# Patient Record
Sex: MALE | Race: WHITE | NOT HISPANIC OR LATINO | Employment: OTHER | URBAN - METROPOLITAN AREA
[De-identification: names, ages, dates, MRNs, and addresses within clinical notes are randomized per-mention and may not be internally consistent; named-entity substitution may affect disease eponyms.]

---

## 2017-01-06 ENCOUNTER — OFFICE VISIT (OUTPATIENT)
Dept: CARDIOLOGY | Facility: CLINIC | Age: 77
End: 2017-01-06
Payer: MEDICARE

## 2017-01-06 ENCOUNTER — TELEPHONE (OUTPATIENT)
Dept: ELECTROPHYSIOLOGY | Facility: CLINIC | Age: 77
End: 2017-01-06

## 2017-01-06 VITALS
HEIGHT: 71 IN | DIASTOLIC BLOOD PRESSURE: 76 MMHG | WEIGHT: 219.56 LBS | SYSTOLIC BLOOD PRESSURE: 139 MMHG | HEART RATE: 51 BPM | BODY MASS INDEX: 30.74 KG/M2

## 2017-01-06 DIAGNOSIS — I25.810 CORONARY ARTERY DISEASE INVOLVING AUTOLOGOUS VEIN CORONARY BYPASS GRAFT WITHOUT ANGINA PECTORIS: ICD-10-CM

## 2017-01-06 DIAGNOSIS — I48.3 TYPICAL ATRIAL FLUTTER: Primary | ICD-10-CM

## 2017-01-06 DIAGNOSIS — I25.5 ISCHEMIC CARDIOMYOPATHY: Chronic | ICD-10-CM

## 2017-01-06 DIAGNOSIS — I25.2 HISTORY OF MYOCARDIAL INFARCTION: ICD-10-CM

## 2017-01-06 DIAGNOSIS — Z95.810 ICD (IMPLANTABLE CARDIOVERTER-DEFIBRILLATOR) IN PLACE: Chronic | ICD-10-CM

## 2017-01-06 DIAGNOSIS — I48.0 PAF (PAROXYSMAL ATRIAL FIBRILLATION): ICD-10-CM

## 2017-01-06 DIAGNOSIS — I50.42 CHRONIC COMBINED SYSTOLIC AND DIASTOLIC HEART FAILURE: ICD-10-CM

## 2017-01-06 DIAGNOSIS — Z95.1 S/P CABG X 4: ICD-10-CM

## 2017-01-06 DIAGNOSIS — I10 ESSENTIAL HYPERTENSION: ICD-10-CM

## 2017-01-06 PROCEDURE — 99214 OFFICE O/P EST MOD 30 MIN: CPT | Mod: S$GLB,,, | Performed by: INTERNAL MEDICINE

## 2017-01-06 PROCEDURE — 1157F ADVNC CARE PLAN IN RCRD: CPT | Mod: S$GLB,,, | Performed by: INTERNAL MEDICINE

## 2017-01-06 PROCEDURE — 1159F MED LIST DOCD IN RCRD: CPT | Mod: S$GLB,,, | Performed by: INTERNAL MEDICINE

## 2017-01-06 PROCEDURE — 99499 UNLISTED E&M SERVICE: CPT | Mod: S$GLB,,, | Performed by: INTERNAL MEDICINE

## 2017-01-06 PROCEDURE — 99999 PR PBB SHADOW E&M-EST. PATIENT-LVL III: CPT | Mod: PBBFAC,,, | Performed by: INTERNAL MEDICINE

## 2017-01-06 PROCEDURE — 3075F SYST BP GE 130 - 139MM HG: CPT | Mod: S$GLB,,, | Performed by: INTERNAL MEDICINE

## 2017-01-06 PROCEDURE — 1126F AMNT PAIN NOTED NONE PRSNT: CPT | Mod: S$GLB,,, | Performed by: INTERNAL MEDICINE

## 2017-01-06 PROCEDURE — 93000 ELECTROCARDIOGRAM COMPLETE: CPT | Mod: S$GLB,,, | Performed by: INTERNAL MEDICINE

## 2017-01-06 PROCEDURE — 1160F RVW MEDS BY RX/DR IN RCRD: CPT | Mod: S$GLB,,, | Performed by: INTERNAL MEDICINE

## 2017-01-06 PROCEDURE — 3078F DIAST BP <80 MM HG: CPT | Mod: S$GLB,,, | Performed by: INTERNAL MEDICINE

## 2017-01-06 NOTE — TELEPHONE ENCOUNTER
----- Message from Morris Alejandre MD sent at 1/5/2017  2:29 PM CST -----  Monitor revealed no significant arrhythmias please relay to patient

## 2017-01-06 NOTE — PROGRESS NOTES
Subjective:    Patient ID:  Ankit Ruff is a 76 y.o. male who presents for follow-up of Atrial Fibrillation (post op f/u - RFA - 10/24/2016) and NSVT      HPI 75 yo male with atrial flutter, CAD, ischemic cardiomyopathy, PCI, CHF, ICD, NSVT.  He is a .  Developed atrial arrhythmias in 2013. Notes indicate atrial fibrillation, ECG available for review c/w atrial flutter. Underwent DCCV 3/13, and did well for extended period. More recently developed recurrence. ECG c/w isthmus-dependent atrial flutter.  Echo 7/8/16 EF 25% PASP 50 mm Hg  Single chamber ICD implanted 10/27/10, rt sided. Reports abnormal lt sided venous anatomy (? Persistent lt SVC).  RFA 10/24/16 Isthmus dependent atrial flutter confirmed, RFA performed.  Event monitor with auto trigger 6 weeks later negative.  Continues to feel well.  Thinks he may feel slightly better overall, but this is a slight difference.  ECG reveals nsr.    Review of Systems   Constitution: Negative. Negative for weakness and malaise/fatigue.   Cardiovascular: Negative for chest pain, dyspnea on exertion, irregular heartbeat, leg swelling, near-syncope, orthopnea, palpitations, paroxysmal nocturnal dyspnea and syncope.   Respiratory: Negative for cough and shortness of breath.    Neurological: Negative for dizziness and light-headedness.   All other systems reviewed and are negative.       Objective:    Physical Exam   Constitutional: He is oriented to person, place, and time. He appears well-developed and well-nourished.   Eyes: Conjunctivae are normal. No scleral icterus.   Neck: No JVD present. No tracheal deviation present.   Cardiovascular: Normal rate, regular rhythm and normal heart sounds.  PMI is not displaced.    Pulmonary/Chest: Effort normal and breath sounds normal. No respiratory distress.   Abdominal: Soft. There is no hepatosplenomegaly. There is no tenderness.   Musculoskeletal: He exhibits no edema (lower extremity) or tenderness.   Neurological:  He is alert and oriented to person, place, and time.   Skin: Skin is warm and dry. No rash noted.   Psychiatric: He has a normal mood and affect. His behavior is normal.         Assessment:       1. Typical atrial flutter    2. Chronic combined systolic and diastolic heart failure    3. Ischemic cardiomyopathy    4. Essential hypertension    5. Coronary artery disease involving autologous vein coronary bypass graft without angina pectoris    6. S/P CABG x 4    7. History of myocardial infarction    8. ICD (implantable cardioverter-defibrillator) in place         Plan:           Doing great.  Discontinue Xarelto.  Event monitor with auto-trigger in one year, f/u upon completion.

## 2017-01-06 NOTE — TELEPHONE ENCOUNTER
Informed Pt of 30 day monitor results stating that there are no significant arrhythmias. He verbalized understanding and denied further questions, needs, and concerns.

## 2017-01-11 ENCOUNTER — PATIENT MESSAGE (OUTPATIENT)
Dept: CARDIOLOGY | Facility: CLINIC | Age: 77
End: 2017-01-11

## 2017-01-11 DIAGNOSIS — I48.0 PAF (PAROXYSMAL ATRIAL FIBRILLATION): Primary | ICD-10-CM

## 2017-01-11 RX ORDER — PANTOPRAZOLE SODIUM 40 MG/1
40 TABLET, DELAYED RELEASE ORAL DAILY
Qty: 90 TABLET | Refills: 1 | Status: SHIPPED | OUTPATIENT
Start: 2017-01-11 | End: 2017-06-05 | Stop reason: SDUPTHER

## 2017-02-06 ENCOUNTER — CLINICAL SUPPORT (OUTPATIENT)
Dept: CARDIOLOGY | Facility: CLINIC | Age: 77
End: 2017-02-06
Payer: MEDICARE

## 2017-02-06 DIAGNOSIS — Z95.810 CARDIAC DEFIBRILLATOR IN SITU: ICD-10-CM

## 2017-02-06 DIAGNOSIS — I50.9 ACUTE ON CHRONIC CONGESTIVE HEART FAILURE, UNSPECIFIED CONGESTIVE HEART FAILURE TYPE: ICD-10-CM

## 2017-02-06 DIAGNOSIS — I25.5 ISCHEMIC CARDIOMYOPATHY: ICD-10-CM

## 2017-02-06 PROCEDURE — 93295 DEV INTERROG REMOTE 1/2/MLT: CPT | Mod: S$GLB,,, | Performed by: INTERNAL MEDICINE

## 2017-02-06 PROCEDURE — 93296 REM INTERROG EVL PM/IDS: CPT | Mod: S$GLB,,, | Performed by: INTERNAL MEDICINE

## 2017-02-12 RX ORDER — GABAPENTIN 300 MG/1
CAPSULE ORAL
Qty: 180 CAPSULE | Refills: 0 | Status: SHIPPED | OUTPATIENT
Start: 2017-02-12 | End: 2017-04-03 | Stop reason: SDUPTHER

## 2017-02-23 RX ORDER — TRAMADOL HYDROCHLORIDE 50 MG/1
50 TABLET ORAL 2 TIMES DAILY
Qty: 60 TABLET | Refills: 0 | Status: SHIPPED | OUTPATIENT
Start: 2017-02-23 | End: 2017-05-29 | Stop reason: SDUPTHER

## 2017-03-16 ENCOUNTER — OFFICE VISIT (OUTPATIENT)
Dept: DERMATOLOGY | Facility: CLINIC | Age: 77
End: 2017-03-16
Payer: MEDICARE

## 2017-03-16 VITALS — BODY MASS INDEX: 27.58 KG/M2 | HEIGHT: 71 IN | WEIGHT: 197 LBS | RESPIRATION RATE: 16 BRPM

## 2017-03-16 DIAGNOSIS — L57.0 MULTIPLE ACTINIC KERATOSES: Primary | ICD-10-CM

## 2017-03-16 DIAGNOSIS — Z85.828 PERSONAL HISTORY OF OTHER MALIGNANT NEOPLASM OF SKIN: ICD-10-CM

## 2017-03-16 DIAGNOSIS — D48.5 NEOPLASM OF UNCERTAIN BEHAVIOR OF SKIN: ICD-10-CM

## 2017-03-16 DIAGNOSIS — Z12.83 SKIN EXAM, SCREENING FOR CANCER: ICD-10-CM

## 2017-03-16 PROCEDURE — 88305 TISSUE EXAM BY PATHOLOGIST: CPT | Performed by: PATHOLOGY

## 2017-03-16 PROCEDURE — 1126F AMNT PAIN NOTED NONE PRSNT: CPT | Mod: S$GLB,,, | Performed by: DERMATOLOGY

## 2017-03-16 PROCEDURE — 1160F RVW MEDS BY RX/DR IN RCRD: CPT | Mod: S$GLB,,, | Performed by: DERMATOLOGY

## 2017-03-16 PROCEDURE — 1157F ADVNC CARE PLAN IN RCRD: CPT | Mod: S$GLB,,, | Performed by: DERMATOLOGY

## 2017-03-16 PROCEDURE — 17004 DESTROY PREMAL LESIONS 15/>: CPT | Mod: S$GLB,,, | Performed by: DERMATOLOGY

## 2017-03-16 PROCEDURE — 1159F MED LIST DOCD IN RCRD: CPT | Mod: S$GLB,,, | Performed by: DERMATOLOGY

## 2017-03-16 PROCEDURE — 99999 PR PBB SHADOW E&M-EST. PATIENT-LVL III: CPT | Mod: PBBFAC,,, | Performed by: DERMATOLOGY

## 2017-03-16 PROCEDURE — 99213 OFFICE O/P EST LOW 20 MIN: CPT | Mod: 25,S$GLB,, | Performed by: DERMATOLOGY

## 2017-03-16 PROCEDURE — 11100 PR BIOPSY OF SKIN LESION: CPT | Mod: 59,S$GLB,, | Performed by: DERMATOLOGY

## 2017-03-16 RX ORDER — CEFDINIR 300 MG/1
CAPSULE ORAL
COMMUNITY
Start: 2017-02-27 | End: 2017-05-29 | Stop reason: ALTCHOICE

## 2017-03-16 NOTE — PROGRESS NOTES
Subjective:       Patient ID:  Ankit Ruff is a 76 y.o. male who presents for   Chief Complaint   Patient presents with    Follow-up     HPI Comments: High risk patient here for f/u Last seen 8-15-16   Here to evaluate for the development of new lesions.     Surgery 6/21/2016 with Dr. Richardson for Mohs surgery left cheek SCC    Phx SK  Phx AK's - cryo tx   Implantable cardioverter-defibrillator) in place        FINAL PATHOLOGIC DATED 6-6-16   1. Skin, left medial cheek, shave biopsy:  - ACTINIC KERATOSIS.  - THE LESION IS ULCERATED.  - THE ATYPICAL SQUAMOUS EPITHELIUM EXTENDS FOCALLY TO THE BASE OF THE BIOPSY, AND AN  UNDERLYING INVASIVE SQUAMOUS CELL CARCINOMA CANNOT BE EXCLUDED.  MICROSCOPIC DESCRIPTION: Sections show atypia within the lowermost epidermal layers associated with  hyper- and parakeratosis and solar elastosis. There is epidermal ulceration with underlying dermal necrosis and  inflammation. Multiple levels were examined. Pancytokeratin immunohistochemical stain (AE1/AE3) fails to reveal  definitive evidence of invasive carcinoma. Appropriately reactive controls were reviewed.  Diagnosed by: Adriana Baum M.D.  (Electronically Signed: 2016-06-10 11:24:04)    History numerous NMSC , AKs s/p cryo at last visit.  SCC in situ on the right lower nose. In light of the absence of any evidence of residual squamous cell carcinoma in situ at the biopsy site and given the small apparent size of the lesion, the pt defered treatment at this time.    Hx of skin cancer had Mohs surgeries by dr Richardson 9/2015, SCC.  History PDT in past, unhappy with experience.    History mohs left neck/postauricular. States firm scar since initial surgery date - stable      growth on left cheek, rough, weeks, asx, no tx.     Scaly area on nose, asx, no tx. Weeks.     Review of Systems   Skin: Positive for dry skin. Negative for daily sunscreen use, activity-related sunscreen use, sensitivity to antibiotic ointment,  sensitivity to bandage adhesive and tendency to form keloidal scars.   Hematologic/Lymphatic: Bruises/bleeds easily.        Objective:    Physical Exam   Constitutional: He appears well-developed and well-nourished. No distress.   Neurological: He is alert and oriented to person, place, and time. He is not disoriented.   Psychiatric: He has a normal mood and affect.   Skin:   Areas Examined (abnormalities noted in diagram):   Scalp / Hair Palpated and Inspected  Head / Face Inspection Performed  Neck Inspection Performed  Chest / Axilla Inspection Performed  Abdomen Inspection Performed  Back Inspection Performed  RUE Inspected  LUE Inspection Performed  RLE Inspected  LLE Inspection Performed  Nails and Digits Inspection Performed                   Diagram Legend     Erythematous scaling macule/papule c/w actinic keratosis       Vascular papule c/w angioma      Pigmented verrucoid papule/plaque c/w seborrheic keratosis      Yellow umbilicated papule c/w sebaceous hyperplasia      Irregularly shaped tan macule c/w lentigo     1-2 mm smooth white papules consistent with Milia      Movable subcutaneous cyst with punctum c/w epidermal inclusion cyst      Subcutaneous movable cyst c/w pilar cyst      Firm pink to brown papule c/w dermatofibroma      Pedunculated fleshy papule(s) c/w skin tag(s)      Evenly pigmented macule c/w junctional nevus     Mildly variegated pigmented, slightly irregular-bordered macule c/w mildly atypical nevus      Flesh colored to evenly pigmented papule c/w intradermal nevus       Pink pearly papule/plaque c/w basal cell carcinoma      Erythematous hyperkeratotic cursted plaque c/w SCC      Surgical scar with no sign of skin cancer recurrence      Open and closed comedones      Inflammatory papules and pustules      Verrucoid papule consistent consistent with wart     Erythematous eczematous patches and plaques     Dystrophic onycholytic nail with subungual debris c/w onychomycosis      Umbilicated papule    Erythematous-base heme-crusted tan verrucoid plaque consistent with inflamed seborrheic keratosis     Erythematous Silvery Scaling Plaque c/w Psoriasis     See annotation          Assessment / Plan:      Pathology Orders:      Normal Orders This Visit    Tissue Specimen To Pathology, Dermatology     Questions:    Directional Terms:  Other(comment)    Clinical information:  scc vs other    Specific Site:  left cheek        Multiple actinic keratoses  Cryosurgery Procedure Note    Verbal consent from the patient is obtained and the patient is aware of the precancerous quality and need for treatment of these lesions. Liquid nitrogen cryosurgery is applied to the 18 actinic keratoses, as detailed in the physical exam, to produce a freeze injury. The patient is aware that blisters may form and is instructed on wound care with gentle cleansing and use of vaseline ointment to keep moist until healed. The patient is supplied a handout on cryosurgery and is instructed to call if lesions do not completely resolve. Discussed risk postinflammatory pigmentary changes.       Neoplasm of uncertain behavior of skin  -     Tissue Specimen To Pathology, Dermatology  If biopsy reveals malignancy, will refer to Dr. Richardson for Mohs surgery consultation.    Shave biopsy procedure note:    Shave biopsy performed after verbal consent including risk of infection, scar, recurrence, need for additional treatment of site. Area prepped with alcohol, anesthetized with approximately 1.0cc of 1% lidocaine with epinephrine. Lesional tissue shaved with razor blade. Hemostasis achieved with application of aluminum chloride followed by hyfrecation. No complications. Dressing applied. Wound care explained.        Skin exam, screening for cancer  Area(s) of previous NMSC evaluated with no signs of recurrence.    Upper body skin examination performed today including at least 6 points as noted in physical examination. Suspicious  lesions noted.      Personal history of other malignant neoplasm of skin  Area(s) of previous NMSC evaluated with no signs of recurrence.    Upper body skin examination performed today including at least 6 points as noted in physical examination. Suspicious lesions noted.           Return in about 4 months (around 7/16/2017).

## 2017-03-29 ENCOUNTER — INITIAL CONSULT (OUTPATIENT)
Dept: DERMATOLOGY | Facility: CLINIC | Age: 77
End: 2017-03-29
Payer: MEDICARE

## 2017-03-29 VITALS
HEIGHT: 71 IN | BODY MASS INDEX: 27.44 KG/M2 | SYSTOLIC BLOOD PRESSURE: 114 MMHG | HEART RATE: 61 BPM | WEIGHT: 196 LBS | DIASTOLIC BLOOD PRESSURE: 70 MMHG

## 2017-03-29 DIAGNOSIS — C44.329 SQUAMOUS CELL CARCINOMA OF SKIN OF CHEEK: Primary | ICD-10-CM

## 2017-03-29 PROCEDURE — 1159F MED LIST DOCD IN RCRD: CPT | Mod: S$GLB,,, | Performed by: DERMATOLOGY

## 2017-03-29 PROCEDURE — 3074F SYST BP LT 130 MM HG: CPT | Mod: S$GLB,,, | Performed by: DERMATOLOGY

## 2017-03-29 PROCEDURE — 99213 OFFICE O/P EST LOW 20 MIN: CPT | Mod: S$GLB,,, | Performed by: DERMATOLOGY

## 2017-03-29 PROCEDURE — 1160F RVW MEDS BY RX/DR IN RCRD: CPT | Mod: S$GLB,,, | Performed by: DERMATOLOGY

## 2017-03-29 PROCEDURE — 1126F AMNT PAIN NOTED NONE PRSNT: CPT | Mod: S$GLB,,, | Performed by: DERMATOLOGY

## 2017-03-29 PROCEDURE — 99999 PR PBB SHADOW E&M-EST. PATIENT-LVL III: CPT | Mod: PBBFAC,,, | Performed by: DERMATOLOGY

## 2017-03-29 PROCEDURE — 1157F ADVNC CARE PLAN IN RCRD: CPT | Mod: S$GLB,,, | Performed by: DERMATOLOGY

## 2017-03-29 PROCEDURE — 3078F DIAST BP <80 MM HG: CPT | Mod: S$GLB,,, | Performed by: DERMATOLOGY

## 2017-03-29 NOTE — PROGRESS NOTES
ALLERGIES:  Penicillins    CHIEF COMPLAINT:  This 76 y.o. male comes for evaluation for Mohs' Micrographic Surgery, Fresh Tissue Technique, for treatment of a biopsy-proven squamous cell carcinoma on the left cheek. Consultation requested by Bridgett Singh MD.    HISTORY OF PRESENT ILLNESS:   Location: Left cheek  Duration: 3-4 months  Quality:  Context: status post biopsy by Bridgett Singh MD; path = squamous cell carcinoma ; pathology accession #[id number], [facility]     Prior Treatment: none  See also the handwritten notes/diagrams scanned to chart for additional details.    Defibrillator: Yes  Pacemaker: No  Artificial heart valves: No  Artificial joints: No    REVIEW OF SYSTEMS:   General: general health good  Skin: has previous history of skin cancer(s); prior Mohs surgery  CV: has no hypertension, no artificial valves; has a defibrillator; has atrial fibrillation; status post ablation; history of coronary artery bypass grafting and stents  Resp: has no respiratory problems  Endo: has no diabetes  Hem/Lymph: taking prescribed anticoagulants-ASA; has stopped Xarelto  Allergy/Immuno: has allergies as noted above  GI: has no history of hepatitis  MS: as noted above     PAST MEDICAL HISTORY:  Past Medical History:   Diagnosis Date    Anxiety     Arthritis     Atrial fibrillation     CAD (coronary artery disease)     Cataract     OU    CHF (congestive heart failure)     Defibrillator discharge     Heart failure     Hyperlipidemia     Hypertension     ICD (implantable cardiac defibrillator) in place     Ischemic cardiomyopathy     Myocardial infarction     Sciatica     had seen Dr. Amy Canela in the past    Squamous cell carcinoma     Left cheek 4-2016        PAST SURGICAL HISTORY:  Past Surgical History:   Procedure Laterality Date    APPENDECTOMY      CARDIAC SURGERY      FRACTURE SURGERY      HERNIA REPAIR      SKIN BIOPSY          SOCIAL HISTORY:  Dependencies: smoking status as  noted below  Social History   Substance Use Topics    Smoking status: Former Smoker     Types: Cigarettes     Quit date: 8/8/1977    Smokeless tobacco: Never Used    Alcohol use Yes      Comment: socially       PERTINENT MEDICATIONS:  See medications list.  Current Outpatient Prescriptions on File Prior to Visit   Medication Sig Dispense Refill    aspirin (ECOTRIN) 81 MG EC tablet Take 81 mg by mouth once daily.        carvedilol (COREG) 12.5 MG tablet TAKE 1 TABLET TWICE DAILY 180 tablet 3    cefdinir (OMNICEF) 300 MG capsule       citalopram (CELEXA) 10 MG tablet TAKE 1 TABLET EVERY DAY 90 tablet 3    gabapentin (NEURONTIN) 300 MG capsule TAKE 1 CAPSULE TWICE DAILY 180 capsule 0    meloxicam (MOBIC) 15 MG tablet TAKE 1 TABLET ONE TIME DAILY 90 tablet 1    pantoprazole (PROTONIX) 40 MG tablet Take 1 tablet (40 mg total) by mouth once daily. 90 tablet 1    rosuvastatin (CRESTOR) 10 MG tablet TAKE 1 TABLET ONE TIME DAILY 90 tablet 3    spironolactone (ALDACTONE) 25 MG tablet Take 1 tablet (25 mg total) by mouth once daily. Take half a tablet Monday, Wednesday and Friday 45 tablet 3    tizanidine (ZANAFLEX) 4 MG tablet TAKE 1/2 TABLET EVERY 8 HOURS 90 tablet 0    tramadol (ULTRAM) 50 mg tablet Take 1 tablet (50 mg total) by mouth 2 (two) times daily. 60 tablet 0     No current facility-administered medications on file prior to visit.        ALLERGIES:  Penicillins    EXAM:  See also the handwritten notes/diagrams scanned to chart for additional details.  Constitutional  General appearance: well-developed, well-nourished, well-kempt older white male    Eyes  Inspection of conjunctivae and lids reveals no abnormalities; sclerae anicteric  Neurologic/Psychiatric  Alert,  normal orientation to time, place, person  Normal mood and affect with no evidence of depression, anxiety, agitation  Skin: see photo(s)  Head: background marked solar damage to exposed areas of skin; in addition, inspection and/or  palpation reveals an approximately 5 mm eschar with an ill-defined area of surrounding erythema on the left malar cheek/lower eyelid which feels freely movable over the underlying tissues on palpation;  he confirmed this as the site of the prior biopsy; inferior to this on the cheek is a well-healed scar from his prior surgery  Neck: examination reveals marked chronic solar damage  Right upper extremity: examination reveals marked chronic solar damage  Left upper extremity: examination reveals marked chronic solar damage    ASSESSMENT: 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 In particular, the discussion included, but was not limited to, the following:    One alternative at this point would be to defer further treatment and observe the lesion. With small skin cancers of this kind, it is possible that a biopsy can be sufficient to definitively treat a small skin cancer of this kind. Alternatively, some skin cancers are slow growing and do not require immediate treatment. The potential advantage of this choice would be to avoid the need for possibly unnecessary additional surgery. Among the potential disadvantages of this would be the possibility of enlargement of the lesion, more extensive spread of the lesion or recurrence at a later date, which might necessitate a larger and more complex surgery.    Radiation treatment can be an effective treatment for this type of skin cancer. The usual course of treatment is every weekday for  several weeks. Local irritation will result from treatment, although no systemic side effects are expected. The potential advantage of radiation treatment is that it avoids the need for surgery. Among the disadvantages of radiation treatment are the length of treatment, the local inflammatory response, the absence of pathologic confirmation of the removal of the skin cancer, a possible increased risk of additional skin cancer in the treated area in later years, and a somewhat increased risk of recurrence at a later date.     Excisional surgery can be an effective treatment for this type of skin cancer. This would involve excision of the lesion with margin evaluation by submitting the specimen to a pathologist for either immediate marginal assessment via frozen section processing, or delayed marginal assessment by fixed-tissue processing. The potential advantage of this technique is that it offers a way of treating the lesion with some degree of histologic confirmation of tumor removal. Among the disadvantages of this treatment are the possible need for re-excision if marginal involvement is identified, a somewhat greater likelihood of recurrence as compared to Mohs' surgery because of the less comprehensive margin evaluation inherent in the technique, and the general potential risks of surgery, including allergic reactions to the anesthetic and other materials used, infection, injury to nerves in the area with consequent loss of sensation or muscle function, and scarring or distortion of surrounding structures.    Mohs' surgery is a very effective treatment for this type of skin cancer. The potential advantage of Mohs' surgery is that this technique offers the greatest possible certainty of knowing that the skin cancer has been completely removed, with the removal of the least amount of normal tissue. The potential disadvantages of Mohs' surgery include the duration of the surgery, the possible need for a separate  surgery for reconstruction following tumor removal, and scarring as a result. In addition, general potential risks of surgery as noted above also apply to treatment via Mohs' surgery.    In light of the ill-defined nature of this tumor and the location on the cheek/eyelid in an area of increased risk of recurrence,  Mohs' micrographic surgery was thought to be the most appropriate management choice, and this diagnosis is appropriate for treatment by Mohs' micrographic surgery.     Sufficient time was available for questions, and all questions were answered to his satisfaction. He fully understands the aims, risks, alternatives, and possible complications, and has elected to proceed with the surgery, and verbally consented to do so. The procedure will be scheduled in the near future.  Routine pre-op instructions were given to him.    The patient was instructed to continue ASA prior to surgery.    --------------------------------------  Note: some or all of this note may have been generated using voice recognition software and thus may contain grammatical and/or spelling errors.

## 2017-03-29 NOTE — LETTER
March 30, 2017      Bridgett Singh MD  [address] [facility] [address] [place] [address]           [facility]  [address] [facility] [address] [place] [address]  Phone: [phone]          Patient: Ankit Ruff   MR Number: [id number]   YOB: 1940   Date of Visit: 3/29/2017       Dear Dr. Bridgett Singh:    Thank you for referring Ankit Ruff to me for evaluation. Attached you will find relevant portions of my assessment and plan of care.    If you have questions, please do not hesitate to call me. I look forward to following Ankit Ruff along with you.    Sincerely,    Javier Richardson MD    Enclosure  CC:  No Recipients    If you would like to receive this communication electronically, please contact [email or web address] or [phone] to request more information on [employer] access.    For providers and/or their staff who would like to refer a patient to [facility], please contact us through our one-stop-shop provider referral line, [facility], at [phone].    If you feel you have received this communication in error or would no longer like to receive these types of communications, please e-mail [email or web address]

## 2017-03-29 NOTE — MR AVS SNAPSHOT
[place] [facility]  [address]  [facility] [address]  Phone: [phone]                  Ankit Ruff   3/29/2017 2:00 PM   Initial consult    Description:  Male : 1940   Provider:  Javier Richardson MD   Department:  [place] [facility]           Reason for Visit     Squamous Cell Carcinoma                To Do List           Goals (5 Years of Data)     None      [facility]     [facility] Care Line -  Assistance  Registered nurses in the [facility] provide clinical advisement, health education, appointment booking, and other advisory services.  Call for this free service at [phone].             Medications           Message regarding Medications     Verify the changes and/or additions to your medication regime listed below are the same as discussed with your clinician today.  If any of these changes or additions are incorrect, please notify your healthcare provider.             Verify that the below list of medications is an accurate representation of the medications you are currently taking.  If none reported, the list may be blank. If incorrect, please contact your healthcare provider. Carry this list with you in case of emergency.           Current Medications     aspirin (ECOTRIN) 81 MG EC tablet Take 81 mg by mouth once daily.      carvedilol (COREG) 12.5 MG tablet TAKE 1 TABLET TWICE DAILY    cefdinir (OMNICEF) 300 MG capsule     citalopram (CELEXA) 10 MG tablet TAKE 1 TABLET EVERY DAY    gabapentin (NEURONTIN) 300 MG capsule TAKE 1 CAPSULE TWICE DAILY    meloxicam (MOBIC) 15 MG tablet TAKE 1 TABLET ONE TIME DAILY    pantoprazole (PROTONIX) 40 MG tablet Take 1 tablet (40 mg total) by mouth once daily.    rosuvastatin (CRESTOR) 10 MG tablet TAKE 1 TABLET ONE TIME DAILY    spironolactone (ALDACTONE) 25 MG tablet Take 1 tablet (25 mg total) by mouth once daily. Take half a tablet Monday, Wednesday and Friday    tizanidine (ZANAFLEX) 4 MG  "tablet TAKE 1/2 TABLET EVERY 8 HOURS    tramadol (ULTRAM) 50 mg tablet Take 1 tablet (50 mg total) by mouth 2 (two) times daily.           Clinical Reference Information           Your Vitals Were     BP Pulse Height Weight BMI    114/70 (BP Location: Right arm, Patient Position: Sitting, BP Method: Automatic) 61 5' 11" (1.803 m) 88.9 kg (196 lb) 27.34 kg/m2      Blood Pressure          Most Recent Value    BP  114/70      Allergies as of 3/29/2017     Penicillins      Immunizations Administered on Date of Encounter - 3/29/2017     None      Language Assistance Services     ATTENTION: Language assistance services are available, free of charge. Please call [phone].      ATENCIÓN: Si tatyana bello, tiene a mena disposición servicios gratuitos de asistencia lingüística. Llgume al [phone].     PRIMITIVO Ý: N?u b?n nói Ti?ng Vi?t, có các d?ch v? h? tr? ngôn ng? mi?n phí dành cho b?n. G?i s? [phone].         [facility] complies with applicable Federal civil rights laws and does not discriminate on the basis of race, color, national origin, age, disability, or sex.        "

## 2017-04-04 RX ORDER — GABAPENTIN 300 MG/1
CAPSULE ORAL
Qty: 180 CAPSULE | Refills: 0 | Status: SHIPPED | OUTPATIENT
Start: 2017-04-04 | End: 2017-06-14 | Stop reason: SDUPTHER

## 2017-04-05 ENCOUNTER — PROCEDURE VISIT (OUTPATIENT)
Dept: DERMATOLOGY | Facility: CLINIC | Age: 77
End: 2017-04-05
Payer: MEDICARE

## 2017-04-05 VITALS
SYSTOLIC BLOOD PRESSURE: 131 MMHG | DIASTOLIC BLOOD PRESSURE: 74 MMHG | HEART RATE: 55 BPM | HEIGHT: 71 IN | WEIGHT: 196 LBS | BODY MASS INDEX: 27.44 KG/M2

## 2017-04-05 DIAGNOSIS — C44.329 SQUAMOUS CELL CARCINOMA OF SKIN OF CHEEK: Primary | ICD-10-CM

## 2017-04-05 PROCEDURE — 99499 UNLISTED E&M SERVICE: CPT | Mod: S$GLB,,, | Performed by: DERMATOLOGY

## 2017-04-05 PROCEDURE — 12052 INTMD RPR FACE/MM 2.6-5.0 CM: CPT | Mod: 51,S$GLB,, | Performed by: DERMATOLOGY

## 2017-04-05 PROCEDURE — 17311 MOHS 1 STAGE H/N/HF/G: CPT | Mod: S$GLB,,, | Performed by: DERMATOLOGY

## 2017-04-05 NOTE — PROGRESS NOTES
ALLERGIES:   Penicillins      Current Outpatient Prescriptions:     aspirin (ECOTRIN) 81 MG EC tablet, Take 81 mg by mouth once daily.  , Disp: , Rfl:     carvedilol (COREG) 12.5 MG tablet, TAKE 1 TABLET TWICE DAILY, Disp: 180 tablet, Rfl: 3    cefdinir (OMNICEF) 300 MG capsule, , Disp: , Rfl:     citalopram (CELEXA) 10 MG tablet, TAKE 1 TABLET EVERY DAY, Disp: 90 tablet, Rfl: 3    gabapentin (NEURONTIN) 300 MG capsule, TAKE 1 CAPSULE TWICE DAILY, Disp: 180 capsule, Rfl: 0    meloxicam (MOBIC) 15 MG tablet, TAKE 1 TABLET ONE TIME DAILY, Disp: 90 tablet, Rfl: 1    pantoprazole (PROTONIX) 40 MG tablet, Take 1 tablet (40 mg total) by mouth once daily., Disp: 90 tablet, Rfl: 1    rosuvastatin (CRESTOR) 10 MG tablet, TAKE 1 TABLET ONE TIME DAILY, Disp: 90 tablet, Rfl: 3    spironolactone (ALDACTONE) 25 MG tablet, Take 1 tablet (25 mg total) by mouth once daily. Take half a tablet Monday, Wednesday and Friday, Disp: 45 tablet, Rfl: 3    tizanidine (ZANAFLEX) 4 MG tablet, TAKE 1/2 TABLET EVERY 8 HOURS, Disp: 90 tablet, Rfl: 0    tramadol (ULTRAM) 50 mg tablet, Take 1 tablet (50 mg total) by mouth 2 (two) times daily., Disp: 60 tablet, Rfl: 0  -------------------------------------------------------------  PROCEDURE: Mohs' Micrographic Surgery    SITE: left cheek    INDICATION: squamous cell carcinoma in an area at increased risk of recurrence    CASE NUMBER: [id number]      ANESTHETIC: 1 mL 1% Lidocaine with Epinephrine 1:100,000, buffered    SURGICAL PREP: Ethanol and Betadine    SURGEON: Javier Richardson MD    ASSISTANTS: Isidro Dobson CST     PREOPERATIVE DIAGNOSIS: squamous cell carcinoma     POSTOPERATIVE DIAGNOSIS: squamous cell carcinoma     PATHOLOGIC DIAGNOSIS: squamous cell carcinoma     STAGES OF MOHS' SURGERY PERFORMED: one    TUMOR-FREE PLANE ACHIEVED: yes    HEMOSTASIS: Hot-tipped cautery     SPECIMENS: one (one in stage A)    INITIAL LESION SIZE: 0.8 x 0.9 cm    FINAL DEFECT SIZE: 0.9 x 1.1  cm    WOUND REPAIR/DISPOSITION: see below    NARRATIVE:    The patient is a 76 y.o.male referred by Bridgett Singh MD with a history of cancer on the left malar cheek/lower eyelid which was biopsied - pathology accession #[id number], [facility]. Findings revealed squamous cell carcinoma. Examination revealed a pink, crusted scar on the left malar cheek/lower eyelid at the site of prior biopsy, which was confirmed by reference to the photograph taken at the previous patient visit. In light of the nature of this tumor and the location, Mohs' micrographic surgery was thought to be the most appropriate management choice, and this diagnosis is appropriate for treatment by Mohs' micrographic surgery.  I discussed it with the patient and he fully understands the aims, risks, alternatives, and possible complications, and elects to proceed.  There are no medical or surgical contraindications to the procedure.     A signed informed consent was obtained.    PROCEDURE:  The patient was placed in the semi-recumbent position on the operating table in the Mohs' Surgery Suite. The area in question was thoroughly prepped with ethanol and Betadine. A sterile surgical marker was used to outline the clinically apparent margins of the involved area, and a narrow margin of normal-appearing skin. Reference marks were made at the periphery of the outlined area with the surgical marker. The proposed area of excision was measured and photographed. Local anesthesia of 1% Lidocaine with 1:100,000 epinephrine, buffered with sodium bicarbonate, was administered.  The total volume of anesthetic used throughout this portion of the procedure was as documented above. The area was prepared and draped in the standard manner. All of the grossly identifiable area of clinically abnormal tissue and an underlying/peripheral layer was taken and processed by the Mohs' technique.  Hemostasis was obtained with the hot-tipped cautery pen. Tissue was  "taken from any areas of residual marginal involvement (if present) and processed by the Mohs' technique in as many stages as needed until a tumor-free plane was achieved.    Colors of inks used in the reference nicks at epidermal margins (if present) and/or inking of non-epithelial edges, if applicable, is represented on the Mohs map as follows: solid lines represent red ink, dots represent blue ink, jagged lines represent black ink, curlicues represent green ink, "xxx" represents yellow ink.    The first Mohs' layer consisted of one section(s) with 4 slide(s) evaluated. No residual tumor was noted at the margins of the first Mohs' layer. Histology of the specimen(s) showed changes consistent with chronic solar damage.    A total of one section(s) and 4 slide(s) were examined under the microscope via the Mohs technique.  A cancer free plane was reached after layer number one. Defect final size was as noted above.      The wound was covered with a nonadherent dressing between stages, and the patient allowed to wait in the waiting area during these periods. The final defect was photographed at the completion of the Mohs' procedure.    See the separate procedure note which follows regarding repair of the defect following Mohs' surgery.       -----------------------------------------------    REPAIR FOLLOWING MOHS' MICROGRAPHIC SURGERY    PREOPERATIVE DIAGNOSIS: defect following Mohs' surgery for a squamous cell carcinoma    POSTOPERATIVE DIAGNOSIS: same    PROCEDURE PERFORMED: intermediate (layered) closure     ANESTHETIC: 3 mL 1% Lidocaine with Epinephrine 1:100,000, buffered     SURGICAL PREP: Betadine    SURGEON: Javier Richardson MD     ASSISTANTS: as above    LOCATION: left malar cheek/lower eyelid      INDICATIONS:  Earlier in the day, the patient underwent Mohs' micrographic surgical excision of a squamous cell carcinoma on the left malar cheek/lower eyelid. Tumor free margins were achieved after layer number " one.  Later in the day, the management of the resulting wound was addressed with the patient. I discussed the various wound management options with the patient and he fully understands the aims, risks, alternatives, and possible complications of the alternatives, and he elects to proceed with closure of the defect in the manner noted below.  There are no medical or surgical contraindications to the procedure.    A signed informed consent was previously obtained.    PROCEDURE:  Repair via intermediate closure:  The patient was returned to the procedure room following completion of the Mohs' procedure and final slide review. Because of the size, shape and location of the defect, simple closure could not be achieved without excessive tension on the wound margins and an unacceptable risk of wound dehiscence and standing cone deformities. After surgical prepping, additional anesthetic was infiltrated into the tissues surrounding the defect and the anticipated area of repair, to maintain anesthesia during the procedure. Preparation of the site was carried out by extending the defect through excision of small triangles of superfluous tissue on either side of the wound to square the shoulders of the defect and to allow closure without distortion by standing cone deformities, creating a fusiform defect measuring 0.9 x 3.0 cm in size. Wound margins were minimally undermined to allow closure with minimal tension. After hemostasis was achieved with the hyfrecator, closure was accomplished in layered fashion with:      three #5-0 buried interrupted Vicryl suture(s) and    two #5-0 running locked Prolene suture(s) for final approximation of the wound margins.     The site was photographed following completion of the repair. Final dressing consisted of petrolatum, Telfa and tape.    Estimated blood loss for the total procedure was less than 5 mL.    Total operative time including tissue processing in the Mohs' laboratory and  microscopic Mohs' frozen section slide review was 2 hour(s). Verbal and written wound care instructions were given to the patient, and he expressed understanding of these instructions. The patient tolerated the procedure well and left the operating room in good condition; he is to return in 7 days for suture removal.     Dr. Richardson's cell phone number was given to the patient with instructions to call prn with any problems.

## 2017-04-12 ENCOUNTER — OFFICE VISIT (OUTPATIENT)
Dept: DERMATOLOGY | Facility: CLINIC | Age: 77
End: 2017-04-12
Payer: MEDICARE

## 2017-04-12 DIAGNOSIS — Z48.02 VISIT FOR SUTURE REMOVAL: Primary | ICD-10-CM

## 2017-04-12 PROCEDURE — 99024 POSTOP FOLLOW-UP VISIT: CPT | Mod: S$GLB,,, | Performed by: DERMATOLOGY

## 2017-04-12 PROCEDURE — 99999 PR PBB SHADOW E&M-EST. PATIENT-LVL II: CPT | Mod: PBBFAC,,, | Performed by: DERMATOLOGY

## 2017-05-01 ENCOUNTER — TELEPHONE (OUTPATIENT)
Dept: FAMILY MEDICINE | Facility: CLINIC | Age: 77
End: 2017-05-01

## 2017-05-03 ENCOUNTER — TELEPHONE (OUTPATIENT)
Dept: INTERNAL MEDICINE | Facility: CLINIC | Age: 77
End: 2017-05-03

## 2017-05-03 DIAGNOSIS — E78.5 HYPERLIPIDEMIA, UNSPECIFIED HYPERLIPIDEMIA TYPE: ICD-10-CM

## 2017-05-03 DIAGNOSIS — Z51.81 MEDICATION MONITORING ENCOUNTER: Primary | ICD-10-CM

## 2017-05-03 RX ORDER — TRAMADOL HYDROCHLORIDE 50 MG/1
50 TABLET ORAL 2 TIMES DAILY
Qty: 60 TABLET | Refills: 0 | OUTPATIENT
Start: 2017-05-03

## 2017-05-03 RX ORDER — MELOXICAM 15 MG/1
TABLET ORAL
Qty: 90 TABLET | Refills: 1 | OUTPATIENT
Start: 2017-05-03

## 2017-05-03 NOTE — TELEPHONE ENCOUNTER
scheduled appointment for 5/29/17 at 1:40 appointment letter printed for reminder for appointment printed to be mailed to patient

## 2017-05-03 NOTE — TELEPHONE ENCOUNTER
I have refused refills.  He needs appt. And labs.  May be with available provider.  He cancelled his last 2 appts. with me.

## 2017-05-04 RX ORDER — MELOXICAM 15 MG/1
TABLET ORAL
Qty: 90 TABLET | Refills: 1 | OUTPATIENT
Start: 2017-05-04

## 2017-05-05 ENCOUNTER — LAB VISIT (OUTPATIENT)
Dept: LAB | Facility: HOSPITAL | Age: 77
End: 2017-05-05
Attending: INTERNAL MEDICINE
Payer: MEDICARE

## 2017-05-05 DIAGNOSIS — Z51.81 MEDICATION MONITORING ENCOUNTER: ICD-10-CM

## 2017-05-05 DIAGNOSIS — E78.5 HYPERLIPIDEMIA, UNSPECIFIED HYPERLIPIDEMIA TYPE: ICD-10-CM

## 2017-05-05 LAB
ALBUMIN SERPL BCP-MCNC: 3.9 G/DL
ALP SERPL-CCNC: 63 U/L
ALT SERPL W/O P-5'-P-CCNC: 41 U/L
ANION GAP SERPL CALC-SCNC: 9 MMOL/L
AST SERPL-CCNC: 46 U/L
BILIRUB SERPL-MCNC: 0.9 MG/DL
BUN SERPL-MCNC: 20 MG/DL
CALCIUM SERPL-MCNC: 9.3 MG/DL
CHLORIDE SERPL-SCNC: 102 MMOL/L
CHOLEST/HDLC SERPL: 2.8 {RATIO}
CO2 SERPL-SCNC: 27 MMOL/L
CREAT SERPL-MCNC: 1 MG/DL
EST. GFR  (AFRICAN AMERICAN): >60 ML/MIN/1.73 M^2
EST. GFR  (NON AFRICAN AMERICAN): >60 ML/MIN/1.73 M^2
GLUCOSE SERPL-MCNC: 104 MG/DL
HDL/CHOLESTEROL RATIO: 35.5 %
HDLC SERPL-MCNC: 138 MG/DL
HDLC SERPL-MCNC: 49 MG/DL
LDLC SERPL CALC-MCNC: 69 MG/DL
NONHDLC SERPL-MCNC: 89 MG/DL
POTASSIUM SERPL-SCNC: 4.8 MMOL/L
PROT SERPL-MCNC: 7.4 G/DL
SODIUM SERPL-SCNC: 138 MMOL/L
TRIGL SERPL-MCNC: 100 MG/DL

## 2017-05-05 PROCEDURE — 36415 COLL VENOUS BLD VENIPUNCTURE: CPT | Mod: PO

## 2017-05-05 PROCEDURE — 80053 COMPREHEN METABOLIC PANEL: CPT

## 2017-05-05 PROCEDURE — 80061 LIPID PANEL: CPT

## 2017-05-24 ENCOUNTER — OFFICE VISIT (OUTPATIENT)
Dept: DERMATOLOGY | Facility: CLINIC | Age: 77
End: 2017-05-24
Payer: MEDICARE

## 2017-05-24 DIAGNOSIS — Z98.890 HISTORY OF MOH'S MICROGRAPHIC SURGERY FOR SKIN CANCER: Primary | ICD-10-CM

## 2017-05-24 DIAGNOSIS — Z85.828 HISTORY OF MOH'S MICROGRAPHIC SURGERY FOR SKIN CANCER: Primary | ICD-10-CM

## 2017-05-24 PROCEDURE — 99999 PR PBB SHADOW E&M-EST. PATIENT-LVL I: CPT | Mod: PBBFAC,,, | Performed by: DERMATOLOGY

## 2017-05-24 PROCEDURE — 99024 POSTOP FOLLOW-UP VISIT: CPT | Mod: S$GLB,,, | Performed by: DERMATOLOGY

## 2017-05-24 NOTE — PROGRESS NOTES
CC: 76 y.o.male patient is here for followup     HPI: Patient is 7 week(s) s/p Mohs' micrographic surgery, fresh tissue technique, of a squamous cell carcinoma on the left cheek; with subsequent repair   Patient reports no problems    EXAM: Site appears well healed. Some residual erythema as anticipated.    IMPRESSION: Well healed post Mohs' micrographic surgery    PLAN:  Discussed anticipated course  Followup to Dr. Singh in 2-3 months; prn to me

## 2017-05-29 ENCOUNTER — OFFICE VISIT (OUTPATIENT)
Dept: INTERNAL MEDICINE | Facility: CLINIC | Age: 77
End: 2017-05-29
Payer: MEDICARE

## 2017-05-29 ENCOUNTER — CLINICAL SUPPORT (OUTPATIENT)
Dept: CARDIOLOGY | Facility: CLINIC | Age: 77
End: 2017-05-29
Payer: MEDICARE

## 2017-05-29 ENCOUNTER — TELEPHONE (OUTPATIENT)
Dept: FAMILY MEDICINE | Facility: CLINIC | Age: 77
End: 2017-05-29

## 2017-05-29 ENCOUNTER — HOSPITAL ENCOUNTER (OUTPATIENT)
Dept: RADIOLOGY | Facility: HOSPITAL | Age: 77
Discharge: HOME | End: 2017-05-29
Attending: INTERNAL MEDICINE
Payer: MEDICARE

## 2017-05-29 VITALS
RESPIRATION RATE: 18 BRPM | BODY MASS INDEX: 29.56 KG/M2 | HEIGHT: 71 IN | SYSTOLIC BLOOD PRESSURE: 124 MMHG | HEART RATE: 59 BPM | DIASTOLIC BLOOD PRESSURE: 70 MMHG | WEIGHT: 211.19 LBS | OXYGEN SATURATION: 96 %

## 2017-05-29 DIAGNOSIS — I47.29 NSVT (NONSUSTAINED VENTRICULAR TACHYCARDIA): ICD-10-CM

## 2017-05-29 DIAGNOSIS — M47.26 OSTEOARTHRITIS OF SPINE WITH RADICULOPATHY, LUMBAR REGION: ICD-10-CM

## 2017-05-29 DIAGNOSIS — J42 CHRONIC BRONCHITIS, UNSPECIFIED CHRONIC BRONCHITIS TYPE: Primary | ICD-10-CM

## 2017-05-29 DIAGNOSIS — Z00.00 HEALTH CARE MAINTENANCE: ICD-10-CM

## 2017-05-29 DIAGNOSIS — J42 CHRONIC BRONCHITIS, UNSPECIFIED CHRONIC BRONCHITIS TYPE: ICD-10-CM

## 2017-05-29 DIAGNOSIS — Z95.810 CARDIAC DEFIBRILLATOR IN SITU: ICD-10-CM

## 2017-05-29 DIAGNOSIS — I48.0 PAF (PAROXYSMAL ATRIAL FIBRILLATION): ICD-10-CM

## 2017-05-29 DIAGNOSIS — E78.5 HYPERLIPIDEMIA, UNSPECIFIED HYPERLIPIDEMIA TYPE: ICD-10-CM

## 2017-05-29 PROCEDURE — 71020 XR CHEST PA AND LATERAL: CPT | Mod: TC,PO

## 2017-05-29 PROCEDURE — 93296 REM INTERROG EVL PM/IDS: CPT | Mod: S$GLB,,, | Performed by: INTERNAL MEDICINE

## 2017-05-29 PROCEDURE — 99499 UNLISTED E&M SERVICE: CPT | Mod: S$GLB,,, | Performed by: INTERNAL MEDICINE

## 2017-05-29 PROCEDURE — 93295 DEV INTERROG REMOTE 1/2/MLT: CPT | Mod: S$GLB,,, | Performed by: INTERNAL MEDICINE

## 2017-05-29 PROCEDURE — 1159F MED LIST DOCD IN RCRD: CPT | Mod: S$GLB,,, | Performed by: INTERNAL MEDICINE

## 2017-05-29 PROCEDURE — 1126F AMNT PAIN NOTED NONE PRSNT: CPT | Mod: S$GLB,,, | Performed by: INTERNAL MEDICINE

## 2017-05-29 PROCEDURE — 99214 OFFICE O/P EST MOD 30 MIN: CPT | Mod: S$GLB,,, | Performed by: INTERNAL MEDICINE

## 2017-05-29 PROCEDURE — 99999 PR PBB SHADOW E&M-EST. PATIENT-LVL III: CPT | Mod: PBBFAC,,, | Performed by: INTERNAL MEDICINE

## 2017-05-29 PROCEDURE — 71020 XR CHEST PA AND LATERAL: CPT | Mod: 26,,, | Performed by: RADIOLOGY

## 2017-05-29 RX ORDER — MELOXICAM 15 MG/1
TABLET ORAL
Qty: 90 TABLET | Refills: 1 | Status: SHIPPED | OUTPATIENT
Start: 2017-05-29 | End: 2017-08-31 | Stop reason: SDUPTHER

## 2017-05-29 RX ORDER — AZITHROMYCIN 500 MG/1
500 TABLET, FILM COATED ORAL DAILY
Qty: 3 TABLET | Refills: 0 | Status: SHIPPED | OUTPATIENT
Start: 2017-05-29 | End: 2017-06-01

## 2017-05-29 RX ORDER — TRAMADOL HYDROCHLORIDE 50 MG/1
50 TABLET ORAL 2 TIMES DAILY
Qty: 60 TABLET | Refills: 0 | Status: SHIPPED | OUTPATIENT
Start: 2017-05-29 | End: 2017-09-06 | Stop reason: SDUPTHER

## 2017-05-29 NOTE — PROGRESS NOTES
HISTORY OF PRESENT ILLNESS:  Pt. is a 76 y.o. male presents for monitoring of his hyperlipidemia, HTN, PAF, CAD, SCC, osteoarthritis of the spine.  He is also followed by Dr. Guerrero and Dr. Alejandre. He has a significant history of coronary artery bypass graft and coronary stents. He has an ICD implanted.  He has been able to stop his xarelto.  He has recently seen Dr. Debra cantrell for a Mohs' Micrographic Surgery, Fresh Tissue Technique, for treatment of a biopsy-proven squamous cell carcinoma on the left cheek.  HTN is in control on current meds.  He states he has been having a chronic bronchitis, phlegm is occ yellow/green.  He states has been happening intermittently, did have an initial bronchitis, was treated with antibiotics, got slightly better, but has not resolved.  Had cefdinir.  States celexa is working well.    Lab Results   Component Value Date    WBC 6.95 10/17/2016    HGB 14.2 10/17/2016    HCT 41.7 10/17/2016     (L) 10/17/2016    CHOL 138 05/05/2017    TRIG 100 05/05/2017    HDL 49 05/05/2017    ALT 41 05/05/2017    AST 46 (H) 05/05/2017     05/05/2017    K 4.8 05/05/2017     05/05/2017    CREATININE 1.0 05/05/2017    BUN 20 05/05/2017    CO2 27 05/05/2017    TSH 1.843 07/08/2016    INR 1.2 10/17/2016     Lab Results   Component Value Date    LDLCALC 69.0 05/05/2017             ROS:  GENERAL: No fever, chills, fatigability or weight loss.  SKIN: No rashes, itching or changes in color or texture of skin.  HEAD: No headaches or recent head trauma.  EARS: Denies ear pain, discharge or vertigo.  NOSE: No loss of smell, no epistaxis or postnasal drip.  MOUTH & THROAT: No hoarseness or change in voice. No excessive gum bleeding.  NODES: Denies swollen glands.  CHEST: Denies LINDER, cyanosis, wheezing, cough and sputum production.  CARDIOVASCULAR: Denies chest pain, PND, orthopnea or reduced exercise tolerance.  ABDOMEN: Appetite fine. No weight loss. Denies constipation, diarrhea,  abdominal pain, hematemesis or blood in stool.  URINARY: No flank pain, dysuria or hematuria.  PERIPHERAL VASCULAR: No claudication or cyanosis. No edema.  MUSCULOSKELETAL: No joint stiffness or swelling. Denies back pain.  NEUROLOGIC: Denies numbness    PE:   Vitals:   Vitals:    05/29/17 1307   BP: 124/70   Pulse: (!) 59   Resp: 18     GENERAL: no acute distress, A&Ox3, comfortable.  Male with BMI of 29   HEENT: tympanic membranes clear, nasal mucosa pink, no pharyngeal erythema or exudate  NECK: supple, no cervical lymphadenopathy, no thyromegaly; no supraclavicular nodes;   CHEST:  clear to auscultation bilaterally, no crackles or wheeze; no increased work of breathing;  CARDIOVASCULAR: regular rate and rhythm, no rubs, murmurs or gallops.  ABDOMEN: normal bowel sounds, soft non-tender, non-distended; no palpable organomegaly;   EXT: no clubbing, cyanosis or edema.     ASSESSMENT/PLAN:    1.  Chronic bronchitis: will have pt. Use mucinex OTC; will give zithromax 500 mg po q day x 3 days;  2.  Osteoarthritis of lumbar spine: will give ultram and meloxicam; doesn't need refill of tizanidine at this time; used to see Dr. Amy Canela for pain mgt;  3.  History PAF: being followed by cardiology;  4.  Hyperlipidemia:  is at cardiac goal on crestor;  5.  Health Maintenance: has done ColoGuard through Dr. Grande, was negative;      Chronic bronchitis, unspecified chronic bronchitis type  -     X-Ray Chest PA And Lateral; Future; Expected date: 05/29/2017    Other orders  -     azithromycin (ZITHROMAX) 500 MG tablet; Take 1 tablet (500 mg total) by mouth once daily.  Dispense: 3 tablet; Refill: 0  -     tramadol (ULTRAM) 50 mg tablet; Take 1 tablet (50 mg total) by mouth 2 (two) times daily.  Dispense: 60 tablet; Refill: 0  -     meloxicam (MOBIC) 15 MG tablet; TAKE 1 TABLET ONE TIME DAILY  Dispense: 90 tablet; Refill: 1          Call if condition changes or worsens.

## 2017-05-30 PROBLEM — J42 CHRONIC BRONCHITIS: Status: ACTIVE | Noted: 2017-05-30

## 2017-06-01 DIAGNOSIS — Z95.810 CARDIAC DEFIBRILLATOR IN SITU: Primary | ICD-10-CM

## 2017-06-01 DIAGNOSIS — I47.29 NSVT (NONSUSTAINED VENTRICULAR TACHYCARDIA): ICD-10-CM

## 2017-06-06 RX ORDER — PANTOPRAZOLE SODIUM 40 MG/1
TABLET, DELAYED RELEASE ORAL
Qty: 90 TABLET | Refills: 1 | Status: SHIPPED | OUTPATIENT
Start: 2017-06-06 | End: 2018-01-22 | Stop reason: SDUPTHER

## 2017-06-15 RX ORDER — GABAPENTIN 300 MG/1
CAPSULE ORAL
Qty: 180 CAPSULE | Refills: 0 | Status: SHIPPED | OUTPATIENT
Start: 2017-06-15 | End: 2017-08-31 | Stop reason: SDUPTHER

## 2017-08-31 ENCOUNTER — TELEPHONE (OUTPATIENT)
Dept: FAMILY MEDICINE | Facility: CLINIC | Age: 77
End: 2017-08-31

## 2017-08-31 RX ORDER — GABAPENTIN 300 MG/1
CAPSULE ORAL
Qty: 180 CAPSULE | Refills: 0 | Status: SHIPPED | OUTPATIENT
Start: 2017-08-31 | End: 2018-04-02 | Stop reason: SDUPTHER

## 2017-08-31 RX ORDER — MELOXICAM 15 MG/1
TABLET ORAL
Qty: 90 TABLET | Refills: 0 | Status: SHIPPED | OUTPATIENT
Start: 2017-08-31 | End: 2018-03-26

## 2017-08-31 RX ORDER — CARVEDILOL 12.5 MG/1
TABLET ORAL
Qty: 180 TABLET | Refills: 3 | Status: SHIPPED | OUTPATIENT
Start: 2017-08-31 | End: 2018-10-17 | Stop reason: SDUPTHER

## 2017-08-31 RX ORDER — CITALOPRAM 10 MG/1
TABLET ORAL
Qty: 90 TABLET | Refills: 3 | Status: SHIPPED | OUTPATIENT
Start: 2017-08-31 | End: 2018-11-06 | Stop reason: SDUPTHER

## 2017-08-31 NOTE — TELEPHONE ENCOUNTER
Pt notified that medication meloxicam has been sent to pharmacy and to schedule appointment in 3 months he verbalized that he understood

## 2017-09-01 ENCOUNTER — OFFICE VISIT (OUTPATIENT)
Dept: FAMILY MEDICINE | Facility: CLINIC | Age: 77
End: 2017-09-01
Payer: MEDICARE

## 2017-09-01 VITALS
BODY MASS INDEX: 29.94 KG/M2 | SYSTOLIC BLOOD PRESSURE: 116 MMHG | HEIGHT: 71 IN | HEART RATE: 55 BPM | WEIGHT: 213.88 LBS | DIASTOLIC BLOOD PRESSURE: 66 MMHG

## 2017-09-01 DIAGNOSIS — I27.9 PULMONARY HEART DISEASE: ICD-10-CM

## 2017-09-01 DIAGNOSIS — I48.0 PAF (PAROXYSMAL ATRIAL FIBRILLATION): ICD-10-CM

## 2017-09-01 DIAGNOSIS — Z95.1 S/P CABG X 4: ICD-10-CM

## 2017-09-01 DIAGNOSIS — I50.42 CHRONIC COMBINED SYSTOLIC AND DIASTOLIC HEART FAILURE: ICD-10-CM

## 2017-09-01 DIAGNOSIS — Z00.00 ENCOUNTER FOR PREVENTIVE HEALTH EXAMINATION: Primary | ICD-10-CM

## 2017-09-01 DIAGNOSIS — I25.810 CORONARY ARTERY DISEASE INVOLVING AUTOLOGOUS VEIN CORONARY BYPASS GRAFT WITHOUT ANGINA PECTORIS: ICD-10-CM

## 2017-09-01 DIAGNOSIS — I10 ESSENTIAL HYPERTENSION: ICD-10-CM

## 2017-09-01 DIAGNOSIS — M47.26 OSTEOARTHRITIS OF SPINE WITH RADICULOPATHY, LUMBAR REGION: ICD-10-CM

## 2017-09-01 DIAGNOSIS — E78.5 HYPERLIPIDEMIA, UNSPECIFIED HYPERLIPIDEMIA TYPE: ICD-10-CM

## 2017-09-01 DIAGNOSIS — H91.93 BILATERAL HEARING LOSS, UNSPECIFIED HEARING LOSS TYPE: ICD-10-CM

## 2017-09-01 DIAGNOSIS — I25.2 HISTORY OF MYOCARDIAL INFARCTION: ICD-10-CM

## 2017-09-01 DIAGNOSIS — Z85.89 HISTORY OF SQUAMOUS CELL CARCINOMA: ICD-10-CM

## 2017-09-01 DIAGNOSIS — D69.6 THROMBOCYTOPENIA: ICD-10-CM

## 2017-09-01 DIAGNOSIS — M54.30 SCIATICA, UNSPECIFIED LATERALITY: ICD-10-CM

## 2017-09-01 DIAGNOSIS — I25.5 ISCHEMIC CARDIOMYOPATHY: Chronic | ICD-10-CM

## 2017-09-01 DIAGNOSIS — I47.29 NSVT (NONSUSTAINED VENTRICULAR TACHYCARDIA): ICD-10-CM

## 2017-09-01 DIAGNOSIS — I48.3 TYPICAL ATRIAL FLUTTER: ICD-10-CM

## 2017-09-01 DIAGNOSIS — Z95.810 ICD (IMPLANTABLE CARDIOVERTER-DEFIBRILLATOR) IN PLACE: Chronic | ICD-10-CM

## 2017-09-01 PROBLEM — J42 CHRONIC BRONCHITIS: Status: RESOLVED | Noted: 2017-05-30 | Resolved: 2017-09-01

## 2017-09-01 PROCEDURE — G0439 PPPS, SUBSEQ VISIT: HCPCS | Mod: S$GLB,,, | Performed by: NURSE PRACTITIONER

## 2017-09-01 PROCEDURE — 99999 PR PBB SHADOW E&M-EST. PATIENT-LVL IV: CPT | Mod: PBBFAC,,, | Performed by: NURSE PRACTITIONER

## 2017-09-01 PROCEDURE — 99499 UNLISTED E&M SERVICE: CPT | Mod: S$GLB,,, | Performed by: NURSE PRACTITIONER

## 2017-09-01 NOTE — PATIENT INSTRUCTIONS
Counseling and Referral of Other Preventative  (Italic type indicates deductible and co-insurance are waived)    Patient Name: Ankit Ruff  Today's Date: 9/1/2017      SERVICE LIMITATIONS RECOMMENDATION    Vaccines    · Pneumococcal (once after 65)    · Influenza (annually)    · Hepatitis B (if medium/high risk)    · Prevnar 13      Hepatitis B medium/high risk factors:       - End-stage renal disease       - Hemophiliacs who received Factor VII or         IX concentrates       - Clients of institutions for the mentally             retarded       - Persons who live in the same house as          a HepB carrier       - Homosexual men       - Illicit injectable drug abusers     Pneumococcal: Scheduled - see appointments     Influenza: N/A     Hepatitis B: N/A     Prevnar 13: Done, no repeat necessary    Prostate cancer screening (annually to age 75)     Prostate specific antigen (PSA) Shared decision making with Provider. Sometimes a co-pay may be required if the patient decides to have this test. The USPSTF no longer recommends prostate cancer screening routinely in medicine: not to follow    Colorectal cancer screening (to age 75)    · Fecal occult blood test (annual)  · Flexible sigmoidoscopy (5y)  · Screening colonoscopy (10y)  · Barium enema   N/A    Diabetes self-management training (no USPSTF recommendations)  Requires referral by treating physician for patient with diabetes or renal disease. 10 hours of initial DSMT sessions of no less than 30 minutes each in a continuous 12-month period. 2 hours of follow-up DSMT in subsequent years.  N/A    Glaucoma screening (no USPSTF recommendation)  Diabetes mellitus, family history   , age 50 or over    American, age 65 or over  Recommend follow up with eye care professional regularly    Medical nutrition therapy for diabetes or renal disease (no recommended schedule)  Requires referral by treating physician for patient with diabetes or renal  disease or kidney transplant within the past 3 years.  Can be provided in same year as diabetes self-management training (DSMT), and CMS recommends medical nutrition therapy take place after DSMT. Up to 3 hours for initial year and 2 hours in subsequent years.  N/A    Cardiovascular screening blood tests (every 5 years)  · Fasting lipid panel  Order as a panel if possible  Done this year, repeat every year    Diabetes screening tests (at least every 3 years, Medicare covers annually or at 6-month intervals for prediabetic patients)  · Fasting blood sugar (FBS) or glucose tolerance test (GTT)  Patient must be diagnosed with one of the following:       - Hypertension       - Dyslipidemia       - Obesity (BMI 30kg/m2)       - Previous elevated impaired FBS or GTT       ... or any two of the following:       - Overweight (BMI 25 but <30)       - Family history of diabetes       - Age 65 or older       - History of gestational diabetes or birth of baby weighing more than 9 pounds  Done this year, repeat every year    Abdominal aortic aneurysm screening (once)  · Sonogram   Limited to patients who meet one of the following criteria:       - Men who are 65-75 years old and have smoked more than 100 cigarette in their lifetime       - Anyone with a family history of abdominal aortic aneurysm       - Anyone recommended for screening by the USPSTF  N/A    HIV screening (annually for increased risk patients)  · HIV-1 and HIV-2 by EIA, or PRIETO, rapid antibody test or oral mucosa transudate  Patients must be at increased risk for HIV infection per USPSTF guidelines or pregnant. Tests covered annually for patient at increased risk or as requested by the patient. Pregnant patients may receive up to 3 tests during pregnancy.  Risks discussed, screening is not recommended    Smoking cessation counseling (up to 8 sessions per year)  Patients must be asymptomatic of tobacco-related conditions to receive as a preventative service.   Non-smoker    Subsequent annual wellness visit  At least 12 months since last AWV  Return in one year     The following information is provided to all patients.  This information is to help you find resources for any of the problems found today that may be affecting your health:                Living healthy guide: www.[facility].[place].[facility]      Understanding Diabetes: www.diabetes.org      Eating healthy: www.cdc.gov/healthyweight      CDC home safety checklist: www.cdc.gov/steadi/patient.html      Agency on Aging: www.goea.[place].[facility]      Alcoholics anonymous (AA): www.aa.org      Physical Activity: www.karrie.nih.gov/pj4fcig      Tobacco use: www.quitwithusla.org

## 2017-09-06 ENCOUNTER — OFFICE VISIT (OUTPATIENT)
Dept: CARDIOLOGY | Facility: CLINIC | Age: 77
End: 2017-09-06
Payer: MEDICARE

## 2017-09-06 ENCOUNTER — CLINICAL SUPPORT (OUTPATIENT)
Dept: CARDIOLOGY | Facility: CLINIC | Age: 77
End: 2017-09-06
Payer: MEDICARE

## 2017-09-06 VITALS
HEIGHT: 71 IN | DIASTOLIC BLOOD PRESSURE: 72 MMHG | BODY MASS INDEX: 30 KG/M2 | HEART RATE: 55 BPM | WEIGHT: 214.31 LBS | SYSTOLIC BLOOD PRESSURE: 140 MMHG

## 2017-09-06 DIAGNOSIS — Z95.1 S/P CABG X 4: Primary | ICD-10-CM

## 2017-09-06 DIAGNOSIS — I47.29 NSVT (NONSUSTAINED VENTRICULAR TACHYCARDIA): ICD-10-CM

## 2017-09-06 DIAGNOSIS — I25.810 CORONARY ARTERY DISEASE INVOLVING AUTOLOGOUS VEIN CORONARY BYPASS GRAFT WITHOUT ANGINA PECTORIS: ICD-10-CM

## 2017-09-06 DIAGNOSIS — Z95.810 CARDIAC DEFIBRILLATOR IN SITU: ICD-10-CM

## 2017-09-06 DIAGNOSIS — I10 ESSENTIAL HYPERTENSION: ICD-10-CM

## 2017-09-06 DIAGNOSIS — Z95.810 ICD (IMPLANTABLE CARDIOVERTER-DEFIBRILLATOR) IN PLACE: Chronic | ICD-10-CM

## 2017-09-06 DIAGNOSIS — I48.0 PAF (PAROXYSMAL ATRIAL FIBRILLATION): ICD-10-CM

## 2017-09-06 PROCEDURE — 1159F MED LIST DOCD IN RCRD: CPT | Mod: S$GLB,,, | Performed by: INTERNAL MEDICINE

## 2017-09-06 PROCEDURE — 99214 OFFICE O/P EST MOD 30 MIN: CPT | Mod: S$GLB,,, | Performed by: INTERNAL MEDICINE

## 2017-09-06 PROCEDURE — 99499 UNLISTED E&M SERVICE: CPT | Mod: S$GLB,,, | Performed by: INTERNAL MEDICINE

## 2017-09-06 PROCEDURE — 99999 PR PBB SHADOW E&M-EST. PATIENT-LVL II: CPT | Mod: PBBFAC,,, | Performed by: INTERNAL MEDICINE

## 2017-09-06 PROCEDURE — 1126F AMNT PAIN NOTED NONE PRSNT: CPT | Mod: S$GLB,,, | Performed by: INTERNAL MEDICINE

## 2017-09-06 PROCEDURE — 3077F SYST BP >= 140 MM HG: CPT | Mod: S$GLB,,, | Performed by: INTERNAL MEDICINE

## 2017-09-06 PROCEDURE — 3078F DIAST BP <80 MM HG: CPT | Mod: S$GLB,,, | Performed by: INTERNAL MEDICINE

## 2017-09-06 PROCEDURE — 93282 PRGRMG EVAL IMPLANTABLE DFB: CPT | Mod: S$GLB,,, | Performed by: INTERNAL MEDICINE

## 2017-09-06 PROCEDURE — 3008F BODY MASS INDEX DOCD: CPT | Mod: S$GLB,,, | Performed by: INTERNAL MEDICINE

## 2017-09-06 RX ORDER — TRAMADOL HYDROCHLORIDE 50 MG/1
50 TABLET ORAL 2 TIMES DAILY
Qty: 60 TABLET | Refills: 0 | Status: SHIPPED | OUTPATIENT
Start: 2017-09-06 | End: 2017-10-16 | Stop reason: SDUPTHER

## 2017-09-06 NOTE — PROGRESS NOTES
Subjective:    Patient ID:  Ankit Ruff is a 77 y.o. male who presents for follow-up of CAD    HPI  He comes for follow up with no major problems, no chest pain, no shortness of breath.  FC II    Review of Systems   Constitution: Negative for decreased appetite, weakness, malaise/fatigue, weight gain and weight loss.   Cardiovascular: Negative for chest pain, dyspnea on exertion, leg swelling, palpitations and syncope.   Respiratory: Negative for cough and shortness of breath.    Gastrointestinal: Negative.    All other systems reviewed and are negative.       Objective:    Physical Exam   Constitutional: He is oriented to person, place, and time. He appears well-developed and well-nourished.   HENT:   Head: Normocephalic.   Eyes: Pupils are equal, round, and reactive to light.   Neck: Normal range of motion. Neck supple. No JVD present. Carotid bruit is not present. No thyromegaly present.   Cardiovascular: Normal rate, regular rhythm, normal heart sounds, intact distal pulses and normal pulses.  PMI is not displaced.  Exam reveals no gallop.    No murmur heard.  Pulmonary/Chest: Effort normal and breath sounds normal.   Abdominal: Soft. Normal appearance. He exhibits no mass. There is no hepatosplenomegaly. There is no tenderness.   Musculoskeletal: Normal range of motion. He exhibits no edema.   Neurological: He is alert and oriented to person, place, and time. He has normal strength and normal reflexes. No sensory deficit.   Skin: Skin is warm and intact.   Psychiatric: He has a normal mood and affect.   Nursing note and vitals reviewed.        Assessment:       1. S/P CABG x 4    2. PAF (paroxysmal atrial fibrillation)    3. ICD (implantable cardioverter-defibrillator) in place    4. Essential hypertension    5. Coronary artery disease involving autologous vein coronary bypass graft without angina pectoris         Plan:     Continue all cardiac medications  Regular exercise program  Weight loss  1 yr f/u  with [facility]

## 2017-09-08 PROBLEM — D69.6 THROMBOCYTOPENIA: Status: ACTIVE | Noted: 2017-09-08

## 2017-09-08 NOTE — PROGRESS NOTES
"Ankit Ruff presented for a  Medicare AWV and comprehensive Health Risk Assessment today. The following components were reviewed and updated:    · Medical history  · Family History  · Social history  · Allergies and Current Medications  · Health Risk Assessment  · Health Maintenance  · Care Team     ** See Completed Assessments for Annual Wellness Visit within the encounter summary.**       The following assessments were completed:  · Living Situation  · CAGE  · Depression Screening  · Timed Get Up and Go  · Whisper Test  · Cognitive Function Screening  · Nutrition Screening  · ADL Screening  · PAQ Screening    Vitals:    09/01/17 0858   BP: 116/66   BP Location: Left arm   Patient Position: Sitting   BP Method: Medium (Automatic)   Pulse: (!) 55   Weight: 97 kg (213 lb 13.5 oz)   Height: 5' 11" (1.803 m)     Body mass index is 29.83 kg/m².  Physical Exam   Constitutional: He appears well-developed and well-nourished. No distress.   HENT:   Head: Normocephalic and atraumatic.   Right Ear: External ear normal.   Left Ear: External ear normal.   Nose: Nose normal.   Mouth/Throat: Oropharynx is clear and moist. No oropharyngeal exudate.   Eyes: Conjunctivae and EOM are normal. Pupils are equal, round, and reactive to light. No scleral icterus.   Cardiovascular: Normal heart sounds.  An irregular rhythm present. Bradycardia present.    Pulmonary/Chest: Effort normal and breath sounds normal. No respiratory distress. He has no wheezes. He exhibits no tenderness.   Neurological: He is alert.   Skin: Skin is warm and dry. He is not diaphoretic. No erythema.   Psychiatric: He has a normal mood and affect. His behavior is normal. Judgment and thought content normal.   Nursing note and vitals reviewed.        Diagnoses and health risks identified today and associated recommendations/orders:    1. Encounter for preventive health examination  Reviewed health [id number], provided recommendations  Provided written rx for " [id number]     2. Typical atrial flutter  Stable.   Controlled on current medications.  Followed by Celina.       3. PAF (paroxysmal atrial fibrillation)  Stable.   Taking coreg  Followed by Celina.       4. NSVT (nonsustained ventricular tachycardia)  Stable.     Followed by Celina.       5. Chronic combined systolic and diastolic heart failure  Stable.   Taking coreg and spironoloactone  Followed by cesar.       6. Pulmonary heart disease  Continue to monitor with echo  Followed by Cesar.   PA pressure 50 echo 7/8/16    7. Coronary artery disease involving autologous vein coronary bypass graft without angina pectoris  Stable.   No cp  Followed by Cesar.       8. Osteoarthritis of spine with radiculopathy, lumbar region  Stable.     Followed by Lorna Webb MD .       9. S/P CABG x 4  Stable.     Followed by cardiology.       10. Ischemic cardiomyopathy  Continue to monitor with echo     Followed by cardiology.       11. Thrombocytopenia  Continue to monitor   Followed by Lorna Webb MD .       12. ICD (implantable cardioverter-defibrillator) in place  Stable.     Followed by celina.       13. Sciatica, unspecified laterality  Stable.   Taking tramadol  Followed by Lorna Webb MD .       14. Hyperlipidemia, unspecified hyperlipidemia type  Continue to monitor lipids  Taking statin  Followed by Lorna Webb MD .       15. History of myocardial infarction  Stable.   No cp  Followed by cardiology.       16. Essential hypertension  Stable.   Controlled on current medications.  Followed by cardiology.       17. History of squamous cell carcinoma  Stable.   Continue to have skin checks as recommended by derm  Followed by Francisco.       18. Bilateral hearing loss, unspecified hearing loss type  Stable.     Followed by Lorna Webb MD .         Provided Ankit with a 5-10 year written screening schedule and personal prevention plan. Recommendations were developed using the USPSTF  age appropriate recommendations. Education, counseling, and referrals were provided as needed. After Visit Summary printed and given to patient which includes a list of additional screenings\tests needed.    Return in about 1 year (around 9/1/2018).    Kayleigh Erickson NP

## 2017-10-12 DIAGNOSIS — I10 ESSENTIAL HYPERTENSION: ICD-10-CM

## 2017-10-12 RX ORDER — SPIRONOLACTONE 25 MG/1
25 TABLET ORAL DAILY
Qty: 90 TABLET | Refills: 3 | Status: SHIPPED | OUTPATIENT
Start: 2017-10-12 | End: 2017-10-17 | Stop reason: SDUPTHER

## 2017-10-17 ENCOUNTER — PATIENT MESSAGE (OUTPATIENT)
Dept: CARDIOLOGY | Facility: CLINIC | Age: 77
End: 2017-10-17

## 2017-10-17 DIAGNOSIS — I10 ESSENTIAL HYPERTENSION: ICD-10-CM

## 2017-10-17 RX ORDER — TRAMADOL HYDROCHLORIDE 50 MG/1
50 TABLET ORAL 2 TIMES DAILY
Qty: 60 TABLET | Refills: 0 | Status: SHIPPED | OUTPATIENT
Start: 2017-10-17 | End: 2017-10-26 | Stop reason: SDUPTHER

## 2017-10-18 RX ORDER — SPIRONOLACTONE 25 MG/1
25 TABLET ORAL DAILY
Qty: 90 TABLET | Refills: 1 | Status: SHIPPED | OUTPATIENT
Start: 2017-10-18 | End: 2018-10-05 | Stop reason: SDUPTHER

## 2017-10-23 ENCOUNTER — TELEPHONE (OUTPATIENT)
Dept: CARDIOLOGY | Facility: CLINIC | Age: 77
End: 2017-10-23

## 2017-10-23 NOTE — TELEPHONE ENCOUNTER
----- Message from Marilyn Moreno sent at 10/23/2017  1:55 PM CDT -----  Contact: Stephanie with [facility] [phone]  Stephanie with [facility] [phone] calling to get clarification on a medication for the patient spironolactone (ALDACTONE) 25 MG tablet, had conflicting directions. Please advise. Thanks.

## 2017-10-25 ENCOUNTER — TELEPHONE (OUTPATIENT)
Dept: CARDIOLOGY | Facility: CLINIC | Age: 77
End: 2017-10-25

## 2017-10-25 NOTE — TELEPHONE ENCOUNTER
----- Message from Ankur House sent at 10/25/2017 10:10 AM CDT -----  Contact: [facility], Rogelio Colón want to speak with a nurse regarding spironolactone dosage please call back at [phone]

## 2017-10-31 RX ORDER — TRAMADOL HYDROCHLORIDE 50 MG/1
TABLET ORAL
Qty: 60 TABLET | Refills: 0 | Status: SHIPPED | OUTPATIENT
Start: 2017-10-31 | End: 2018-01-24 | Stop reason: SDUPTHER

## 2017-11-22 RX ORDER — TIZANIDINE 4 MG/1
4 TABLET ORAL EVERY 8 HOURS
Qty: 90 TABLET | Refills: 3 | Status: SHIPPED | OUTPATIENT
Start: 2017-11-22 | End: 2018-10-23 | Stop reason: SDUPTHER

## 2017-12-14 ENCOUNTER — TELEPHONE (OUTPATIENT)
Dept: PAIN MEDICINE | Facility: CLINIC | Age: 77
End: 2017-12-14

## 2017-12-14 NOTE — TELEPHONE ENCOUNTER
----- Message from Macie Heath sent at 12/14/2017 12:05 PM CST -----  Contact: Self  Patient has fallen and now his right knee is swollen with possible fluild building up, but both knees hurt.  Requesting appointment access as soon as possible.  Please call [phone] or his cell [phone].  Thank you!

## 2017-12-26 RX ORDER — ROSUVASTATIN CALCIUM 10 MG/1
10 TABLET, COATED ORAL DAILY
Qty: 90 TABLET | Refills: 3 | Status: SHIPPED | OUTPATIENT
Start: 2017-12-26 | End: 2018-10-17 | Stop reason: SDUPTHER

## 2018-01-18 DIAGNOSIS — M25.561 CHRONIC PAIN OF BOTH KNEES: Primary | ICD-10-CM

## 2018-01-18 DIAGNOSIS — M25.562 CHRONIC PAIN OF BOTH KNEES: Primary | ICD-10-CM

## 2018-01-18 DIAGNOSIS — G89.29 CHRONIC PAIN OF BOTH KNEES: Primary | ICD-10-CM

## 2018-01-19 ENCOUNTER — OFFICE VISIT (OUTPATIENT)
Dept: PHYSICAL MEDICINE AND REHAB | Facility: CLINIC | Age: 78
End: 2018-01-19
Payer: MEDICARE

## 2018-01-19 ENCOUNTER — HOSPITAL ENCOUNTER (OUTPATIENT)
Dept: RADIOLOGY | Facility: HOSPITAL | Age: 78
Discharge: HOME | End: 2018-01-19
Attending: PHYSICAL MEDICINE & REHABILITATION
Payer: MEDICARE

## 2018-01-19 VITALS
WEIGHT: 214 LBS | HEART RATE: 60 BPM | BODY MASS INDEX: 29.96 KG/M2 | SYSTOLIC BLOOD PRESSURE: 138 MMHG | DIASTOLIC BLOOD PRESSURE: 65 MMHG | HEIGHT: 71 IN

## 2018-01-19 DIAGNOSIS — G89.29 CHRONIC PAIN OF BOTH KNEES: ICD-10-CM

## 2018-01-19 DIAGNOSIS — M25.561 CHRONIC PAIN OF BOTH KNEES: ICD-10-CM

## 2018-01-19 DIAGNOSIS — M25.562 CHRONIC PAIN OF BOTH KNEES: ICD-10-CM

## 2018-01-19 DIAGNOSIS — M25.561 CHRONIC PAIN OF BOTH KNEES: Primary | ICD-10-CM

## 2018-01-19 DIAGNOSIS — M17.0 PRIMARY OSTEOARTHRITIS OF BOTH KNEES: ICD-10-CM

## 2018-01-19 DIAGNOSIS — G89.29 CHRONIC PAIN OF BOTH KNEES: Primary | ICD-10-CM

## 2018-01-19 DIAGNOSIS — M25.562 CHRONIC PAIN OF BOTH KNEES: Primary | ICD-10-CM

## 2018-01-19 PROCEDURE — 99999 PR PBB SHADOW E&M-EST. PATIENT-LVL III: CPT | Mod: PBBFAC,,, | Performed by: PHYSICAL MEDICINE & REHABILITATION

## 2018-01-19 PROCEDURE — 73564 X-RAY EXAM KNEE 4 OR MORE: CPT | Mod: TC,50,FY,PO

## 2018-01-19 PROCEDURE — 99204 OFFICE O/P NEW MOD 45 MIN: CPT | Mod: S$GLB,,, | Performed by: PHYSICAL MEDICINE & REHABILITATION

## 2018-01-19 PROCEDURE — 99499 UNLISTED E&M SERVICE: CPT | Mod: S$GLB,,, | Performed by: PHYSICAL MEDICINE & REHABILITATION

## 2018-01-19 PROCEDURE — 73564 X-RAY EXAM KNEE 4 OR MORE: CPT | Mod: 26,50,, | Performed by: RADIOLOGY

## 2018-01-19 NOTE — PROGRESS NOTES
[facility]    CHIEF COMPLAINT:   Chief Complaint   Patient presents with    Knee Pain     bilateral knee pain     HISTORY OF PRESENT ILLNESS: Ankit Ruff is a 77 y.o. male who presents to me for the first time regarding bilateral knee pain.  He feels the right side is worse than the left.  He rates his pain as a 5 on a scale of 1-10.  He is very active and reports his knees hurt when he gets up from a seated position.  He does note a previous right knee arthroscopic meniscal repair surgery several years ago.  He feels his current knee pain may have worsened after falling on the front of his knees a couple of years ago.  He feels his knees are somewhat weakening over recent months.  He denies any numbness or tingling of the area.  The pain is achy in nature.  The pain is reduced with rest.    Review of Systems   Constitutional: Negative for fever.   HENT: Negative for drooling.    Eyes: Negative for discharge.   Respiratory: Negative for choking.    Cardiovascular: Negative for chest pain.   Genitourinary: Negative for flank pain.   Skin: Negative for wound.   Allergic/Immunologic: Negative for immunocompromised state.   Neurological: Negative for tremors and syncope.   Psychiatric/Behavioral: Negative for behavioral problems.     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 No pertinent family history.    Medications:   Current Outpatient Prescriptions on File Prior to Visit   Medication Sig Dispense Refill    aspirin (ECOTRIN) 81 MG EC tablet Take 81 mg by mouth once daily.        carvedilol (COREG) 12.5 MG tablet TAKE 1 TABLET TWICE DAILY 180 tablet 3    citalopram (CELEXA) 10 MG tablet TAKE 1 TABLET EVERY DAY 90 tablet 3    gabapentin (NEURONTIN) 300 MG capsule TAKE 1 CAPSULE TWICE DAILY 180 capsule 0    meloxicam (MOBIC) 15 MG tablet TAKE 1 TABLET EVERY DAY 90 tablet 0    pantoprazole (PROTONIX) 40 MG tablet TAKE 1 TABLET EVERY DAY 90 tablet 1    rosuvastatin (CRESTOR) 10 MG tablet Take 1 tablet (10 mg total) by mouth once daily. 90 tablet 3    spironolactone (ALDACTONE) 25 MG tablet Take 1 tablet (25 mg total) by mouth once daily. Take half a tablet Monday, Wednesday and Friday 90 tablet 1    tiZANidine (ZANAFLEX) 4 MG tablet Take 1 tablet (4 mg total) by mouth every 8 (eight) hours. 90 tablet 3    traMADol (ULTRAM) 50 mg tablet TAKE 1 TABLET TWICE DAILY 60 tablet 0     No current facility-administered medications on file prior to visit.        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    PHYSICAL EXAMINATION:   General    Vitals:    01/19/18 1044   Weight: 97.1 kg (214 lb)   Height: 5' 11" (1.803 m)     Constitutional: Oriented to person, " place, and time. No apparent distress. Appears well-developed and well-nourished. Pleasant.  HENT:   Head: Normocephalic and atraumatic.   Eyes: Right eye exhibits no discharge. Left eye exhibits no discharge. No scleral icterus.   Pulmonary/Chest: Effort normal. No respiratory distress.   Abdominal: There is no guarding.   Neurological: Alert and oriented to person, place, and time.   Psychiatric: Behavior is normal.   Right Knee Exam     Tenderness   The patient is experiencing tenderness in the medial joint line.    Range of Motion   Extension: 0   Flexion: 130     Tests   Vikki:  Medial - negative Lateral - negative  Lachman:  Anterior - negative    Posterior - negative  Varus: negative  Valgus: negative  Patellar Apprehension: negative    Other   Erythema: absent  Scars: present  Sensation: normal  Pulse: present  Swelling: none  Other tests: no effusion present      Left Knee Exam     Tenderness   The patient is experiencing no tenderness.         Range of Motion   Extension: 0   Flexion: 130     Tests   Vikki:  Medial - negative Lateral - negative  Lachman:  Anterior - negative    Posterior - negative  Varus: negative  Valgus: negative  Patellar Apprehension: negative    Other   Erythema: absent  Scars: absent  Sensation: normal  Pulse: present  Swelling: none  Effusion: no effusion present        INSPECTION: There is no swelling, ecchymoses, erythema or gross deformity about the bilateral knees.  GAIT/DYNAMIC: Preserved.    Imaging  X-ray of the bilateral knees from 1/19/2018: There is degenerative arthrosis of the medial compartments of both knees with joint space narrowing. There is minimal degenerative arthrosis of the patellofemoral articulations with small posterior patellar osteophytes. No fracture or subluxation are identified. There are no signs of joint effusion on either side.    Data Reviewed: X-ray    Supportive Actions: Independent visualization of images or test specimens    ASSESSMENT:    Encounter Diagnoses   Name Primary?    Chronic pain of both knees Yes    Primary osteoarthritis of both knees      PLAN:     1. Time was spent reviewing the above diagnosis in depth with Ankit today, including acute management and rehabilitation.     2.  He has signs and symptoms consistent with bilateral knee degenerative arthritis.  He had no prior formal directed treatment of this issue.  We will start conservatively in the form of formal physical therapy working on quadriceps strengthening and transitioning to a home exercise program for long-term maintenance.  We'll help facilitate him getting started with physical therapy here at [facility].    3.  We discussed various forms of injections and I recommended injections of hyaluronic acid.  This should produce lubricating properties, reduce inflammation, and chondral protection.  He does wish to proceed with this option.     4. RTC in 2 weeks for the first in a series of 3 bilateral knee Euflexxa injections.    The above note was completed, in part, with the aid of Dragon dictation software/hardware. Translation errors may be present.

## 2018-01-22 ENCOUNTER — TELEPHONE (OUTPATIENT)
Dept: FAMILY MEDICINE | Facility: CLINIC | Age: 78
End: 2018-01-22

## 2018-01-22 RX ORDER — PANTOPRAZOLE SODIUM 40 MG/1
40 TABLET, DELAYED RELEASE ORAL DAILY
Qty: 90 TABLET | Refills: 3 | Status: SHIPPED | OUTPATIENT
Start: 2018-01-22 | End: 2018-10-17 | Stop reason: SDUPTHER

## 2018-01-22 NOTE — TELEPHONE ENCOUNTER
Pt is requesting refill for noted medication LOV with Dr Webb was 5/29/17 with no upcoming appt noted. Please review and advise. Thank you. CLC

## 2018-01-23 ENCOUNTER — LAB VISIT (OUTPATIENT)
Dept: LAB | Facility: HOSPITAL | Age: 78
End: 2018-01-23
Attending: INTERNAL MEDICINE
Payer: MEDICARE

## 2018-01-23 ENCOUNTER — TELEPHONE (OUTPATIENT)
Dept: FAMILY MEDICINE | Facility: CLINIC | Age: 78
End: 2018-01-23

## 2018-01-23 DIAGNOSIS — Z51.81 MEDICATION MONITORING ENCOUNTER: Primary | ICD-10-CM

## 2018-01-23 DIAGNOSIS — Z51.81 MEDICATION MONITORING ENCOUNTER: ICD-10-CM

## 2018-01-23 PROCEDURE — 80048 BASIC METABOLIC PNL TOTAL CA: CPT

## 2018-01-23 PROCEDURE — 36415 COLL VENOUS BLD VENIPUNCTURE: CPT | Mod: PO

## 2018-01-23 RX ORDER — MELOXICAM 15 MG/1
15 TABLET ORAL DAILY
Qty: 90 TABLET | Refills: 0 | OUTPATIENT
Start: 2018-01-23

## 2018-01-23 RX ORDER — TRAMADOL HYDROCHLORIDE 50 MG/1
50 TABLET ORAL 2 TIMES DAILY
Qty: 60 TABLET | Refills: 0 | OUTPATIENT
Start: 2018-01-23

## 2018-01-24 ENCOUNTER — TELEPHONE (OUTPATIENT)
Dept: INTERNAL MEDICINE | Facility: CLINIC | Age: 78
End: 2018-01-24

## 2018-01-24 LAB
ANION GAP SERPL CALC-SCNC: 9 MMOL/L
BUN SERPL-MCNC: 23 MG/DL
CALCIUM SERPL-MCNC: 9.1 MG/DL
CHLORIDE SERPL-SCNC: 103 MMOL/L
CO2 SERPL-SCNC: 26 MMOL/L
CREAT SERPL-MCNC: 1.3 MG/DL
EST. GFR  (AFRICAN AMERICAN): 60 ML/MIN/1.73 M^2
EST. GFR  (NON AFRICAN AMERICAN): 51.9 ML/MIN/1.73 M^2
GLUCOSE SERPL-MCNC: 111 MG/DL
POTASSIUM SERPL-SCNC: 4.6 MMOL/L
SODIUM SERPL-SCNC: 138 MMOL/L

## 2018-01-24 RX ORDER — TRAMADOL HYDROCHLORIDE 50 MG/1
50 TABLET ORAL DAILY
Qty: 30 TABLET | Refills: 0 | Status: SHIPPED | OUTPATIENT
Start: 2018-01-24 | End: 2018-03-26

## 2018-01-24 NOTE — TELEPHONE ENCOUNTER
Phoned pt in regards to test results per Dr Webb's instructions. Pt states he is drinking plenty of free water and goes to the restroom constantly. Pt states he will go to the 1/2 tab of mobic as instructed but is requesting a refill for his ultram as his knees are giving him trouble and is seeing Dr Cash for that. Pt is suppose to be getting synvisc injections for that soon, but if he cannot have the rx ultram he will take OTC meds such as aleve or whatever he can to get relief for knee pain. Pt states the RX for ultram states he can take 2 times daily but he only has to take 1 daily to get knee pain relief. PT would like to have that sent to [employer] if possible. Please review and advise on this request. Thank you . CLC

## 2018-01-24 NOTE — TELEPHONE ENCOUNTER
----- Message from Lorna Webb MD sent at 1/24/2018  9:37 AM CST -----  Please have pt. Cut meloxicam in half to 7.5 mg, as kidney function has decreased slightly.  Adjust medlist./  Is he drinking sufficient free water?

## 2018-01-25 NOTE — TELEPHONE ENCOUNTER
Patient notified.   Romy at [facility] patient has script on file already from 11/2017. Rx refaxed [phone]

## 2018-01-31 ENCOUNTER — OFFICE VISIT (OUTPATIENT)
Dept: PHYSICAL MEDICINE AND REHAB | Facility: CLINIC | Age: 78
End: 2018-01-31
Payer: MEDICARE

## 2018-01-31 ENCOUNTER — HOSPITAL ENCOUNTER (OUTPATIENT)
Dept: RADIOLOGY | Facility: HOSPITAL | Age: 78
Discharge: HOME | End: 2018-01-31
Attending: PHYSICAL MEDICINE & REHABILITATION
Payer: MEDICARE

## 2018-01-31 DIAGNOSIS — M79.642 LEFT HAND PAIN: ICD-10-CM

## 2018-01-31 DIAGNOSIS — M17.0 BILATERAL PRIMARY OSTEOARTHRITIS OF KNEE: Primary | ICD-10-CM

## 2018-01-31 PROCEDURE — 73130 X-RAY EXAM OF HAND: CPT | Mod: TC,PO,LT

## 2018-01-31 PROCEDURE — 99499 UNLISTED E&M SERVICE: CPT | Mod: S$GLB,,, | Performed by: PHYSICAL MEDICINE & REHABILITATION

## 2018-01-31 PROCEDURE — 20611 DRAIN/INJ JOINT/BURSA W/US: CPT | Mod: 50,S$GLB,, | Performed by: PHYSICAL MEDICINE & REHABILITATION

## 2018-01-31 PROCEDURE — 73130 X-RAY EXAM OF HAND: CPT | Mod: 26,LT,, | Performed by: RADIOLOGY

## 2018-01-31 PROCEDURE — 99999 PR PBB SHADOW E&M-EST. PATIENT-LVL I: CPT | Mod: PBBFAC,,, | Performed by: PHYSICAL MEDICINE & REHABILITATION

## 2018-01-31 RX ORDER — HYALURONATE SODIUM 20 MG/2 ML
20 SYRINGE (ML) INTRAARTICULAR
Status: DISCONTINUED | OUTPATIENT
Start: 2018-01-31 | End: 2018-01-31 | Stop reason: HOSPADM

## 2018-01-31 RX ADMIN — Medication 20 MG: at 10:01

## 2018-01-31 NOTE — PROCEDURES
Large Joint Aspiration/Injection  Date/Time: 1/31/2018 10:38 AM  Performed by: PAIGE SORIANO  Authorized by: PAIGE SORIANO     Consent Done?:  Yes (Verbal)  Indications:  Pain  Procedure site marked: Yes    Timeout: Prior to procedure the correct patient, procedure, and site was verified      Location:  Knee  Site:  R knee and L knee  Prep: Patient was prepped and draped in usual sterile fashion    Ultrasonic Guidance for needle placement: Yes  Images are saved and documented.  Needle size:  22 G  Approach: superolateral.  Medications:  20 mg EUFLEXXA 10 mg/mL(mw 2.4 -3.6 million)  Patient tolerance:  Patient tolerated the procedure well with no immediate complications    Additional Comments: Ultrasound guidance was used for correct needle placement, the images were saved will be uploaded to EMR.

## 2018-01-31 NOTE — PROGRESS NOTES
[facility]    CHIEF COMPLAINT:   Chief Complaint   Patient presents with    Injections     bilateral euflexxa 1/3     HISTORY OF PRESENT ILLNESS: Ankit Ruff is a 77 y.o. male who presents to me in follow-up for evaluation of bilateral knee pain and #1/3 bilateral Euflexxa injections. He reports achy pain in bilateral knees with symptoms greater in right knee. He denies numbness/tingling or any changes in his symptoms since his last clinic visit. He is about to start physical therapy at [facility] for bilateral knees. He would like to proceed with injections today.    He also reports chronic bilateral hand arthralgias (left>right) that he would like to be addressed at next visit.    Review of Systems   Constitutional: Negative for fever.   HENT: Negative for drooling.    Eyes: Negative for discharge.   Respiratory: Negative for choking.    Cardiovascular: Negative for chest pain.   Genitourinary: Negative for flank pain.   Skin: Negative for wound.   Allergic/Immunologic: Negative for immunocompromised state.   Neurological: Negative for tremors and syncope.   Psychiatric/Behavioral: Negative for behavioral problems.     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    Medications:   Current Outpatient Prescriptions  on File Prior to Visit   Medication Sig Dispense Refill    aspirin (ECOTRIN) 81 MG EC tablet Take 81 mg by mouth once daily.        carvedilol (COREG) 12.5 MG tablet TAKE 1 TABLET TWICE DAILY 180 tablet 3    citalopram (CELEXA) 10 MG tablet TAKE 1 TABLET EVERY DAY 90 tablet 3    gabapentin (NEURONTIN) 300 MG capsule TAKE 1 CAPSULE TWICE DAILY 180 capsule 0    meloxicam (MOBIC) 15 MG tablet TAKE 1 TABLET EVERY DAY (Patient taking differently: TAKE 1/2 TABLET EVERY DAY) 90 tablet 0    pantoprazole (PROTONIX) 40 MG tablet Take 1 tablet (40 mg total) by mouth once daily. 90 tablet 3    rosuvastatin (CRESTOR) 10 MG tablet Take 1 tablet (10 mg total) by mouth once daily. 90 tablet 3    spironolactone (ALDACTONE) 25 MG tablet Take 1 tablet (25 mg total) by mouth once daily. Take half a tablet Monday, Wednesday and Friday 90 tablet 1    tiZANidine (ZANAFLEX) 4 MG tablet Take 1 tablet (4 mg total) by mouth every 8 (eight) hours. 90 tablet 3    traMADol (ULTRAM) 50 mg tablet Take 1 tablet (50 mg total) by mouth once daily. 30 tablet 0     No current facility-administered medications on file prior to visit.        Allergies:   Review of patient's allergies indicates:   Allergen Reactions    Penicillins Rash       Social History:   Social History     Social History    Marital status:      Spouse name: N/A    Number of children: N/A    Years of education: N/A     Social History Main Topics    Smoking status: Former Smoker     Types: Cigarettes     Quit date: 8/8/1977    Smokeless tobacco: Never Used    Alcohol use Yes      Comment: socially    Drug use: No    Sexual activity: Not on file     Other Topics Concern    Not on file     Social History Narrative    No narrative on file     PHYSICAL EXAMINATION:   General  VSS  Constitutional: Oriented to person, place, and time. No apparent distress. Appears well-developed and well-nourished. Pleasant.  HENT:   Head: Normocephalic and atraumatic.   Eyes:  Right eye exhibits no discharge. Left eye exhibits no discharge. No scleral icterus.   Pulmonary/Chest: Effort normal. No respiratory distress.   Abdominal: There is no guarding.   Neurological: Alert and oriented to person, place, and time.   Psychiatric: Behavior is normal.     Ortho Exam  Right Knee Exam      Tenderness   The patient is experiencing tenderness in the medial joint line.     Range of Motion   Extension: 0   Flexion: 130      Tests   Vikki:  Medial - negative Lateral - negative  Lachman:  Anterior - negative    Posterior - negative  Varus: negative  Valgus: negative  Patellar Apprehension: negative     Other   Erythema: absent  Scars: present  Sensation: normal  Pulse: present  Swelling: none  Other tests: no effusion present        Left Knee Exam      Tenderness   The patient is experiencing no tenderness.            Range of Motion   Extension: 0   Flexion: 130      Tests   Vikki:  Medial - negative Lateral - negative  Lachman:  Anterior - negative    Posterior - negative  Varus: negative  Valgus: negative  Patellar Apprehension: negative     Other   Erythema: absent  Scars: absent  Sensation: normal  Pulse: present  Swelling: none  Effusion: no effusion present     INSPECTION: There is no swelling, ecchymoses, erythema or gross deformity about the bilateral knees.  GAIT/DYNAMIC: Preserved.     Imaging  X-ray of the bilateral knees from 1/19/2018: There is degenerative arthrosis of the medial compartments of both knees with joint space narrowing. There is minimal degenerative arthrosis of the patellofemoral articulations with small posterior patellar osteophytes. No fracture or subluxation are identified. There are no signs of joint effusion on either side.    Data Reviewed: X-ray    Supportive Actions: Independent visualization of images or test specimens    ASSESSMENT:   1. Bilateral primary osteoarthritis of knee      PLAN:     1. Performed #1/3 bilateral knee Euflexxa injections under  ultrasound guidance. See separate procedure note.     2. Recommend physical therapy working on quadriceps strengthening and transitioning to a home exercise program for long-term maintenance. He will be starting at [facility] this week.    3. X-ray of left hand. Plan to address bilateral hand symptoms at next clinic visit.    4. Return to clinic next week for #2/3 bilateral Euflexxa injections    The above note was completed, in part, with the aid of Dragon dictation software/hardware. Translation errors may be present.

## 2018-02-06 ENCOUNTER — TELEPHONE (OUTPATIENT)
Dept: CARDIOLOGY | Facility: CLINIC | Age: 78
End: 2018-02-06

## 2018-02-06 ENCOUNTER — OFFICE VISIT (OUTPATIENT)
Dept: PHYSICAL MEDICINE AND REHAB | Facility: CLINIC | Age: 78
End: 2018-02-06
Payer: MEDICARE

## 2018-02-06 DIAGNOSIS — G89.29 CHRONIC PAIN OF BOTH KNEES: Primary | ICD-10-CM

## 2018-02-06 DIAGNOSIS — M17.0 PRIMARY OSTEOARTHRITIS OF BOTH KNEES: ICD-10-CM

## 2018-02-06 DIAGNOSIS — M25.561 CHRONIC PAIN OF BOTH KNEES: Primary | ICD-10-CM

## 2018-02-06 DIAGNOSIS — M25.562 CHRONIC PAIN OF BOTH KNEES: Primary | ICD-10-CM

## 2018-02-06 PROCEDURE — 99499 UNLISTED E&M SERVICE: CPT | Mod: S$GLB,,, | Performed by: PHYSICAL MEDICINE & REHABILITATION

## 2018-02-06 PROCEDURE — 20611 DRAIN/INJ JOINT/BURSA W/US: CPT | Mod: 50,S$GLB,, | Performed by: PHYSICAL MEDICINE & REHABILITATION

## 2018-02-06 PROCEDURE — 99999 PR PBB SHADOW E&M-EST. PATIENT-LVL II: CPT | Mod: PBBFAC,,, | Performed by: PHYSICAL MEDICINE & REHABILITATION

## 2018-02-06 RX ORDER — HYALURONATE SODIUM 20 MG/2 ML
20 SYRINGE (ML) INTRAARTICULAR
Status: DISCONTINUED | OUTPATIENT
Start: 2018-02-06 | End: 2018-02-06 | Stop reason: HOSPADM

## 2018-02-06 RX ADMIN — Medication 20 MG: at 10:02

## 2018-02-06 NOTE — TELEPHONE ENCOUNTER
----- Message from Alisa Kumar sent at 2/6/2018 10:09 AM CST -----  Patient would like to be contacted to make sure his appointment for 3/7 is at 10:30. He stated he spoke with nurse about appointment being to 9:30.

## 2018-02-06 NOTE — PROGRESS NOTES
[facility]    CHIEF COMPLAINT:   Chief Complaint   Patient presents with    Injections     euflexxa 2/3 samantha     HISTORY OF PRESENT ILLNESS: Ankit Ruff is a 77 y.o. male who presents to me in follow-up for #2/3 bilateral Euflexxa injections. He reports improvement in his bilateral knee pain. He is starting physical therapy at [facility] for bilateral knees. He would like to proceed with injections today.    Review of Systems   Constitutional: Negative for fever.   HENT: Negative for drooling.    Eyes: Negative for discharge.   Respiratory: Negative for choking.    Cardiovascular: Negative for chest pain.   Genitourinary: Negative for flank pain.   Skin: Negative for wound.   Allergic/Immunologic: Negative for immunocompromised state.   Neurological: Negative for tremors and syncope.   Psychiatric/Behavioral: Negative for behavioral problems.     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    Medications:   Current Outpatient Prescriptions on File Prior to Visit   Medication Sig Dispense Refill    aspirin (ECOTRIN) 81 MG EC tablet Take 81 mg by mouth once daily.        carvedilol (COREG) 12.5 MG tablet TAKE 1 TABLET TWICE DAILY 180 tablet 3    citalopram (CELEXA) 10 MG tablet TAKE 1 TABLET EVERY DAY 90 tablet 3     gabapentin (NEURONTIN) 300 MG capsule TAKE 1 CAPSULE TWICE DAILY 180 capsule 0    meloxicam (MOBIC) 15 MG tablet TAKE 1 TABLET EVERY DAY (Patient taking differently: TAKE 1/2 TABLET EVERY DAY) 90 tablet 0    pantoprazole (PROTONIX) 40 MG tablet Take 1 tablet (40 mg total) by mouth once daily. 90 tablet 3    rosuvastatin (CRESTOR) 10 MG tablet Take 1 tablet (10 mg total) by mouth once daily. 90 tablet 3    spironolactone (ALDACTONE) 25 MG tablet Take 1 tablet (25 mg total) by mouth once daily. Take half a tablet Monday, Wednesday and Friday 90 tablet 1    tiZANidine (ZANAFLEX) 4 MG tablet Take 1 tablet (4 mg total) by mouth every 8 (eight) hours. 90 tablet 3    traMADol (ULTRAM) 50 mg tablet Take 1 tablet (50 mg total) by mouth once daily. 30 tablet 0     No current facility-administered medications on file prior to visit.        Allergies:   Review of patient's allergies indicates:   Allergen Reactions    Penicillins Rash       Social History:   Social History     Social History    Marital status:      Spouse name: N/A    Number of children: N/A    Years of education: N/A     Social History Main Topics    Smoking status: Former Smoker     Types: Cigarettes     Quit date: 8/8/1977    Smokeless tobacco: Never Used    Alcohol use Yes      Comment: socially    Drug use: No    Sexual activity: Not Asked     Other Topics Concern    None     Social History Narrative    None     PHYSICAL EXAMINATION:   General  VSS  Constitutional: Oriented to person, place, and time. No apparent distress. Appears well-developed and well-nourished. Pleasant.  HENT:   Head: Normocephalic and atraumatic.   Eyes: Right eye exhibits no discharge. Left eye exhibits no discharge. No scleral icterus.   Pulmonary/Chest: Effort normal. No respiratory distress.   Abdominal: There is no guarding.   Neurological: Alert and oriented to person, place, and time.   Psychiatric: Behavior is normal.     Ortho Exam  Right Knee Exam       Tenderness   The patient is experiencing tenderness in the medial joint line.     Range of Motion   Extension: 0   Flexion: 130      Tests   Vikki:  Medial - negative Lateral - negative  Lachman:  Anterior - negative    Posterior - negative  Varus: negative  Valgus: negative  Patellar Apprehension: negative     Other   Erythema: absent  Scars: present  Sensation: normal  Pulse: present  Swelling: none  Other tests: no effusion present        Left Knee Exam      Tenderness   The patient is experiencing no tenderness.            Range of Motion   Extension: 0   Flexion: 130      Tests   Vikki:  Medial - negative Lateral - negative  Lachman:  Anterior - negative    Posterior - negative  Varus: negative  Valgus: negative  Patellar Apprehension: negative     Other   Erythema: absent  Scars: absent  Sensation: normal  Pulse: present  Swelling: none  Effusion: no effusion present     INSPECTION: There is no swelling, ecchymoses, erythema or gross deformity about the bilateral knees.  GAIT/DYNAMIC: Preserved.     Imaging  X-ray of the bilateral knees from 1/19/2018: There is degenerative arthrosis of the medial compartments of both knees with joint space narrowing. There is minimal degenerative arthrosis of the patellofemoral articulations with small posterior patellar osteophytes. No fracture or subluxation are identified. There are no signs of joint effusion on either side.    Data Reviewed: X-ray    Supportive Actions: Independent visualization of images or test specimens    ASSESSMENT:   1. Chronic pain of both knees    2. Primary osteoarthritis of both knees      PLAN:     1. Performed #2/3 bilateral knee Euflexxa injections under ultrasound guidance. See separate procedure note.     2. Recommend starting physical therapy working on quadriceps strengthening and transitioning to a home exercise program for long-term maintenance.    3. X-ray of left hand reveals severe first CMC osteoarthritis. Plan to address  bilateral hand symptoms at next clinic visit.    4. Return to clinic next week for #3/3 bilateral Euflexxa injections    The above note was completed, in part, with the aid of Dragon dictation software/hardware. Translation errors may be present.

## 2018-02-06 NOTE — PROCEDURES
Large Joint Aspiration/Injection  Date/Time: 2/6/2018 10:05 AM  Performed by: PAIGE SORIANO  Authorized by: PAIGE SORIANO     Consent Done?:  Yes (Verbal)  Indications:  Pain  Procedure site marked: Yes    Timeout: Prior to procedure the correct patient, procedure, and site was verified      Location:  Knee  Site:  R knee and L knee  Prep: Patient was prepped and draped in usual sterile fashion    Ultrasonic Guidance for needle placement: Yes  Images are saved and documented.  Needle size:  22 G  Approach: Needle in plane, lateral to medial.  Medications:  20 mg EUFLEXXA 10 mg/mL(mw 2.4 -3.6 million)  Patient tolerance:  Patient tolerated the procedure well with no immediate complications    Additional Comments: Ultrasound guidance was used for correct needle placement, the images were saved will be uploaded to EMR.

## 2018-02-15 ENCOUNTER — CLINICAL SUPPORT (OUTPATIENT)
Dept: PHYSICAL MEDICINE AND REHAB | Facility: CLINIC | Age: 78
End: 2018-02-15
Payer: MEDICARE

## 2018-02-15 DIAGNOSIS — M25.562 CHRONIC PAIN OF BOTH KNEES: Primary | ICD-10-CM

## 2018-02-15 DIAGNOSIS — M17.0 PRIMARY OSTEOARTHRITIS OF BOTH KNEES: ICD-10-CM

## 2018-02-15 DIAGNOSIS — G89.29 CHRONIC PAIN OF BOTH KNEES: Primary | ICD-10-CM

## 2018-02-15 DIAGNOSIS — M25.561 CHRONIC PAIN OF BOTH KNEES: Primary | ICD-10-CM

## 2018-02-15 PROCEDURE — 99499 UNLISTED E&M SERVICE: CPT | Mod: S$GLB,,, | Performed by: PHYSICAL MEDICINE & REHABILITATION

## 2018-02-15 PROCEDURE — 20611 DRAIN/INJ JOINT/BURSA W/US: CPT | Mod: 50,S$GLB,, | Performed by: PHYSICAL MEDICINE & REHABILITATION

## 2018-02-15 RX ORDER — HYALURONATE SODIUM 20 MG/2 ML
20 SYRINGE (ML) INTRAARTICULAR
Status: DISCONTINUED | OUTPATIENT
Start: 2018-02-15 | End: 2018-02-15 | Stop reason: HOSPADM

## 2018-02-15 RX ADMIN — Medication 20 MG: at 01:02

## 2018-02-15 NOTE — PROCEDURES
Large Joint Aspiration/Injection  Date/Time: 2/15/2018 1:51 PM  Performed by: PAIGE SORIANO  Authorized by: PAIGE SORIANO     Consent Done?:  Yes (Verbal)  Indications:  Pain  Procedure site marked: Yes    Timeout: Prior to procedure the correct patient, procedure, and site was verified      Location:  Knee  Site:  R knee and L knee  Prep: Patient was prepped and draped in usual sterile fashion    Ultrasonic Guidance for needle placement: Yes  Images are saved and documented.  Needle size:  22 G  Approach: Needle in plane, lateral to medial.  Medications:  20 mg EUFLEXXA 10 mg/mL(mw 2.4 -3.6 million)  Patient tolerance:  Patient tolerated the procedure well with no immediate complications    Additional Comments: Ultrasound guidance was used for correct needle placement, the images were saved will be uploaded to EMR.

## 2018-02-15 NOTE — PROGRESS NOTES
[facility]    CHIEF COMPLAINT:   Bilateral knee pain    HISTORY OF PRESENT ILLNESS: Ankit Ruff is a 77 y.o. male who presents to me in follow-up for #3/3 bilateral Euflexxa injections. He reports improvement in his bilateral knee pain since last visit. He is starting physical therapy at [facility] for bilateral knees. He would like to proceed with injections today.    Review of Systems   Constitutional: Negative for fever.   HENT: Negative for drooling.    Eyes: Negative for discharge.   Respiratory: Negative for choking.    Cardiovascular: Negative for chest pain.   Genitourinary: Negative for flank pain.   Skin: Negative for wound.   Allergic/Immunologic: Negative for immunocompromised state.   Neurological: Negative for tremors and syncope.   Psychiatric/Behavioral: Negative for behavioral problems.     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    Medications:   Current Outpatient Prescriptions on File Prior to Visit   Medication Sig Dispense Refill    aspirin (ECOTRIN) 81 MG EC tablet Take 81 mg by mouth once daily.        carvedilol (COREG) 12.5 MG tablet TAKE 1 TABLET TWICE DAILY 180 tablet 3    citalopram (CELEXA) 10 MG tablet TAKE 1 TABLET EVERY DAY 90 tablet 3    gabapentin (NEURONTIN) 300 MG  capsule TAKE 1 CAPSULE TWICE DAILY 180 capsule 0    meloxicam (MOBIC) 15 MG tablet TAKE 1 TABLET EVERY DAY (Patient taking differently: TAKE 1/2 TABLET EVERY DAY) 90 tablet 0    pantoprazole (PROTONIX) 40 MG tablet Take 1 tablet (40 mg total) by mouth once daily. 90 tablet 3    rosuvastatin (CRESTOR) 10 MG tablet Take 1 tablet (10 mg total) by mouth once daily. 90 tablet 3    spironolactone (ALDACTONE) 25 MG tablet Take 1 tablet (25 mg total) by mouth once daily. Take half a tablet Monday, Wednesday and Friday 90 tablet 1    tiZANidine (ZANAFLEX) 4 MG tablet Take 1 tablet (4 mg total) by mouth every 8 (eight) hours. 90 tablet 3    traMADol (ULTRAM) 50 mg tablet Take 1 tablet (50 mg total) by mouth once daily. 30 tablet 0     No current facility-administered medications on file prior to visit.        Allergies:   Review of patient's allergies indicates:   Allergen Reactions    Penicillins Rash       Social History:   Social History     Social History    Marital status:      Spouse name: N/A    Number of children: N/A    Years of education: N/A     Social History Main Topics    Smoking status: Former Smoker     Types: Cigarettes     Quit date: 8/8/1977    Smokeless tobacco: Never Used    Alcohol use Yes      Comment: socially    Drug use: No    Sexual activity: Not on file     Other Topics Concern    Not on file     Social History Narrative    No narrative on file     PHYSICAL EXAMINATION:   General  VSS  Constitutional: Oriented to person, place, and time. No apparent distress. Appears well-developed and well-nourished. Pleasant.  HENT:   Head: Normocephalic and atraumatic.   Eyes: Right eye exhibits no discharge. Left eye exhibits no discharge. No scleral icterus.   Pulmonary/Chest: Effort normal. No respiratory distress.   Abdominal: There is no guarding.   Neurological: Alert and oriented to person, place, and time.   Psychiatric: Behavior is normal.     Ortho Exam  Right Knee Exam       Tenderness   The patient is experiencing tenderness in the medial joint line.     Range of Motion   Extension: 0   Flexion: 130      Tests   Vikki:  Medial - negative Lateral - negative  Lachman:  Anterior - negative    Posterior - negative  Varus: negative  Valgus: negative  Patellar Apprehension: negative     Other   Erythema: absent  Scars: present  Sensation: normal  Pulse: present  Swelling: none  Other tests: no effusion present        Left Knee Exam      Tenderness   The patient is experiencing no tenderness.            Range of Motion   Extension: 0   Flexion: 130      Tests   Vikki:  Medial - negative Lateral - negative  Lachman:  Anterior - negative    Posterior - negative  Varus: negative  Valgus: negative  Patellar Apprehension: negative     Other   Erythema: absent  Scars: absent  Sensation: normal  Pulse: present  Swelling: none  Effusion: no effusion present     INSPECTION: There is no swelling, ecchymoses, erythema or gross deformity about the bilateral knees.  GAIT/DYNAMIC: Preserved.     Imaging  X-ray of the bilateral knees from 1/19/2018: There is degenerative arthrosis of the medial compartments of both knees with joint space narrowing. There is minimal degenerative arthrosis of the patellofemoral articulations with small posterior patellar osteophytes. No fracture or subluxation are identified. There are no signs of joint effusion on either side.    Data Reviewed: X-ray    Supportive Actions: Independent visualization of images or test specimens    ASSESSMENT:   1. Chronic pain of both knees    2. Primary osteoarthritis of both knees      PLAN:     1. Performed #3/3 bilateral knee Euflexxa injections under ultrasound guidance. See separate procedure note.     2. Recommend starting physical therapy working on quadriceps strengthening and transitioning to a home exercise program for long-term maintenance.    3. X-ray of left hand reveals severe first CMC osteoarthritis.  He reports the  thumb is not terribly bothersome at the present time.  He will return to clinic if his symptoms increase.    4. Return to clinic prn.    The above note was completed, in part, with the aid of Dragon dictation software/hardware. Translation errors may be present.

## 2018-02-26 ENCOUNTER — CLINICAL SUPPORT (OUTPATIENT)
Dept: CARDIOLOGY | Facility: CLINIC | Age: 78
End: 2018-02-26
Attending: INTERNAL MEDICINE
Payer: MEDICARE

## 2018-02-26 DIAGNOSIS — I10 ESSENTIAL HYPERTENSION: ICD-10-CM

## 2018-02-26 DIAGNOSIS — I25.810 CORONARY ARTERY DISEASE INVOLVING AUTOLOGOUS VEIN CORONARY BYPASS GRAFT WITHOUT ANGINA PECTORIS: ICD-10-CM

## 2018-02-26 DIAGNOSIS — Z95.1 S/P CABG X 4: ICD-10-CM

## 2018-02-26 DIAGNOSIS — Z95.810 ICD (IMPLANTABLE CARDIOVERTER-DEFIBRILLATOR) IN PLACE: Chronic | ICD-10-CM

## 2018-02-26 DIAGNOSIS — I48.0 PAF (PAROXYSMAL ATRIAL FIBRILLATION): ICD-10-CM

## 2018-02-26 LAB
DIASTOLIC DYSFUNCTION: YES
ESTIMATED PA SYSTOLIC PRESSURE: 31.3
MITRAL VALVE MOBILITY: NORMAL
MITRAL VALVE REGURGITATION: ABNORMAL
RETIRED EF AND QEF - SEE NOTES: 30 (ref 55–65)
TRICUSPID VALVE REGURGITATION: ABNORMAL

## 2018-02-26 PROCEDURE — 93306 TTE W/DOPPLER COMPLETE: CPT | Mod: S$GLB,,, | Performed by: INTERNAL MEDICINE

## 2018-03-06 ENCOUNTER — TELEPHONE (OUTPATIENT)
Dept: CARDIOLOGY | Facility: CLINIC | Age: 78
End: 2018-03-06

## 2018-03-06 NOTE — TELEPHONE ENCOUNTER
Contacted patient about his March 7, 2018 at 8am instead of 10:30am to confirm and patient agrees and understands.

## 2018-03-07 ENCOUNTER — OFFICE VISIT (OUTPATIENT)
Dept: CARDIOLOGY | Facility: CLINIC | Age: 78
End: 2018-03-07
Payer: MEDICARE

## 2018-03-07 VITALS
BODY MASS INDEX: 29.78 KG/M2 | DIASTOLIC BLOOD PRESSURE: 70 MMHG | HEIGHT: 71 IN | HEART RATE: 55 BPM | WEIGHT: 212.75 LBS | SYSTOLIC BLOOD PRESSURE: 137 MMHG

## 2018-03-07 DIAGNOSIS — Z95.1 S/P CABG X 4: ICD-10-CM

## 2018-03-07 DIAGNOSIS — I10 ESSENTIAL HYPERTENSION: ICD-10-CM

## 2018-03-07 DIAGNOSIS — I50.42 CHRONIC COMBINED SYSTOLIC AND DIASTOLIC HEART FAILURE: ICD-10-CM

## 2018-03-07 DIAGNOSIS — I47.29 NSVT (NONSUSTAINED VENTRICULAR TACHYCARDIA): ICD-10-CM

## 2018-03-07 DIAGNOSIS — I25.5 ISCHEMIC CARDIOMYOPATHY: Chronic | ICD-10-CM

## 2018-03-07 DIAGNOSIS — I48.0 PAF (PAROXYSMAL ATRIAL FIBRILLATION): Primary | ICD-10-CM

## 2018-03-07 DIAGNOSIS — Z95.810 ICD (IMPLANTABLE CARDIOVERTER-DEFIBRILLATOR) IN PLACE: Chronic | ICD-10-CM

## 2018-03-07 PROCEDURE — 3075F SYST BP GE 130 - 139MM HG: CPT | Mod: S$GLB,,, | Performed by: INTERNAL MEDICINE

## 2018-03-07 PROCEDURE — 3078F DIAST BP <80 MM HG: CPT | Mod: S$GLB,,, | Performed by: INTERNAL MEDICINE

## 2018-03-07 PROCEDURE — 99214 OFFICE O/P EST MOD 30 MIN: CPT | Mod: S$GLB,,, | Performed by: INTERNAL MEDICINE

## 2018-03-07 PROCEDURE — 99999 PR PBB SHADOW E&M-EST. PATIENT-LVL II: CPT | Mod: PBBFAC,,, | Performed by: INTERNAL MEDICINE

## 2018-03-07 PROCEDURE — 99499 UNLISTED E&M SERVICE: CPT | Mod: S$GLB,,, | Performed by: INTERNAL MEDICINE

## 2018-03-07 NOTE — PROGRESS NOTES
Subjective:    Patient ID:  Ankit Ruff is a 77 y.o. male who presents for follow-up of ICD    HPI  He comes for follow up with no major problems, no chest pain, no shortness of breath.  FC II    Review of Systems   Constitution: Negative for decreased appetite, weakness, malaise/fatigue, weight gain and weight loss.   Cardiovascular: Negative for chest pain, dyspnea on exertion, leg swelling, palpitations and syncope.   Respiratory: Negative for cough and shortness of breath.    Gastrointestinal: Negative.    All other systems reviewed and are negative.       Objective:    Physical Exam   Constitutional: He is oriented to person, place, and time. He appears well-developed and well-nourished.   HENT:   Head: Normocephalic.   Eyes: Pupils are equal, round, and reactive to light.   Neck: Normal range of motion. Neck supple. No JVD present. Carotid bruit is not present. No thyromegaly present.   Cardiovascular: Normal rate, regular rhythm, normal heart sounds, intact distal pulses and normal pulses.  PMI is not displaced.  Exam reveals no gallop.    No murmur heard.  Pulmonary/Chest: Effort normal and breath sounds normal.   Abdominal: Soft. Normal appearance. He exhibits no mass. There is no hepatosplenomegaly. There is no tenderness.   Musculoskeletal: Normal range of motion. He exhibits no edema.   Neurological: He is alert and oriented to person, place, and time. He has normal strength and normal reflexes. No sensory deficit.   Skin: Skin is warm and intact.   Psychiatric: He has a normal mood and affect.   Nursing note and vitals reviewed.    Echo reviewed      Assessment:       1. PAF (paroxysmal atrial fibrillation)    2. NSVT (nonsustained ventricular tachycardia)    3. Chronic combined systolic and diastolic heart failure    4. S/P CABG x 4    5. Essential hypertension    6. Ischemic cardiomyopathy    7. ICD (implantable cardioverter-defibrillator) in place         Plan:     Continue all cardiac  medications  Regular exercise program  9 m f/u

## 2018-03-14 DIAGNOSIS — Z51.81 MEDICATION MONITORING ENCOUNTER: Primary | ICD-10-CM

## 2018-03-15 RX ORDER — TRAMADOL HYDROCHLORIDE 50 MG/1
50 TABLET ORAL DAILY
Qty: 30 TABLET | Refills: 0 | OUTPATIENT
Start: 2018-03-15

## 2018-03-15 RX ORDER — MELOXICAM 15 MG/1
15 TABLET ORAL DAILY
Qty: 90 TABLET | Refills: 0 | OUTPATIENT
Start: 2018-03-15

## 2018-03-15 NOTE — TELEPHONE ENCOUNTER
His kidney function is slightly decreased, is he taking 1/2 pill of meloxicam now?    If so, have him come in to recheck kidney function.  Can't send 3 months to [facility].

## 2018-03-15 NOTE — TELEPHONE ENCOUNTER
The urologist is not associated with medical renal function.  His last BMP showed a decrease in his kidney function, and at that time I wanted him to cut pill in half to 7.5 mg.  He needs a redo to monitor.  His previous GFRs had been normal, but his last was not.

## 2018-03-15 NOTE — TELEPHONE ENCOUNTER
MARY   Pt states he is taking 1 tab meloxicam at 15 mg daily and is going to see the urologist 3/28/18 and feels like he does not need to come in to have the kidney function lab done before coming in to see Dr Arambula. Pt states that he just had labs done not too long ago and saw Dr Ellis and was told he was doing great. Med card corrected according to pt taking the RX mobic. CLC

## 2018-03-16 NOTE — TELEPHONE ENCOUNTER
Spoke to pt and gave him your advice and he states that his daughter is a pharmacist and a nurse and his son is an ER dr. He went on and talked about he is going to see a urologist and that is his main focus. States he is also going to look to est care with another provider and will have the lab drawn when he finishes with everything else.

## 2018-03-26 ENCOUNTER — OFFICE VISIT (OUTPATIENT)
Dept: UROLOGY | Facility: CLINIC | Age: 78
End: 2018-03-26
Payer: MEDICARE

## 2018-03-26 VITALS
HEIGHT: 71 IN | DIASTOLIC BLOOD PRESSURE: 67 MMHG | SYSTOLIC BLOOD PRESSURE: 125 MMHG | HEART RATE: 49 BPM | WEIGHT: 208.5 LBS | BODY MASS INDEX: 29.19 KG/M2

## 2018-03-26 DIAGNOSIS — N13.8 ENLARGED PROSTATE WITH URINARY OBSTRUCTION: ICD-10-CM

## 2018-03-26 DIAGNOSIS — R35.1 NOCTURIA: Primary | ICD-10-CM

## 2018-03-26 DIAGNOSIS — R33.9 URINARY RETENTION: ICD-10-CM

## 2018-03-26 DIAGNOSIS — N40.1 ENLARGED PROSTATE WITH URINARY OBSTRUCTION: ICD-10-CM

## 2018-03-26 LAB
BILIRUB SERPL-MCNC: NORMAL MG/DL
BLOOD URINE, POC: NORMAL
COLOR, POC UA: YELLOW
GLUCOSE UR QL STRIP: NORMAL
KETONES UR QL STRIP: NORMAL
LEUKOCYTE ESTERASE URINE, POC: NORMAL
NITRITE, POC UA: NORMAL
PH, POC UA: 6
POC RESIDUAL URINE VOLUME: 168 ML (ref 0–100)
PROTEIN, POC: NORMAL
SPECIFIC GRAVITY, POC UA: 1.01
UROBILINOGEN, POC UA: NORMAL

## 2018-03-26 PROCEDURE — 51798 US URINE CAPACITY MEASURE: CPT | Mod: S$GLB,,, | Performed by: UROLOGY

## 2018-03-26 PROCEDURE — 99499 UNLISTED E&M SERVICE: CPT | Mod: S$GLB,,, | Performed by: UROLOGY

## 2018-03-26 PROCEDURE — 99204 OFFICE O/P NEW MOD 45 MIN: CPT | Mod: 25,S$GLB,, | Performed by: UROLOGY

## 2018-03-26 PROCEDURE — 3074F SYST BP LT 130 MM HG: CPT | Mod: CPTII,S$GLB,, | Performed by: UROLOGY

## 2018-03-26 PROCEDURE — 3078F DIAST BP <80 MM HG: CPT | Mod: CPTII,S$GLB,, | Performed by: UROLOGY

## 2018-03-26 PROCEDURE — 81002 URINALYSIS NONAUTO W/O SCOPE: CPT | Mod: S$GLB,,, | Performed by: UROLOGY

## 2018-03-26 PROCEDURE — 99999 PR PBB SHADOW E&M-EST. PATIENT-LVL III: CPT | Mod: PBBFAC,,, | Performed by: UROLOGY

## 2018-03-26 RX ORDER — OXYBUTYNIN CHLORIDE 5 MG/1
5 TABLET ORAL NIGHTLY
Qty: 30 TABLET | Refills: 11 | Status: SHIPPED | OUTPATIENT
Start: 2018-03-26 | End: 2018-07-11 | Stop reason: SDUPTHER

## 2018-03-26 NOTE — PROGRESS NOTES
Subjective:       Patient ID: Ankit Ruff is a 77 y.o. male.    Chief Complaint: Nocturia    HPI     77 year complains of nocturia.  He says he void every 2 hours at night.  Daytime symptoms are not as bad.  He is still very active.  He has been shodding horses for 60 years.  He does take pain medicine due to chronic arthalgia.  He denies hematuria and dysuria.  He says his flow is strong.  Previously seen by Dr. Kelly.  Last PSA 1.5 09/2015.  He has no family history of prostate cancer.  Takes Aldactone in the morning.  No previous BPH meds.  He has tried fluid restrictions.    Urine dipstick shows negative for all components.   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  , Disp: , Rfl:     carvedilol (COREG) 12.5 MG tablet, TAKE 1 TABLET TWICE DAILY, Disp: 180 tablet, Rfl: 3    citalopram (CELEXA) 10 MG tablet, TAKE 1 TABLET EVERY DAY, Disp: 90 tablet, Rfl: 3    gabapentin (NEURONTIN) 300 MG capsule, TAKE 1 CAPSULE TWICE DAILY, Disp: 180 capsule, Rfl: 0    pantoprazole (PROTONIX) 40 MG tablet, Take 1 tablet (40 mg total) by mouth once daily., Disp: 90 tablet, Rfl: 3    rosuvastatin (CRESTOR) 10 MG tablet, Take 1 tablet (10 mg total) by mouth once daily., Disp: 90 tablet, Rfl:  3    spironolactone (ALDACTONE) 25 MG tablet, Take 1 tablet (25 mg total) by mouth once daily. Take half a tablet Monday, Wednesday and Friday, Disp: 90 tablet, Rfl: 1    tiZANidine (ZANAFLEX) 4 MG tablet, Take 1 tablet (4 mg total) by mouth every 8 (eight) hours., Disp: 90 tablet, Rfl: 3    oxybutynin (DITROPAN) 5 MG Tab, Take 1 tablet (5 mg total) by mouth every evening., Disp: 30 tablet, Rfl: 11    Review of Systems   Constitutional: Negative for fever.   Eyes: Negative for visual disturbance.   Respiratory: Negative for shortness of breath.    Cardiovascular: Negative for chest pain.   Gastrointestinal: Negative for nausea and vomiting.   Genitourinary: Negative for dysuria and hematuria.   Musculoskeletal: Negative for gait problem.   Skin: Negative for rash.   Neurological: Negative for seizures.   Psychiatric/Behavioral: Negative for confusion.       Objective:      Physical Exam   Constitutional: He is oriented to person, place, and time. He appears well-developed and well-nourished.   HENT:   Head: Normocephalic and atraumatic.   Eyes: Conjunctivae are normal.   Cardiovascular: Normal rate.    Pulmonary/Chest: Effort normal.   Abdominal: Hernia confirmed negative in the right inguinal area and confirmed negative in the left inguinal area.   Genitourinary: Testes normal and penis normal. Rectal exam shows no mass and anal tone normal. Prostate is enlarged (30g, s/s/a). Prostate is not tender.   Musculoskeletal: Normal range of motion.   Lymphadenopathy: No inguinal adenopathy noted on the right or left side.   Neurological: He is alert and oriented to person, place, and time.   Skin: Skin is warm and dry. No rash noted.   Psychiatric: He has a normal mood and affect.   Vitals reviewed.      Assessment:       1. Nocturia    2. Enlarged prostate with urinary obstruction    3. Urinary retention        Plan:       Nocturia  -     POCT urine dipstick without microscope  -     POCT Bladder Scan    Enlarged  prostate with urinary obstruction    Urinary retention    Other orders  -     oxybutynin (DITROPAN) 5 MG Tab; Take 1 tablet (5 mg total) by mouth every evening.  Dispense: 30 tablet; Refill: 11      Trial oxybutynin at bedtime

## 2018-04-03 RX ORDER — GABAPENTIN 300 MG/1
300 CAPSULE ORAL 2 TIMES DAILY
Qty: 180 CAPSULE | Refills: 0 | Status: SHIPPED | OUTPATIENT
Start: 2018-04-03 | End: 2018-06-07 | Stop reason: SDUPTHER

## 2018-04-03 RX ORDER — GABAPENTIN 300 MG/1
CAPSULE ORAL
Qty: 180 CAPSULE | Refills: 0 | Status: SHIPPED | OUTPATIENT
Start: 2018-04-03 | End: 2018-05-09

## 2018-04-04 ENCOUNTER — OFFICE VISIT (OUTPATIENT)
Dept: PHYSICAL MEDICINE AND REHAB | Facility: CLINIC | Age: 78
End: 2018-04-04
Payer: MEDICARE

## 2018-04-04 VITALS
DIASTOLIC BLOOD PRESSURE: 80 MMHG | SYSTOLIC BLOOD PRESSURE: 167 MMHG | WEIGHT: 208 LBS | HEIGHT: 71 IN | BODY MASS INDEX: 29.12 KG/M2 | HEART RATE: 47 BPM

## 2018-04-04 DIAGNOSIS — M18.12 ARTHRITIS OF CARPOMETACARPAL (CMC) JOINT OF LEFT THUMB: Primary | ICD-10-CM

## 2018-04-04 PROCEDURE — 3079F DIAST BP 80-89 MM HG: CPT | Mod: CPTII,S$GLB,, | Performed by: PHYSICAL MEDICINE & REHABILITATION

## 2018-04-04 PROCEDURE — 3077F SYST BP >= 140 MM HG: CPT | Mod: CPTII,S$GLB,, | Performed by: PHYSICAL MEDICINE & REHABILITATION

## 2018-04-04 PROCEDURE — 99499 UNLISTED E&M SERVICE: CPT | Mod: S$GLB,,, | Performed by: PHYSICAL MEDICINE & REHABILITATION

## 2018-04-04 PROCEDURE — 99999 PR PBB SHADOW E&M-EST. PATIENT-LVL III: CPT | Mod: PBBFAC,,, | Performed by: PHYSICAL MEDICINE & REHABILITATION

## 2018-04-04 PROCEDURE — 99213 OFFICE O/P EST LOW 20 MIN: CPT | Mod: 25,S$GLB,, | Performed by: PHYSICAL MEDICINE & REHABILITATION

## 2018-04-04 PROCEDURE — 20604 DRAIN/INJ JOINT/BURSA W/US: CPT | Mod: LT,S$GLB,, | Performed by: PHYSICAL MEDICINE & REHABILITATION

## 2018-04-04 RX ORDER — TRAMADOL HYDROCHLORIDE 50 MG/1
50 TABLET ORAL DAILY PRN
Qty: 30 TABLET | Refills: 1 | Status: CANCELLED | OUTPATIENT
Start: 2018-04-04 | End: 2018-04-14

## 2018-04-04 RX ORDER — TRAMADOL HYDROCHLORIDE 50 MG/1
50 TABLET ORAL DAILY PRN
Qty: 30 TABLET | Refills: 1 | Status: SHIPPED | OUTPATIENT
Start: 2018-04-04 | End: 2018-04-14

## 2018-04-04 RX ORDER — TRIAMCINOLONE ACETONIDE 40 MG/ML
40 INJECTION, SUSPENSION INTRA-ARTICULAR; INTRAMUSCULAR
Status: DISCONTINUED | OUTPATIENT
Start: 2018-04-04 | End: 2018-04-04 | Stop reason: HOSPADM

## 2018-04-04 RX ORDER — MELOXICAM 15 MG/1
15 TABLET ORAL DAILY
Qty: 30 TABLET | Refills: 1 | Status: SHIPPED | OUTPATIENT
Start: 2018-04-04 | End: 2018-05-28 | Stop reason: SDUPTHER

## 2018-04-04 RX ADMIN — TRIAMCINOLONE ACETONIDE 40 MG: 40 INJECTION, SUSPENSION INTRA-ARTICULAR; INTRAMUSCULAR at 02:04

## 2018-04-05 ENCOUNTER — PATIENT MESSAGE (OUTPATIENT)
Dept: PHYSICAL MEDICINE AND REHAB | Facility: CLINIC | Age: 78
End: 2018-04-05

## 2018-04-05 NOTE — PROGRESS NOTES
[facility]    CHIEF COMPLAINT:   Chief Complaint   Patient presents with    Hand Pain     left thumb pain     HISTORY OF PRESENT ILLNESS: Ankit Ruff is a 77 y.o. male who presents to me in follow-up for evaluation and treatment of his chronic left thumb pain.  I last saw him recently for a series of Euflexxa injections.  He notes that his knee pain is improved following these injections.  He reports that his primary care physician is changing and he has been out of many medicines for the past few weeks.  He notes that he has been taking tramadol and meloxicam for over 10 years.  Since he has discontinued these medicines he has been expressing increased diffuse pain, but especially in the left thumb region.  The left hand pain is increased with increased use of the left hand.  He locates the pain to the thumb/thenar region.  There is no excess redness or warmth of the area.    Review of Systems   Constitutional: Negative for fever.   HENT: Negative for drooling.    Eyes: Negative for discharge.   Respiratory: Negative for choking.    Cardiovascular: Negative for chest pain.   Genitourinary: Negative for flank pain.   Skin: Negative for wound.   Allergic/Immunologic: Negative for immunocompromised state.   Neurological: Negative for tremors and syncope.   Psychiatric/Behavioral: Negative for behavioral problems.     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 No pertinent family history.    Medications:   Current Outpatient Prescriptions on File Prior to Visit   Medication Sig Dispense Refill    aspirin (ECOTRIN) 81 MG EC tablet Take 81 mg by mouth once daily.        carvedilol (COREG) 12.5 MG tablet TAKE 1 TABLET TWICE DAILY 180 tablet 3    citalopram (CELEXA) 10 MG tablet TAKE 1 TABLET EVERY DAY 90 tablet 3    gabapentin (NEURONTIN) 300 MG capsule Take 1 capsule (300 mg total) by mouth 2 (two) times daily. 180 capsule 0    gabapentin (NEURONTIN) 300 MG capsule TAKE 1 CAPSULE TWICE DAILY 180 capsule 0    oxybutynin (DITROPAN) 5 MG Tab Take 1 tablet (5 mg total) by mouth every evening. 30 tablet 11    pantoprazole (PROTONIX) 40 MG tablet Take 1 tablet (40 mg total) by mouth once daily. 90 tablet 3    rosuvastatin (CRESTOR) 10 MG tablet Take 1 tablet (10 mg total) by mouth once daily. 90 tablet 3    spironolactone (ALDACTONE) 25 MG tablet Take 1 tablet (25 mg total) by mouth once daily. Take half a tablet Monday, Wednesday and Friday 90 tablet 1    tiZANidine (ZANAFLEX) 4 MG tablet Take 1 tablet (4 mg total) by mouth every 8 (eight) hours. 90 tablet 3     No current facility-administered medications on file prior to visit.        Allergies:   Review of patient's allergies indicates:   Allergen Reactions    Penicillins Rash       Social History:   Social History     Social History    Marital status:      Spouse name: N/A    Number of children: N/A    Years of education: N/A     Social History Main Topics    Smoking status: Former Smoker     Types: Cigarettes     Quit date: 8/8/1977    Smokeless tobacco: Never Used    Alcohol use Yes      Comment: socially    Drug use: No    Sexual activity: Not Asked     Other Topics Concern    None     Social History Narrative    None     PHYSICAL  "EXAMINATION:   General    Vitals:    04/04/18 1437   BP: (!) 167/80   Pulse: (!) 47   Weight: 94.3 kg (208 lb)   Height: 5' 11" (1.803 m)     Constitutional: Oriented to person, place, and time. No apparent distress. Appears well-developed and well-nourished. Pleasant.  HENT:   Head: Normocephalic and atraumatic.   Eyes: Right eye exhibits no discharge. Left eye exhibits no discharge. No scleral icterus.   Pulmonary/Chest: Effort normal. No respiratory distress.   Abdominal: There is no guarding.   Neurological: Alert and oriented to person, place, and time.   Psychiatric: Behavior is normal.   Right Hand Exam     Tenderness   The patient is experiencing no tenderness.         Range of Motion     Wrist   Extension: normal   Flexion: normal   Pronation: normal   Supination: normal     Other   Erythema: absent  Scars: absent  Sensation: normal  Pulse: present      Left Hand Exam     Tenderness   Left hand tenderness location: Tenderness at the base of the first digit.     Range of Motion     Wrist   Extension: normal   Flexion: normal   Pronation: normal   Supination: normal     Hand   MP Thumb: 80   DIP Thumb: 80     Muscle Strength   Wrist Extension: 5/5   Wrist Flexion: 5/5   :  4/5     Tests   Tinels Sign (Medial Nerve): negative    Other   Erythema: absent  Scars: absent  Sensation: normal  Pulse: present    Comments:  Positive basilar thumb grind test        INSPECTION: There is no swelling, ecchymoses, erythema of the left hand.    Imaging  X-ray of the left hand from 1/31/2018: No fracture or subluxation are identified. There is osteoarthritis of the first carpometacarpal joint with joint space narrowing and periarticular osteophyte formation. There is mild osteoarthritis of the DIP joints of the left second through fifth digits. No erosive changes are evident.    Data Reviewed: X-ray    Supportive Actions: Independent visualization of images or test specimens    ASSESSMENT:   1. Arthritis of " carpometacarpal (CMC) joint of left thumb      PLAN:     1. Time was spent reviewing the above diagnosis in depth with Ankit today, including acute management and rehabilitation.     2.  He has signs, symptoms, and x-ray findings all consistent with a diagnosis of left a similar thumb degenerative arthrosis.  He's had no prior injections in the area and would like to proceed with this option, see separate procedure note for ultrasound-guided corticosteroid injection of the first CMC joint.    3.  I will refill a 30 day supply of his Ultram and meloxicam which she has been on for several years.  He may take 1 pill daily when necessary pain.     4. RTC in 4-6 weeks if not improved.    The above note was completed, in part, with the aid of Dragon dictation software/hardware. Translation errors may be present.

## 2018-04-06 ENCOUNTER — TELEPHONE (OUTPATIENT)
Dept: FAMILY MEDICINE | Facility: CLINIC | Age: 78
End: 2018-04-06

## 2018-04-06 NOTE — TELEPHONE ENCOUNTER
----- Message from Mercedes Buchanan sent at 4/5/2018  4:21 PM CDT -----  Contact: Laila Ruff(spouse)  Laila Ruff(spouse) calling to speak to the Nurse. She recently scheduled an appointment for her  and [facility] is schedule April 30 2018. Thanks!  Call back  or    Thanks!

## 2018-04-06 NOTE — TELEPHONE ENCOUNTER
Spoke to patient and advised to call [employer] and check for coverage of Tonya Silver as it may be listed under full name. Patient verbalized understanding. Wife, Laila was put on phone and information was repeated, wife verbalized understanding.

## 2018-04-17 ENCOUNTER — TELEPHONE (OUTPATIENT)
Dept: FAMILY MEDICINE | Facility: CLINIC | Age: 78
End: 2018-04-17

## 2018-04-17 ENCOUNTER — OFFICE VISIT (OUTPATIENT)
Dept: DERMATOLOGY | Facility: CLINIC | Age: 78
End: 2018-04-17
Payer: MEDICARE

## 2018-04-17 VITALS — BODY MASS INDEX: 29.12 KG/M2 | WEIGHT: 208 LBS | HEIGHT: 71 IN

## 2018-04-17 DIAGNOSIS — Z85.828 PERSONAL HISTORY OF OTHER MALIGNANT NEOPLASM OF SKIN: Primary | ICD-10-CM

## 2018-04-17 DIAGNOSIS — Z12.83 SKIN CANCER SCREENING: ICD-10-CM

## 2018-04-17 DIAGNOSIS — L57.0 MULTIPLE ACTINIC KERATOSES: ICD-10-CM

## 2018-04-17 PROCEDURE — 17000 DESTRUCT PREMALG LESION: CPT | Mod: S$GLB,,, | Performed by: DERMATOLOGY

## 2018-04-17 PROCEDURE — 99213 OFFICE O/P EST LOW 20 MIN: CPT | Mod: 25,S$GLB,, | Performed by: DERMATOLOGY

## 2018-04-17 PROCEDURE — 17003 DESTRUCT PREMALG LES 2-14: CPT | Mod: S$GLB,,, | Performed by: DERMATOLOGY

## 2018-04-17 PROCEDURE — 99999 PR PBB SHADOW E&M-EST. PATIENT-LVL II: CPT | Mod: PBBFAC,,, | Performed by: DERMATOLOGY

## 2018-04-17 NOTE — PROGRESS NOTES
Subjective:       Patient ID:  Ankit Ruff is a 77 y.o. male who presents for   Chief Complaint   Patient presents with    Spot     face     High risk patient returns today with redness and scaling to multiple sites on face.  Uses Cerave and hats daily.  Requests UBSE today.    Surgery  4/2017 with Dr. Richardson for Mohs surgery left cheek SCC  History numerous NMSC , AKs s/p cryo  SCC in situ on the right lower nose. In light of the absence of any evidence of residual squamous cell carcinoma in situ at the biopsy site and given the small apparent size of the lesion, the pt defered treatment at this time.    Hx of skin cancer had Mohs surgeries by dr Richardson 9/2015, SCC.  History PDT in past, unhappy with experience.    History mohs left neck/postauricular. States firm scar since initial surgery date - stable      Spot         Review of Systems   Constitutional: Negative for weight loss, weight gain and night sweats.   Skin: Positive for activity-related sunscreen use and wears hat. Negative for daily sunscreen use.   Hematologic/Lymphatic: Bruises/bleeds easily.        Objective:    Physical Exam   Constitutional: He appears well-developed and well-nourished. No distress.   HENT:   Head:       Mouth/Throat: Lips normal.    Eyes: Lids are normal.  No conjunctival no injection.   Neurological: He is alert and oriented to person, place, and time. He is not disoriented.   Psychiatric: He has a normal mood and affect.   Skin:   Areas Examined (abnormalities noted in diagram):   Head / Face Inspection Performed  Neck Inspection Performed  Chest / Axilla Inspection Performed  Back Inspection Performed  RUE Inspected  LUE Inspection Performed              Diagram Legend     Erythematous scaling macule/papule c/w actinic keratosis       Vascular papule c/w angioma      Pigmented verrucoid papule/plaque c/w seborrheic keratosis      Yellow umbilicated papule c/w sebaceous hyperplasia      Irregularly shaped tan  macule c/w lentigo     1-2 mm smooth white papules consistent with Milia      Movable subcutaneous cyst with punctum c/w epidermal inclusion cyst      Subcutaneous movable cyst c/w pilar cyst      Firm pink to brown papule c/w dermatofibroma      Pedunculated fleshy papule(s) c/w skin tag(s)      Evenly pigmented macule c/w junctional nevus     Mildly variegated pigmented, slightly irregular-bordered macule c/w mildly atypical nevus      Flesh colored to evenly pigmented papule c/w intradermal nevus       Pink pearly papule/plaque c/w basal cell carcinoma      Erythematous hyperkeratotic cursted plaque c/w SCC      Surgical scar with no sign of skin cancer recurrence      Open and closed comedones      Inflammatory papules and pustules      Verrucoid papule consistent consistent with wart     Erythematous eczematous patches and plaques     Dystrophic onycholytic nail with subungual debris c/w onychomycosis     Umbilicated papule    Erythematous-base heme-crusted tan verrucoid plaque consistent with inflamed seborrheic keratosis     Erythematous Silvery Scaling Plaque c/w Psoriasis     See annotation      Assessment / Plan:        Personal history of other malignant neoplasm of skin  Area(s) of previous NMSC evaluated with no signs of recurrence.    Upper body skin examination performed today including at least 6 points as noted in physical examination. No lesions suspicious for malignancy noted.      Skin cancer screening  Area(s) of previous NMSC evaluated with no signs of recurrence.    Upper body skin examination performed today including at least 6 points as noted in physical examination. No lesions suspicious for malignancy noted.      Multiple actinic keratoses  Cryosurgery Procedure Note    Verbal consent from the patient is obtained and the patient is aware of the precancerous quality and need for treatment of these lesions. Liquid nitrogen cryosurgery is applied to the 15 actinic keratoses, as detailed in  the physical exam, to produce a freeze injury. The patient is aware that blisters may form and is instructed on wound care with gentle cleansing and use of vaseline ointment to keep moist until healed. The patient is supplied a handout on cryosurgery and is instructed to call if lesions do not completely resolve. Discussed risk postinflammatory pigmentary changes.                Follow-up in about 3 months (around 7/17/2018).

## 2018-04-27 ENCOUNTER — OFFICE VISIT (OUTPATIENT)
Dept: FAMILY MEDICINE | Facility: CLINIC | Age: 78
End: 2018-04-27
Payer: MEDICARE

## 2018-04-27 VITALS
DIASTOLIC BLOOD PRESSURE: 70 MMHG | OXYGEN SATURATION: 96 % | WEIGHT: 209 LBS | HEIGHT: 71 IN | BODY MASS INDEX: 29.26 KG/M2 | HEART RATE: 54 BPM | SYSTOLIC BLOOD PRESSURE: 122 MMHG

## 2018-04-27 DIAGNOSIS — Z85.89 HISTORY OF SQUAMOUS CELL CARCINOMA: ICD-10-CM

## 2018-04-27 DIAGNOSIS — E78.5 HYPERLIPIDEMIA, UNSPECIFIED HYPERLIPIDEMIA TYPE: ICD-10-CM

## 2018-04-27 DIAGNOSIS — D69.6 THROMBOCYTOPENIA: ICD-10-CM

## 2018-04-27 DIAGNOSIS — I48.0 PAF (PAROXYSMAL ATRIAL FIBRILLATION): ICD-10-CM

## 2018-04-27 DIAGNOSIS — H91.93 BILATERAL HEARING LOSS, UNSPECIFIED HEARING LOSS TYPE: ICD-10-CM

## 2018-04-27 DIAGNOSIS — I50.42 CHRONIC COMBINED SYSTOLIC AND DIASTOLIC HEART FAILURE: ICD-10-CM

## 2018-04-27 DIAGNOSIS — Z95.1 S/P CABG X 4: ICD-10-CM

## 2018-04-27 DIAGNOSIS — I25.5 ISCHEMIC CARDIOMYOPATHY: Chronic | ICD-10-CM

## 2018-04-27 DIAGNOSIS — Z95.810 ICD (IMPLANTABLE CARDIOVERTER-DEFIBRILLATOR) IN PLACE: Chronic | ICD-10-CM

## 2018-04-27 DIAGNOSIS — I25.810 CORONARY ARTERY DISEASE INVOLVING AUTOLOGOUS VEIN CORONARY BYPASS GRAFT WITHOUT ANGINA PECTORIS: ICD-10-CM

## 2018-04-27 DIAGNOSIS — I27.9 PULMONARY HEART DISEASE: ICD-10-CM

## 2018-04-27 DIAGNOSIS — M54.30 SCIATICA, UNSPECIFIED LATERALITY: ICD-10-CM

## 2018-04-27 DIAGNOSIS — I25.2 HISTORY OF MYOCARDIAL INFARCTION: ICD-10-CM

## 2018-04-27 DIAGNOSIS — I47.29 NSVT (NONSUSTAINED VENTRICULAR TACHYCARDIA): ICD-10-CM

## 2018-04-27 DIAGNOSIS — M47.26 OSTEOARTHRITIS OF SPINE WITH RADICULOPATHY, LUMBAR REGION: ICD-10-CM

## 2018-04-27 DIAGNOSIS — Z00.00 ENCOUNTER FOR PREVENTIVE HEALTH EXAMINATION: Primary | ICD-10-CM

## 2018-04-27 DIAGNOSIS — I10 ESSENTIAL HYPERTENSION: ICD-10-CM

## 2018-04-27 DIAGNOSIS — I48.3 TYPICAL ATRIAL FLUTTER: ICD-10-CM

## 2018-04-27 PROCEDURE — 99999 PR PBB SHADOW E&M-EST. PATIENT-LVL V: CPT | Mod: PBBFAC,,, | Performed by: NURSE PRACTITIONER

## 2018-04-27 PROCEDURE — 99499 UNLISTED E&M SERVICE: CPT | Mod: S$GLB,,, | Performed by: NURSE PRACTITIONER

## 2018-04-27 PROCEDURE — 3074F SYST BP LT 130 MM HG: CPT | Mod: CPTII,S$GLB,, | Performed by: NURSE PRACTITIONER

## 2018-04-27 PROCEDURE — 3078F DIAST BP <80 MM HG: CPT | Mod: CPTII,S$GLB,, | Performed by: NURSE PRACTITIONER

## 2018-04-27 PROCEDURE — G0439 PPPS, SUBSEQ VISIT: HCPCS | Mod: S$GLB,,, | Performed by: NURSE PRACTITIONER

## 2018-04-27 RX ORDER — TRAMADOL HYDROCHLORIDE 50 MG/1
TABLET ORAL
COMMUNITY
End: 2018-06-05 | Stop reason: SDUPTHER

## 2018-04-27 RX ORDER — NUT. TX FOR PKU WITH IRON #36 10G-86
POWDER IN PACKET (EA) ORAL
COMMUNITY
End: 2019-03-06

## 2018-04-27 NOTE — PROGRESS NOTES
"Ankit Ruff presented for a  Medicare AWV and comprehensive Health Risk Assessment today. The following components were reviewed and updated:    · Medical history  · Family History  · Social history  · Allergies and Current Medications  · Health Risk Assessment  · Health Maintenance  · Care Team     ** See Completed Assessments for Annual Wellness Visit within the encounter summary.**       The following assessments were completed:  · Living Situation  · CAGE  · Depression Screening  · Timed Get Up and Go  · Whisper Test  · Cognitive Function Screening          · Nutrition Screening  · ADL Screening  · PAQ Screening    Vitals:    04/27/18 0753   BP: 122/70   BP Location: Left arm   Patient Position: Sitting   BP Method: Medium (Manual)   Pulse: (!) 54   SpO2: 96%   Weight: 94.8 kg (208 lb 15.9 oz)   Height: 5' 11" (1.803 m)     Body mass index is 29.15 kg/m².  Physical Exam   HENT:   Head: Normocephalic.   Eyes: Conjunctivae are normal.   Cardiovascular: Normal rate and regular rhythm.    No murmur heard.  Pulmonary/Chest: Effort normal and breath sounds normal. No respiratory distress.   Neurological: He is alert.   Skin: Skin is warm.   Psychiatric: He has a normal mood and affect.         Diagnoses and health risks identified today and associated recommendations/orders:    1. Encounter for preventive health examination  Reviewed health maintenance and provided recommendations    Written rx for shingrix x 1 refill provided    2. Osteoarthritis of spine with radiculopathy, lumbar region  Stable.     Followed by Shailesh.      3. Bilateral hearing loss, unspecified hearing loss type  Stable.   Has hearing aids  Followed by Lorna Webb MD .      4. Pulmonary heart disease  Continue to monitor with echo  Followed by Cesar.  .    5. Chronic combined systolic and diastolic heart failure  Stable.   Controlled on current medications.  Followed by Cesar.      6. Coronary artery disease involving autologous " vein coronary bypass graft without angina pectoris  Stable.   No CP  Followed by Cesar.      7. Essential hypertension  Stable.     Followed by Lorna Webb MD .      8. History of myocardial infarction  Stable.     Followed by Cesar.      9. Hyperlipidemia, unspecified hyperlipidemia type  Continue to monitor lipids     Followed by Lorna Webb MD .      10. ICD (implantable cardioverter-defibrillator) in place  Stable.   Controlled on current medications.  Followed by Cesar.      11. Ischemic cardiomyopathy  Continue to monitor with echo  Followed by Cesar.      12. NSVT (nonsustained ventricular tachycardia)  Continue to monitor   Followed by Cesar.      13. PAF (paroxysmal atrial fibrillation)  Had ablation  Followed by Cesar.      14. S/P CABG x 4  Stable.   No CP  Followed by Cesar.      15. Typical atrial flutter  Continue to monitor   Followed by Cesar.      16. History of squamous cell carcinoma  Continue to follow with dermatology  Followed by Francisco.      17. Sciatica, unspecified laterality  Stable.     Followed by Shailesh.      18. Thrombocytopenia  Continue to monitor   Followed by Lorna Webb MD .        Provided Ankit with a 5-10 year written screening schedule and personal prevention plan. Recommendations were developed using the USPSTF age appropriate recommendations. Education, counseling, and referrals were provided as needed. After Visit Summary printed and given to patient which includes a list of additional screenings\tests needed.    Follow-up in about 1 year (around 4/27/2019).    Kayleigh Erickson NP

## 2018-04-27 NOTE — PATIENT INSTRUCTIONS
Counseling and Referral of Other Preventative  (Italic type indicates deductible and co-insurance are waived)    Patient Name: Ankit Ruff  Today's Date: 4/27/2018    Health Maintenance       Date Due Completion Date    Lipid Panel 05/05/2022 5/5/2017    TETANUS VACCINE 09/16/2025 9/16/2015        No orders of the defined types were placed in this encounter.    The following information is provided to all patients.  This information is to help you find resources for any of the problems found today that may be affecting your health:                Living healthy guide: www.[facility]      Understanding Diabetes: www.diabetes.org      Eating healthy: www.cdc.gov/healthyweight      CDC home safety checklist: www.cdc.gov/steadi/patient.html      Agency on Aging: www.goea.[place].[facility]      Alcoholics anonymous (AA): www.aa.org      Physical Activity: www.karrie.nih.gov/ek8jzgh      Tobacco use: www.quitwithusla.org

## 2018-05-09 ENCOUNTER — OFFICE VISIT (OUTPATIENT)
Dept: FAMILY MEDICINE | Facility: CLINIC | Age: 78
End: 2018-05-09
Payer: MEDICARE

## 2018-05-09 VITALS
SYSTOLIC BLOOD PRESSURE: 138 MMHG | HEART RATE: 64 BPM | WEIGHT: 207.69 LBS | RESPIRATION RATE: 16 BRPM | HEIGHT: 71 IN | DIASTOLIC BLOOD PRESSURE: 74 MMHG | BODY MASS INDEX: 29.08 KG/M2

## 2018-05-09 DIAGNOSIS — I10 ESSENTIAL HYPERTENSION: Primary | ICD-10-CM

## 2018-05-09 DIAGNOSIS — M47.816 LUMBAR ARTHROPATHY: ICD-10-CM

## 2018-05-09 DIAGNOSIS — E78.5 HYPERLIPIDEMIA, UNSPECIFIED HYPERLIPIDEMIA TYPE: ICD-10-CM

## 2018-05-09 PROCEDURE — 99214 OFFICE O/P EST MOD 30 MIN: CPT | Mod: S$GLB,,, | Performed by: FAMILY MEDICINE

## 2018-05-09 PROCEDURE — 3078F DIAST BP <80 MM HG: CPT | Mod: CPTII,S$GLB,, | Performed by: FAMILY MEDICINE

## 2018-05-09 PROCEDURE — 99999 PR PBB SHADOW E&M-EST. PATIENT-LVL III: CPT | Mod: PBBFAC,,, | Performed by: FAMILY MEDICINE

## 2018-05-09 PROCEDURE — 3075F SYST BP GE 130 - 139MM HG: CPT | Mod: CPTII,S$GLB,, | Performed by: FAMILY MEDICINE

## 2018-05-09 PROCEDURE — 99499 UNLISTED E&M SERVICE: CPT | Mod: S$GLB,,, | Performed by: FAMILY MEDICINE

## 2018-05-09 NOTE — PROGRESS NOTES
Subjective:       Patient ID: Ankit Ruff is a 77 y.o. male.    Chief Complaint: Establish Care (Patient here to establish care)    Here for f/u chronic medical issues. States doing well overall.  New patient to me; previously saw Dr. Webb.      Hyperlipidemia   This is a chronic problem. The current episode started more than 1 year ago. The problem is controlled. Pertinent negatives include no chest pain or shortness of breath.   Hypertension   This is a chronic problem. The current episode started more than 1 year ago. The problem is controlled. Pertinent negatives include no chest pain, palpitations or shortness of breath.     Review of Systems   Constitutional: Negative for chills, fatigue and fever.   Respiratory: Negative for cough, chest tightness and shortness of breath.    Cardiovascular: Negative for chest pain, palpitations and leg swelling.   Gastrointestinal: Negative for abdominal distention and abdominal pain.   Endocrine: Negative for cold intolerance and heat intolerance.   Musculoskeletal: Positive for arthralgias.   Skin: Negative for rash.   Psychiatric/Behavioral: Negative for dysphoric mood. The patient is not nervous/anxious.        Objective:      Physical Exam   Constitutional: He appears well-developed and well-nourished.   HENT:   Head: Normocephalic and atraumatic.   Cardiovascular: Normal rate, regular rhythm and normal heart sounds.    Pulmonary/Chest: Effort normal and breath sounds normal.   Psychiatric: He has a normal mood and affect.   Nursing note and vitals reviewed.      Assessment:       1. Essential hypertension    2. Hyperlipidemia, unspecified hyperlipidemia type    3. Lumbar arthropathy        Plan:       Essential hypertension  -     CBC auto differential; Future; Expected date: 05/09/2018  -     Comprehensive metabolic panel; Future; Expected date: 05/09/2018  -     Lipid panel; Future; Expected date: 05/09/2018  -     TSH; Future; Expected date:  05/09/2018    Hyperlipidemia, unspecified hyperlipidemia type  -     CBC auto differential; Future; Expected date: 05/09/2018  -     Comprehensive metabolic panel; Future; Expected date: 05/09/2018  -     Lipid panel; Future; Expected date: 05/09/2018  -     TSH; Future; Expected date: 05/09/2018    Lumbar arthropathy      Will monitor chronic medical issues and continue current plan of care.  Update labs and health maintenance.    Follow-up in about 6 months (around 11/9/2018), or if symptoms worsen or fail to improve.

## 2018-05-10 ENCOUNTER — LAB VISIT (OUTPATIENT)
Dept: LAB | Facility: HOSPITAL | Age: 78
End: 2018-05-10
Attending: FAMILY MEDICINE
Payer: MEDICARE

## 2018-05-10 DIAGNOSIS — E78.5 HYPERLIPIDEMIA, UNSPECIFIED HYPERLIPIDEMIA TYPE: ICD-10-CM

## 2018-05-10 DIAGNOSIS — I10 ESSENTIAL HYPERTENSION: ICD-10-CM

## 2018-05-10 LAB
ALBUMIN SERPL BCP-MCNC: 4 G/DL
ALP SERPL-CCNC: 61 U/L
ALT SERPL W/O P-5'-P-CCNC: 35 U/L
ANION GAP SERPL CALC-SCNC: 8 MMOL/L
AST SERPL-CCNC: 42 U/L
BASOPHILS # BLD AUTO: 0.05 K/UL
BASOPHILS NFR BLD: 0.8 %
BILIRUB SERPL-MCNC: 1.1 MG/DL
BUN SERPL-MCNC: 23 MG/DL
CALCIUM SERPL-MCNC: 9.6 MG/DL
CHLORIDE SERPL-SCNC: 101 MMOL/L
CHOLEST SERPL-MCNC: 142 MG/DL
CHOLEST/HDLC SERPL: 2.7 {RATIO}
CO2 SERPL-SCNC: 28 MMOL/L
CREAT SERPL-MCNC: 0.9 MG/DL
DIFFERENTIAL METHOD: ABNORMAL
EOSINOPHIL # BLD AUTO: 0.2 K/UL
EOSINOPHIL NFR BLD: 3 %
ERYTHROCYTE [DISTWIDTH] IN BLOOD BY AUTOMATED COUNT: 12.4 %
EST. GFR  (AFRICAN AMERICAN): >60 ML/MIN/1.73 M^2
EST. GFR  (NON AFRICAN AMERICAN): >60 ML/MIN/1.73 M^2
GLUCOSE SERPL-MCNC: 108 MG/DL
HCT VFR BLD AUTO: 42.4 %
HDLC SERPL-MCNC: 52 MG/DL
HDLC SERPL: 36.6 %
HGB BLD-MCNC: 14.3 G/DL
IMM GRANULOCYTES # BLD AUTO: 0.02 K/UL
IMM GRANULOCYTES NFR BLD AUTO: 0.3 %
LDLC SERPL CALC-MCNC: 72.2 MG/DL
LYMPHOCYTES # BLD AUTO: 1.7 K/UL
LYMPHOCYTES NFR BLD: 27.3 %
MCH RBC QN AUTO: 32.3 PG
MCHC RBC AUTO-ENTMCNC: 33.7 G/DL
MCV RBC AUTO: 96 FL
MONOCYTES # BLD AUTO: 0.5 K/UL
MONOCYTES NFR BLD: 7.7 %
NEUTROPHILS # BLD AUTO: 3.9 K/UL
NEUTROPHILS NFR BLD: 60.9 %
NONHDLC SERPL-MCNC: 90 MG/DL
NRBC BLD-RTO: 0 /100 WBC
PLATELET # BLD AUTO: 109 K/UL
PMV BLD AUTO: 10.7 FL
POTASSIUM SERPL-SCNC: 4.9 MMOL/L
PROT SERPL-MCNC: 7.3 G/DL
RBC # BLD AUTO: 4.43 M/UL
SODIUM SERPL-SCNC: 137 MMOL/L
TRIGL SERPL-MCNC: 89 MG/DL
TSH SERPL DL<=0.005 MIU/L-ACNC: 1.55 UIU/ML
WBC # BLD AUTO: 6.37 K/UL

## 2018-05-10 PROCEDURE — 85025 COMPLETE CBC W/AUTO DIFF WBC: CPT

## 2018-05-10 PROCEDURE — 84443 ASSAY THYROID STIM HORMONE: CPT

## 2018-05-10 PROCEDURE — 36415 COLL VENOUS BLD VENIPUNCTURE: CPT | Mod: PO

## 2018-05-10 PROCEDURE — 80053 COMPREHEN METABOLIC PANEL: CPT

## 2018-05-10 PROCEDURE — 80061 LIPID PANEL: CPT

## 2018-05-28 ENCOUNTER — PATIENT MESSAGE (OUTPATIENT)
Dept: FAMILY MEDICINE | Facility: CLINIC | Age: 78
End: 2018-05-28

## 2018-05-30 RX ORDER — MELOXICAM 15 MG/1
15 TABLET ORAL DAILY PRN
Qty: 90 TABLET | Refills: 0 | Status: SHIPPED | OUTPATIENT
Start: 2018-05-30 | End: 2018-08-03 | Stop reason: SDUPTHER

## 2018-05-31 NOTE — TELEPHONE ENCOUNTER
Refill Authorization Note     is requesting a refill authorization.    Brief assessment and rationale for refill: DEFER: hx of CAD  Amount/Quantity of medication ordered: 90d         Refills Authorized: Yes  If authorized number of refills: 1           Medication Therapy Plan: Labs are updated however  Name and strength of medication: meloxicam (MOBIC) 15 MG tablet  How patient will take medication: t1t po daily   Medication reconciliation completed: No  Comments:   Lab Results   Component Value Date    WBC 6.37 05/10/2018    HGB 14.3 05/10/2018    HCT 42.4 05/10/2018    MCV 96 05/10/2018     (L) 05/10/2018      Lab Results   Component Value Date    ALT 35 05/10/2018    AST 42 (H) 05/10/2018    ALKPHOS 61 05/10/2018    BILITOT 1.1 (H) 05/10/2018      Lab Results   Component Value Date    CREATININE 0.9 05/10/2018    BUN 23 05/10/2018     05/10/2018    K 4.9 05/10/2018     05/10/2018    CO2 28 05/10/2018

## 2018-06-04 ENCOUNTER — PATIENT MESSAGE (OUTPATIENT)
Dept: FAMILY MEDICINE | Facility: CLINIC | Age: 78
End: 2018-06-04

## 2018-06-05 RX ORDER — TRAMADOL HYDROCHLORIDE 50 MG/1
50 TABLET ORAL DAILY PRN
Qty: 30 TABLET | Refills: 0 | Status: SHIPPED | OUTPATIENT
Start: 2018-06-05 | End: 2018-07-03 | Stop reason: SDUPTHER

## 2018-06-10 RX ORDER — GABAPENTIN 300 MG/1
CAPSULE ORAL
Qty: 180 CAPSULE | Refills: 0 | Status: SHIPPED | OUTPATIENT
Start: 2018-06-10 | End: 2018-10-03 | Stop reason: SDUPTHER

## 2018-06-19 ENCOUNTER — OFFICE VISIT (OUTPATIENT)
Dept: DERMATOLOGY | Facility: CLINIC | Age: 78
End: 2018-06-19
Payer: MEDICARE

## 2018-06-19 VITALS — BODY MASS INDEX: 28.98 KG/M2 | WEIGHT: 207 LBS | RESPIRATION RATE: 18 BRPM | HEIGHT: 71 IN

## 2018-06-19 DIAGNOSIS — D48.5 NEOPLASM OF UNCERTAIN BEHAVIOR OF SKIN: ICD-10-CM

## 2018-06-19 DIAGNOSIS — Z12.83 SKIN CANCER SCREENING: ICD-10-CM

## 2018-06-19 DIAGNOSIS — L57.0 MULTIPLE ACTINIC KERATOSES: Primary | ICD-10-CM

## 2018-06-19 DIAGNOSIS — Z85.828 PERSONAL HISTORY OF OTHER MALIGNANT NEOPLASM OF SKIN: ICD-10-CM

## 2018-06-19 PROCEDURE — 99213 OFFICE O/P EST LOW 20 MIN: CPT | Mod: 25,S$GLB,, | Performed by: DERMATOLOGY

## 2018-06-19 PROCEDURE — 88305 TISSUE EXAM BY PATHOLOGIST: CPT | Performed by: PATHOLOGY

## 2018-06-19 PROCEDURE — 17000 DESTRUCT PREMALG LESION: CPT | Mod: S$GLB,,, | Performed by: DERMATOLOGY

## 2018-06-19 PROCEDURE — 99999 PR PBB SHADOW E&M-EST. PATIENT-LVL III: CPT | Mod: PBBFAC,,, | Performed by: DERMATOLOGY

## 2018-06-19 PROCEDURE — 17003 DESTRUCT PREMALG LES 2-14: CPT | Mod: S$GLB,,, | Performed by: DERMATOLOGY

## 2018-06-19 PROCEDURE — 99499 UNLISTED E&M SERVICE: CPT | Mod: S$GLB,,, | Performed by: DERMATOLOGY

## 2018-06-19 PROCEDURE — 11100 PR BIOPSY OF SKIN LESION: CPT | Mod: 59,S$GLB,, | Performed by: DERMATOLOGY

## 2018-06-19 RX ORDER — ZOSTER VACCINE RECOMBINANT, ADJUVANTED 50 MCG/0.5
KIT INTRAMUSCULAR
COMMUNITY
Start: 2018-04-27 | End: 2023-06-26 | Stop reason: CLARIF

## 2018-06-19 NOTE — PROGRESS NOTES
Subjective:       Patient ID:  Ankit Ruff is a 77 y.o. male who presents for   Chief Complaint   Patient presents with    Follow-up    Lesion     face      Last seen 4-17-18  Follow up   S/P CRYO [place] - AK's to face  Lesion Right face - months - slightly raised, rough, & scabs at times . Cryo'd in past ?          Surgery  4/2017 with Dr. Richardson for Mohs surgery left cheek SCC  History numerous NMSC , AKs s/p cryo  SCC in situ on the right lower nose. In light of the absence of any evidence of residual squamous cell carcinoma in situ at the biopsy site and given the small apparent size of the lesion, the pt defered treatment at this time.    Hx of skin cancer had Mohs surgeries by dr Richardson 9/2015, SCC.  History PDT in past, unhappy with experience.    History mohs left neck/postauricular. States firm scar since initial surgery date - stable   Past Medical History:  No date: Anxiety  No date: Arthritis  No date: Atrial fibrillation  No date: CAD (coronary artery disease)  No date: Cataract      Comment: OU  No date: CHF (congestive heart failure)  No date: Defibrillator discharge  No date: Heart failure  No date: Hyperlipidemia  No date: Hypertension  No date: ICD (implantable cardiac defibrillator) in nuria*  No date: Ischemic cardiomyopathy  No date: Myocardial infarction  No date: Sciatica      Comment: had seen Dr. Amy Canela in the past  No date: Squamous cell carcinoma      Comment: Left cheek 4-2016               Review of Systems   Skin: Positive for dry skin and wears hat. Negative for sensitivity to antibiotic ointment and sensitivity to bandage adhesive.   Hematologic/Lymphatic: Bruises/bleeds easily.        Objective:    Physical Exam   Constitutional: He appears well-developed and well-nourished. No distress.   HENT:   Mouth/Throat: Lips normal.    Eyes: Lids are normal.  No conjunctival no injection.   Neurological: He is alert and oriented to person, place, and time. He is not disoriented.    Psychiatric: He has a normal mood and affect.   Skin:   Areas Examined (abnormalities noted in diagram):   Head / Face Inspection Performed  Neck Inspection Performed  Chest / Axilla Inspection Performed  Abdomen Inspection Performed  Back Inspection Performed  RUE Inspected  LUE Inspection Performed                   Diagram Legend     Erythematous scaling macule/papule c/w actinic keratosis       Vascular papule c/w angioma      Pigmented verrucoid papule/plaque c/w seborrheic keratosis      Yellow umbilicated papule c/w sebaceous hyperplasia      Irregularly shaped tan macule c/w lentigo     1-2 mm smooth white papules consistent with Milia      Movable subcutaneous cyst with punctum c/w epidermal inclusion cyst      Subcutaneous movable cyst c/w pilar cyst      Firm pink to brown papule c/w dermatofibroma      Pedunculated fleshy papule(s) c/w skin tag(s)      Evenly pigmented macule c/w junctional nevus     Mildly variegated pigmented, slightly irregular-bordered macule c/w mildly atypical nevus      Flesh colored to evenly pigmented papule c/w intradermal nevus       Pink pearly papule/plaque c/w basal cell carcinoma      Erythematous hyperkeratotic cursted plaque c/w SCC      Surgical scar with no sign of skin cancer recurrence      Open and closed comedones      Inflammatory papules and pustules      Verrucoid papule consistent consistent with wart     Erythematous eczematous patches and plaques     Dystrophic onycholytic nail with subungual debris c/w onychomycosis     Umbilicated papule    Erythematous-base heme-crusted tan verrucoid plaque consistent with inflamed seborrheic keratosis     Erythematous Silvery Scaling Plaque c/w Psoriasis     See annotation      Assessment / Plan:      Pathology Orders:     Normal Orders This Visit    Tissue Specimen To Pathology, Dermatology     Questions:    Directional Terms:  Other(comment)    Clinical information:  hak vs scc vs other    Specific Site:  right cheek         Multiple actinic keratoses  Cryosurgery Procedure Note    Verbal consent from the patient is obtained and the patient is aware of the precancerous quality and need for treatment of these lesions. Liquid nitrogen cryosurgery is applied to the 14 actinic keratoses, as detailed in the physical exam, to produce a freeze injury. The patient is aware that blisters may form and is instructed on wound care with gentle cleansing and use of vaseline ointment to keep moist until healed. The patient is supplied a handout on cryosurgery and is instructed to call if lesions do not completely resolve. Discussed risk postinflammatory pigmentary changes.       Skin cancer screening  Upper body skin examination performed today including at least 6 points as noted in physical examination. No lesions suspicious for malignancy noted.        Personal history of other malignant neoplasm of skin  Area(s) of previous NMSC evaluated with no signs of recurrence.    Upper body skin examination performed today including at least 6 points as noted in physical examination. Suspicious lesions noted.      Neoplasm of uncertain behavior of skin  -     Tissue Specimen To Pathology, Dermatology    If biopsy reveals malignancy, will refer to Dr. Richardson for Mohs surgery consultation.    Shave biopsy procedure note:    Shave biopsy performed after verbal consent including risk of infection, scar, recurrence, need for additional treatment of site. Area prepped with alcohol, anesthetized with approximately 1.0cc of 1% lidocaine with epinephrine. Lesional tissue shaved with razor blade. Hemostasis achieved with application of aluminum chloride followed by hyfrecation. No complications. Dressing applied. Wound care explained.                 Follow-up in about 6 months (around 12/19/2018).

## 2018-06-25 ENCOUNTER — OFFICE VISIT (OUTPATIENT)
Dept: UROLOGY | Facility: CLINIC | Age: 78
End: 2018-06-25
Payer: MEDICARE

## 2018-06-25 VITALS
WEIGHT: 213.19 LBS | BODY MASS INDEX: 29.85 KG/M2 | SYSTOLIC BLOOD PRESSURE: 145 MMHG | HEIGHT: 71 IN | HEART RATE: 51 BPM | DIASTOLIC BLOOD PRESSURE: 71 MMHG

## 2018-06-25 DIAGNOSIS — R35.1 NOCTURIA: Primary | ICD-10-CM

## 2018-06-25 LAB
BILIRUB SERPL-MCNC: NORMAL MG/DL
BLOOD URINE, POC: NORMAL
COLOR, POC UA: YELLOW
GLUCOSE UR QL STRIP: NORMAL
KETONES UR QL STRIP: NORMAL
LEUKOCYTE ESTERASE URINE, POC: NORMAL
NITRITE, POC UA: NORMAL
PH, POC UA: 5.5
POC RESIDUAL URINE VOLUME: 41 ML (ref 0–100)
PROTEIN, POC: NORMAL
SPECIFIC GRAVITY, POC UA: 1.03
UROBILINOGEN, POC UA: NORMAL

## 2018-06-25 PROCEDURE — 81002 URINALYSIS NONAUTO W/O SCOPE: CPT | Mod: S$GLB,,, | Performed by: UROLOGY

## 2018-06-25 PROCEDURE — 51798 US URINE CAPACITY MEASURE: CPT | Mod: S$GLB,,, | Performed by: UROLOGY

## 2018-06-25 PROCEDURE — 99999 PR PBB SHADOW E&M-EST. PATIENT-LVL III: CPT | Mod: PBBFAC,,, | Performed by: UROLOGY

## 2018-06-25 PROCEDURE — 3077F SYST BP >= 140 MM HG: CPT | Mod: CPTII,S$GLB,, | Performed by: UROLOGY

## 2018-06-25 PROCEDURE — 3078F DIAST BP <80 MM HG: CPT | Mod: CPTII,S$GLB,, | Performed by: UROLOGY

## 2018-06-25 PROCEDURE — 99213 OFFICE O/P EST LOW 20 MIN: CPT | Mod: 25,S$GLB,, | Performed by: UROLOGY

## 2018-06-25 NOTE — PROGRESS NOTES
Subjective:       Patient ID: Ankit Ruff is a 77 y.o. male.    Chief Complaint: Follow-up (3 mth f/u for nocturia stating that medication is helping.)    HPI     77 year complains of nocturia.  He says he void every 2 hours at night.  Daytime symptoms are not as bad.  He was given a trial of oxybutynin at bedtime and he feels there has been a significant improvement in his symptoms.  Nocturia decreased to 0-1.  He is overall satisfied for now.    Urine dipstick shows negative for all components.  PVR 41 ml    Review of Systems   Constitutional: Negative for fever.   Genitourinary: Negative for dysuria and hematuria.       Objective:      Physical Exam   Constitutional: He is oriented to person, place, and time. He appears well-developed and well-nourished.   Pulmonary/Chest: Effort normal.   Neurological: He is alert and oriented to person, place, and time.   Skin: No rash noted.   Psychiatric: He has a normal mood and affect.   Vitals reviewed.      Assessment:       1. Nocturia        Plan:       Nocturia  -     POCT URINE DIPSTICK WITHOUT MICROSCOPE  -     POCT Bladder Scan

## 2018-06-26 ENCOUNTER — TELEPHONE (OUTPATIENT)
Dept: DERMATOLOGY | Facility: CLINIC | Age: 78
End: 2018-06-26

## 2018-06-26 NOTE — TELEPHONE ENCOUNTER
"----- Message from Bridgett Singh MD sent at 6/26/2018  8:41 AM CDT -----  Please call pt with results and schedule consultation with Dr. Richardson for Mohs surgery on [place]. Thank you.         FINAL PATHOLOGIC DIAGNOSIS  1. 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  - THE TUMOR EXTENDS TO THE DEEP AND LATERAL BIOPSY MARGINS.  MICROSCOPIC DESCRIPTION: Sections show a proliferation of squamous cells with focal basaloid differentiation  exhibiting atypia and infiltrating within the dermis.  Diagnosed by: Adriana Baum M.D.  (Electronically Signed: 2018-06-25 13:02:50)  Gross Description  Specimen is received in a container of formalin, labeled with patient's name and medical record number  "[id number]" and designated as "right cheek", is a skin shave biopsy specimen that measures 0.7 x 0.3 x 0.2 cm.  "

## 2018-06-26 NOTE — TELEPHONE ENCOUNTER
6-26-18 called patient with path report and he is scheduled for a consult with Dr. Richardson on 6-27-18

## 2018-06-27 ENCOUNTER — INITIAL CONSULT (OUTPATIENT)
Dept: DERMATOLOGY | Facility: CLINIC | Age: 78
End: 2018-06-27
Payer: MEDICARE

## 2018-06-27 VITALS
DIASTOLIC BLOOD PRESSURE: 68 MMHG | HEART RATE: 51 BPM | HEIGHT: 71 IN | WEIGHT: 206 LBS | BODY MASS INDEX: 28.84 KG/M2 | SYSTOLIC BLOOD PRESSURE: 123 MMHG

## 2018-06-27 DIAGNOSIS — C44.329 SQUAMOUS CELL CARCINOMA OF SKIN OF RIGHT CHEEK: Primary | ICD-10-CM

## 2018-06-27 PROCEDURE — 3078F DIAST BP <80 MM HG: CPT | Mod: CPTII,S$GLB,, | Performed by: DERMATOLOGY

## 2018-06-27 PROCEDURE — 99999 PR PBB SHADOW E&M-EST. PATIENT-LVL III: CPT | Mod: PBBFAC,,, | Performed by: DERMATOLOGY

## 2018-06-27 PROCEDURE — 3074F SYST BP LT 130 MM HG: CPT | Mod: CPTII,S$GLB,, | Performed by: DERMATOLOGY

## 2018-06-27 PROCEDURE — 99499 UNLISTED E&M SERVICE: CPT | Mod: S$GLB,,, | Performed by: DERMATOLOGY

## 2018-06-27 PROCEDURE — 99214 OFFICE O/P EST MOD 30 MIN: CPT | Mod: 24,S$GLB,, | Performed by: DERMATOLOGY

## 2018-06-27 NOTE — PROGRESS NOTES
+++ HAS A DEFIBRILLATOR +++   ALLERGIES:  Penicillins    CHIEF COMPLAINT:  This 77 y.o. 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 Consultation requested by  Bridgett Singh M.D..    HISTORY OF PRESENT ILLNESS:   Location: Right cheek  Duration: 2 months months  Quality: persistent  Context: status post biopsy by Bridgett Singh M.D..; path = squamous cell carcinoma; pathology accession #[id number],  [facility]   Prior Treatment: none    See also the handwritten notes/diagrams scanned to chart for additional details.    Defibrillator: +++ HAS A DEFIBRILLATOR +++   Pacemaker: No  Artificial heart valves: No  Artificial joints: No    REVIEW OF SYSTEMS:   General: general health good  Skin: has previous history of skin cancer(s)  CV: has hypertension, has coronary artery disease and stents; no artificial valves, has no chest pain; +++ HAS A DEFIBRILLATOR +++   Resp: has no shortness of breath  Endo: has no diabetes  Hem/Lymph: taking prescribed anticoagulants-ASPIRIN 81 mg, has no easy bruising/bleeding  Allergy/Immuno: has allergies as noted above  GI: has no history of hepatitis  MS: as noted above     PAST MEDICAL HISTORY:  Past Medical History:   Diagnosis Date    Anxiety     Arthritis     Atrial fibrillation     CAD (coronary artery disease)     Cataract     OU    CHF (congestive heart failure)     Defibrillator discharge     Heart failure     Hyperlipidemia     Hypertension     ICD (implantable cardiac defibrillator) in place     Ischemic cardiomyopathy     Myocardial infarction     Sciatica     had seen Dr. Amy Canela in the past    Squamous cell carcinoma     Left cheek 4-2016        PAST SURGICAL HISTORY:  Past Surgical History:   Procedure Laterality Date    APPENDECTOMY      CARDIAC SURGERY      Defibulater      FRACTURE SURGERY      HERNIA REPAIR      SKIN BIOPSY           SOCIAL HISTORY:  Dependencies: smoking status as noted below  Social History   Substance Use Topics    Smoking status: Former Smoker     Types: Cigarettes     Quit date: 8/8/1977    Smokeless tobacco: Never Used    Alcohol use Yes      Comment: socially       PERTINENT MEDICATIONS:  See medications list.    Current Outpatient Prescriptions:     aspirin (ECOTRIN) 81 MG EC tablet, Take 81 mg by mouth once daily.  , Disp: , Rfl:     carvedilol (COREG) 12.5 MG tablet, TAKE 1 TABLET TWICE DAILY, Disp: 180 tablet, Rfl: 3    citalopram (CELEXA) 10 MG tablet, TAKE 1 TABLET EVERY DAY, Disp: 90 tablet, Rfl: 3    gabapentin (NEURONTIN) 300 MG capsule, TAKE 1 CAPSULE TWICE DAILY, Disp: 180 capsule, Rfl: 0    meloxicam (MOBIC) 15 MG tablet, Take 1 tablet (15 mg total) by mouth daily as needed for Pain., Disp: 90 tablet, Rfl: 0    nut.tx for PKU with iron no.25 (LANAFLEX) 33 gram-253 kcal/100 gram PwPk, Lanaflex  4mg, Disp: , Rfl:     oxybutynin (DITROPAN) 5 MG Tab, Take 1 tablet (5 mg total) by mouth every evening., Disp: 30 tablet, Rfl: 11    pantoprazole (PROTONIX) 40 MG tablet, Take 1 tablet (40 mg total) by mouth once daily., Disp: 90 tablet, Rfl: 3    rosuvastatin (CRESTOR) 10 MG tablet, Take 1 tablet (10 mg total) by mouth once daily., Disp: 90 tablet, Rfl: 3    SHINGRIX, PF, 50 mcg/0.5 mL injection, , Disp: , Rfl:     spironolactone (ALDACTONE) 25 MG tablet, Take 1 tablet (25 mg total) by mouth once daily. Take half a tablet Monday, Wednesday and Friday, Disp: 90 tablet, Rfl: 1    tiZANidine (ZANAFLEX) 4 MG tablet, Take 1 tablet (4 mg total) by mouth every 8 (eight) hours., Disp: 90 tablet, Rfl: 3    traMADol (ULTRAM) 50 mg tablet, Take 1 tablet (50 mg total) by mouth daily as needed for Pain., Disp: 30 tablet, Rfl: 0    ALLERGIES:  Penicillins    EXAM:  See also the handwritten notes/diagrams scanned to chart for additional details.  Constitutional  General appearance: well-developed,  well-nourished, well-kempt older white male    Eyes  Inspection of conjunctivae and lids reveals no abnormalities; sclerae anicteric  Neurologic/Psychiatric  Alert,  normal orientation to time, place, person  Normal mood and affect with no evidence of depression, anxiety, agitation  Skin: see photo(s)  Head: background marked solar damage to exposed areas of skin; in addition, inspection/palpation reveals an approximately 6-7 mm eschar on the right medial cheek which feels freely movable over the underlying tissues on palpation; site(s) confirmed by reference to the photograph(s) in the chart taken at the time of the biopsy/biopsies by the referring physician and he confirmed this as the site of the prior biopsy; medial to this and not contiguous with this is an area of erythema and papular changes which are suggestive of an area of basal cell carcinoma vs squamous cell carcinoma in situ vs other  Neck: examination reveals marked chronic solar damage  Right upper extremity: examination reveals marked chronic solar damage  Left upper extremity: examination reveals marked chronic solar damage    ASSESSMENT: 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 In particular, the discussion included, but was not limited to, the following:    One alternative at this point would be to defer further treatment and observe the lesion. With small skin cancers of this kind, it is possible that a biopsy can be sufficient  to definitively treat a small skin cancer of this kind. Alternatively, some skin cancers are slow growing and do not require immediate treatment. The potential advantage of this choice would be to avoid the need for possibly unnecessary additional surgery. Among the potential disadvantages of this would be the possibility of enlargement of the lesion, more extensive spread of the lesion or recurrence at a later date, which might necessitate a larger and more complex surgery.    Radiation treatment can be an effective treatment for this type of skin cancer. The usual course of treatment is every weekday for several weeks. Local irritation will result from treatment, although no systemic side effects are expected. The potential advantage of radiation treatment is that it avoids the need for surgery. Among the disadvantages of radiation treatment are the length of treatment, the local inflammatory response, the absence of pathologic confirmation of the removal of the skin cancer, a possible increased risk of additional skin cancer in the treated area in later years, and a somewhat increased risk of recurrence at a later date.     Excisional surgery can be an effective treatment for this type of skin cancer. This would involve excision of the lesion with margin evaluation by submitting the specimen to a pathologist for either immediate marginal assessment via frozen section processing, or delayed marginal assessment by fixed-tissue processing. The potential advantage of this technique is that it offers a way of treating the lesion with some degree of histologic confirmation of tumor removal. Among the disadvantages of this treatment are the possible need for re-excision if marginal involvement is identified, a somewhat greater likelihood of recurrence as compared to Mohs' surgery because of the less comprehensive margin evaluation inherent in the technique, and the general potential risks of surgery, including allergic  reactions to the anesthetic and other materials used, infection, injury to nerves in the area with consequent loss of sensation or muscle function, and scarring or distortion of surrounding structures.    Mohs' surgery is a very effective treatment for this type of skin cancer. The potential advantage of Mohs' surgery is that this technique offers the greatest possible certainty of knowing that the skin cancer has been completely removed, with the removal of the least amount of normal tissue. The potential disadvantages of Mohs' surgery include the duration of the surgery, the possible need for a separate surgery for reconstruction following tumor removal, and scarring as a result. In addition, general potential risks of surgery as noted above also apply to treatment via Mohs' surgery.    In light of the nature of this tumor and the location in an area of increased risk of recurrence,  Mohs' micrographic surgery was thought to be the most appropriate management choice, and this diagnosis is appropriate for treatment by Mohs' micrographic surgery.  However, given the uncertain nature of the changes medial to the site of previous biopsy, we will reschedule him in the near future with sufficient time to allow for biopsy of this area as well, to better define the extent of involvement and guide further treatment.    Treatment thereafter will be coordinated as indicated.  --------------------------------------  Note: Some or all of this note may have been generated using voice recognition software. There may be voice recognition errors including grammatical and/or spelling errors found in the text. Attempts were made to correct these errors prior to signature.

## 2018-06-27 NOTE — LETTER
June 28, 2018      Bridgett Singh MD  [address] [facility] [address] [place] [address]           [facility]  [address] [facility] [address] [place] [address]  Phone: [phone]          Patient: Ankit Ruff   MR Number: [id number]   YOB: 1940   Date of Visit: 6/27/2018       Dear Dr. Bridgett Singh:    Thank you for referring Ankit Ruff to me for evaluation. Attached you will find relevant portions of my assessment and plan of care.    If you have questions, please do not hesitate to call me. I look forward to following Ankit Ruff along with you.    Sincerely,    Javier Richardson MD    Enclosure  CC:  No Recipients    If you would like to receive this communication electronically, please contact [email or web address] or [phone] to request more information on [employer] Link access.    For providers and/or their staff who would like to refer a patient to [facility], please contact us through our one-stop-shop provider referral line, [facility], at [phone].    If you feel you have received this communication in error or would no longer like to receive these types of communications, please e-mail [email or web address]

## 2018-07-06 RX ORDER — TRAMADOL HYDROCHLORIDE 50 MG/1
TABLET ORAL
Qty: 30 TABLET | Refills: 0 | Status: SHIPPED | OUTPATIENT
Start: 2018-07-06 | End: 2018-08-14 | Stop reason: SDUPTHER

## 2018-07-11 ENCOUNTER — PATIENT MESSAGE (OUTPATIENT)
Dept: UROLOGY | Facility: CLINIC | Age: 78
End: 2018-07-11

## 2018-07-11 DIAGNOSIS — N32.81 OAB (OVERACTIVE BLADDER): Primary | ICD-10-CM

## 2018-07-11 RX ORDER — OXYBUTYNIN CHLORIDE 5 MG/1
5 TABLET ORAL NIGHTLY
Qty: 90 TABLET | Refills: 3 | Status: SHIPPED | OUTPATIENT
Start: 2018-07-11 | End: 2019-07-05 | Stop reason: SDUPTHER

## 2018-07-20 ENCOUNTER — OFFICE VISIT (OUTPATIENT)
Dept: DERMATOLOGY | Facility: CLINIC | Age: 78
End: 2018-07-20
Payer: MEDICARE

## 2018-07-20 DIAGNOSIS — D48.5 NEOPLASM OF UNCERTAIN BEHAVIOR OF SKIN: Primary | ICD-10-CM

## 2018-07-20 PROCEDURE — 11101 PR BIOPSY, EACH ADDED LESION: CPT | Mod: S$GLB,,, | Performed by: DERMATOLOGY

## 2018-07-20 PROCEDURE — 88305 TISSUE EXAM BY PATHOLOGIST: CPT | Mod: 59 | Performed by: PATHOLOGY

## 2018-07-20 PROCEDURE — 99499 UNLISTED E&M SERVICE: CPT | Mod: S$GLB,,, | Performed by: DERMATOLOGY

## 2018-07-20 PROCEDURE — 11100 PR BIOPSY OF SKIN LESION: CPT | Mod: S$GLB,,, | Performed by: DERMATOLOGY

## 2018-07-20 PROCEDURE — 99999 PR PBB SHADOW E&M-EST. PATIENT-LVL II: CPT | Mod: PBBFAC,,, | Performed by: DERMATOLOGY

## 2018-07-20 NOTE — PROGRESS NOTES
ALLERGIES:  Penicillins    CHIEF COMPLAINT: here for biopsies    HISTORY OF PRESENT ILLNESS:  Location: right lower eyelid/nose  Timing:  I last saw him about 3 weeks ago  Context: see previous note; here for additional biopsies to the area to establish the extent of potential involvement    REVIEW OF SYSTEMS:  General: general health good  CV: +++ HAS A DEFIBRILLATOR +++     PAST MEDICAL HISTORY:  Past Medical History:   Diagnosis Date    Anxiety     Arthritis     Atrial fibrillation     CAD (coronary artery disease)     Cataract     OU    CHF (congestive heart failure)     Defibrillator discharge     Heart failure     Hyperlipidemia     Hypertension     ICD (implantable cardiac defibrillator) in place     Ischemic cardiomyopathy     Myocardial infarction     Sciatica     had seen Dr. Amy Canela in the past    Squamous cell carcinoma     Left cheek 4-2016        PAST SURGICAL HISTORY:  Past Surgical History:   Procedure Laterality Date    APPENDECTOMY      CARDIAC SURGERY      Defibulater      FRACTURE SURGERY      HERNIA REPAIR      SKIN BIOPSY          SOCIAL HISTORY:  Social History   Substance Use Topics    Smoking status: Former Smoker     Types: Cigarettes     Quit date: 8/8/1977    Smokeless tobacco: Never Used    Alcohol use Yes      Comment: socially       PERTINENT MEDICATIONS:    Current Outpatient Prescriptions:     aspirin (ECOTRIN) 81 MG EC tablet, Take 81 mg by mouth once daily.  , Disp: , Rfl:     carvedilol (COREG) 12.5 MG tablet, TAKE 1 TABLET TWICE DAILY, Disp: 180 tablet, Rfl: 3    citalopram (CELEXA) 10 MG tablet, TAKE 1 TABLET EVERY DAY, Disp: 90 tablet, Rfl: 3    gabapentin (NEURONTIN) 300 MG capsule, TAKE 1 CAPSULE TWICE DAILY, Disp: 180 capsule, Rfl: 0    meloxicam (MOBIC) 15 MG tablet, Take 1 tablet (15 mg total) by mouth daily as needed for Pain., Disp: 90 tablet, Rfl: 0    nut.tx for PKU with iron no.25 (LANAFLEX) 33 gram-253 kcal/100 gram PwPk, Lanaflex  4mg,  Disp: , Rfl:     oxybutynin (DITROPAN) 5 MG Tab, Take 1 tablet (5 mg total) by mouth every evening., Disp: 90 tablet, Rfl: 3    pantoprazole (PROTONIX) 40 MG tablet, Take 1 tablet (40 mg total) by mouth once daily., Disp: 90 tablet, Rfl: 3    rosuvastatin (CRESTOR) 10 MG tablet, Take 1 tablet (10 mg total) by mouth once daily., Disp: 90 tablet, Rfl: 3    SHINGRIX, PF, 50 mcg/0.5 mL injection, , Disp: , Rfl:     spironolactone (ALDACTONE) 25 MG tablet, Take 1 tablet (25 mg total) by mouth once daily. Take half a tablet Monday, Wednesday and Friday, Disp: 90 tablet, Rfl: 1    tiZANidine (ZANAFLEX) 4 MG tablet, Take 1 tablet (4 mg total) by mouth every 8 (eight) hours., Disp: 90 tablet, Rfl: 3    traMADol (ULTRAM) 50 mg tablet, TAKE 1 TABLET DAILY AS NEEDED FOR PAIN., Disp: 30 tablet, Rfl: 0    ALLERGIES:  Penicillins    EXAM:  Constitutional  - General appearance: well-developed, well-nourished, well-kempt elderly white male    Skin: see photo(s) below  - Head/Face:  In the photo below, the central gentian violet [place] is the site of the previous biopsy; medial to this there is an approximately 4-5 mm superficially eroded papule; lateral to the site of previous biopsy, on the lower lid, is an approximately 4-5 mm centrally eroded papule          ASSESSMENT:   Status post biopsy, squamous cell carcinoma, right medial/infraorbital cheek in  Additional areas of possible involvement as noted above    PLAN:  The diagnosis/diagnoses and management options, and risks, benefits, and alternatives were discussed.  See procedure note(s) below.     PROCEDURE NOTE: MULTIPLE SHAVE BIOPSIES    The diagnoses and management options and risk, benefits and alternatives were discussed with the patient. All questions were answered. Verbal consent was obtained.    SITE ONE  Location: right lower eyelid  Indication: clinically suspicious lesion; rule out malignancy  Prep: ethanol  Anesthesia: 2% lidocaine plain, less than 2  mL  Shave biopsy performed  Hemostasis with hot-tipped cautery  Dressed with petrolatum and bandaid  Specimen placed in formalin to be submitted to pathology    SITE TWO  Location: right nose  Indication: clinically suspicious lesion; rule out malignancy  Prep: as above  Anesthesia: as above  Shave biopsy performed  Hemostasis as above  Dressed with petrolatum and bandaid  Specimen placed in formalin to be submitted to pathology    Routine care instructions given  Followup: 10-12 days to discuss biopsy results and coordinate any further treatment indicated    --------------------------------------  Note: Some or all of this note may have been generated using voice recognition software. There may be voice recognition errors including grammatical and/or spelling errors found in the text. Attempts were made to correct these errors prior to signature. .

## 2018-07-30 ENCOUNTER — TELEPHONE (OUTPATIENT)
Dept: DERMATOLOGY | Facility: CLINIC | Age: 78
End: 2018-07-30

## 2018-07-30 NOTE — TELEPHONE ENCOUNTER
----- Message from Javier Richardson MD sent at 7/30/2018  2:40 PM CDT -----  These were precancerous changes only.   Please followup as scheduled to discuss.  FINAL PATHOLOGIC DIAGNOSIS  [facility] DIAGNOSIS:  1. RIGHT LOWER EYELID:  ACTINIC KERATOSIS, EXCORIATED.  2. RIGHT NOSE:  ACTINIC KERATOSIS, EXCORIATED.  Brodie Martins MD, FCAP

## 2018-07-31 ENCOUNTER — TELEPHONE (OUTPATIENT)
Dept: DERMATOLOGY | Facility: CLINIC | Age: 78
End: 2018-07-31

## 2018-08-01 ENCOUNTER — OFFICE VISIT (OUTPATIENT)
Dept: DERMATOLOGY | Facility: CLINIC | Age: 78
End: 2018-08-01
Payer: MEDICARE

## 2018-08-01 DIAGNOSIS — C44.329 SQUAMOUS CELL CANCER OF SKIN OF RIGHT CHEEK: Primary | ICD-10-CM

## 2018-08-01 PROCEDURE — 99212 OFFICE O/P EST SF 10 MIN: CPT | Mod: S$GLB,,, | Performed by: DERMATOLOGY

## 2018-08-01 PROCEDURE — 99999 PR PBB SHADOW E&M-EST. PATIENT-LVL I: CPT | Mod: PBBFAC,,, | Performed by: DERMATOLOGY

## 2018-08-01 NOTE — PROGRESS NOTES
+++ HAS A DEFIBRILLATOR +++     ALLERGIES:  Penicillins    CHIEF COMPLAINT: followup post biopsies    HISTORY OF PRESENT ILLNESS:  Location: right lower eyelid/right nose  Timing: biopsy/biopsies performed 10-11 days ago   Context: pathology showed actinic keratosis as noted below; see pathology report in chart  See the previous note; prior biopsy in the area showed squamous cell carcinoma with basaloid features  Quality: unchanged    PATH:   FINAL PATHOLOGIC DIAGNOSIS  [facility] DIAGNOSIS:  1. RIGHT LOWER EYELID:  ACTINIC KERATOSIS, EXCORIATED.  2. RIGHT NOSE:  ACTINIC KERATOSIS, EXCORIATED.  Brodie Martins MD, FCAP      PAST MEDICAL HISTORY:  Past Medical History:   Diagnosis Date    Anxiety     Arthritis     Atrial fibrillation     CAD (coronary artery disease)     Cataract     OU    CHF (congestive heart failure)     Defibrillator discharge     Heart failure     Hyperlipidemia     Hypertension     ICD (implantable cardiac defibrillator) in place     Ischemic cardiomyopathy     Myocardial infarction     Sciatica     had seen Dr. Amy Canela in the past    Squamous cell carcinoma     Left cheek 4-2016        PAST SURGICAL HISTORY:  Past Surgical History:   Procedure Laterality Date    APPENDECTOMY      CARDIAC SURGERY      Defibulater      FRACTURE SURGERY      HERNIA REPAIR      SKIN BIOPSY         SOCIAL HISTORY:  Social History   Substance Use Topics    Smoking status: Former Smoker     Types: Cigarettes     Quit date: 8/8/1977    Smokeless tobacco: Never Used    Alcohol use Yes      Comment: socially        PERTINENT MEDICATIONS:  See medications list.  Current Outpatient Prescriptions on File Prior to Visit   Medication Sig Dispense Refill    aspirin (ECOTRIN) 81 MG EC tablet Take 81 mg by mouth once daily.        carvedilol (COREG) 12.5 MG tablet TAKE 1 TABLET TWICE DAILY 180 tablet 3    citalopram (CELEXA) 10 MG tablet TAKE 1 TABLET EVERY DAY 90 tablet 3    gabapentin  (NEURONTIN) 300 MG capsule TAKE 1 CAPSULE TWICE DAILY 180 capsule 0    meloxicam (MOBIC) 15 MG tablet Take 1 tablet (15 mg total) by mouth daily as needed for Pain. 90 tablet 0    nut.tx for PKU with iron no.25 (LANAFLEX) 33 gram-253 kcal/100 gram PwPk Lanaflex   4mg      oxybutynin (DITROPAN) 5 MG Tab Take 1 tablet (5 mg total) by mouth every evening. 90 tablet 3    pantoprazole (PROTONIX) 40 MG tablet Take 1 tablet (40 mg total) by mouth once daily. 90 tablet 3    rosuvastatin (CRESTOR) 10 MG tablet Take 1 tablet (10 mg total) by mouth once daily. 90 tablet 3    SHINGRIX, PF, 50 mcg/0.5 mL injection       spironolactone (ALDACTONE) 25 MG tablet Take 1 tablet (25 mg total) by mouth once daily. Take half a tablet Monday, Wednesday and Friday 90 tablet 1    tiZANidine (ZANAFLEX) 4 MG tablet Take 1 tablet (4 mg total) by mouth every 8 (eight) hours. 90 tablet 3    traMADol (ULTRAM) 50 mg tablet TAKE 1 TABLET DAILY AS NEEDED FOR PAIN. 30 tablet 0     No current facility-administered medications on file prior to visit.        EXAM:   Skin: eschars to the sites of the biopsies; these are the lateral and medial sites noted in the photo below  The site between these is the site of the prior biopsy which showed squamous cell carcinoma         ASSESSMENT:   status post biopsies, actinic keratosis x 2  biopsy-proven squamous cell carcinoma, pending treatment    PLAN:  The diagnosis of the squamous cell carcinoma and management options, and risks and benefits of the alternatives, including observation/non-treatment, radiation treatment, excision with vertical frozen section or paraffin-embedded section margin evaluation, and Mohs' Micrographic Surgery, Fresh Tissue Technique, were discussed at length with the patient. In particular, the discussion included, but was not limited to, the following:    One alternative at this point would be to defer further treatment and observe the lesion(s). With small skin cancers of  this kind, it is possible that a biopsy can be sufficient to definitively treat a small skin cancer of this kind. Alternatively, some skin cancers are slow growing and do not require immediate treatment. The potential advantage of this choice would be to avoid the need for possibly unnecessary additional surgery. Among the potential disadvantages of this would be the possibility of enlargement of the lesion, more extensive spread of the lesion or recurrence at a later date, which might necessitate a larger and more complex surgery.    Radiation treatment can be an effective treatment for this type of skin cancer. The usual course of treatment is every weekday for several weeks. Local irritation will result from treatment, although no systemic side effects are expected. The potential advantage of radiation treatment is that it avoids the need for surgery. Among the disadvantages of radiation treatment are the length of treatment, the local inflammatory response, the absence of pathologic confirmation of the removal of the skin cancer, a possible increased risk of additional skin cancer in the treated area in later years, and a somewhat increased risk of recurrence at a later date.     Excisional surgery can be an effective treatment for this type of skin cancer. This would involve excision of the lesion with margin evaluation by submitting the specimen to a pathologist for either immediate marginal assessment via frozen section processing, or delayed marginal assessment by fixed-tissue processing. The potential advantage of this technique is that it offers a way of treating the lesion with some degree of histologic confirmation of tumor removal. Among the disadvantages of this treatment are the possible need for re-excision if marginal involvement is identified, a somewhat greater likelihood of recurrence as compared to Mohs' surgery because of the less comprehensive margin evaluation inherent in the technique, and  the general potential risks of surgery, including allergic reactions to the anesthetic and other materials used, infection, injury to nerves in the area with consequent loss of sensation or muscle function, and scarring or distortion of surrounding structures.    Mohs' surgery is a very effective treatment for this type of skin cancer. The potential advantage of Mohs' surgery is that this technique offers the greatest possible certainty of knowing that the skin cancer has been completely removed, with the removal of the least amount of normal tissue. The potential disadvantages of Mohs' surgery include the duration of the surgery, the possible need for a separate surgery for reconstruction following tumor removal, and scarring as a result. In addition, general potential risks of surgery as noted above also apply to treatment via Mohs' surgery.    In light of the nature of this tumor and the location in an area of increased risk of recurrence,  Mohs' micrographic surgery was thought to be the most appropriate management choice, and this diagnosis is appropriate for treatment by Mohs' micrographic surgery.     Sufficient time was available for questions, and all questions were answered to his satisfaction. He fully understands the aims, risks, alternatives, and possible complications, and has elected to proceed with the surgery, and verbally consented to do so. The procedure will be scheduled in the near future.    Routine pre-op instructions were given to him.    --------------------------------------  Note: Some or all of this note may have been generated using voice recognition software. There may be voice recognition errors including grammatical and/or spelling errors found in the text. Attempts were made to correct these errors prior to signature.

## 2018-08-07 DIAGNOSIS — Z95.810 ICD (IMPLANTABLE CARDIOVERTER-DEFIBRILLATOR) IN PLACE: Primary | ICD-10-CM

## 2018-08-07 DIAGNOSIS — I47.29 NSVT (NONSUSTAINED VENTRICULAR TACHYCARDIA): ICD-10-CM

## 2018-08-07 RX ORDER — MELOXICAM 15 MG/1
15 TABLET ORAL DAILY PRN
Qty: 90 TABLET | Refills: 0 | Status: SHIPPED | OUTPATIENT
Start: 2018-08-07 | End: 2018-10-05 | Stop reason: SDUPTHER

## 2018-08-16 ENCOUNTER — CLINICAL SUPPORT (OUTPATIENT)
Dept: CARDIOLOGY | Facility: CLINIC | Age: 78
End: 2018-08-16
Attending: INTERNAL MEDICINE
Payer: MEDICARE

## 2018-08-16 DIAGNOSIS — I47.29 NSVT (NONSUSTAINED VENTRICULAR TACHYCARDIA): ICD-10-CM

## 2018-08-16 DIAGNOSIS — Z95.810 ICD (IMPLANTABLE CARDIOVERTER-DEFIBRILLATOR) IN PLACE: ICD-10-CM

## 2018-08-16 PROCEDURE — 93296 REM INTERROG EVL PM/IDS: CPT | Mod: ,,, | Performed by: INTERNAL MEDICINE

## 2018-08-16 PROCEDURE — 93295 DEV INTERROG REMOTE 1/2/MLT: CPT | Mod: ,,, | Performed by: INTERNAL MEDICINE

## 2018-08-17 RX ORDER — TRAMADOL HYDROCHLORIDE 50 MG/1
50 TABLET ORAL EVERY 4 HOURS PRN
Qty: 30 TABLET | Refills: 0 | Status: SHIPPED | OUTPATIENT
Start: 2018-08-17 | End: 2018-09-12 | Stop reason: SDUPTHER

## 2018-08-20 LAB
BATTERY VOLTAGE (V): 2.78 V
CHARGE TIME (SEC): 11.1 SEC
ERI (V): 2.63 V
HV IMPEDANCE (OHM): 67 OHM
IMPEDANCE RA LEAD: 437 OHMS
OHS CV DC PP MS1: 0.4 MS
OHS CV DC PP V1: 2 V
P/R-WAVE RA LEAD: 9.5 MV
SVC IMPEDANCE (OHM): 67 OHM

## 2018-09-06 ENCOUNTER — OFFICE VISIT (OUTPATIENT)
Dept: PHYSICAL MEDICINE AND REHAB | Facility: CLINIC | Age: 78
End: 2018-09-06
Payer: MEDICARE

## 2018-09-06 VITALS
BODY MASS INDEX: 28.84 KG/M2 | HEART RATE: 68 BPM | HEIGHT: 71 IN | DIASTOLIC BLOOD PRESSURE: 72 MMHG | SYSTOLIC BLOOD PRESSURE: 128 MMHG | WEIGHT: 206 LBS

## 2018-09-06 DIAGNOSIS — G89.29 CHRONIC PAIN OF RIGHT KNEE: Primary | ICD-10-CM

## 2018-09-06 DIAGNOSIS — M17.0 PRIMARY OSTEOARTHRITIS OF BOTH KNEES: ICD-10-CM

## 2018-09-06 DIAGNOSIS — M25.561 CHRONIC PAIN OF RIGHT KNEE: Primary | ICD-10-CM

## 2018-09-06 PROCEDURE — 3078F DIAST BP <80 MM HG: CPT | Mod: CPTII,,, | Performed by: PHYSICAL MEDICINE & REHABILITATION

## 2018-09-06 PROCEDURE — 20611 DRAIN/INJ JOINT/BURSA W/US: CPT | Mod: PBBFAC,PN | Performed by: PHYSICAL MEDICINE & REHABILITATION

## 2018-09-06 PROCEDURE — 1101F PT FALLS ASSESS-DOCD LE1/YR: CPT | Mod: CPTII,,, | Performed by: PHYSICAL MEDICINE & REHABILITATION

## 2018-09-06 PROCEDURE — 3074F SYST BP LT 130 MM HG: CPT | Mod: CPTII,,, | Performed by: PHYSICAL MEDICINE & REHABILITATION

## 2018-09-06 PROCEDURE — 99213 OFFICE O/P EST LOW 20 MIN: CPT | Mod: 25,S$PBB,, | Performed by: PHYSICAL MEDICINE & REHABILITATION

## 2018-09-06 PROCEDURE — 99999 PR PBB SHADOW E&M-EST. PATIENT-LVL III: CPT | Mod: PBBFAC,,, | Performed by: PHYSICAL MEDICINE & REHABILITATION

## 2018-09-06 PROCEDURE — 99213 OFFICE O/P EST LOW 20 MIN: CPT | Mod: PBBFAC,PN,25 | Performed by: PHYSICAL MEDICINE & REHABILITATION

## 2018-09-06 RX ADMIN — Medication 20 MG: at 02:09

## 2018-09-07 NOTE — PROCEDURES
Large Joint Aspiration/Injection: R knee  Date/Time: 9/6/2018 2:34 PM  Performed by: Sj Gonzales MD  Authorized by: Sj Gonzales MD     Consent Done?:  Yes (Verbal)  Indications:  Pain  Procedure site marked: Yes    Timeout: Prior to procedure the correct patient, procedure, and site was verified      Location:  Knee  Site:  R knee  Prep: Patient was prepped and draped in usual sterile fashion    Ultrasonic Guidance for needle placement: Yes  Images are saved and documented.  Needle size:  22 G  Approach: Needle in plane, lateral to medial.  Medications:  20 mg sodium hyaluronate (EUFLEXXA) 10 mg/mL(mw 2.4 -3.6 million)  Patient tolerance:  Patient tolerated the procedure well with no immediate complications    Additional Comments: Ultrasound guidance was used for correct needle placement, the images were saved will be uploaded to EMR.

## 2018-09-07 NOTE — PROGRESS NOTES
[facility]    CHIEF COMPLAINT:   Chief Complaint   Patient presents with    Knee Pain     right knee pain     HISTORY OF PRESENT ILLNESS: Ankit Ruff is a 78 y.o. male who presents to me  In follow-up for evaluation  And treatment of his right knee pain.  I last saw him for his knee and February of this year.  We completed a course of Euflexxa injections.  He feels these did produce some benefit but the effects have worn off over recent weeks.  He rates his pain currently as a 10 on a scale of 1-10.  He locates the pain most prominently over the medial aspect of the right knee.  He denies any swelling of the knee.  He has been wearing a brace which helps somewhat.  Overall he feels the symptoms are worsening over recent weeks.    Review of Systems   Constitutional: Negative for fever.   HENT: Negative for drooling.    Eyes: Negative for discharge.   Respiratory: Negative for choking.    Cardiovascular: Negative for chest pain.   Genitourinary: Negative for flank pain.   Skin: Negative for wound.   Allergic/Immunologic: Negative for immunocompromised state.   Neurological: Negative for tremors and syncope.   Psychiatric/Behavioral: Negative for behavioral problems.     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 No pertinent family history.    Medications:   Current Outpatient Medications on File Prior to Visit   Medication Sig Dispense Refill    aspirin (ECOTRIN) 81 MG EC tablet Take 81 mg by mouth once daily.        carvedilol (COREG) 12.5 MG tablet TAKE 1 TABLET TWICE DAILY 180 tablet 3    citalopram (CELEXA) 10 MG tablet TAKE 1 TABLET EVERY DAY 90 tablet 3    gabapentin (NEURONTIN) 300 MG capsule TAKE 1 CAPSULE TWICE DAILY 180 capsule 0    meloxicam (MOBIC) 15 MG tablet Take 1 tablet (15 mg total) by mouth daily as needed for Pain. 90 tablet 0    nut.tx for PKU with iron no.25 (LANAFLEX) 33 gram-253 kcal/100 gram PwPk Lanaflex   4mg      oxybutynin (DITROPAN) 5 MG Tab Take 1 tablet (5 mg total) by mouth every evening. 90 tablet 3    pantoprazole (PROTONIX) 40 MG tablet Take 1 tablet (40 mg total) by mouth once daily. 90 tablet 3    rosuvastatin (CRESTOR) 10 MG tablet Take 1 tablet (10 mg total) by mouth once daily. 90 tablet 3    SHINGRIX, PF, 50 mcg/0.5 mL injection       spironolactone (ALDACTONE) 25 MG tablet Take 1 tablet (25 mg total) by mouth once daily. Take half a tablet Monday, Wednesday and Friday 90 tablet 1    tiZANidine (ZANAFLEX) 4 MG tablet Take 1 tablet (4 mg total) by mouth every 8 (eight) hours. 90 tablet 3    traMADol (ULTRAM) 50 mg tablet Take 1 tablet (50 mg total) by mouth every 4 (four) hours as needed for Pain. 30 tablet 0     No current facility-administered medications on file prior to visit.        Allergies:   Review of patient's allergies indicates:   Allergen Reactions    Penicillins Rash       Social History:   Social History     Socioeconomic History    Marital status:      Spouse name: None    Number of children: None    Years of education: None    Highest education level: None   Social Needs    Financial resource strain: None    Food insecurity - worry: None    Food insecurity - inability: None    Transportation needs - medical: None     "Transportation needs - non-medical: None   Occupational History    None   Tobacco Use    Smoking status: Former Smoker     Types: Cigarettes     Last attempt to quit: 1977     Years since quittin.1    Smokeless tobacco: Never Used   Substance and Sexual Activity    Alcohol use: Yes     Comment: socially    Drug use: No    Sexual activity: None   Other Topics Concern    None   Social History Narrative    None     PHYSICAL EXAMINATION:   General    Vitals:    18 1509   BP: 128/72   Pulse: 68   Weight: 93.4 kg (206 lb)   Height: 5' 11" (1.803 m)     Constitutional: Oriented to person, place, and time. No apparent distress. Appears well-developed and well-nourished. Pleasant.  HENT:   Head: Normocephalic and atraumatic.   Eyes: Right eye exhibits no discharge. Left eye exhibits no discharge. No scleral icterus.   Pulmonary/Chest: Effort normal. No respiratory distress.   Abdominal: There is no guarding.   Neurological: Alert and oriented to person, place, and time.   Psychiatric: Behavior is normal.   Right Knee Exam     Tenderness   The patient is experiencing tenderness in the medial joint line.    Range of Motion   Extension: 0   Flexion: 120     Tests   Vikki:  Medial - negative Lateral - negative  Varus: negative Valgus: negative  Lachman:  Anterior - negative    Posterior - negative  Patellar apprehension: negative    Other   Erythema: absent  Scars: absent  Sensation: normal  Pulse: present  Swelling: none  Effusion: no effusion present      Left Knee Exam     Tenderness   The patient is experiencing no tenderness.     Range of Motion   Extension: normal   Flexion: normal     Other   Erythema: absent  Scars: absent  Sensation: normal  Pulse: present  Swelling: none  Effusion: no effusion present        INSPECTION: There is no swelling, ecchymoses, erythema or gross deformity  Of the right knee.  GAIT/DYNAMIC:  Mildly antalgic gait.    Imaging   x-ray of the right knee from 2018: " There is degenerative arthrosis of the medial compartments of both knees with joint space narrowing. There is minimal degenerative arthrosis of the patellofemoral articulations with small posterior patellar osteophytes. No fracture or subluxation are identified. There are no signs of joint effusion on either side.    Data Reviewed: X-ray    Supportive Actions: Independent visualization of images or test specimens    ASSESSMENT:   1. Chronic pain of right knee    2. Primary osteoarthritis of both knees      PLAN:     1. Time was spent reviewing the above diagnosis in depth with Ankit today, including acute management and rehabilitation.     2.   We discussed that his symptoms and exam are consistent with his x-ray showing significant osteoarthritis.  He did have some beneficial affects from the prior injection of hyaluronic acid.  We discussed referral for knee replacement, however he remains opposed to this.  He would prefer to stay on the conservative side of things.  We discussed corticosteroid injections, repeat injections of hyaluronic acid, or the genicular radiofrequency ablation.   After discussion, we elected to repeat the injection of hyaluronic acid in the form of Synvisc-One.  See separate procedure note for ultrasound-guided injection of the right knee today.    3. RTC prn.   It today's procedure does not produce adequate relief, we will likely proceed with genicular RFA.    The above note was completed, in part, with the aid of Dragon dictation software/hardware. Translation errors may be present.

## 2018-09-10 NOTE — PROGRESS NOTES
+++ HAS A DEFIBRILLATOR +++   Allergies:   Review of patient's allergies indicates:   Allergen Reactions    Penicillins Rash       Chief Complaint: here for Mohs' surgery to a squamous cell carcinoma on the right medial cheek/lower eyelid    HPI:   Location: right medial cheek/lower eyelid  Quality: unchanged  Timing: I last saw him 5 weeks ago  Context: prior biopsy showed changes as noted below  Scheduled for Mohs surgery to the site today    FINAL PATHOLOGIC DIAGNOSIS  1. 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  - THE TUMOR EXTENDS TO THE DEEP AND LATERAL BIOPSY MARGINS.  MICROSCOPIC DESCRIPTION: Sections show a proliferation of squamous cells with focal basaloid differentiation  exhibiting atypia and infiltrating within the dermis.  Diagnosed by: Adraina Baum M.D.  (Electronically Signed: 2018-06-25 13:02:50)    Review of Systems:  CV: +++ HAS A DEFIBRILLATOR +++  Skin: has noticed multiple areas of erythema and scaling/crusting to his right and left temples, et al; these seem to be increasing in number  Was treated about 4+ years ago with PDT by Dr. Roy to the face    Medications: see list    Current Outpatient Medications:     aspirin (ECOTRIN) 81 MG EC tablet, Take 81 mg by mouth once daily.  , Disp: , Rfl:     carvedilol (COREG) 12.5 MG tablet, TAKE 1 TABLET TWICE DAILY, Disp: 180 tablet, Rfl: 3    citalopram (CELEXA) 10 MG tablet, TAKE 1 TABLET EVERY DAY, Disp: 90 tablet, Rfl: 3    gabapentin (NEURONTIN) 300 MG capsule, TAKE 1 CAPSULE TWICE DAILY, Disp: 180 capsule, Rfl: 0    meloxicam (MOBIC) 15 MG tablet, Take 1 tablet (15 mg total) by mouth daily as needed for Pain., Disp: 90 tablet, Rfl: 0    nut.tx for PKU with iron no.25 (LANAFLEX) 33 gram-253 kcal/100 gram PwPk, Lanaflex  4mg, Disp: , Rfl:     oxybutynin (DITROPAN) 5 MG Tab, Take 1 tablet (5 mg total) by mouth every evening., Disp: 90 tablet, Rfl: 3    pantoprazole  (PROTONIX) 40 MG tablet, Take 1 tablet (40 mg total) by mouth once daily., Disp: 90 tablet, Rfl: 3    rosuvastatin (CRESTOR) 10 MG tablet, Take 1 tablet (10 mg total) by mouth once daily., Disp: 90 tablet, Rfl: 3    SHINGRIX, PF, 50 mcg/0.5 mL injection, , Disp: , Rfl:     spironolactone (ALDACTONE) 25 MG tablet, Take 1 tablet (25 mg total) by mouth once daily. Take half a tablet Monday, Wednesday and Friday, Disp: 90 tablet, Rfl: 1    tiZANidine (ZANAFLEX) 4 MG tablet, Take 1 tablet (4 mg total) by mouth every 8 (eight) hours., Disp: 90 tablet, Rfl: 3    traMADol (ULTRAM) 50 mg tablet, Take 1 tablet (50 mg total) by mouth every 4 (four) hours as needed for Pain., Disp: 30 tablet, Rfl: 0    fluorouracil (EFUDEX) 5 % cream, Apply sparingly twice a day to face for three weeks, Disp: 40 g, Rfl: 1    influenza (FLUZONE HIGH-DOSE) 180 mcg/0.5 mL vaccine, Inject 0.5 mLs into the muscle once. for 1 dose, Disp: 0.5 mL, Rfl: 0    Review of patient's allergies indicates:   Allergen Reactions    Penicillins Rash       Past Medical History:   Diagnosis Date    Anxiety     Arthritis     Atrial fibrillation     CAD (coronary artery disease)     Cataract     OU    CHF (congestive heart failure)     Defibrillator discharge     Heart failure     Hyperlipidemia     Hypertension     ICD (implantable cardiac defibrillator) in place     Ischemic cardiomyopathy     Myocardial infarction     Sciatica     had seen Dr. Amy Canela in the past    Squamous cell carcinoma     Left cheek 4-2016        Past Surgical History:   Procedure Laterality Date    ABLATION N/A 10/24/2016    Performed by [facility]re MD at [facility] CATH [facility]    APPENDECTOMY      CARDIAC SURGERY      Defibulater      FRACTURE SURGERY      HERNIA REPAIR      SKIN BIOPSY      TRANSESOPHAGEAL ECHOCARDIOGRAM (MERCED) N/A 10/24/2016    Performed by Morris Alejandre MD at [facility] CATH LAB       Social History     Socioeconomic History    Marital status:   "    Spouse name: Not on file    Number of children: Not on file    Years of education: Not on file    Highest education level: Not on file   Social Needs    Financial resource strain: Not on file    Food insecurity - worry: Not on file    Food insecurity - inability: Not on file    Transportation needs - medical: Not on file    Transportation needs - non-medical: Not on file   Occupational History    Not on file   Tobacco Use    Smoking status: Former Smoker     Types: Cigarettes     Last attempt to quit: 1977     Years since quittin.1    Smokeless tobacco: Never Used   Substance and Sexual Activity    Alcohol use: Yes     Comment: socially    Drug use: No    Sexual activity: Not on file   Other Topics Concern    Not on file   Social History Narrative    Not on file       Vitals:    18 1126   BP: 136/77   BP Location: Left arm   Patient Position: Sitting   BP Method: Medium (Automatic)   Pulse: (!) 48   Weight: 94.3 kg (208 lb)   Height: 5' 11" (1.803 m)        EXAM:  Constitutional  - General appearance: well-developed, well-nourished, well-kempt older white male    Eyes  - Conjunctivae and lids - no abnormalities; sclerae anicteric  Neurologic/Psychiatric  - Orientation - Alert,  normal orientation to time, place, person  - Mood and affect - Normal mood and affect with no evidence of depression, anxiety, agitation; speech is normal  Skin: see photo below from prior visit   - Head/Face: there are multiple areas of rough, hyperkeratotic, irregularly-shaped, irregularly-surfaced, yellowish papules and/or plaques with mild surrounding erythema but no notable underlying induration, which are clinically typical in appearance for actinic keratoses, on his right and left temple, and cheeks, and forehead   The site of Dr. Singh's biopsy shows no evidence of residual squamous cell carcinoma on examination today; see the photo below from the prior visit to identify the site    Prior " Photo:      Assessment:  status post biopsy, squamous cell carcinoma of the right medial cheek/lower lid, now clinically clear  actinic keratoses to the rest of his face    Plan:  I discussed with the patient the diagnoses and management options, and risks, benefits and alternatives. This discussion included but was not limited to the following:    I reviewed with him the nature of the skin cancer(s) on his right medial cheek/lower lid, I discussed the current clinical findings, including the absence of any signs of residual skin cancer to the site of prior biopsy today. I explained that this is not an issue which requires immediate/urgent treatment.    Under the circumstances, and after discussing the options and risks and benefits of the alternatives with him, we will defer surgery pro tem and have him start efudex to the areas.     I reviewed the use of the medication and anticipated effects.    All questions were answered to his satisfaction.    He is to follow up in 2 weeks; call PRN sooner.

## 2018-09-11 ENCOUNTER — PROCEDURE VISIT (OUTPATIENT)
Dept: DERMATOLOGY | Facility: CLINIC | Age: 78
End: 2018-09-11
Payer: MEDICARE

## 2018-09-11 VITALS
HEIGHT: 71 IN | WEIGHT: 208 LBS | SYSTOLIC BLOOD PRESSURE: 136 MMHG | BODY MASS INDEX: 29.12 KG/M2 | HEART RATE: 48 BPM | DIASTOLIC BLOOD PRESSURE: 77 MMHG

## 2018-09-11 DIAGNOSIS — L57.0 ACTINIC KERATOSIS: ICD-10-CM

## 2018-09-11 DIAGNOSIS — C44.329 SQUAMOUS CELL CARCINOMA OF SKIN OF RIGHT CHEEK: Primary | ICD-10-CM

## 2018-09-11 RX ORDER — FLUOROURACIL 50 MG/G
CREAM TOPICAL
Qty: 40 G | Refills: 1 | Status: SHIPPED | OUTPATIENT
Start: 2018-09-11 | End: 2019-03-06 | Stop reason: ALTCHOICE

## 2018-09-12 RX ORDER — TRAMADOL HYDROCHLORIDE 50 MG/1
50 TABLET ORAL EVERY 4 HOURS PRN
Qty: 30 TABLET | Refills: 0 | Status: SHIPPED | OUTPATIENT
Start: 2018-09-12 | End: 2018-10-10 | Stop reason: SDUPTHER

## 2018-09-25 ENCOUNTER — OFFICE VISIT (OUTPATIENT)
Dept: DERMATOLOGY | Facility: CLINIC | Age: 78
End: 2018-09-25
Payer: MEDICARE

## 2018-09-25 DIAGNOSIS — T49.95XA DERMATITIS MEDICAMENTOSA (DRUG APPLIED TO SKIN): Primary | ICD-10-CM

## 2018-09-25 DIAGNOSIS — L25.1 DERMATITIS MEDICAMENTOSA (DRUG APPLIED TO SKIN): Primary | ICD-10-CM

## 2018-09-25 DIAGNOSIS — L57.0 ACTINIC KERATOSIS: ICD-10-CM

## 2018-09-25 PROCEDURE — 99999 PR PBB SHADOW E&M-EST. PATIENT-LVL III: CPT | Mod: PBBFAC,,, | Performed by: DERMATOLOGY

## 2018-09-25 PROCEDURE — 1101F PT FALLS ASSESS-DOCD LE1/YR: CPT | Mod: CPTII,,, | Performed by: DERMATOLOGY

## 2018-09-25 PROCEDURE — 99213 OFFICE O/P EST LOW 20 MIN: CPT | Mod: PBBFAC,PO | Performed by: DERMATOLOGY

## 2018-09-25 PROCEDURE — 99212 OFFICE O/P EST SF 10 MIN: CPT | Mod: S$PBB,,, | Performed by: DERMATOLOGY

## 2018-09-25 NOTE — PROGRESS NOTES
CHIEF COMPLAINT: followup treatment with topical fluoruracil    HISTORY OF PRESENT ILLNESS:  Location(s): face, scalp  Duration: has been applying the medication for 2 weeks  Quality: mild irritation  Context: applying medication BID    ROS:   Skin: see previous notes; status post biopsies right infra-orbital cheek; see path in chart  One showed squamous cell carcinoma as noted below; two additional biopsies in the area showed excoriated actinic keratosis     FINAL PATHOLOGIC DIAGNOSIS  1. 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  - THE TUMOR EXTENDS TO THE DEEP AND LATERAL BIOPSY MARGINS.  MICROSCOPIC DESCRIPTION: Sections show a proliferation of squamous cells with focal basaloid differentiation  exhibiting atypia and infiltrating within the dermis.  Diagnosed by: Adriana Baum M.D.  (Electronically Signed: 2018-06-25 13:02:50)    EXAMINATION:  Skin: there are multiple irregular confetti-like and focally confluent areas of erythema with scaling/crusting to areas of application on the face and scalp; typical in appearance for fluorouracil-induced dermatitis in areas of actinic keratosis  No evidence of squamous cell carcinoma at site of prior biopsy    ASSESSMENT:   Typical dermatitis due to application of fluoruracil to areas of precancerous skin changes, as expected  Actinic keratosis  History of squamous cell carcinoma, right cheek; now clinically clear    PLAN:  Current status and management options, and risks, benefits, and alternatives were discussed.  continue application of fluorouracil to face x 1 week; to scalp x 4 weeks  Followup 4 weeks  Call prn sooner  --------------------------------------  Note: Some or all of this note may have been generated using voice recognition software. There may be voice recognition errors including grammatical and/or spelling errors found in the text. Attempts were made to correct these errors prior to  signature.

## 2018-10-04 RX ORDER — GABAPENTIN 300 MG/1
300 CAPSULE ORAL 2 TIMES DAILY
Qty: 180 CAPSULE | Refills: 0 | Status: SHIPPED | OUTPATIENT
Start: 2018-10-04 | End: 2018-10-05 | Stop reason: SDUPTHER

## 2018-10-05 DIAGNOSIS — I10 ESSENTIAL HYPERTENSION: ICD-10-CM

## 2018-10-08 RX ORDER — SPIRONOLACTONE 25 MG/1
TABLET ORAL
Qty: 20 TABLET | Refills: 3 | Status: SHIPPED | OUTPATIENT
Start: 2018-10-08 | End: 2019-07-05 | Stop reason: SDUPTHER

## 2018-10-08 RX ORDER — GABAPENTIN 300 MG/1
300 CAPSULE ORAL 2 TIMES DAILY
Qty: 180 CAPSULE | Refills: 0 | Status: SHIPPED | OUTPATIENT
Start: 2018-10-08 | End: 2019-04-08 | Stop reason: SDUPTHER

## 2018-10-08 RX ORDER — GABAPENTIN 300 MG/1
CAPSULE ORAL
Qty: 180 CAPSULE | Refills: 0 | Status: SHIPPED | OUTPATIENT
Start: 2018-10-08 | End: 2018-12-20 | Stop reason: CLARIF

## 2018-10-09 RX ORDER — MELOXICAM 15 MG/1
TABLET ORAL
Qty: 90 TABLET | Refills: 0 | Status: SHIPPED | OUTPATIENT
Start: 2018-10-09 | End: 2018-11-06 | Stop reason: SDUPTHER

## 2018-10-10 RX ORDER — TRAMADOL HYDROCHLORIDE 50 MG/1
50 TABLET ORAL EVERY 4 HOURS PRN
Qty: 30 TABLET | Refills: 0 | Status: SHIPPED | OUTPATIENT
Start: 2018-10-10 | End: 2018-11-08 | Stop reason: SDUPTHER

## 2018-10-17 RX ORDER — PANTOPRAZOLE SODIUM 40 MG/1
TABLET, DELAYED RELEASE ORAL
Qty: 90 TABLET | Refills: 3 | Status: SHIPPED | OUTPATIENT
Start: 2018-10-17 | End: 2019-07-29 | Stop reason: SDUPTHER

## 2018-10-17 RX ORDER — ROSUVASTATIN CALCIUM 10 MG/1
TABLET, COATED ORAL
Qty: 90 TABLET | Refills: 3 | Status: SHIPPED | OUTPATIENT
Start: 2018-10-17 | End: 2019-12-04 | Stop reason: SDUPTHER

## 2018-10-17 RX ORDER — CARVEDILOL 12.5 MG/1
TABLET ORAL
Qty: 180 TABLET | Refills: 3 | Status: SHIPPED | OUTPATIENT
Start: 2018-10-17 | End: 2019-12-04 | Stop reason: SDUPTHER

## 2018-10-22 ENCOUNTER — OFFICE VISIT (OUTPATIENT)
Dept: OPTOMETRY | Facility: CLINIC | Age: 78
End: 2018-10-22
Payer: MEDICARE

## 2018-10-22 DIAGNOSIS — H11.001 PTERYGIUM EYE, RIGHT: ICD-10-CM

## 2018-10-22 DIAGNOSIS — H52.203 HYPEROPIA WITH ASTIGMATISM AND PRESBYOPIA, BILATERAL: ICD-10-CM

## 2018-10-22 DIAGNOSIS — H52.4 HYPEROPIA WITH ASTIGMATISM AND PRESBYOPIA, BILATERAL: ICD-10-CM

## 2018-10-22 DIAGNOSIS — H25.13 NUCLEAR SCLEROSIS, BILATERAL: Primary | ICD-10-CM

## 2018-10-22 DIAGNOSIS — H43.393 VITREOUS FLOATERS, BILATERAL: ICD-10-CM

## 2018-10-22 DIAGNOSIS — H52.03 HYPEROPIA WITH ASTIGMATISM AND PRESBYOPIA, BILATERAL: ICD-10-CM

## 2018-10-22 DIAGNOSIS — H04.123 DRY EYES, BILATERAL: ICD-10-CM

## 2018-10-22 DIAGNOSIS — Z13.5 GLAUCOMA SCREENING: ICD-10-CM

## 2018-10-22 PROCEDURE — 92015 DETERMINE REFRACTIVE STATE: CPT | Mod: HCWC,,, | Performed by: OPTOMETRIST

## 2018-10-22 PROCEDURE — 92014 COMPRE OPH EXAM EST PT 1/>: CPT | Mod: S$PBB,HCWC,, | Performed by: OPTOMETRIST

## 2018-10-22 PROCEDURE — 99213 OFFICE O/P EST LOW 20 MIN: CPT | Mod: PBBFAC,PO | Performed by: OPTOMETRIST

## 2018-10-22 PROCEDURE — 99999 PR PBB SHADOW E&M-EST. PATIENT-LVL III: CPT | Mod: PBBFAC,,, | Performed by: OPTOMETRIST

## 2018-10-22 NOTE — PATIENT INSTRUCTIONS
"DRY EYES:  Use Over The Counter artificial tears as needed for dry eye symptoms.  Some common brands include:  Systane, Optive, and Refresh.  These drops can be used as frequently as desired, but may be most helpful use during long periods of concentrated work.  For example, reading / working at the computer.  Avoid drops that "get redness out", as these contain medication that may further irritate the eyes.    ALLERGY EYES / SYMPTOMS:    Over the counter medications include--Zaditor and Alaway  Use as directed 1-2 drops daily for symptoms of itching / watering eyes.  These drops will not help for dry eye or exposure symptoms.    FLASHES / FLOATERS / POSTERIOR VITREOUS DETACHMENT    Call the clinic if you have any further changes in symptoms.  Including:  Increased numbers of floaters or flashing lights, dimness or darkness that moves through or stays constant in your vision, or any pain in the eye (s).                "

## 2018-10-22 NOTE — PROGRESS NOTES
HPI     Annual Exam      Additional comments: DLE 12-16 (magi)   ocular health exam // pt   states no complaints              Dry Eye      Additional comments: OU tearing, +ATS prn              Comments     Agree above  Notes [facility] stable  Some tearing / dry eye complaint            Last edited by ARMOND Horner, OD on 10/22/2018  2:13 PM. (History)        ROS     Positive for: Eyes    Negative for: Constitutional, Gastrointestinal, Neurological, Skin,   Genitourinary, Musculoskeletal, HENT, Endocrine, Cardiovascular,   Respiratory, Psychiatric, Allergic/Imm, Heme/Lymph    Last edited by ARMOND Horner, OD on 10/22/2018  1:52 PM. (History)        Assessment /Plan     For exam results, see Encounter Report.    Nuclear sclerosis, bilateral    Vitreous floaters, bilateral    Pterygium eye, right    Glaucoma screening    Dry eyes, bilateral    Hyperopia with astigmatism and presbyopia, bilateral    1. Vis sig, not ready for consult   2. RD precautions given, reviewed  3. Stable OD  4. Not suspect   5. Unsure if dry eye related w/ tearing   Try otc ATs, tid+ sulaiman w near work  6. Updated specs rx, gave copy, ok continue     Discussed and educated patient on current findings /plan.  RTC 1 year, prn if any changes / issues

## 2018-10-23 RX ORDER — TIZANIDINE 4 MG/1
4 TABLET ORAL EVERY 8 HOURS
Qty: 90 TABLET | Refills: 3 | Status: SHIPPED | OUTPATIENT
Start: 2018-10-23 | End: 2019-07-29 | Stop reason: SDUPTHER

## 2018-11-02 ENCOUNTER — OFFICE VISIT (OUTPATIENT)
Dept: DERMATOLOGY | Facility: CLINIC | Age: 78
End: 2018-11-02
Payer: MEDICARE

## 2018-11-02 DIAGNOSIS — L25.1 DERMATITIS MEDICAMENTOSA (DRUG APPLIED TO SKIN): Primary | ICD-10-CM

## 2018-11-02 DIAGNOSIS — Z85.828 HISTORY OF NONMELANOMA SKIN CANCER: ICD-10-CM

## 2018-11-02 DIAGNOSIS — T49.95XA DERMATITIS MEDICAMENTOSA (DRUG APPLIED TO SKIN): Primary | ICD-10-CM

## 2018-11-02 PROCEDURE — 1101F PT FALLS ASSESS-DOCD LE1/YR: CPT | Mod: CPTII,HCWC,S$GLB, | Performed by: DERMATOLOGY

## 2018-11-02 PROCEDURE — 99212 OFFICE O/P EST SF 10 MIN: CPT | Mod: HCWC,S$GLB,, | Performed by: DERMATOLOGY

## 2018-11-02 PROCEDURE — 99999 PR PBB SHADOW E&M-EST. PATIENT-LVL III: CPT | Mod: PBBFAC,HCWC,, | Performed by: DERMATOLOGY

## 2018-11-02 NOTE — PROGRESS NOTES
CHIEF COMPLAINT: followup treatment with topical fluoruracil    HISTORY OF PRESENT ILLNESS:  Location(s): face, scalp  Duration: applied about 3 weeks on the face; about 4-5 weeks on the scalp; stopped about 1 week ago on scalp  Quality: much better now  Context: see previous notes; prior biopsy on the right lower lid; path as noted below; other biopsies showed actinic keratosis   Treatment to the biopsied site was deferred    FINAL PATHOLOGIC DIAGNOSIS  1. 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  - THE TUMOR EXTENDS TO THE DEEP AND LATERAL BIOPSY MARGINS.  MICROSCOPIC DESCRIPTION: Sections show a proliferation of squamous cells with focal basaloid differentiation  exhibiting atypia and infiltrating within the dermis.  Diagnosed by: Adriana Baum M.D.  (Electronically Signed: 2018-06-25 13:02:50)      EXAMINATION:  Skin: Overall much improved; there is minimal residual evidence of actinic keratosis to his scalp or face; the site of previous biopsy on his left medial cheek/lower lid shows no evidence on inspection or palpation of residual actinic keratosis or squamous cell carcinoma/basal cell carcinoma    ASSESSMENT:   Status post Efudex treatment, now much improved  Status post biopsy, squamous cell carcinoma with basaloid differentiation, right medial cheek/lower lid; now clinically clear    PLAN:  Current status and management options, and risks, benefits, and alternatives were discussed.  Given the current status, we will defer further treatment and observe the site on his right medial cheek/lower lid at this time.  He is to follow up in 3 months, or sooner if any changes arise to the area in the meantime.  --------------------------------------  Note: Some or all of this note may have been generated using voice recognition software. There may be voice recognition errors including grammatical and/or spelling errors found in the text. Attempts  were made to correct these errors prior to signature.

## 2018-11-06 ENCOUNTER — OFFICE VISIT (OUTPATIENT)
Dept: FAMILY MEDICINE | Facility: CLINIC | Age: 78
End: 2018-11-06
Payer: MEDICARE

## 2018-11-06 ENCOUNTER — LAB VISIT (OUTPATIENT)
Dept: LAB | Facility: HOSPITAL | Age: 78
End: 2018-11-06
Attending: NURSE PRACTITIONER
Payer: MEDICARE

## 2018-11-06 VITALS
HEART RATE: 52 BPM | BODY MASS INDEX: 29.66 KG/M2 | RESPIRATION RATE: 18 BRPM | WEIGHT: 211.88 LBS | OXYGEN SATURATION: 95 % | SYSTOLIC BLOOD PRESSURE: 110 MMHG | TEMPERATURE: 98 F | HEIGHT: 71 IN | DIASTOLIC BLOOD PRESSURE: 70 MMHG

## 2018-11-06 DIAGNOSIS — Z12.5 SCREENING FOR MALIGNANT NEOPLASM OF PROSTATE: ICD-10-CM

## 2018-11-06 DIAGNOSIS — M17.0 OSTEOARTHRITIS OF BOTH KNEES, UNSPECIFIED OSTEOARTHRITIS TYPE: ICD-10-CM

## 2018-11-06 DIAGNOSIS — E78.00 PURE HYPERCHOLESTEROLEMIA: ICD-10-CM

## 2018-11-06 DIAGNOSIS — Z86.59 HISTORY OF ANXIETY: ICD-10-CM

## 2018-11-06 DIAGNOSIS — I10 ESSENTIAL HYPERTENSION: Primary | ICD-10-CM

## 2018-11-06 LAB — COMPLEXED PSA SERPL-MCNC: 0.76 NG/ML

## 2018-11-06 PROCEDURE — 3078F DIAST BP <80 MM HG: CPT | Mod: CPTII,HCWC,S$GLB, | Performed by: NURSE PRACTITIONER

## 2018-11-06 PROCEDURE — 99214 OFFICE O/P EST MOD 30 MIN: CPT | Mod: HCWC,S$GLB,, | Performed by: NURSE PRACTITIONER

## 2018-11-06 PROCEDURE — 99999 PR PBB SHADOW E&M-EST. PATIENT-LVL V: CPT | Mod: PBBFAC,HCWC,, | Performed by: NURSE PRACTITIONER

## 2018-11-06 PROCEDURE — 3074F SYST BP LT 130 MM HG: CPT | Mod: CPTII,HCWC,S$GLB, | Performed by: NURSE PRACTITIONER

## 2018-11-06 PROCEDURE — 3288F FALL RISK ASSESSMENT DOCD: CPT | Mod: CPTII,HCWC,S$GLB, | Performed by: NURSE PRACTITIONER

## 2018-11-06 PROCEDURE — 84153 ASSAY OF PSA TOTAL: CPT | Mod: HCWC

## 2018-11-06 PROCEDURE — 36415 COLL VENOUS BLD VENIPUNCTURE: CPT | Mod: HCWC,PO

## 2018-11-06 PROCEDURE — 1100F PTFALLS ASSESS-DOCD GE2>/YR: CPT | Mod: CPTII,HCWC,S$GLB, | Performed by: NURSE PRACTITIONER

## 2018-11-06 RX ORDER — MELOXICAM 15 MG/1
15 TABLET ORAL DAILY PRN
Qty: 90 TABLET | Refills: 1 | Status: SHIPPED | OUTPATIENT
Start: 2018-11-06 | End: 2019-05-10 | Stop reason: SDUPTHER

## 2018-11-06 RX ORDER — CITALOPRAM 10 MG/1
10 TABLET ORAL DAILY
Qty: 90 TABLET | Refills: 3 | Status: SHIPPED | OUTPATIENT
Start: 2018-11-06 | End: 2019-07-29 | Stop reason: SDUPTHER

## 2018-11-06 NOTE — PROGRESS NOTES
Subjective:       Patient ID: Ankit Ruff is a 78 y.o. male.    Chief Complaint: Follow-up (hypertension and right knee pain)  He was last seen in primary care by Karen on 05/19/2018. This is my first time seeing him in the clinic.   HPI   He is here for follow up HTN and complains of right knee pain, it is about a 3-4 on scale, aching in nature.   Had orthoscopic surgery many years ago.   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 States numerous injuries and accidents working with horses that has caused pain to hands and knees.  He states he will follow with Mirta regarding his knee discomfort and determine the need for further treatment.   States he is running out of ultram     Medication List           Accurate as of 11/6/18 11:59 PM. If you have any questions, ask your nurse or doctor.               CONTINUE taking these medications    aspirin 81 MG EC tablet  Commonly known as:  ECOTRIN     carvedilol 12.5 MG tablet  Commonly known as:  COREG  TAKE 1 TABLET TWICE DAILY     citalopram 10 MG tablet  Commonly known as:  CELEXA  Take 1 tablet (10 mg total) by mouth once daily.     fluorouracil 5 % cream  Commonly known as:  EFUDEX  Apply sparingly twice a day to face for three weeks     * gabapentin 300 MG capsule  Commonly known as:  NEURONTIN  TAKE 1 CAPSULE TWICE DAILY     * gabapentin 300 MG capsule  Commonly known as:  NEURONTIN  Take 1 capsule (300 mg total) by mouth 2 (two) times daily.     LANAFLEX 33 gram-253 kcal/100 gram Pwpk  Generic drug:  nut.tx for PKU with iron no.25     meloxicam 15 MG tablet  Commonly known as:  MOBIC  Take 1 tablet (15 mg total) by mouth daily as needed.     oxybutynin 5 MG Tab  Commonly known as:  DITROPAN  Take 1 tablet (5 mg total) by mouth every evening.     pantoprazole 40 MG tablet  Commonly known as:  PROTONIX  TAKE 1 TABLET ONE  TIME DAILY     rosuvastatin 10 MG tablet  Commonly known as:  CRESTOR  TAKE 1 TABLET ONE TIME DAILY     SHINGRIX (PF) 50 mcg/0.5 mL injection  Generic drug:  varicella-zoster gE-AS01B (PF)     spironolactone 25 MG tablet  Commonly known as:  ALDACTONE  TAKE 1/2 TABLET  DAILY  ON  MONDAY,  WEDNESDAY  & FRIDAY     tiZANidine 4 MG tablet  Commonly known as:  ZANAFLEX  Take 1 tablet (4 mg total) by mouth every 8 (eight) hours.     traMADol 50 mg tablet  Commonly known as:  ULTRAM  Take 1 tablet (50 mg total) by mouth every 4 (four) hours as needed for Pain.         * This list has 2 medication(s) that are the same as other medications prescribed for you. Read the directions carefully, and ask your doctor or other care provider to review them with you.               Where to Get Your Medications      These medications were sent to [facility] - [facility] [address] [facility]  [address] [facility] [address]    Phone:  [phone]   · citalopram 10 MG tablet  · meloxicam 15 MG tablet       Objective:      Physical Exam   Constitutional: He is oriented to person, place, and time. Vital signs are normal. He appears well-developed and well-nourished. He is active and cooperative.   HENT:   Head: Normocephalic and atraumatic.   Right Ear: External ear normal.   Left Ear: External ear normal.   Ears:    Nose: Nose normal.   Mouth/Throat: Uvula is midline and oropharynx is clear and moist. He has dentures.   Neck: Normal range of motion. Neck supple.   Cardiovascular: Normal rate, regular rhythm and normal heart sounds.   Pulmonary/Chest: Effort normal and breath sounds normal.   Abdominal: Soft. There is no tenderness.   Musculoskeletal: Normal range of motion.   Lymphadenopathy:        Head (right side): No submental, no submandibular, no tonsillar, no preauricular, no posterior auricular and no occipital adenopathy present.        Head (left side): No submental, no submandibular, no tonsillar,  no preauricular, no posterior auricular and no occipital adenopathy present.     He has no cervical adenopathy.   Neurological: He is alert and oriented to person, place, and time.   Skin: Skin is warm, dry and intact.   Psychiatric: He has a normal mood and affect. His speech is normal and behavior is normal. Judgment and thought content normal. Cognition and memory are normal.   Numerous skin changes to face and head   Nursing note and vitals reviewed.      Assessment & Plan:       Essential hypertension- Stable Aldactone, Coreg    Pure hypercholesterolemia - Stable, Crestor    Osteoarthritis of both knees, unspecified osteoarthritis type  -     meloxicam (MOBIC) 15 MG tablet; Take 1 tablet (15 mg total) by mouth daily as needed.  Dispense: 90 tablet; Refill: 1    Screening for malignant neoplasm of prostate  -     PSA, Screening; Future; Expected date: 11/06/2018    History of anxiety  -     citalopram (CELEXA) 10 MG tablet; Take 1 tablet (10 mg total) by mouth once daily.  Dispense: 90 tablet; Refill: 3       Medication List           Accurate as of 11/6/18 11:59 PM. If you have any questions, ask your nurse or doctor.               CONTINUE taking these medications    aspirin 81 MG EC tablet  Commonly known as:  ECOTRIN     carvedilol 12.5 MG tablet  Commonly known as:  COREG  TAKE 1 TABLET TWICE DAILY     citalopram 10 MG tablet  Commonly known as:  CELEXA  Take 1 tablet (10 mg total) by mouth once daily.     fluorouracil 5 % cream  Commonly known as:  EFUDEX  Apply sparingly twice a day to face for three weeks     * gabapentin 300 MG capsule  Commonly known as:  NEURONTIN  TAKE 1 CAPSULE TWICE DAILY     * gabapentin 300 MG capsule  Commonly known as:  NEURONTIN  Take 1 capsule (300 mg total) by mouth 2 (two) times daily.     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     oxybutynin 5 MG Tab  Commonly known as:  DITROPAN  Take 1 tablet (5 mg total) by mouth every evening.     pantoprazole 40 MG tablet  Commonly known as:  PROTONIX  TAKE 1 TABLET ONE TIME DAILY     rosuvastatin 10 MG tablet  Commonly known as:  CRESTOR  TAKE 1 TABLET ONE TIME DAILY     SHINGRIX (PF) 50 mcg/0.5 mL injection  Generic drug:  varicella-zoster gE-AS01B (PF)     spironolactone 25 MG tablet  Commonly known as:  ALDACTONE  TAKE 1/2 TABLET  DAILY  ON  MONDAY,  WEDNESDAY  & FRIDAY     tiZANidine 4 MG tablet  Commonly known as:  ZANAFLEX  Take 1 tablet (4 mg total) by mouth every 8 (eight) hours.     traMADol 50 mg tablet  Commonly known as:  ULTRAM  Take 1 tablet (50 mg total) by mouth every 4 (four) hours as needed for Pain.         * This list has 2 medication(s) that are the same as other medications prescribed for you. Read the directions carefully, and ask your doctor or other care provider to review them with you.               Where to Get Your Medications      These medications were sent to [facility] - [place], [place] - [address]   [address], [facility] [address]    Phone:  [phone]   · citalopram 10 MG tablet  · meloxicam 15 MG tablet           Follow-up in about 4 months (around 3/6/2019), or if symptoms worsen or fail to improve.

## 2018-11-08 RX ORDER — TRAMADOL HYDROCHLORIDE 50 MG/1
50 TABLET ORAL EVERY 4 HOURS PRN
Qty: 30 TABLET | Refills: 0 | Status: SHIPPED | OUTPATIENT
Start: 2018-11-08 | End: 2018-12-31 | Stop reason: SDUPTHER

## 2018-11-20 ENCOUNTER — OFFICE VISIT (OUTPATIENT)
Dept: PHYSICAL MEDICINE AND REHAB | Facility: CLINIC | Age: 78
End: 2018-11-20
Payer: MEDICARE

## 2018-11-20 VITALS
HEART RATE: 45 BPM | DIASTOLIC BLOOD PRESSURE: 58 MMHG | WEIGHT: 211 LBS | BODY MASS INDEX: 29.54 KG/M2 | SYSTOLIC BLOOD PRESSURE: 126 MMHG | HEIGHT: 71 IN

## 2018-11-20 DIAGNOSIS — G89.29 CHRONIC PAIN OF RIGHT KNEE: Primary | ICD-10-CM

## 2018-11-20 DIAGNOSIS — M25.561 CHRONIC PAIN OF RIGHT KNEE: Primary | ICD-10-CM

## 2018-11-20 DIAGNOSIS — M18.12 ARTHRITIS OF CARPOMETACARPAL (CMC) JOINT OF LEFT THUMB: ICD-10-CM

## 2018-11-20 PROCEDURE — 3078F DIAST BP <80 MM HG: CPT | Mod: CPTII,HCWC,S$GLB, | Performed by: PHYSICAL MEDICINE & REHABILITATION

## 2018-11-20 PROCEDURE — 3074F SYST BP LT 130 MM HG: CPT | Mod: CPTII,HCWC,S$GLB, | Performed by: PHYSICAL MEDICINE & REHABILITATION

## 2018-11-20 PROCEDURE — 20604 DRAIN/INJ JOINT/BURSA W/US: CPT | Mod: HCWC,RT,S$GLB, | Performed by: PHYSICAL MEDICINE & REHABILITATION

## 2018-11-20 PROCEDURE — 99214 OFFICE O/P EST MOD 30 MIN: CPT | Mod: 25,HCWC,S$GLB, | Performed by: PHYSICAL MEDICINE & REHABILITATION

## 2018-11-20 PROCEDURE — 1101F PT FALLS ASSESS-DOCD LE1/YR: CPT | Mod: CPTII,HCWC,S$GLB, | Performed by: PHYSICAL MEDICINE & REHABILITATION

## 2018-11-20 PROCEDURE — 99999 PR PBB SHADOW E&M-EST. PATIENT-LVL III: CPT | Mod: PBBFAC,HCWC,, | Performed by: PHYSICAL MEDICINE & REHABILITATION

## 2018-11-20 RX ORDER — TRIAMCINOLONE ACETONIDE 40 MG/ML
40 INJECTION, SUSPENSION INTRA-ARTICULAR; INTRAMUSCULAR
Status: DISCONTINUED | OUTPATIENT
Start: 2018-11-20 | End: 2018-11-20 | Stop reason: HOSPADM

## 2018-11-20 RX ADMIN — TRIAMCINOLONE ACETONIDE 40 MG: 40 INJECTION, SUSPENSION INTRA-ARTICULAR; INTRAMUSCULAR at 01:11

## 2018-11-20 NOTE — PROCEDURES
Small Joint Aspiration/Injection  Date/Time: 11/20/2018 1:10 PM  Performed by: Sj Gonzales MD  Authorized by: Sj Gonzales MD     Consent Done?:  Yes (Verbal)  Indications:  Pain  Site marked: The procedure site was marked    Timeout: Prior to procedure the correct patient, procedure, and site was verified      Location:  Thumb  Thumb joint: CMC joint left.  Prep: Patient was prepped and draped in usual sterile fashion    Ultrasonic Guidance for needle placement: Yes  Images are saved and documented.  Needle size:  25 G  Approach: Needle out of plane, lateral to medial.  Medications:  40 mg triamcinolone acetonide 40 mg/mL  Patient tolerance:  Patient tolerated the procedure well with no immediate complications     Additional Comments: Ultrasound guidance was used for correct needle placement, the images were saved will be uploaded to EMR.

## 2018-11-20 NOTE — PROGRESS NOTES
[facility]    CHIEF COMPLAINT:   Chief Complaint   Patient presents with    Knee Pain     right knee pain     HISTORY OF PRESENT ILLNESS: Ankit Ruff is a 78 y.o. male who presents to me in follow-up in regards to his right knee pain and left thumb pain.  At his last visit in September of 2018 we performed ultrasound-guided injection of Synvisc-One to the right knee.  Unfortunately, he notes very little relief of pain following that procedure.  He rates his pain currently as 8 8 on a scale of 1-10.  He has been wearing a knee sleeve which helps somewhat.  His pain is located most prominently over the medial aspect of the right knee.  There is some swelling present.  He denies any mechanical symptoms such as popping, grinding, or locking.  His knee is not giving out on him.  He is taking Ultram and meloxicam without much relief.    In terms of his left thumb, the previous injection of corticosteroid helped tremendously that he received in April of this year.  He feels over the past few weeks the injection has been wearing off.  He has increased pain when using the left hand in gripping heavy are objects.  There is no swelling or redness or excess warmth of the area.    Review of Systems   Constitutional: Negative for fever.   HENT: Negative for drooling.    Eyes: Negative for discharge.   Respiratory: Negative for choking.    Cardiovascular: Negative for chest pain.   Genitourinary: Negative for flank pain.   Skin: Negative for wound.   Allergic/Immunologic: Negative for immunocompromised state.   Neurological: Negative for tremors and syncope.   Psychiatric/Behavioral: Negative for behavioral problems.     Past Medical History:   Past Medical History:   Diagnosis Date    Anxiety     Arthritis     Atrial fibrillation     CAD (coronary artery disease)     Cataract     OU    CHF (congestive heart failure)     Defibrillator discharge     Heart failure     Hyperlipidemia      Hypertension     ICD (implantable cardiac defibrillator) in place     Ischemic cardiomyopathy     Myocardial infarction     Sciatica     had seen Dr. Amy Canela in the past    Squamous cell carcinoma     Left cheek 4-2016        Past Surgical History:   Past Surgical History:   Procedure Laterality Date    ABLATION N/A 10/24/2016    Performed by [facility]re [facility] at [facility] CATH LAB    APPENDECTOMY      CARDIAC SURGERY      Defibulater      FRACTURE SURGERY      HERNIA REPAIR      SKIN BIOPSY      TRANSESOPHAGEAL ECHOCARDIOGRAM (MERCED) N/A 10/24/2016    Performed by Morris Alejandre MD at [facility] CATH LAB       Family History: History reviewed. No pertinent family history.    Medications:   Current Outpatient Medications on File Prior to Visit   Medication Sig Dispense Refill    aspirin (ECOTRIN) 81 MG EC tablet Take 81 mg by mouth once daily.        carvedilol (COREG) 12.5 MG tablet TAKE 1 TABLET TWICE DAILY 180 tablet 3    citalopram (CELEXA) 10 MG tablet Take 1 tablet (10 mg total) by mouth once daily. 90 tablet 3    fluorouracil (EFUDEX) 5 % cream Apply sparingly twice a day to face for three weeks 40 g 1    gabapentin (NEURONTIN) 300 MG capsule TAKE 1 CAPSULE TWICE DAILY 180 capsule 0    gabapentin (NEURONTIN) 300 MG capsule Take 1 capsule (300 mg total) by mouth 2 (two) times daily. 180 capsule 0    meloxicam (MOBIC) 15 MG tablet Take 1 tablet (15 mg total) by mouth daily as needed. 90 tablet 1    nut.tx for PKU with iron no.25 (LANAFLEX) 33 gram-253 kcal/100 gram PwPk Lanaflex   4mg      oxybutynin (DITROPAN) 5 MG Tab Take 1 tablet (5 mg total) by mouth every evening. 90 tablet 3    pantoprazole (PROTONIX) 40 MG tablet TAKE 1 TABLET ONE TIME DAILY 90 tablet 3    rosuvastatin (CRESTOR) 10 MG tablet TAKE 1 TABLET ONE TIME DAILY 90 tablet 3    SHINGRIX, PF, 50 mcg/0.5 mL injection       spironolactone (ALDACTONE) 25 MG tablet TAKE 1/2 TABLET  DAILY  ON  MONDAY,  WEDNESDAY  & FRIDAY  20 tablet 3     "tiZANidine (ZANAFLEX) 4 MG tablet Take 1 tablet (4 mg total) by mouth every 8 (eight) hours. 90 tablet 3    traMADol (ULTRAM) 50 mg tablet Take 1 tablet (50 mg total) by mouth every 4 (four) hours as needed for Pain. 30 tablet 0     No current facility-administered medications on file prior to visit.        Allergies:   Review of patient's allergies indicates:   Allergen Reactions    Penicillins Rash       Social History:   Social History     Socioeconomic History    Marital status:      Spouse name: None    Number of children: None    Years of education: None    Highest education level: None   Social Needs    Financial resource strain: None    Food insecurity - worry: None    Food insecurity - inability: None    Transportation needs - medical: None    Transportation needs - non-medical: None   Occupational History    None   Tobacco Use    Smoking status: Former Smoker     Types: Cigarettes     Last attempt to quit: 1977     Years since quittin.3    Smokeless tobacco: Never Used   Substance and Sexual Activity    Alcohol use: Yes     Comment: socially    Drug use: No    Sexual activity: None   Other Topics Concern    None   Social History Narrative    None     PHYSICAL EXAMINATION:   General    Vitals:    18 0912   BP: (!) 126/58   Pulse: (!) 45   Weight: 95.7 kg (211 lb)   Height: 5' 11" (1.803 m)     Constitutional: Oriented to person, place, and time. No apparent distress. Appears well-developed and well-nourished. Pleasant.  HENT:   Head: Normocephalic and atraumatic.   Eyes: Right eye exhibits no discharge. Left eye exhibits no discharge. No scleral icterus.   Pulmonary/Chest: Effort normal. No respiratory distress.   Abdominal: There is no guarding.   Neurological: Alert and oriented to person, place, and time.   Psychiatric: Behavior is normal.   Right Knee Exam     Tenderness   The patient is experiencing tenderness in the medial joint line.    Range of Motion "   Extension: 0   Flexion: 120     Tests   Vikki:  Medial - negative Lateral - negative  Varus: negative Valgus: negative  Lachman:  Anterior - negative    Posterior - negative  Patellar apprehension: negative    Other   Erythema: absent  Scars: absent  Sensation: normal  Pulse: present  Swelling: none  Effusion: no effusion present      Left Knee Exam     Tenderness   The patient is experiencing no tenderness.     Range of Motion   Extension: normal   Flexion: normal     Other   Erythema: absent  Scars: absent  Sensation: normal  Pulse: present  Swelling: none  Effusion: no effusion present      Left Hand Exam     Tenderness   Left hand tenderness location: Tender over the base of the 1st CMC.     Range of Motion   Wrist   Extension: normal   Flexion: normal   Pronation: normal   Supination: normal   Hand   MP Thumb: 70   DIP Thumb: 70     Muscle Strength   Wrist extension: 5/5   Wrist flexion: 5/5   :  5/5     Other   Erythema: absent  Scars: absent  Sensation: normal  Pulse: present    Comments:  There is some pain with basilar thumb grind        INSPECTION: There is no swelling, ecchymoses, erythema or gross deformity of the right knee.  GAIT/DYNAMIC:  Mildly antalgic gait.    Imaging   x-ray of the right knee from 01/19/2018: There is degenerative arthrosis of the medial compartments of both knees with joint space narrowing. There is minimal degenerative arthrosis of the patellofemoral articulations with small posterior patellar osteophytes. No fracture or subluxation are identified. There are no signs of joint effusion on either side.    Data Reviewed: X-ray    Supportive Actions: Independent visualization of images or test specimens    ASSESSMENT:   1. Chronic pain of right knee    2. Arthritis of carpometacarpal (CMC) joint of left thumb      PLAN:     1.  In terms of the right knee, he has pain secondary to significant osteoarthritis.  He has failed prior injection of hyaluronic acid.  He is very  adamantly opposed to knee replacement at this time.  We discussed the utility of genicular nerve radiofrequency ablation.  I do believe he is a good candidate for this procedure, and he wished to proceed.  We will schedule him for diagnostic block of the right knee genicular nerve branches to assess his candidacy for the radiofrequency ablation procedure.    2.   Terms of his left thumb pain, his pain is also due to osteoarthritis.  He had excellent response to the prior injection of corticosteroid.  It has been several months this procedure and I do believe it is reasonable to repeat.  He is in favor of this, see separate procedure note for ultrasound-guided injection of corticosteroid to the left 1st CMC joint.    3. Return in 3 weeks for the right knee diagnostic nerve block.    The above note was completed, in part, with the aid of Dragon dictation software/hardware. Translation errors may be present.

## 2018-11-21 ENCOUNTER — CLINICAL SUPPORT (OUTPATIENT)
Dept: CARDIOLOGY | Facility: CLINIC | Age: 78
End: 2018-11-21
Attending: INTERNAL MEDICINE
Payer: MEDICARE

## 2018-11-21 DIAGNOSIS — I47.29 NSVT (NONSUSTAINED VENTRICULAR TACHYCARDIA): ICD-10-CM

## 2018-11-21 DIAGNOSIS — M25.561 CHRONIC PAIN OF RIGHT KNEE: Primary | ICD-10-CM

## 2018-11-21 DIAGNOSIS — Z95.810 ICD (IMPLANTABLE CARDIOVERTER-DEFIBRILLATOR) IN PLACE: ICD-10-CM

## 2018-11-21 DIAGNOSIS — G89.29 CHRONIC PAIN OF RIGHT KNEE: Primary | ICD-10-CM

## 2018-11-21 LAB
BATTERY VOLTAGE (V): 2.74 V
CHARGE TIME (SEC): 11.1 SEC
ERI (V): 2.63 V
HV IMPEDANCE (OHM): 73 OHM
IMPEDANCE RA LEAD: 437 OHMS
P/R-WAVE RA LEAD: 9.8 MV
SVC IMPEDANCE (OHM): 68 OHM
THRESHOLD MS RA LEAD: 0.4 MS
THRESHOLD V RA LEAD: 0.75 V

## 2018-11-21 PROCEDURE — 93282 PRGRMG EVAL IMPLANTABLE DFB: CPT | Mod: HCWC,S$GLB,, | Performed by: INTERNAL MEDICINE

## 2018-11-21 RX ORDER — MIDAZOLAM HYDROCHLORIDE 5 MG/ML
2 INJECTION INTRAMUSCULAR; INTRAVENOUS ONCE AS NEEDED
Status: CANCELLED | OUTPATIENT
Start: 2018-11-21 | End: 2018-11-21

## 2018-11-21 RX ORDER — LIDOCAINE HYDROCHLORIDE 10 MG/ML
1 INJECTION, SOLUTION EPIDURAL; INFILTRATION; INTRACAUDAL; PERINEURAL ONCE
Status: CANCELLED | OUTPATIENT
Start: 2018-11-21 | End: 2018-11-21

## 2018-11-21 RX ORDER — SODIUM CHLORIDE, SODIUM LACTATE, POTASSIUM CHLORIDE, CALCIUM CHLORIDE 600; 310; 30; 20 MG/100ML; MG/100ML; MG/100ML; MG/100ML
INJECTION, SOLUTION INTRAVENOUS CONTINUOUS
Status: CANCELLED | OUTPATIENT
Start: 2018-11-21

## 2018-12-03 ENCOUNTER — OFFICE VISIT (OUTPATIENT)
Dept: CARDIOLOGY | Facility: CLINIC | Age: 78
End: 2018-12-03
Payer: MEDICARE

## 2018-12-03 VITALS
DIASTOLIC BLOOD PRESSURE: 64 MMHG | WEIGHT: 210.13 LBS | RESPIRATION RATE: 16 BRPM | BODY MASS INDEX: 29.42 KG/M2 | HEIGHT: 71 IN | SYSTOLIC BLOOD PRESSURE: 136 MMHG

## 2018-12-03 DIAGNOSIS — I10 ESSENTIAL HYPERTENSION: ICD-10-CM

## 2018-12-03 DIAGNOSIS — Z95.810 ICD (IMPLANTABLE CARDIOVERTER-DEFIBRILLATOR) IN PLACE: Primary | Chronic | ICD-10-CM

## 2018-12-03 DIAGNOSIS — I48.0 PAF (PAROXYSMAL ATRIAL FIBRILLATION): ICD-10-CM

## 2018-12-03 DIAGNOSIS — Z95.1 S/P CABG X 4: ICD-10-CM

## 2018-12-03 PROCEDURE — 1101F PT FALLS ASSESS-DOCD LE1/YR: CPT | Mod: CPTII,HCWC,S$GLB, | Performed by: INTERNAL MEDICINE

## 2018-12-03 PROCEDURE — 99999 PR PBB SHADOW E&M-EST. PATIENT-LVL III: CPT | Mod: PBBFAC,HCWC,, | Performed by: INTERNAL MEDICINE

## 2018-12-03 PROCEDURE — 3075F SYST BP GE 130 - 139MM HG: CPT | Mod: CPTII,HCWC,S$GLB, | Performed by: INTERNAL MEDICINE

## 2018-12-03 PROCEDURE — 99214 OFFICE O/P EST MOD 30 MIN: CPT | Mod: HCWC,S$GLB,, | Performed by: INTERNAL MEDICINE

## 2018-12-03 PROCEDURE — 3078F DIAST BP <80 MM HG: CPT | Mod: CPTII,HCWC,S$GLB, | Performed by: INTERNAL MEDICINE

## 2018-12-03 NOTE — PROGRESS NOTES
Subjective:    Patient ID:  Ankit Ruff is a 78 y.o. male who presents for follow-up of CAD    HPI  He comes for follow up with no major problems, no chest pain, no shortness of breath.  FC II    Review of Systems   Constitution: Negative for decreased appetite, weakness, malaise/fatigue, weight gain and weight loss.   Cardiovascular: Negative for chest pain, dyspnea on exertion, leg swelling, palpitations and syncope.   Respiratory: Negative for cough and shortness of breath.    Gastrointestinal: Negative.    All other systems reviewed and are negative.       Objective:      Physical Exam   Constitutional: He is oriented to person, place, and time. He appears well-developed and well-nourished.   HENT:   Head: Normocephalic.   Eyes: Pupils are equal, round, and reactive to light.   Neck: Normal range of motion. Neck supple. No JVD present. Carotid bruit is not present. No thyromegaly present.   Cardiovascular: Normal rate, regular rhythm, normal heart sounds, intact distal pulses and normal pulses. PMI is not displaced. Exam reveals no gallop.   No murmur heard.  Pulmonary/Chest: Effort normal and breath sounds normal.   Abdominal: Soft. Normal appearance. He exhibits no mass. There is no hepatosplenomegaly. There is no tenderness.   Musculoskeletal: Normal range of motion. He exhibits no edema.   Neurological: He is alert and oriented to person, place, and time. He has normal strength and normal reflexes. No sensory deficit.   Skin: Skin is warm and intact.   Psychiatric: He has a normal mood and affect.   Nursing note and vitals reviewed.        Assessment:       1. ICD (implantable cardioverter-defibrillator) in place    2. PAF (paroxysmal atrial fibrillation)    3. S/P CABG x 4    4. Essential hypertension         Plan:     Continue all cardiac medications  Regular exercise program  Weight loss  9 m f/u

## 2018-12-20 DIAGNOSIS — M25.561 RIGHT KNEE PAIN: Primary | ICD-10-CM

## 2018-12-21 ENCOUNTER — HOSPITAL ENCOUNTER (OUTPATIENT)
Facility: HOSPITAL | Age: 78
Discharge: HOME | End: 2018-12-21
Attending: PHYSICAL MEDICINE & REHABILITATION | Admitting: PHYSICAL MEDICINE & REHABILITATION
Payer: MEDICARE

## 2018-12-21 ENCOUNTER — HOSPITAL ENCOUNTER (OUTPATIENT)
Dept: RADIOLOGY | Facility: HOSPITAL | Age: 78
Discharge: HOME | End: 2018-12-21
Attending: PHYSICAL MEDICINE & REHABILITATION | Admitting: PHYSICAL MEDICINE & REHABILITATION
Payer: MEDICARE

## 2018-12-21 VITALS
RESPIRATION RATE: 15 BRPM | OXYGEN SATURATION: 95 % | HEART RATE: 50 BPM | TEMPERATURE: 97 F | HEIGHT: 71 IN | WEIGHT: 200 LBS | BODY MASS INDEX: 28 KG/M2 | SYSTOLIC BLOOD PRESSURE: 132 MMHG | DIASTOLIC BLOOD PRESSURE: 69 MMHG

## 2018-12-21 DIAGNOSIS — G89.29 CHRONIC PAIN OF RIGHT KNEE: ICD-10-CM

## 2018-12-21 DIAGNOSIS — M25.561 RIGHT KNEE PAIN: ICD-10-CM

## 2018-12-21 DIAGNOSIS — M25.561 CHRONIC PAIN OF RIGHT KNEE: ICD-10-CM

## 2018-12-21 PROCEDURE — 64450 NJX AA&/STRD OTHER PN/BRANCH: CPT | Mod: RT,,, | Performed by: PHYSICAL MEDICINE & REHABILITATION

## 2018-12-21 PROCEDURE — S0020 INJECTION, BUPIVICAINE HYDRO: HCPCS | Mod: PO | Performed by: PHYSICAL MEDICINE & REHABILITATION

## 2018-12-21 PROCEDURE — 76000 FLUOROSCOPY <1 HR PHYS/QHP: CPT | Mod: TC,PO

## 2018-12-21 PROCEDURE — 64450 NJX AA&/STRD OTHER PN/BRANCH: CPT | Mod: PO | Performed by: PHYSICAL MEDICINE & REHABILITATION

## 2018-12-21 PROCEDURE — 25000003 PHARM REV CODE 250: Mod: PO | Performed by: PHYSICAL MEDICINE & REHABILITATION

## 2018-12-21 RX ORDER — SODIUM CHLORIDE, SODIUM LACTATE, POTASSIUM CHLORIDE, CALCIUM CHLORIDE 600; 310; 30; 20 MG/100ML; MG/100ML; MG/100ML; MG/100ML
INJECTION, SOLUTION INTRAVENOUS CONTINUOUS
Status: DISCONTINUED | OUTPATIENT
Start: 2018-12-21 | End: 2018-12-21 | Stop reason: HOSPADM

## 2018-12-21 RX ORDER — MIDAZOLAM HYDROCHLORIDE 1 MG/ML
2 INJECTION INTRAMUSCULAR; INTRAVENOUS ONCE AS NEEDED
Status: DISCONTINUED | OUTPATIENT
Start: 2018-12-21 | End: 2018-12-21 | Stop reason: HOSPADM

## 2018-12-21 RX ORDER — LIDOCAINE HYDROCHLORIDE 10 MG/ML
1 INJECTION, SOLUTION EPIDURAL; INFILTRATION; INTRACAUDAL; PERINEURAL ONCE
Status: DISCONTINUED | OUTPATIENT
Start: 2018-12-21 | End: 2018-12-21 | Stop reason: HOSPADM

## 2018-12-21 RX ORDER — BUPIVACAINE HYDROCHLORIDE 5 MG/ML
INJECTION, SOLUTION EPIDURAL; INTRACAUDAL
Status: DISCONTINUED | OUTPATIENT
Start: 2018-12-21 | End: 2018-12-21 | Stop reason: HOSPADM

## 2018-12-21 RX ORDER — LIDOCAINE HYDROCHLORIDE 10 MG/ML
INJECTION INFILTRATION; PERINEURAL
Status: DISCONTINUED | OUTPATIENT
Start: 2018-12-21 | End: 2018-12-21 | Stop reason: HOSPADM

## 2018-12-21 RX ADMIN — SODIUM CHLORIDE, SODIUM LACTATE, POTASSIUM CHLORIDE, AND CALCIUM CHLORIDE: .6; .31; .03; .02 INJECTION, SOLUTION INTRAVENOUS at 09:12

## 2018-12-21 NOTE — PLAN OF CARE
VSS. Questions answered to patient satisfaction. H&P update needed.  Patient ready for procedure.

## 2018-12-21 NOTE — OP NOTE
Operative Note       Surgery Date: 12/21/2018     Surgeon(s) and Role:     * Sj Gonzales MD - Primary    Pre-op Diagnosis:  Chronic pain of right knee [M25.561, G89.29]    Post-op Diagnosis: Post-Op Diagnosis Codes:     * Chronic pain of right knee [M25.561, G89.29]    Procedure(s) (LRB):  Diagnsotic block to the genicular nerve branches to the right knee (Right) x 3 nerve branches  Fluoroscopic guidance    Anesthesia: RN IV Sedation    Procedure in Detail/Findings:    Description of Procedure:    A 78 y.o. male with chronic right knee pain presents for an elective genicular nerve branch block x3 for the right knee. We discussed risks, benefits, and alternatives. He has failed conservation care. We are going to see if a water-cooled radiofrequency ablation procedure to the genicular nerve branches would benefit his knee pain. Patient's verbal and written consent was obtained. He was brought to the fluoroscopic suite, placed in supine position. Right knee was prepped in the usual sterile fashion. Fluoroscopic AP and lateral views were obtained. With 1 % lidocaine with a 27g needle, starting with a skin wheal down to the periosteum the area was injected for local anesthesia in each of the three sites of the genicular branches. Next, a 25-gauge needle was introduced down to the proximal medial tibial metaphysis. A similar needle was placed at the lateral aspect of the distal femur. A third introducer needle was guided to the medial aspect of the distal femur. All 3 needles being placed near or around the genicular nerve origin prior to entering the right knee joint. AP views were obtained to assure proper location then lateral fluoroscopic views were obtained to assure the needle was at the appropriate mid shaft location. Once this was obtained, then the stylette was removed in each needle and 1cc of 0.25% Marcaine was injected. The stylette was replaced after each injection. The needles were then removed and  a sterile dressing with antibiotic ointment was applied. The patient was returned to the recovery area in stable condition. There were no complications. Neurovascular exam was performed and revealed no injury. Patient was discharged to follow up in the clinic in several days. We discussed postoperative protocol including pain diary recording and voiced understanding.      Estimated Blood Loss: Minimal           Specimens (From admission, onward)    None                          Condition: Good    Attestation:  I performed the procedure.           Discharge Note    Admit Date: 12/21/2018    Attending Physician: Sj Gonzales MD     Discharge Physician: Sj Gonzales MD    Final Diagnosis: Post-Op Diagnosis Codes:     * Chronic pain of right knee [M25.561, G89.29]    Disposition: Home or Self Care, pt discharged in good condition and will f/u by phone in the next 1-3 days.    Patient Instructions:   Current Discharge Medication List      CONTINUE these medications which have NOT CHANGED    Details   aspirin (ECOTRIN) 81 MG EC tablet Take 81 mg by mouth once daily.        carvedilol (COREG) 12.5 MG tablet TAKE 1 TABLET TWICE DAILY  Qty: 180 tablet, Refills: 3      citalopram (CELEXA) 10 MG tablet Take 1 tablet (10 mg total) by mouth once daily.  Qty: 90 tablet, Refills: 3    Associated Diagnoses: History of anxiety      gabapentin (NEURONTIN) 300 MG capsule Take 1 capsule (300 mg total) by mouth 2 (two) times daily.  Qty: 180 capsule, Refills: 0      meloxicam (MOBIC) 15 MG tablet Take 1 tablet (15 mg total) by mouth daily as needed.  Qty: 90 tablet, Refills: 1    Associated Diagnoses: Osteoarthritis of both knees, unspecified osteoarthritis type      oxybutynin (DITROPAN) 5 MG Tab Take 1 tablet (5 mg total) by mouth every evening.  Qty: 90 tablet, Refills: 3    Associated Diagnoses: OAB (overactive bladder)      pantoprazole (PROTONIX) 40 MG tablet TAKE 1 TABLET ONE TIME DAILY  Qty: 90 tablet, Refills:  3      rosuvastatin (CRESTOR) 10 MG tablet TAKE 1 TABLET ONE TIME DAILY  Qty: 90 tablet, Refills: 3      spironolactone (ALDACTONE) 25 MG tablet TAKE 1/2 TABLET  DAILY  ON  MONDAY,  WEDNESDAY  & FRIDAY   Qty: 20 tablet, Refills: 3    Associated Diagnoses: Essential hypertension      tiZANidine (ZANAFLEX) 4 MG tablet Take 1 tablet (4 mg total) by mouth every 8 (eight) hours.  Qty: 90 tablet, Refills: 3      traMADol (ULTRAM) 50 mg tablet Take 1 tablet (50 mg total) by mouth every 4 (four) hours as needed for Pain.  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.25 (LANAFLEX) 33 gram-253 kcal/100 gram PwPk Lanaflex   4mg      SHINGRIX, PF, 50 mcg/0.5 mL injection              Discharge Procedure Orders (must include Diet, Follow-up, Activity)   Discharge Procedure Orders (must include Diet, Follow-up, Activity)   Diet general     Ice to affected area     Call MD for:  temperature >100.4     Call MD for:  persistent nausea and vomiting     Call MD for:  severe uncontrolled pain     Call MD for:  difficulty breathing, headache or visual disturbances     Call MD for:  redness, tenderness, or signs of infection (pain, swelling, redness, odor or green/yellow discharge around incision site)     Call MD for:  hives     Call MD for:  persistent dizziness or light-headedness     Call MD for:  extreme fatigue     No dressing needed     Shower on day dressing removed (No bath)        Discharge Date: 12/21/18   3

## 2018-12-21 NOTE — H&P
CHIEF COMPLAINT:        Chief Complaint   Patient presents with    Knee Pain       right knee pain      HISTORY OF PRESENT ILLNESS: Ankit Ruff is an 78 y.o. male who presents to me for elective right knee diagnostic genicular block procedure. He reports no significant change in his condition since last visit.     Review of Systems   Constitutional: Negative for fever.   HENT: Negative for drooling.    Eyes: Negative for discharge.   Respiratory: Negative for choking.    Cardiovascular: Negative for chest pain.   Genitourinary: Negative for flank pain.   Skin: Negative for wound.   Allergic/Immunologic: Negative for immunocompromised state.   Neurological: Negative for tremors and syncope.   Psychiatric/Behavioral: Negative for behavioral problems.      Past Medical History:   Past Medical History:   Diagnosis Date    Anxiety      Arthritis      Atrial fibrillation      CAD (coronary artery disease)      Cataract       OU    CHF (congestive heart failure)      Defibrillator discharge      Heart failure      Hyperlipidemia      Hypertension      ICD (implantable cardiac defibrillator) in place      Ischemic cardiomyopathy      Myocardial infarction      Sciatica       had seen Dr. Amy Canela in the past    Squamous cell carcinoma       Left cheek 4-2016          Past Surgical History:         Past Surgical History:   Procedure Laterality Date    ABLATION N/A 10/24/2016     Performed by [facility]andre MD at [facility] CATH LAB    APPENDECTOMY        CARDIAC SURGERY        Defibulater        FRACTURE SURGERY        HERNIA REPAIR        SKIN BIOPSY        TRANSESOPHAGEAL ECHOCARDIOGRAM (MERCED) N/A 10/24/2016     Performed by Morris Alejandre MD at [facility] CATH LAB         Family History: History reviewed. No pertinent family history.     Medications:   Current Outpatient Medications on File Prior to Visit   Medication Sig Dispense Refill    aspirin (ECOTRIN) 81 MG EC tablet Take 81 mg by mouth once daily.           carvedilol (COREG) 12.5 MG tablet TAKE 1 TABLET TWICE DAILY 180 tablet 3    citalopram (CELEXA) 10 MG tablet Take 1 tablet (10 mg total) by mouth once daily. 90 tablet 3    fluorouracil (EFUDEX) 5 % cream Apply sparingly twice a day to face for three weeks 40 g 1    gabapentin (NEURONTIN) 300 MG capsule TAKE 1 CAPSULE TWICE DAILY 180 capsule 0    gabapentin (NEURONTIN) 300 MG capsule Take 1 capsule (300 mg total) by mouth 2 (two) times daily. 180 capsule 0    meloxicam (MOBIC) 15 MG tablet Take 1 tablet (15 mg total) by mouth daily as needed. 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 90 tablet 3    pantoprazole (PROTONIX) 40 MG tablet TAKE 1 TABLET ONE TIME DAILY 90 tablet 3    rosuvastatin (CRESTOR) 10 MG tablet TAKE 1 TABLET ONE TIME DAILY 90 tablet 3    SHINGRIX, PF, 50 mcg/0.5 mL injection          spironolactone (ALDACTONE) 25 MG tablet TAKE 1/2 TABLET  DAILY  ON  MONDAY,  WEDNESDAY  & FRIDAY  20 tablet 3    tiZANidine (ZANAFLEX) 4 MG tablet Take 1 tablet (4 mg total) by mouth every 8 (eight) hours. 90 tablet 3    traMADol (ULTRAM) 50 mg tablet Take 1 tablet (50 mg total) by mouth every 4 (four) hours as needed for Pain. 30 tablet 0      No current facility-administered medications on file prior to visit.          Allergies:        Review of patient's allergies indicates:   Allergen Reactions    Penicillins Rash         Social History:   Social History            Socioeconomic History    Marital status:        Spouse name: None    Number of children: None    Years of education: None    Highest education level: None   Social Needs    Financial resource strain: None    Food insecurity - worry: None    Food insecurity - inability: None    Transportation needs - medical: None    Transportation needs - non-medical: None    Occupational History    None   Tobacco Use    Smoking status: Former Smoker       Types: Cigarettes       Last attempt to quit: 1977       Years since quittin.3    Smokeless tobacco: Never Used   Substance and Sexual Activity    Alcohol use: Yes       Comment: socially    Drug use: No    Sexual activity: None   Other Topics Concern    None   Social History Narrative    None      PHYSICAL EXAMINATION:   General     Vitals                                 VSS   Constitutional: Oriented to person, place, and time. No apparent distress. Appears well-developed and well-nourished. Pleasant.  HENT:   Head: Normocephalic and atraumatic.   Eyes: Right eye exhibits no discharge. Left eye exhibits no discharge. No scleral icterus.   Pulmonary/Chest: Effort normal. No respiratory distress.   Abdominal: There is no guarding.   Neurological: Alert and oriented to person, place, and time.   Psychiatric: Behavior is normal.   Right Knee Exam      Tenderness   The patient is experiencing tenderness in the medial joint line.     Range of Motion   Extension: 0   Flexion: 120      Tests   Vikki:  Medial - negative Lateral - negative  Varus: negative Valgus: negative  Lachman:  Anterior - negative    Posterior - negative  Patellar apprehension: negative     Other   Erythema: absent  Scars: absent  Sensation: normal  Pulse: present  Swelling: none  Effusion: no effusion present        Left Knee Exam      Tenderness   The patient is experiencing no tenderness.      Range of Motion   Extension: normal   Flexion: normal      Other   Erythema: absent  Scars: absent  Sensation: normal  Pulse: present  Swelling: none  Effusion: no effusion present     INSPECTION: There is no swelling, ecchymoses, erythema or gross deformity of the right knee.  GAIT/DYNAMIC:  Mildly antalgic gait.     Imaging   x-ray of the right knee from 2018: There is degenerative arthrosis of the medial compartments of both knees with joint space  narrowing. There is minimal degenerative arthrosis of the patellofemoral articulations with small posterior patellar osteophytes. No fracture or subluxation are identified. There are no signs of joint effusion on either side.     Data Reviewed: X-ray     Supportive Actions: Independent visualization of images or test specimens     ASSESSMENT:   1. Chronic pain of right knee    2. Arthritis of carpometacarpal (CMC) joint of left thumb       PLAN:      1.  We will proceed today with right knee diagnostic genicular block, pt consented.      2.  We will f/u by phone in 3 days.

## 2018-12-21 NOTE — DISCHARGE INSTRUCTIONS
Home care instructions  Apply ice pack to the injection site for 20 minutes periods for the first 24 hrs for soreness/discomfort at injection site DO NOT USE HEAT FOR 24 HOURS  Keep site clean and dry for 24 hours, remove bandaid when desired  Do not drive until tomorrow  Resume normal activities today  Use activity/pain log at home  Dr. Gonzales's office will call you Monday  Resume home medication as prescribed today  Resume Aspirin, Plavix, or Coumadin the day after the procedure unless otherwise instructed.    SEE IMMEDIATE MEDICAL HELP FOR:  Severe increase in your usual pain or appearance of new pain  Prolonged or increasing weakness or numbness in the legs or arms  Drainage, redness, active bleeding, or increased swelling at the injection site  Temperature over 100.0 degrees F.  Headache that increases when your head is upright and decreases when you lie flat    CALL 911 OR GO DIRECTLY TO EMERGENCY DEPARTMENT FOR:  Shortness of breath, chest pain, or problems breathing      Recovery After Procedural Sedation (Adult)  You have been given medicine by vein to make you sleep during your surgery. This may have included both a pain medicine and sleeping medicine. Most of the effects have worn off. But you may still have some drowsiness for the next 6 to 8 hours.  Home care  Follow these guidelines when you get home:  · For the next 8 hours, you should be watched by a responsible adult. This person should make sure your condition is not getting worse.  · Don't drink any alcohol for the next 24 hours.  · Don't drive, operate dangerous machinery, or make important business or personal decisions during the next 24 hours.  Note: Your healthcare provider may tell you not to take any medicine by mouth for pain or sleep in the next 4 hours. These medicines may react with the medicines you were given in the hospital. This could cause a much stronger response than usual.  Follow-up care  Follow up with your healthcare  provider if you are not alert and back to your usual level of activity within 12 hours.  When to seek medical advice  Call your healthcare provider right away if any of these occur:  · Drowsiness gets worse  · Weakness or dizziness gets worse  · Repeated vomiting  · You can't be awakened   Date Last Reviewed: 10/18/2016  © 0701-8109 [employer]. [address] [place] [address], [place] [address]. All rights reserved. This information is not intended as a substitute for professional medical care. Always follow your healthcare professional's instructions.

## 2018-12-21 NOTE — PLAN OF CARE
Pt AAOx3. Resp even, unlabored. No complaints of pain at this time. Bandaids in place to right knee, CDI. NAD noted. Pt tolerating PO well. Discharge and follow-up instructions discussed. Pt and family verbalize understanding. Pt ready for discharge.

## 2018-12-31 RX ORDER — TRAMADOL HYDROCHLORIDE 50 MG/1
50 TABLET ORAL EVERY 4 HOURS PRN
Qty: 30 TABLET | Refills: 0 | Status: SHIPPED | OUTPATIENT
Start: 2018-12-31 | End: 2019-01-05 | Stop reason: SDUPTHER

## 2019-01-07 RX ORDER — TRAMADOL HYDROCHLORIDE 50 MG/1
50 TABLET ORAL EVERY 4 HOURS PRN
Qty: 30 TABLET | Refills: 0 | Status: SHIPPED | OUTPATIENT
Start: 2019-01-07 | End: 2019-03-06 | Stop reason: SDUPTHER

## 2019-01-10 ENCOUNTER — TELEPHONE (OUTPATIENT)
Dept: ADMINISTRATIVE | Facility: OTHER | Age: 79
End: 2019-01-10

## 2019-01-10 DIAGNOSIS — G89.29 CHRONIC PAIN OF RIGHT KNEE: Primary | ICD-10-CM

## 2019-01-10 DIAGNOSIS — M25.561 CHRONIC PAIN OF RIGHT KNEE: Primary | ICD-10-CM

## 2019-01-10 RX ORDER — LIDOCAINE HYDROCHLORIDE 10 MG/ML
1 INJECTION, SOLUTION EPIDURAL; INFILTRATION; INTRACAUDAL; PERINEURAL ONCE
Status: CANCELLED | OUTPATIENT
Start: 2019-01-10 | End: 2019-01-10

## 2019-01-10 RX ORDER — MIDAZOLAM HYDROCHLORIDE 5 MG/ML
2 INJECTION INTRAMUSCULAR; INTRAVENOUS ONCE AS NEEDED
Status: CANCELLED | OUTPATIENT
Start: 2019-01-10 | End: 2030-06-08

## 2019-01-10 RX ORDER — SODIUM CHLORIDE, SODIUM LACTATE, POTASSIUM CHLORIDE, CALCIUM CHLORIDE 600; 310; 30; 20 MG/100ML; MG/100ML; MG/100ML; MG/100ML
INJECTION, SOLUTION INTRAVENOUS CONTINUOUS
Status: CANCELLED | OUTPATIENT
Start: 2019-01-10

## 2019-01-10 NOTE — TELEPHONE ENCOUNTER
----- Message from Nehal Dobson sent at 1/10/2019  4:41 PM CST -----  Contact: Ankit  Type: Needs Medical Advice    Who Called:  patient  Best Call Back Number: [phone]  Additional Information:  Calling regarding as was to get a call to set up next step but has not heard from office. Thanks!

## 2019-01-16 ENCOUNTER — OFFICE VISIT (OUTPATIENT)
Dept: DERMATOLOGY | Facility: CLINIC | Age: 79
End: 2019-01-16
Payer: MEDICARE

## 2019-01-16 DIAGNOSIS — L57.0 ACTINIC KERATOSIS: Primary | ICD-10-CM

## 2019-01-16 DIAGNOSIS — Z85.828 HISTORY OF NONMELANOMA SKIN CANCER: ICD-10-CM

## 2019-01-16 PROCEDURE — 1101F PR PT FALLS ASSESS DOC 0-1 FALLS W/OUT INJ PAST YR: ICD-10-PCS | Mod: CPTII,S$GLB,, | Performed by: DERMATOLOGY

## 2019-01-16 PROCEDURE — 99212 OFFICE O/P EST SF 10 MIN: CPT | Mod: S$GLB,,, | Performed by: DERMATOLOGY

## 2019-01-16 PROCEDURE — 99999 PR PBB SHADOW E&M-EST. PATIENT-LVL II: ICD-10-PCS | Mod: PBBFAC,,, | Performed by: DERMATOLOGY

## 2019-01-16 PROCEDURE — 99212 PR OFFICE/OUTPT VISIT, EST, LEVL II, 10-19 MIN: ICD-10-PCS | Mod: S$GLB,,, | Performed by: DERMATOLOGY

## 2019-01-16 PROCEDURE — 1101F PT FALLS ASSESS-DOCD LE1/YR: CPT | Mod: CPTII,S$GLB,, | Performed by: DERMATOLOGY

## 2019-01-16 PROCEDURE — 99999 PR PBB SHADOW E&M-EST. PATIENT-LVL II: CPT | Mod: PBBFAC,,, | Performed by: DERMATOLOGY

## 2019-01-17 NOTE — PROGRESS NOTES
CHIEF COMPLAINT: followup treatment with topical fluoruracil    HISTORY OF PRESENT ILLNESS:  Location(s): face, scalp  Duration: applied about 3 weeks on the face; about 4-5 weeks on the scalp; stopped about 2 months ago on scalp  Quality: no changes noted  Context: see previous notes; prior biopsy on the right lower lid; path as noted below; other biopsies showed actinic keratosis   Treatment to the biopsied site was deferred in the absence of clinical evidence of residual squamous cell carcinoma at the site    ROS:   Skin: has a new growth left malar cheek    Prior path right lower lid/cheek  FINAL PATHOLOGIC DIAGNOSIS  1. 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  - THE TUMOR EXTENDS TO THE DEEP AND LATERAL BIOPSY MARGINS.  MICROSCOPIC DESCRIPTION: Sections show a proliferation of squamous cells with focal basaloid differentiation  exhibiting atypia and infiltrating within the dermis.  Diagnosed by: Adriana Baum M.D.  (Electronically Signed: 2018-06-25 13:02:50)      EXAMINATION:  Skin:  the site of previous biopsy on his right medial cheek/lower lid shows no evidence on inspection or palpation of residual actinic keratosis or squamous cell carcinoma/basal cell carcinoma  Little residual signs of actinic keratoses to the face  On his left malar cheek there is an approximately 8 mm nodule    ASSESSMENT:   Status post Efudex treatment, scalp and face  Status post biopsy, squamous cell carcinoma with basaloid differentiation, right medial cheek/lower lid; now clinically clear  New growth left malar cheek    PLAN:  Current status and management options, and risks, benefits, and alternatives were discussed.  Given the current status, we will defer further treatment to the right medial cheek/lower lid  I have asked him to followup with Dr. Singh regarding the left malar cheek lesion; PRN to me  --------------------------------------  Note: Some or  all of this note may have been generated using voice recognition software. There may be voice recognition errors including grammatical and/or spelling errors found in the text. Attempts were made to correct these errors prior to signature.

## 2019-01-22 ENCOUNTER — TELEPHONE (OUTPATIENT)
Dept: DERMATOLOGY | Facility: CLINIC | Age: 79
End: 2019-01-22

## 2019-01-22 NOTE — TELEPHONE ENCOUNTER
Left message for pt to schedule follow up appointment with Dr. Snigh for the lesion to his cheek that he saw Dr. Richardson for.   ----- Message from Bridgett Singh MD sent at 1/22/2019  8:32 AM CST -----  Pt with history skin cancer, Dr. Richardson saw him with a new suspicious lesion on left cheek. Please ensure he has f/u with me in 2-4 weeks.   ----- Message -----  From: Javier Richardson MD  Sent: 1/21/2019   7:41 PM  To: MD Bridgett Duvlal,   Attached is the note from my visit with Mr. Ruff. As you may remember, I had seen him  previously regarding treatment of a biopsy-proven squamous cell carcinoma on the right infraorbital cheek/lower eyelid. As you also may remember, surgery was subsequently deferred because of the absence of any clinical evidence of residual tumor at the site of biopsy; and he underwent a course of treatment with topical 5 fluorouracil to his face and scalp to address other lesions in the area consistent with actinic keratosis.    I saw him in follow-up 2 months after completing topical treatment. There is, at the moment, still no evidence of residual squamous cell carcinoma to the site of the previous biopsy.  He has a new lesion on his left malar cheek, which is an approximately 6-7 mm nodule.  I have asked him to schedule a follow-up appointment with you in the next several weeks so that you can evaluate this lesion and consider biopsy if indicated; and to reestablish your routine surveillance of his skin. I have asked him to follow up with me in the meantime should any changes arise to the site of the previous biopsy on his right lower lid.  Please let me know if you have any questions.   Many thanks,   Javier

## 2019-01-22 NOTE — TELEPHONE ENCOUNTER
Offered pt earlier appointment time, but he declined stating that he would just wait until 3/26 for his scheduled time.   ----- Message from Bridgett Singh MD sent at 1/22/2019  8:32 AM CST -----  Pt with history skin cancer, Dr. Richardson saw him with a new suspicious lesion on left cheek. Please ensure he has f/u with me in 2-4 weeks.   ----- Message -----  From: Javier Richardson MD  Sent: 1/21/2019   7:41 PM  To: MD Bridgett Duvall,   Attached is the note from my visit with Mr. Ruff. As you may remember, I had seen him  previously regarding treatment of a biopsy-proven squamous cell carcinoma on the right infraorbital cheek/lower eyelid. As you also may remember, surgery was subsequently deferred because of the absence of any clinical evidence of residual tumor at the site of biopsy; and he underwent a course of treatment with topical 5 fluorouracil to his face and scalp to address other lesions in the area consistent with actinic keratosis.    I saw him in follow-up 2 months after completing topical treatment. There is, at the moment, still no evidence of residual squamous cell carcinoma to the site of the previous biopsy.  He has a new lesion on his left malar cheek, which is an approximately 6-7 mm nodule.  I have asked him to schedule a follow-up appointment with you in the next several weeks so that you can evaluate this lesion and consider biopsy if indicated; and to reestablish your routine surveillance of his skin. I have asked him to follow up with me in the meantime should any changes arise to the site of the previous biopsy on his right lower lid.  Please let me know if you have any questions.   Many thanks,   Javier

## 2019-01-31 DIAGNOSIS — M25.561 RIGHT KNEE PAIN: Primary | ICD-10-CM

## 2019-02-01 ENCOUNTER — HOSPITAL ENCOUNTER (OUTPATIENT)
Dept: RADIOLOGY | Facility: HOSPITAL | Age: 79
Discharge: HOME | End: 2019-02-01
Attending: PHYSICAL MEDICINE & REHABILITATION
Payer: MEDICARE

## 2019-02-01 ENCOUNTER — HOSPITAL ENCOUNTER (OUTPATIENT)
Facility: HOSPITAL | Age: 79
Discharge: HOME | End: 2019-02-01
Attending: PHYSICAL MEDICINE & REHABILITATION | Admitting: PHYSICAL MEDICINE & REHABILITATION
Payer: MEDICARE

## 2019-02-01 DIAGNOSIS — M25.561 RIGHT KNEE PAIN: ICD-10-CM

## 2019-02-01 DIAGNOSIS — G89.29 CHRONIC PAIN OF RIGHT KNEE: ICD-10-CM

## 2019-02-01 DIAGNOSIS — M25.561 CHRONIC PAIN OF RIGHT KNEE: ICD-10-CM

## 2019-02-01 PROCEDURE — 64640 INJECTION TREATMENT OF NERVE: CPT | Mod: HCNC,RT,59,PO | Performed by: PHYSICAL MEDICINE & REHABILITATION

## 2019-02-01 PROCEDURE — 25000003 PHARM REV CODE 250: Mod: HCNC,PO | Performed by: PHYSICAL MEDICINE & REHABILITATION

## 2019-02-01 PROCEDURE — 64640 PR DESTRUCT BY NEURO AGENT; OTHER PERIPH NERVE: ICD-10-PCS | Mod: HCNC,RT,, | Performed by: PHYSICAL MEDICINE & REHABILITATION

## 2019-02-01 PROCEDURE — 64640 INJECTION TREATMENT OF NERVE: CPT | Mod: HCNC,RT,, | Performed by: PHYSICAL MEDICINE & REHABILITATION

## 2019-02-01 PROCEDURE — 76000 FLUOROSCOPY <1 HR PHYS/QHP: CPT | Mod: TC,HCNC,PO

## 2019-02-01 PROCEDURE — 63600175 PHARM REV CODE 636 W HCPCS: Mod: HCNC,PO | Performed by: PHYSICAL MEDICINE & REHABILITATION

## 2019-02-01 RX ORDER — MIDAZOLAM HYDROCHLORIDE 1 MG/ML
INJECTION INTRAMUSCULAR; INTRAVENOUS
Status: DISCONTINUED | OUTPATIENT
Start: 2019-02-01 | End: 2019-02-01 | Stop reason: HOSPADM

## 2019-02-01 RX ORDER — LIDOCAINE HYDROCHLORIDE 10 MG/ML
1 INJECTION, SOLUTION EPIDURAL; INFILTRATION; INTRACAUDAL; PERINEURAL ONCE
Status: DISCONTINUED | OUTPATIENT
Start: 2019-02-01 | End: 2019-02-01 | Stop reason: HOSPADM

## 2019-02-01 RX ORDER — SODIUM CHLORIDE, SODIUM LACTATE, POTASSIUM CHLORIDE, CALCIUM CHLORIDE 600; 310; 30; 20 MG/100ML; MG/100ML; MG/100ML; MG/100ML
INJECTION, SOLUTION INTRAVENOUS CONTINUOUS
Status: DISCONTINUED | OUTPATIENT
Start: 2019-02-01 | End: 2019-02-01 | Stop reason: HOSPADM

## 2019-02-01 RX ORDER — MIDAZOLAM HYDROCHLORIDE 1 MG/ML
2 INJECTION INTRAMUSCULAR; INTRAVENOUS ONCE AS NEEDED
Status: DISCONTINUED | OUTPATIENT
Start: 2019-02-01 | End: 2019-02-01 | Stop reason: HOSPADM

## 2019-02-01 RX ORDER — DEXAMETHASONE SODIUM PHOSPHATE 4 MG/ML
INJECTION, SOLUTION INTRA-ARTICULAR; INTRALESIONAL; INTRAMUSCULAR; INTRAVENOUS; SOFT TISSUE
Status: DISCONTINUED | OUTPATIENT
Start: 2019-02-01 | End: 2019-02-01 | Stop reason: HOSPADM

## 2019-02-01 RX ADMIN — SODIUM CHLORIDE, SODIUM LACTATE, POTASSIUM CHLORIDE, AND CALCIUM CHLORIDE: .6; .31; .03; .02 INJECTION, SOLUTION INTRAVENOUS at 09:02

## 2019-02-01 NOTE — PLAN OF CARE
VSS. Questions answered to patient satisfaction. H&P needed. Site jennifer needed. Patient denies recent illness.  Patient ready for procedure.

## 2019-02-01 NOTE — DISCHARGE INSTRUCTIONS
Recovery After Procedural Sedation (Adult)  You have been given medicine by vein to make you sleep during your surgery. This may have included both a pain medicine and sleeping medicine. Most of the effects have worn off. But you may still have some drowsiness for the next 6 to 8 hours.  Home care  Follow these guidelines when you get home:  · For the next 8 hours, you should be watched by a responsible adult. This person should make sure your condition is not getting worse.  · Don't drink any alcohol for the next 24 hours.  · Don't drive, operate dangerous machinery, or make important business or personal decisions during the next 24 hours.  Note: Your healthcare provider may tell you not to take any medicine by mouth for pain or sleep in the next 4 hours. These medicines may react with the medicines you were given in the hospital. This could cause a much stronger response than usual.  Follow-up care  Follow up with your healthcare provider if you are not alert and back to your usual level of activity within 12 hours.  When to seek medical advice  Call your healthcare provider right away if any of these occur:  · Drowsiness gets worse  · Weakness or dizziness gets worse  · Repeated vomiting  · You can't be awakened   Date Last Reviewed: 10/18/2016  © 5381-6405 [employer]. [address] [place] [address], [place] [address]. All rights reserved. This information is not intended as a substitute for professional medical care. Always follow your healthcare professional's instructions.      Home care instructions  Apply ice pack to the injection site for 20 minutes periods for the first 24 hrs for soreness/discomfort at injection site DO NOT USE HEAT FOR 24 HOURS  Keep site clean and dry for 24 hours, remove bandaid when desired  Do not drive until tomorrow  Take care when walking after a knee injection  Avoid strenuous activities for 2 days  Make take 2 weeks to feel the full effects   Resume home medication as  prescribed today  Resume Aspirin, Plavix, or Coumadin the day after the procedure unless otherwise instructed.    SEE IMMEDIATE MEDICAL HELP FOR:  Severe increase in your usual pain or appearance of new pain  Prolonged or increasing weakness or numbness in the legs or arms  Drainage, redness, active bleeding, or increased swelling at the injection site  Temperature over 100.0 degrees F.  Headache that increases when your head is upright and decreases when you lie flat    CALL 911 OR GO DIRECTLY TO EMERGENCY DEPARTMENT FOR:  Shortness of breath, chest pain, or problems breathing

## 2019-02-01 NOTE — OP NOTE
Operative Note       Surgery Date: 2/1/2019     Surgeon(s) and Role:     * Sj Gonzales MD - Primary    Pre-op Diagnosis:  Chronic pain of right knee [M25.561, G89.29]    Post-op Diagnosis: Post-Op Diagnosis Codes:     * Chronic pain of right knee [M25.561, G89.29]    Procedure:  1.  Right knee genicular nerve radiofrequency ablation ×3 nerve branches.  A. Inferomedial genicular nerve  B. Superolateral genicular nerve  C. Superomedial genicular nerve   2.  Fluoroscopic guidance.    Anesthesia: RN IV Sedation    Description of Procedure:    Ankit Ruff is a 78 y.o. male with chronic right knee pain presents for an elective radiofrequency genicular nerve ablation of a total of 3 individual genicular branches of the right knee. He did get significant pain relief (>75%) from the diagnostic block of the genicular nerves and has elected to undergo the RF procedure in the hopes of longer term pain relief. We discussed risks, benefits, and alternatives. Patient's verbal and written consent was obtained. He was brought to the fluoro scopic suite, placed in supine position. The right knee was prepped and draped in the usual sterile fashion. 1% lidocaine was used to anesthetize the overlying subcutaneous cutaneous tissues at each of the 3 sites using a 27-gauge 1.5 inch needle on a 10 cc syringe. Using AP and lateral views, a 17-gauge introducer needle was guided under intermittent fluoroscopic imaging first to the distal metaphysis of the medial aspect of the femur. A similar needle was placed at the lateral aspect of the distal femur. A third introducer needle was guided to the proximal aspect of the tibial metaphysis medially. All 3 needles being placed near or around each genicular nerve origin prior to entering the knee joint. Motor stimulation was then performed at 2 V. There were no observed muscle contractions at either of the 3 sites. Following motor stimulation 1 cc of 2% lidocaine was then injected into  the introducer needles and stylets were replaced. Next, the radio frequency probe was placed into the introducer needle and radiofrequency lesioning was performed at 84°C for 2 minutes, 30 seconds. Next, the probe was removed and placed in the second genicular location and radiofrequency lesioning was performed here. Finally, the third radio frequency lesioning was performed at the last branch. Following lesioning, a mixture of 1 cc of dexamethasone, 4 mg with 3cc 1% lidocaine was injected through the introducer needles. The needles were then re-styletted and removed. A total of 3 nerve branches were lesioned today: Inferomedial genicular nerve, Superolateral genicular nerve, and superomedial genicular nerve . Band-Aids were applied to the puncture sites after applying antibiotic ointment. The patient was transported to the postoperative recovery area in good condition. Approximately 20 minutes after the procedure, motor strength examination revealed no weakness. The patient reported no paresthesias in the affected extremity. Patient was discharged in good condition and will follow-up in 2 weeks in the office     Estimated Blood Loss: Minimal    Attestation:  I performed the procedure.           Discharge Note    Admit Date: 2/1/2019    Attending Physician: Sj Gonzales MD     Discharge Physician: Sj Gonzales MD    Final Diagnosis: Post-Op Diagnosis Codes:     * Chronic pain of right knee [M25.561, G89.29]    Disposition: Home or Self Care, pt discharged in good condition and will f/u in clinic in 2 weeks.    Patient Instructions:   Current Discharge Medication List      CONTINUE these medications which have NOT CHANGED    Details   aspirin (ECOTRIN) 81 MG EC tablet Take 81 mg by mouth once daily.        carvedilol (COREG) 12.5 MG tablet TAKE 1 TABLET TWICE DAILY  Qty: 180 tablet, Refills: 3      citalopram (CELEXA) 10 MG tablet Take 1 tablet (10 mg total) by mouth once daily.  Qty: 90 tablet,  Refills: 3    Associated Diagnoses: History of anxiety      gabapentin (NEURONTIN) 300 MG capsule Take 1 capsule (300 mg total) by mouth 2 (two) times daily.  Qty: 180 capsule, Refills: 0      meloxicam (MOBIC) 15 MG tablet Take 1 tablet (15 mg total) by mouth daily as needed.  Qty: 90 tablet, Refills: 1    Associated Diagnoses: Osteoarthritis of both knees, unspecified osteoarthritis type      nut.tx for PKU with iron no.25 (LANAFLEX) 33 gram-253 kcal/100 gram PwPk Lanaflex   4mg      oxybutynin (DITROPAN) 5 MG Tab Take 1 tablet (5 mg total) by mouth every evening.  Qty: 90 tablet, Refills: 3    Associated Diagnoses: OAB (overactive bladder)      pantoprazole (PROTONIX) 40 MG tablet TAKE 1 TABLET ONE TIME DAILY  Qty: 90 tablet, Refills: 3      rosuvastatin (CRESTOR) 10 MG tablet TAKE 1 TABLET ONE TIME DAILY  Qty: 90 tablet, Refills: 3      spironolactone (ALDACTONE) 25 MG tablet TAKE 1/2 TABLET  DAILY  ON  MONDAY,  WEDNESDAY  & FRIDAY   Qty: 20 tablet, Refills: 3    Associated Diagnoses: Essential hypertension      tiZANidine (ZANAFLEX) 4 MG tablet Take 1 tablet (4 mg total) by mouth every 8 (eight) hours.  Qty: 90 tablet, Refills: 3      traMADol (ULTRAM) 50 mg tablet Take 1 tablet (50 mg total) by mouth every 4 (four) hours as needed for Pain.  Qty: 30 tablet, Refills: 0      fluorouracil (EFUDEX) 5 % cream Apply sparingly twice a day to face for three weeks  Qty: 40 g, Refills: 1      SHINGRIX, PF, 50 mcg/0.5 mL injection              Discharge Procedure Orders (must include Diet, Follow-up, Activity)   Discharge Procedure Orders (must include Diet, Follow-up, Activity)   Diet general     Ice to affected area     Call MD for:  temperature >100.4     Call MD for:  persistent nausea and vomiting     Call MD for:  severe uncontrolled pain     Call MD for:  difficulty breathing, headache or visual disturbances     Call MD for:  redness, tenderness, or signs of infection (pain, swelling, redness, odor or green/yellow  discharge around incision site)     Call MD for:  hives     Call MD for:  persistent dizziness or light-headedness     Call MD for:  extreme fatigue     No dressing needed     Shower on day dressing removed (No bath)        Discharge Date: 2/1/19

## 2019-02-01 NOTE — H&P
CHIEF COMPLAINT:        Chief Complaint   Patient presents with    Knee Pain       right knee pain      HISTORY OF PRESENT ILLNESS: Ankit Ruff is a 78 y.o. male who presents to me in follow up for elective cooled RFA of the genicular nerve branches to the right knee.     Review of Systems   Constitutional: Negative for fever.   HENT: Negative for drooling.    Eyes: Negative for discharge.   Respiratory: Negative for choking.    Cardiovascular: Negative for chest pain.   Genitourinary: Negative for flank pain.   Skin: Negative for wound.   Allergic/Immunologic: Negative for immunocompromised state.   Neurological: Negative for tremors and syncope.   Psychiatric/Behavioral: Negative for behavioral problems.      Past Medical History:   Past Medical History:   Diagnosis Date    Anxiety      Arthritis      Atrial fibrillation      CAD (coronary artery disease)      Cataract       OU    CHF (congestive heart failure)      Defibrillator discharge      Heart failure      Hyperlipidemia      Hypertension      ICD (implantable cardiac defibrillator) in place      Ischemic cardiomyopathy      Myocardial infarction      Sciatica       had seen Dr. Amy Canela in the past    Squamous cell carcinoma       Left cheek 4-2016          Past Surgical History:         Past Surgical History:   Procedure Laterality Date    ABLATION N/A 10/24/2016     Performed by [facility] at [facility] CATH LAB    APPENDECTOMY        CARDIAC SURGERY        Defibulater        FRACTURE SURGERY        HERNIA REPAIR        SKIN BIOPSY        TRANSESOPHAGEAL ECHOCARDIOGRAM (MERCED) N/A 10/24/2016     Performed by Morris Alejandre MD at [facility] CATH LAB         Family History: History reviewed. No pertinent family history.     Medications:   Current Outpatient Medications on File Prior to Visit   Medication Sig Dispense Refill    aspirin (ECOTRIN) 81 MG EC tablet Take 81 mg by mouth once daily.          carvedilol (COREG) 12.5 MG tablet  TAKE 1 TABLET TWICE DAILY 180 tablet 3    citalopram (CELEXA) 10 MG tablet Take 1 tablet (10 mg total) by mouth once daily. 90 tablet 3    fluorouracil (EFUDEX) 5 % cream Apply sparingly twice a day to face for three weeks 40 g 1    gabapentin (NEURONTIN) 300 MG capsule TAKE 1 CAPSULE TWICE DAILY 180 capsule 0    gabapentin (NEURONTIN) 300 MG capsule Take 1 capsule (300 mg total) by mouth 2 (two) times daily. 180 capsule 0    meloxicam (MOBIC) 15 MG tablet Take 1 tablet (15 mg total) by mouth daily as needed. 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 90 tablet 3    pantoprazole (PROTONIX) 40 MG tablet TAKE 1 TABLET ONE TIME DAILY 90 tablet 3    rosuvastatin (CRESTOR) 10 MG tablet TAKE 1 TABLET ONE TIME DAILY 90 tablet 3    SHINGRIX, PF, 50 mcg/0.5 mL injection          spironolactone (ALDACTONE) 25 MG tablet TAKE 1/2 TABLET  DAILY  ON  MONDAY,  WEDNESDAY  & FRIDAY  20 tablet 3    tiZANidine (ZANAFLEX) 4 MG tablet Take 1 tablet (4 mg total) by mouth every 8 (eight) hours. 90 tablet 3    traMADol (ULTRAM) 50 mg tablet Take 1 tablet (50 mg total) by mouth every 4 (four) hours as needed for Pain. 30 tablet 0      No current facility-administered medications on file prior to visit.          Allergies:        Review of patient's allergies indicates:   Allergen Reactions    Penicillins Rash         Social History:   Social History            Socioeconomic History    Marital status:        Spouse name: None    Number of children: None    Years of education: None    Highest education level: None   Social Needs    Financial resource strain: None    Food insecurity - worry: None    Food insecurity - inability: None    Transportation needs - medical: None    Transportation needs - non-medical: None   Occupational History    None   Tobacco Use     Smoking status: Former Smoker       Types: Cigarettes       Last attempt to quit: 1977       Years since quittin.3    Smokeless tobacco: Never Used   Substance and Sexual Activity    Alcohol use: Yes       Comment: socially    Drug use: No    Sexual activity: None   Other Topics Concern    None   Social History Narrative    None      PHYSICAL EXAMINATION:   General           VSS   Constitutional: Oriented to person, place, and time. No apparent distress. Appears well-developed and well-nourished. Pleasant.  HENT:   Head: Normocephalic and atraumatic.   Eyes: Right eye exhibits no discharge. Left eye exhibits no discharge. No scleral icterus.   Pulmonary/Chest: Effort normal. No respiratory distress.   Abdominal: There is no guarding.   Neurological: Alert and oriented to person, place, and time.   Psychiatric: Behavior is normal.   Right Knee Exam      Tenderness   The patient is experiencing tenderness in the medial joint line.     Range of Motion   Extension: 0   Flexion: 120      Tests   Vikki:  Medial - negative Lateral - negative  Varus: negative Valgus: negative  Lachman:  Anterior - negative    Posterior - negative  Patellar apprehension: negative     Other   Erythema: absent  Scars: absent  Sensation: normal  Pulse: present  Swelling: none  Effusion: no effusion present        Left Knee Exam      Tenderness   The patient is experiencing no tenderness.      Range of Motion   Extension: normal   Flexion: normal      Other   Erythema: absent  Scars: absent  Sensation: normal  Pulse: present  Swelling: none  Effusion: no effusion present        Left Hand Exam      Tenderness   Left hand tenderness location: Tender over the base of the 1st CMC.      Range of Motion   Wrist   Extension: normal   Flexion: normal   Pronation: normal   Supination: normal   Hand   MP Thumb: 70   DIP Thumb: 70      Muscle Strength   Wrist extension: 5/5   Wrist flexion: 5/5   :  5/5      Other   Erythema:  absent  Scars: absent  Sensation: normal  Pulse: present     Comments:  There is some pain with basilar thumb grind     INSPECTION: There is no swelling, ecchymoses, erythema or gross deformity of the right knee.  GAIT/DYNAMIC:  Mildly antalgic gait.     Imaging  x-ray of the right knee from 01/19/2018: There is degenerative arthrosis of the medial compartments of both knees with joint space narrowing. There is minimal degenerative arthrosis of the patellofemoral articulations with small posterior patellar osteophytes. No fracture or subluxation are identified. There are no signs of joint effusion on either side.     Data Reviewed: X-ray     Supportive Actions: Independent visualization of images or test specimens     ASSESSMENT:   1. Chronic pain of right knee    2. Arthritis of carpometacarpal (CMC) joint of left thumb       PLAN:      1. We will proceed with cooled RFA to the genicular nerve branches to the right knee, pt consented.     2.  RTC in 2 weeks.

## 2019-02-04 VITALS
SYSTOLIC BLOOD PRESSURE: 145 MMHG | BODY MASS INDEX: 28 KG/M2 | TEMPERATURE: 98 F | RESPIRATION RATE: 18 BRPM | WEIGHT: 200 LBS | HEART RATE: 52 BPM | OXYGEN SATURATION: 98 % | DIASTOLIC BLOOD PRESSURE: 74 MMHG | HEIGHT: 71 IN

## 2019-03-01 ENCOUNTER — IMMUNIZATION (OUTPATIENT)
Dept: PHARMACY | Facility: CLINIC | Age: 79
End: 2019-03-01
Payer: MEDICARE

## 2019-03-01 ENCOUNTER — OFFICE VISIT (OUTPATIENT)
Dept: FAMILY MEDICINE | Facility: CLINIC | Age: 79
End: 2019-03-01
Payer: MEDICARE

## 2019-03-01 VITALS
HEIGHT: 71 IN | WEIGHT: 214.75 LBS | HEART RATE: 51 BPM | DIASTOLIC BLOOD PRESSURE: 66 MMHG | SYSTOLIC BLOOD PRESSURE: 128 MMHG | BODY MASS INDEX: 30.06 KG/M2

## 2019-03-01 DIAGNOSIS — I48.3 TYPICAL ATRIAL FLUTTER: ICD-10-CM

## 2019-03-01 DIAGNOSIS — M25.561 CHRONIC PAIN OF RIGHT KNEE: ICD-10-CM

## 2019-03-01 DIAGNOSIS — Z00.00 ENCOUNTER FOR PREVENTIVE HEALTH EXAMINATION: Primary | ICD-10-CM

## 2019-03-01 DIAGNOSIS — Z85.89 HISTORY OF SQUAMOUS CELL CARCINOMA: ICD-10-CM

## 2019-03-01 DIAGNOSIS — I48.0 PAF (PAROXYSMAL ATRIAL FIBRILLATION): ICD-10-CM

## 2019-03-01 DIAGNOSIS — Z95.1 S/P CABG X 4: ICD-10-CM

## 2019-03-01 DIAGNOSIS — I25.5 ISCHEMIC CARDIOMYOPATHY: Chronic | ICD-10-CM

## 2019-03-01 DIAGNOSIS — E78.00 PURE HYPERCHOLESTEROLEMIA: ICD-10-CM

## 2019-03-01 DIAGNOSIS — M54.30 SCIATICA, UNSPECIFIED LATERALITY: ICD-10-CM

## 2019-03-01 DIAGNOSIS — I50.42 CHRONIC COMBINED SYSTOLIC AND DIASTOLIC HEART FAILURE: ICD-10-CM

## 2019-03-01 DIAGNOSIS — I10 ESSENTIAL HYPERTENSION: ICD-10-CM

## 2019-03-01 DIAGNOSIS — I27.9 PULMONARY HEART DISEASE: ICD-10-CM

## 2019-03-01 DIAGNOSIS — Z95.810 ICD (IMPLANTABLE CARDIOVERTER-DEFIBRILLATOR) IN PLACE: Chronic | ICD-10-CM

## 2019-03-01 DIAGNOSIS — I47.29 NSVT (NONSUSTAINED VENTRICULAR TACHYCARDIA): ICD-10-CM

## 2019-03-01 DIAGNOSIS — G89.29 CHRONIC PAIN OF RIGHT KNEE: ICD-10-CM

## 2019-03-01 DIAGNOSIS — M46.96 INFLAMMATORY SPONDYLOPATHY OF LUMBAR REGION: ICD-10-CM

## 2019-03-01 DIAGNOSIS — Z86.59 HISTORY OF ANXIETY: ICD-10-CM

## 2019-03-01 DIAGNOSIS — I25.2 HISTORY OF MYOCARDIAL INFARCTION: ICD-10-CM

## 2019-03-01 DIAGNOSIS — M47.816 LUMBAR ARTHROPATHY: ICD-10-CM

## 2019-03-01 DIAGNOSIS — I25.810 CORONARY ARTERY DISEASE INVOLVING AUTOLOGOUS VEIN CORONARY BYPASS GRAFT WITHOUT ANGINA PECTORIS: ICD-10-CM

## 2019-03-01 DIAGNOSIS — M47.26 OSTEOARTHRITIS OF SPINE WITH RADICULOPATHY, LUMBAR REGION: ICD-10-CM

## 2019-03-01 DIAGNOSIS — H91.93 BILATERAL HEARING LOSS, UNSPECIFIED HEARING LOSS TYPE: ICD-10-CM

## 2019-03-01 DIAGNOSIS — D69.6 THROMBOCYTOPENIA: ICD-10-CM

## 2019-03-01 DIAGNOSIS — M17.0 OSTEOARTHRITIS OF BOTH KNEES, UNSPECIFIED OSTEOARTHRITIS TYPE: ICD-10-CM

## 2019-03-01 PROCEDURE — 3074F SYST BP LT 130 MM HG: CPT | Mod: HCNC,CPTII,S$GLB, | Performed by: NURSE PRACTITIONER

## 2019-03-01 PROCEDURE — 3078F PR MOST RECENT DIASTOLIC BLOOD PRESSURE < 80 MM HG: ICD-10-PCS | Mod: HCNC,CPTII,S$GLB, | Performed by: NURSE PRACTITIONER

## 2019-03-01 PROCEDURE — 3074F PR MOST RECENT SYSTOLIC BLOOD PRESSURE < 130 MM HG: ICD-10-PCS | Mod: HCNC,CPTII,S$GLB, | Performed by: NURSE PRACTITIONER

## 2019-03-01 PROCEDURE — 99499 RISK ADDL DX/OHS AUDIT: ICD-10-PCS | Mod: S$GLB,,, | Performed by: NURSE PRACTITIONER

## 2019-03-01 PROCEDURE — 99999 PR PBB SHADOW E&M-EST. PATIENT-LVL IV: ICD-10-PCS | Mod: PBBFAC,HCNC,, | Performed by: NURSE PRACTITIONER

## 2019-03-01 PROCEDURE — G0439 PPPS, SUBSEQ VISIT: HCPCS | Mod: HCNC,S$GLB,, | Performed by: NURSE PRACTITIONER

## 2019-03-01 PROCEDURE — 3078F DIAST BP <80 MM HG: CPT | Mod: HCNC,CPTII,S$GLB, | Performed by: NURSE PRACTITIONER

## 2019-03-01 PROCEDURE — 99999 PR PBB SHADOW E&M-EST. PATIENT-LVL IV: CPT | Mod: PBBFAC,HCNC,, | Performed by: NURSE PRACTITIONER

## 2019-03-01 PROCEDURE — G0439 PR MEDICARE ANNUAL WELLNESS SUBSEQUENT VISIT: ICD-10-PCS | Mod: HCNC,S$GLB,, | Performed by: NURSE PRACTITIONER

## 2019-03-01 PROCEDURE — 99499 UNLISTED E&M SERVICE: CPT | Mod: S$GLB,,, | Performed by: NURSE PRACTITIONER

## 2019-03-01 NOTE — PROGRESS NOTES
"Ankit Ruff presented for a  Medicare AWV and comprehensive Health Risk Assessment today. The following components were reviewed and updated:    · Medical history  · Family History  · Social history  · Allergies and Current Medications  · Health Risk Assessment  · Health Maintenance  · Care Team     ** See Completed Assessments for Annual Wellness Visit within the encounter summary.**       The following assessments were completed:  · Living Situation  · CAGE  · Depression Screening  · Timed Get Up and Go  · Whisper Test  · Cognitive Function Screening          · Nutrition Screening  · ADL Screening  · PAQ Screening    Vitals:    03/01/19 0935   BP: 128/66   BP Location: Left arm   Patient Position: Sitting   BP Method: Large (Automatic)   Pulse: (!) 51   Weight: 97.4 kg (214 lb 11.7 oz)   Height: 5' 11" (1.803 m)     Body mass index is 29.95 kg/m².  Physical Exam   Constitutional: He is oriented to person, place, and time. No distress.   HENT:   Head: Normocephalic.   Eyes: Conjunctivae are normal.   Cardiovascular: Regular rhythm. Bradycardia present.   No murmur heard.  Pulmonary/Chest: Effort normal and breath sounds normal. No stridor. No respiratory distress. He has no wheezes.   Neurological: He is alert and oriented to person, place, and time. No cranial nerve deficit.   Skin: Skin is warm.   Psychiatric: He has a normal mood and affect. His behavior is normal. Judgment and thought content normal.         Diagnoses and health risks identified today and associated recommendations/orders:    1. Encounter for preventive health examination  Reviewed health maintenance and provided recommendations        2. Osteoarthritis of spine with radiculopathy, lumbar region  Continue to monitor  Followed by AFIA Jones MD and  Reji (acupuncture).      3. History of anxiety  Continue to monitor  Taking celexa  Followed by AFIA Jones MD .      4. Bilateral hearing loss, unspecified hearing loss type  Continue " to monitor  Followed by AFIA Jones MD .      5. Pulmonary heart disease  Continue to monitor  Followed by Cesar.      6. Ischemic cardiomyopathy  Continue to monitor  Followed by Cesar.      7. ICD (implantable cardioverter-defibrillator) in place  Continue to monitor  Followed by Cesar.      8. PAF (paroxysmal atrial fibrillation)  Continue to monitor  Followed by Pili      9. NSVT (nonsustained ventricular tachycardia)  Continue to monitor  No palpitations  Followed by pratima.      10. Typical atrial flutter  Continue to monitor  Followed by Pili.      11. Chronic combined systolic and diastolic heart failure  Continue to monitor  Followed by Cesar.      12. Coronary artery disease involving autologous vein coronary bypass graft without angina pectoris  Continue to monitor  Followed by Cesar Boyer CP.      13. History of myocardial infarction  Continue to monitor  Followed by Cesar.      14. Pure hypercholesterolemia  Continue to monitor  Followed by AFIA Jones MD .      15. S/P CABG x 4  Continue to monitor  Followed by Cesar.      16. Essential hypertension  Continue to monitor  Followed by AFIA Jones MD .      17. Thrombocytopenia  Continue to monitor  Followed by AFIA Jones MD .      18. History of squamous cell carcinoma  Continue to monitor  Followed by Chen.      19. Sciatica, unspecified laterality  Continue to monitor  Followed by barbara.      20. Lumbar arthropathy  Continue to monitor  Followed by Barbara.      21. Osteoarthritis of both knees, unspecified osteoarthritis type  Continue to monitor  Followed by Shailesh.      22. Chronic pain of right knee  Continue to monitor  Followed by Shailesh.      23. Inflammatory spondylopathy of lumbar region  Continue to monitor  Followed by AFIA Jones MD  And Barbara.        Provided Ankit with a 5-10 year written screening schedule and personal prevention plan. Recommendations were developed  using the USPSTF age appropriate recommendations. Education, counseling, and referrals were provided as needed. After Visit Summary printed and given to patient which includes a list of additional screenings\tests needed.    Follow-up in about 1 year (around 3/1/2020).    Kayleigh Erickson NP

## 2019-03-01 NOTE — PATIENT INSTRUCTIONS
Counseling and Referral of Other Preventative  (Italic type indicates deductible and co-insurance are waived)    Patient Name: Ankit Ruff  Today's Date: 3/1/2019    Health Maintenance       Date Due Completion Date    Lipid Panel 05/10/2023 5/10/2018    TETANUS VACCINE 09/16/2025 9/16/2015        No orders of the defined types were placed in this encounter.    The following information is provided to all patients.  This information is to help you find resources for any of the problems found today that may be affecting your health:                Living healthy guide: www.[facility].[place].[facility]      Understanding Diabetes: www.diabetes.org      Eating healthy: www.cdc.gov/healthyweight      CDC home safety checklist: www.cdc.gov/steadi/patient.html      Agency on Aging: www.goea.[place].[facility]      Alcoholics anonymous (AA): www.aa.org      Physical Activity: www.karrie.nih.gov/yn7kduy      Tobacco use: www.quitwithusla.org

## 2019-03-06 ENCOUNTER — LAB VISIT (OUTPATIENT)
Dept: LAB | Facility: HOSPITAL | Age: 79
End: 2019-03-06
Attending: FAMILY MEDICINE
Payer: MEDICARE

## 2019-03-06 ENCOUNTER — OFFICE VISIT (OUTPATIENT)
Dept: FAMILY MEDICINE | Facility: CLINIC | Age: 79
End: 2019-03-06
Payer: MEDICARE

## 2019-03-06 VITALS
SYSTOLIC BLOOD PRESSURE: 124 MMHG | TEMPERATURE: 98 F | WEIGHT: 211 LBS | HEIGHT: 71 IN | OXYGEN SATURATION: 95 % | BODY MASS INDEX: 29.54 KG/M2 | DIASTOLIC BLOOD PRESSURE: 68 MMHG

## 2019-03-06 DIAGNOSIS — E78.00 PURE HYPERCHOLESTEROLEMIA: ICD-10-CM

## 2019-03-06 DIAGNOSIS — I10 ESSENTIAL HYPERTENSION: ICD-10-CM

## 2019-03-06 DIAGNOSIS — M46.96 INFLAMMATORY SPONDYLOPATHY OF LUMBAR REGION: ICD-10-CM

## 2019-03-06 DIAGNOSIS — I10 ESSENTIAL HYPERTENSION: Primary | ICD-10-CM

## 2019-03-06 LAB
ALBUMIN SERPL BCP-MCNC: 4 G/DL
ALP SERPL-CCNC: 70 U/L
ALT SERPL W/O P-5'-P-CCNC: 39 U/L
ANION GAP SERPL CALC-SCNC: 7 MMOL/L
AST SERPL-CCNC: 48 U/L
BASOPHILS # BLD AUTO: 0.04 K/UL
BASOPHILS NFR BLD: 0.7 %
BILIRUB SERPL-MCNC: 1.4 MG/DL
BUN SERPL-MCNC: 17 MG/DL
CALCIUM SERPL-MCNC: 9.4 MG/DL
CHLORIDE SERPL-SCNC: 103 MMOL/L
CHOLEST SERPL-MCNC: 135 MG/DL
CHOLEST/HDLC SERPL: 2.8 {RATIO}
CO2 SERPL-SCNC: 28 MMOL/L
CREAT SERPL-MCNC: 1 MG/DL
DIFFERENTIAL METHOD: ABNORMAL
EOSINOPHIL # BLD AUTO: 0.3 K/UL
EOSINOPHIL NFR BLD: 5 %
ERYTHROCYTE [DISTWIDTH] IN BLOOD BY AUTOMATED COUNT: 12.4 %
EST. GFR  (AFRICAN AMERICAN): >60 ML/MIN/1.73 M^2
EST. GFR  (NON AFRICAN AMERICAN): >60 ML/MIN/1.73 M^2
GLUCOSE SERPL-MCNC: 112 MG/DL
HCT VFR BLD AUTO: 41.7 %
HDLC SERPL-MCNC: 48 MG/DL
HDLC SERPL: 35.6 %
HGB BLD-MCNC: 14 G/DL
IMM GRANULOCYTES # BLD AUTO: 0.02 K/UL
IMM GRANULOCYTES NFR BLD AUTO: 0.4 %
LDLC SERPL CALC-MCNC: 69.4 MG/DL
LYMPHOCYTES # BLD AUTO: 1.5 K/UL
LYMPHOCYTES NFR BLD: 27.3 %
MCH RBC QN AUTO: 32.2 PG
MCHC RBC AUTO-ENTMCNC: 33.6 G/DL
MCV RBC AUTO: 96 FL
MONOCYTES # BLD AUTO: 0.5 K/UL
MONOCYTES NFR BLD: 9.3 %
NEUTROPHILS # BLD AUTO: 3.1 K/UL
NEUTROPHILS NFR BLD: 57.3 %
NONHDLC SERPL-MCNC: 87 MG/DL
NRBC BLD-RTO: 0 /100 WBC
PLATELET # BLD AUTO: 97 K/UL
PMV BLD AUTO: 11.1 FL
POTASSIUM SERPL-SCNC: 4.7 MMOL/L
PROT SERPL-MCNC: 7 G/DL
RBC # BLD AUTO: 4.35 M/UL
SODIUM SERPL-SCNC: 138 MMOL/L
TRIGL SERPL-MCNC: 88 MG/DL
TSH SERPL DL<=0.005 MIU/L-ACNC: 1.58 UIU/ML
WBC # BLD AUTO: 5.35 K/UL

## 2019-03-06 PROCEDURE — 84443 ASSAY THYROID STIM HORMONE: CPT | Mod: HCNC

## 2019-03-06 PROCEDURE — 99999 PR PBB SHADOW E&M-EST. PATIENT-LVL III: CPT | Mod: PBBFAC,HCNC,, | Performed by: FAMILY MEDICINE

## 2019-03-06 PROCEDURE — 80061 LIPID PANEL: CPT | Mod: HCNC

## 2019-03-06 PROCEDURE — 1101F PT FALLS ASSESS-DOCD LE1/YR: CPT | Mod: HCNC,CPTII,S$GLB, | Performed by: FAMILY MEDICINE

## 2019-03-06 PROCEDURE — 99499 UNLISTED E&M SERVICE: CPT | Mod: S$GLB,,, | Performed by: FAMILY MEDICINE

## 2019-03-06 PROCEDURE — 3078F PR MOST RECENT DIASTOLIC BLOOD PRESSURE < 80 MM HG: ICD-10-PCS | Mod: HCNC,CPTII,S$GLB, | Performed by: FAMILY MEDICINE

## 2019-03-06 PROCEDURE — 85025 COMPLETE CBC W/AUTO DIFF WBC: CPT | Mod: HCNC

## 2019-03-06 PROCEDURE — 99214 OFFICE O/P EST MOD 30 MIN: CPT | Mod: HCNC,S$GLB,, | Performed by: FAMILY MEDICINE

## 2019-03-06 PROCEDURE — 1101F PR PT FALLS ASSESS DOC 0-1 FALLS W/OUT INJ PAST YR: ICD-10-PCS | Mod: HCNC,CPTII,S$GLB, | Performed by: FAMILY MEDICINE

## 2019-03-06 PROCEDURE — 80053 COMPREHEN METABOLIC PANEL: CPT | Mod: HCNC

## 2019-03-06 PROCEDURE — 3074F SYST BP LT 130 MM HG: CPT | Mod: HCNC,CPTII,S$GLB, | Performed by: FAMILY MEDICINE

## 2019-03-06 PROCEDURE — 99214 PR OFFICE/OUTPT VISIT, EST, LEVL IV, 30-39 MIN: ICD-10-PCS | Mod: HCNC,S$GLB,, | Performed by: FAMILY MEDICINE

## 2019-03-06 PROCEDURE — 3078F DIAST BP <80 MM HG: CPT | Mod: HCNC,CPTII,S$GLB, | Performed by: FAMILY MEDICINE

## 2019-03-06 PROCEDURE — 99499 RISK ADDL DX/OHS AUDIT: ICD-10-PCS | Mod: S$GLB,,, | Performed by: FAMILY MEDICINE

## 2019-03-06 PROCEDURE — 99999 PR PBB SHADOW E&M-EST. PATIENT-LVL III: ICD-10-PCS | Mod: PBBFAC,HCNC,, | Performed by: FAMILY MEDICINE

## 2019-03-06 PROCEDURE — 36415 COLL VENOUS BLD VENIPUNCTURE: CPT | Mod: HCNC,PO

## 2019-03-06 PROCEDURE — 3074F PR MOST RECENT SYSTOLIC BLOOD PRESSURE < 130 MM HG: ICD-10-PCS | Mod: HCNC,CPTII,S$GLB, | Performed by: FAMILY MEDICINE

## 2019-03-06 RX ORDER — TRAMADOL HYDROCHLORIDE 50 MG/1
50 TABLET ORAL DAILY PRN
Qty: 30 TABLET | Refills: 2 | Status: SHIPPED | OUTPATIENT
Start: 2019-03-06 | End: 2019-05-27 | Stop reason: SDUPTHER

## 2019-03-06 NOTE — PROGRESS NOTES
Subjective:       Patient ID: Ankit Ruff is a 78 y.o. male.    Chief Complaint: Hypertension (4 month follow up)    Here for f/u htn and lipids.  Doing well overall and tries to stay active. Due for labs. Still dealing with aches but stable.      Hypertension   This is a chronic problem. The current episode started more than 1 year ago. The problem is controlled. Pertinent negatives include no chest pain, palpitations or shortness of breath.   Hyperlipidemia   This is a chronic problem. The current episode started more than 1 year ago. The problem is controlled. Pertinent negatives include no chest pain or shortness of breath.     Review of Systems   Constitutional: Negative for chills, fatigue and fever.   Respiratory: Negative for cough, chest tightness and shortness of breath.    Cardiovascular: Negative for chest pain, palpitations and leg swelling.   Endocrine: Negative for cold intolerance and heat intolerance.   Musculoskeletal: Positive for arthralgias.   Skin: Negative for rash.   Psychiatric/Behavioral: Negative for dysphoric mood. The patient is not nervous/anxious.        Objective:      Physical Exam   Constitutional: He appears well-developed and well-nourished.   HENT:   Head: Normocephalic and atraumatic.   Cardiovascular: Normal rate, regular rhythm and normal heart sounds.   Pulmonary/Chest: Effort normal and breath sounds normal.   Psychiatric: He has a normal mood and affect.   Nursing note and vitals reviewed.      Assessment:       1. Essential hypertension    2. Pure hypercholesterolemia    3. Inflammatory spondylopathy of lumbar region        Plan:       Essential hypertension  -     CBC auto differential; Future; Expected date: 03/06/2019  -     Comprehensive metabolic panel; Future; Expected date: 03/06/2019  -     Lipid panel; Future; Expected date: 03/06/2019  -     TSH; Future; Expected date: 03/06/2019    Pure hypercholesterolemia  -     CBC auto differential; Future; Expected  date: 03/06/2019  -     Comprehensive metabolic panel; Future; Expected date: 03/06/2019  -     Lipid panel; Future; Expected date: 03/06/2019  -     TSH; Future; Expected date: 03/06/2019    Inflammatory spondylopathy of lumbar region    Other orders  -     traMADol (ULTRAM) 50 mg tablet; Take 1 tablet (50 mg total) by mouth daily as needed for Pain.  Dispense: 30 tablet; Refill: 2      Back stable.  Update labs and health maintenance.  Prn ultram helping with chronic back and knee pain. Ok in  database.  Will monitor chronic medical issues and continue current plan of care.      Follow-up in about 6 months (around 9/6/2019), or if symptoms worsen or fail to improve.

## 2019-03-13 ENCOUNTER — CLINICAL SUPPORT (OUTPATIENT)
Dept: CARDIOLOGY | Facility: CLINIC | Age: 79
End: 2019-03-13
Attending: INTERNAL MEDICINE
Payer: MEDICARE

## 2019-03-13 DIAGNOSIS — I47.29 NSVT (NONSUSTAINED VENTRICULAR TACHYCARDIA): ICD-10-CM

## 2019-03-13 DIAGNOSIS — Z95.810 ICD (IMPLANTABLE CARDIOVERTER-DEFIBRILLATOR) IN PLACE: ICD-10-CM

## 2019-04-08 RX ORDER — GABAPENTIN 300 MG/1
300 CAPSULE ORAL 2 TIMES DAILY
Qty: 180 CAPSULE | Refills: 0 | Status: SHIPPED | OUTPATIENT
Start: 2019-04-08 | End: 2019-07-29 | Stop reason: SDUPTHER

## 2019-04-11 ENCOUNTER — CLINICAL SUPPORT (OUTPATIENT)
Dept: CARDIOLOGY | Facility: CLINIC | Age: 79
End: 2019-04-11
Attending: INTERNAL MEDICINE
Payer: MEDICARE

## 2019-04-11 DIAGNOSIS — I47.29 NSVT (NONSUSTAINED VENTRICULAR TACHYCARDIA): ICD-10-CM

## 2019-04-11 DIAGNOSIS — Z95.810 ICD (IMPLANTABLE CARDIOVERTER-DEFIBRILLATOR) IN PLACE: Primary | ICD-10-CM

## 2019-04-11 DIAGNOSIS — Z95.810 ICD (IMPLANTABLE CARDIOVERTER-DEFIBRILLATOR) IN PLACE: ICD-10-CM

## 2019-05-10 DIAGNOSIS — M17.0 OSTEOARTHRITIS OF BOTH KNEES, UNSPECIFIED OSTEOARTHRITIS TYPE: ICD-10-CM

## 2019-05-10 RX ORDER — MELOXICAM 15 MG/1
15 TABLET ORAL DAILY PRN
Qty: 90 TABLET | Refills: 1 | Status: SHIPPED | OUTPATIENT
Start: 2019-05-10 | End: 2019-09-30 | Stop reason: SDUPTHER

## 2019-05-13 ENCOUNTER — CLINICAL SUPPORT (OUTPATIENT)
Dept: CARDIOLOGY | Facility: CLINIC | Age: 79
End: 2019-05-13
Attending: INTERNAL MEDICINE
Payer: MEDICARE

## 2019-05-13 DIAGNOSIS — Z95.810 ICD (IMPLANTABLE CARDIOVERTER-DEFIBRILLATOR) IN PLACE: ICD-10-CM

## 2019-05-13 DIAGNOSIS — I47.29 NSVT (NONSUSTAINED VENTRICULAR TACHYCARDIA): ICD-10-CM

## 2019-05-27 RX ORDER — TRAMADOL HYDROCHLORIDE 50 MG/1
50 TABLET ORAL DAILY PRN
Qty: 30 TABLET | Refills: 0 | Status: SHIPPED | OUTPATIENT
Start: 2019-05-27 | End: 2019-07-01 | Stop reason: SDUPTHER

## 2019-06-10 ENCOUNTER — CLINICAL SUPPORT (OUTPATIENT)
Dept: CARDIOLOGY | Facility: CLINIC | Age: 79
End: 2019-06-10
Attending: INTERNAL MEDICINE
Payer: MEDICARE

## 2019-06-10 DIAGNOSIS — Z95.810 ICD (IMPLANTABLE CARDIOVERTER-DEFIBRILLATOR) IN PLACE: ICD-10-CM

## 2019-06-10 DIAGNOSIS — I47.29 NSVT (NONSUSTAINED VENTRICULAR TACHYCARDIA): ICD-10-CM

## 2019-07-01 RX ORDER — TRAMADOL HYDROCHLORIDE 50 MG/1
50 TABLET ORAL DAILY PRN
Qty: 30 TABLET | Refills: 0 | Status: SHIPPED | OUTPATIENT
Start: 2019-07-01 | End: 2019-08-05 | Stop reason: SDUPTHER

## 2019-07-05 DIAGNOSIS — I10 ESSENTIAL HYPERTENSION: ICD-10-CM

## 2019-07-05 DIAGNOSIS — N32.81 OAB (OVERACTIVE BLADDER): ICD-10-CM

## 2019-07-08 RX ORDER — OXYBUTYNIN CHLORIDE 5 MG/1
5 TABLET ORAL NIGHTLY
Qty: 90 TABLET | Refills: 3 | Status: SHIPPED | OUTPATIENT
Start: 2019-07-08 | End: 2020-03-17

## 2019-07-08 RX ORDER — SPIRONOLACTONE 25 MG/1
TABLET ORAL
Qty: 20 TABLET | Refills: 3 | Status: SHIPPED | OUTPATIENT
Start: 2019-07-08 | End: 2020-07-29 | Stop reason: SDUPTHER

## 2019-07-10 ENCOUNTER — CLINICAL SUPPORT (OUTPATIENT)
Dept: CARDIOLOGY | Facility: CLINIC | Age: 79
End: 2019-07-10
Attending: INTERNAL MEDICINE
Payer: MEDICARE

## 2019-07-10 DIAGNOSIS — Z95.810 ICD (IMPLANTABLE CARDIOVERTER-DEFIBRILLATOR) IN PLACE: ICD-10-CM

## 2019-07-10 DIAGNOSIS — I47.29 NSVT (NONSUSTAINED VENTRICULAR TACHYCARDIA): ICD-10-CM

## 2019-07-29 DIAGNOSIS — Z86.59 HISTORY OF ANXIETY: ICD-10-CM

## 2019-07-29 RX ORDER — PANTOPRAZOLE SODIUM 40 MG/1
TABLET, DELAYED RELEASE ORAL
Qty: 90 TABLET | Refills: 3 | Status: SHIPPED | OUTPATIENT
Start: 2019-07-29 | End: 2020-07-29 | Stop reason: SDUPTHER

## 2019-07-29 RX ORDER — CITALOPRAM 10 MG/1
TABLET ORAL
Qty: 90 TABLET | Refills: 0 | Status: SHIPPED | OUTPATIENT
Start: 2019-07-29 | End: 2019-08-16

## 2019-07-29 RX ORDER — GABAPENTIN 300 MG/1
CAPSULE ORAL
Qty: 180 CAPSULE | Refills: 0 | Status: SHIPPED | OUTPATIENT
Start: 2019-07-29 | End: 2020-01-06 | Stop reason: SDUPTHER

## 2019-07-29 RX ORDER — TIZANIDINE 4 MG/1
TABLET ORAL
Qty: 270 TABLET | Refills: 3 | Status: SHIPPED | OUTPATIENT
Start: 2019-07-29 | End: 2020-05-07 | Stop reason: SDUPTHER

## 2019-08-05 NOTE — PROCEDURES
Small Joint Aspiration/Injection  Date/Time: 4/4/2018 2:25 PM  Performed by: PAIGE SORIANO  Authorized by: PAIGE SORIANO     Consent Done?:  Yes (Verbal)  Indications:  Pain  Site marked: The procedure site was marked    Timeout: Prior to procedure the correct patient, procedure, and site was verified      Location:  Thumb  Thumb joint: Left first CMC.  Prep: Patient was prepped and draped in usual sterile fashion    Ultrasonic Guidance for needle placement: Yes  Images are saved and documented.  Needle size:  25 G  Approach: Needle out of plane, posterior to anterior.  Medications:  40 mg triamcinolone acetonide 40 mg/mL  Patient tolerance:  Patient tolerated the procedure well with no immediate complications     Additional Comments: Ultrasound guidance was used for correct needle placement, the images were saved will be uploaded to EMR.        
Patent

## 2019-08-06 RX ORDER — TRAMADOL HYDROCHLORIDE 50 MG/1
50 TABLET ORAL DAILY PRN
Qty: 30 TABLET | Refills: 0 | Status: SHIPPED | OUTPATIENT
Start: 2019-08-06 | End: 2019-09-10 | Stop reason: SDUPTHER

## 2019-08-16 PROBLEM — S22.42XA MULTIPLE CLOSED FRACTURES OF RIBS OF LEFT SIDE: Status: ACTIVE | Noted: 2019-08-16

## 2019-08-17 PROBLEM — S09.90XA CLOSED HEAD INJURY: Status: ACTIVE | Noted: 2019-08-17

## 2019-08-22 ENCOUNTER — PATIENT OUTREACH (OUTPATIENT)
Dept: ADMINISTRATIVE | Facility: HOSPITAL | Age: 79
End: 2019-08-22

## 2019-09-03 ENCOUNTER — OFFICE VISIT (OUTPATIENT)
Dept: CARDIOLOGY | Facility: CLINIC | Age: 79
End: 2019-09-03
Payer: MEDICARE

## 2019-09-03 VITALS
HEART RATE: 48 BPM | DIASTOLIC BLOOD PRESSURE: 67 MMHG | HEIGHT: 71 IN | BODY MASS INDEX: 28.58 KG/M2 | SYSTOLIC BLOOD PRESSURE: 114 MMHG | WEIGHT: 204.13 LBS

## 2019-09-03 DIAGNOSIS — I25.5 ISCHEMIC CARDIOMYOPATHY: Primary | Chronic | ICD-10-CM

## 2019-09-03 DIAGNOSIS — Z95.810 ICD (IMPLANTABLE CARDIOVERTER-DEFIBRILLATOR) IN PLACE: Chronic | ICD-10-CM

## 2019-09-03 DIAGNOSIS — Z95.1 S/P CABG X 4: ICD-10-CM

## 2019-09-03 DIAGNOSIS — I10 ESSENTIAL HYPERTENSION: ICD-10-CM

## 2019-09-03 DIAGNOSIS — I48.0 PAF (PAROXYSMAL ATRIAL FIBRILLATION): ICD-10-CM

## 2019-09-03 PROCEDURE — 3078F PR MOST RECENT DIASTOLIC BLOOD PRESSURE < 80 MM HG: ICD-10-PCS | Mod: HCNC,CPTII,S$GLB, | Performed by: INTERNAL MEDICINE

## 2019-09-03 PROCEDURE — 99214 OFFICE O/P EST MOD 30 MIN: CPT | Mod: HCNC,S$GLB,, | Performed by: INTERNAL MEDICINE

## 2019-09-03 PROCEDURE — 99999 PR PBB SHADOW E&M-EST. PATIENT-LVL II: ICD-10-PCS | Mod: PBBFAC,HCNC,, | Performed by: INTERNAL MEDICINE

## 2019-09-03 PROCEDURE — 3074F PR MOST RECENT SYSTOLIC BLOOD PRESSURE < 130 MM HG: ICD-10-PCS | Mod: HCNC,CPTII,S$GLB, | Performed by: INTERNAL MEDICINE

## 2019-09-03 PROCEDURE — 99214 PR OFFICE/OUTPT VISIT, EST, LEVL IV, 30-39 MIN: ICD-10-PCS | Mod: HCNC,S$GLB,, | Performed by: INTERNAL MEDICINE

## 2019-09-03 PROCEDURE — 1101F PT FALLS ASSESS-DOCD LE1/YR: CPT | Mod: HCNC,CPTII,S$GLB, | Performed by: INTERNAL MEDICINE

## 2019-09-03 PROCEDURE — 1101F PR PT FALLS ASSESS DOC 0-1 FALLS W/OUT INJ PAST YR: ICD-10-PCS | Mod: HCNC,CPTII,S$GLB, | Performed by: INTERNAL MEDICINE

## 2019-09-03 PROCEDURE — 99999 PR PBB SHADOW E&M-EST. PATIENT-LVL II: CPT | Mod: PBBFAC,HCNC,, | Performed by: INTERNAL MEDICINE

## 2019-09-03 PROCEDURE — 3074F SYST BP LT 130 MM HG: CPT | Mod: HCNC,CPTII,S$GLB, | Performed by: INTERNAL MEDICINE

## 2019-09-03 PROCEDURE — 3078F DIAST BP <80 MM HG: CPT | Mod: HCNC,CPTII,S$GLB, | Performed by: INTERNAL MEDICINE

## 2019-09-03 NOTE — PROGRESS NOTES
Subjective:    Patient ID:  Ankit Ruff is a 79 y.o. male who presents for follow-up of ICM    HPI  He comes for follow up with no major problems, no chest pain, no shortness of breath.  Had fall a few weeks back with loc  Better now      Review of Systems   Constitution: Negative for decreased appetite, malaise/fatigue, weight gain and weight loss.   Cardiovascular: Negative for chest pain, dyspnea on exertion, leg swelling, palpitations and syncope.   Respiratory: Negative for cough and shortness of breath.    Gastrointestinal: Negative.    Neurological: Negative for weakness.   All other systems reviewed and are negative.       Objective:      Physical Exam   Constitutional: He is oriented to person, place, and time. He appears well-developed and well-nourished.   HENT:   Head: Normocephalic.   Eyes: Pupils are equal, round, and reactive to light.   Neck: Normal range of motion. Neck supple. No JVD present. Carotid bruit is not present. No thyromegaly present.   Cardiovascular: Normal rate, regular rhythm, normal heart sounds, intact distal pulses and normal pulses. PMI is not displaced. Exam reveals no gallop.   No murmur heard.  Pulmonary/Chest: Effort normal and breath sounds normal.   Abdominal: Soft. Normal appearance. He exhibits no mass. There is no hepatosplenomegaly. There is no tenderness.   Musculoskeletal: Normal range of motion. He exhibits no edema.   Neurological: He is alert and oriented to person, place, and time. He has normal strength and normal reflexes. No sensory deficit.   Skin: Skin is warm and intact.   Psychiatric: He has a normal mood and affect.   Nursing note and vitals reviewed.        Assessment:       1. Ischemic cardiomyopathy    2. ICD (implantable cardioverter-defibrillator) in place    3. PAF (paroxysmal atrial fibrillation)    4. S/P CABG x 4    5. Essential hypertension         Plan:     Continue all cardiac medications  Regular exercise program  9 m f/u

## 2019-09-06 ENCOUNTER — IMMUNIZATION (OUTPATIENT)
Dept: PHARMACY | Facility: CLINIC | Age: 79
End: 2019-09-06
Payer: MEDICARE

## 2019-09-06 ENCOUNTER — OFFICE VISIT (OUTPATIENT)
Dept: FAMILY MEDICINE | Facility: CLINIC | Age: 79
End: 2019-09-06
Payer: MEDICARE

## 2019-09-06 VITALS
WEIGHT: 205.69 LBS | HEART RATE: 54 BPM | SYSTOLIC BLOOD PRESSURE: 118 MMHG | RESPIRATION RATE: 18 BRPM | HEIGHT: 71 IN | DIASTOLIC BLOOD PRESSURE: 64 MMHG | BODY MASS INDEX: 28.8 KG/M2

## 2019-09-06 DIAGNOSIS — I10 ESSENTIAL HYPERTENSION: ICD-10-CM

## 2019-09-06 DIAGNOSIS — E78.00 PURE HYPERCHOLESTEROLEMIA: Primary | ICD-10-CM

## 2019-09-06 DIAGNOSIS — S22.42XD CLOSED FRACTURE OF MULTIPLE RIBS OF LEFT SIDE WITH ROUTINE HEALING, SUBSEQUENT ENCOUNTER: ICD-10-CM

## 2019-09-06 PROCEDURE — 99214 OFFICE O/P EST MOD 30 MIN: CPT | Mod: HCNC,S$GLB,, | Performed by: FAMILY MEDICINE

## 2019-09-06 PROCEDURE — 1101F PT FALLS ASSESS-DOCD LE1/YR: CPT | Mod: HCNC,CPTII,S$GLB, | Performed by: FAMILY MEDICINE

## 2019-09-06 PROCEDURE — 99214 PR OFFICE/OUTPT VISIT, EST, LEVL IV, 30-39 MIN: ICD-10-PCS | Mod: HCNC,S$GLB,, | Performed by: FAMILY MEDICINE

## 2019-09-06 PROCEDURE — 3078F DIAST BP <80 MM HG: CPT | Mod: HCNC,CPTII,S$GLB, | Performed by: FAMILY MEDICINE

## 2019-09-06 PROCEDURE — 3074F PR MOST RECENT SYSTOLIC BLOOD PRESSURE < 130 MM HG: ICD-10-PCS | Mod: HCNC,CPTII,S$GLB, | Performed by: FAMILY MEDICINE

## 2019-09-06 PROCEDURE — 99999 PR PBB SHADOW E&M-EST. PATIENT-LVL III: ICD-10-PCS | Mod: PBBFAC,HCNC,, | Performed by: FAMILY MEDICINE

## 2019-09-06 PROCEDURE — 1101F PR PT FALLS ASSESS DOC 0-1 FALLS W/OUT INJ PAST YR: ICD-10-PCS | Mod: HCNC,CPTII,S$GLB, | Performed by: FAMILY MEDICINE

## 2019-09-06 PROCEDURE — 99499 UNLISTED E&M SERVICE: CPT | Mod: HCNC,S$GLB,, | Performed by: FAMILY MEDICINE

## 2019-09-06 PROCEDURE — 3078F PR MOST RECENT DIASTOLIC BLOOD PRESSURE < 80 MM HG: ICD-10-PCS | Mod: HCNC,CPTII,S$GLB, | Performed by: FAMILY MEDICINE

## 2019-09-06 PROCEDURE — 3074F SYST BP LT 130 MM HG: CPT | Mod: HCNC,CPTII,S$GLB, | Performed by: FAMILY MEDICINE

## 2019-09-06 PROCEDURE — 99999 PR PBB SHADOW E&M-EST. PATIENT-LVL III: CPT | Mod: PBBFAC,HCNC,, | Performed by: FAMILY MEDICINE

## 2019-09-06 PROCEDURE — 99499 RISK ADDL DX/OHS AUDIT: ICD-10-PCS | Mod: HCNC,S$GLB,, | Performed by: FAMILY MEDICINE

## 2019-09-06 RX ORDER — CITALOPRAM 10 MG/1
1 TABLET ORAL DAILY
COMMUNITY
Start: 2019-09-03 | End: 2019-09-30 | Stop reason: SDUPTHER

## 2019-09-06 NOTE — PROGRESS NOTES
Subjective:       Patient ID: Aknit Ruff is a 79 y.o. male.    Chief Complaint: Concussion (Follow up from fall with over night stay post 3 weeks) and Fall (Injury/bruising to left side)    Here for f/u ED visit with hospital stay after a fall. He was unhooking a hitch and slipped when he kicked the bumper.  Broke ribs on left side.  Healing currently.  Getting back to his normal state of health.    Hypertension   This is a chronic problem. The current episode started more than 1 year ago. The problem is controlled. Pertinent negatives include no chest pain, palpitations or shortness of breath.   Hyperlipidemia   This is a chronic problem. The current episode started more than 1 year ago. The problem is controlled. Pertinent negatives include no chest pain or shortness of breath.     Review of Systems   Constitutional: Negative for chills, fatigue and fever.   Respiratory: Negative for cough, chest tightness and shortness of breath.    Cardiovascular: Negative for chest pain, palpitations and leg swelling.   Endocrine: Negative for cold intolerance and heat intolerance.   Skin: Negative for rash.   Psychiatric/Behavioral: Negative for dysphoric mood. The patient is not nervous/anxious.        Objective:      Physical Exam   Constitutional: He appears well-developed and well-nourished.   HENT:   Head: Normocephalic and atraumatic.   Cardiovascular: Normal rate, regular rhythm and normal heart sounds.   Pulmonary/Chest: Effort normal and breath sounds normal.   Skin:   Bruising to left chest and left leg    Psychiatric: He has a normal mood and affect.   Nursing note and vitals reviewed.      Assessment:       1. Pure hypercholesterolemia    2. Closed fracture of multiple ribs of left side with routine healing, subsequent encounter    3. Essential hypertension        Plan:       Pure hypercholesterolemia    Closed fracture of multiple ribs of left side with routine healing, subsequent encounter    Essential  hypertension      htn and lipid stable  Had recent good visit with cardiology  Healing from rib fractures.  Call if condition worsens.  Encouraged to stay active but be more mindful of slipping/falls.  Will monitor chronic medical issues and continue current plan of care.  Flu shot at [facility]  Follow up in about 6 months (around 3/6/2020), or if symptoms worsen or fail to improve.

## 2019-09-11 RX ORDER — TRAMADOL HYDROCHLORIDE 50 MG/1
50 TABLET ORAL DAILY PRN
Qty: 30 TABLET | Refills: 0 | Status: SHIPPED | OUTPATIENT
Start: 2019-09-11 | End: 2019-09-12 | Stop reason: SDUPTHER

## 2019-09-12 RX ORDER — TRAMADOL HYDROCHLORIDE 50 MG/1
50 TABLET ORAL DAILY PRN
Qty: 30 TABLET | Refills: 0 | Status: SHIPPED | OUTPATIENT
Start: 2019-09-12 | End: 2019-10-07 | Stop reason: SDUPTHER

## 2019-09-12 NOTE — TELEPHONE ENCOUNTER
"----- Message from Wilner TORRES Aurelio sent at 9/12/2019 12:29 PM CDT -----  Contact: [place]  [place] called in regarding the attached patient and his Rx for traMADol (ULTRAM) 50 mg tablet.  [place] stated Rx must read, "more then 7 days medically necessary"      [facility]- [address] [address]  Phone: [phone] Fax: [phone]      "

## 2019-09-30 DIAGNOSIS — M17.0 OSTEOARTHRITIS OF BOTH KNEES, UNSPECIFIED OSTEOARTHRITIS TYPE: ICD-10-CM

## 2019-10-01 RX ORDER — MELOXICAM 15 MG/1
TABLET ORAL
Qty: 90 TABLET | Refills: 1 | Status: SHIPPED | OUTPATIENT
Start: 2019-10-01 | End: 2020-02-10 | Stop reason: SDUPTHER

## 2019-10-01 RX ORDER — CITALOPRAM 10 MG/1
TABLET ORAL
Qty: 90 TABLET | Refills: 0 | Status: SHIPPED | OUTPATIENT
Start: 2019-10-01 | End: 2019-12-04 | Stop reason: SDUPTHER

## 2019-10-09 RX ORDER — TRAMADOL HYDROCHLORIDE 50 MG/1
50 TABLET ORAL DAILY PRN
Qty: 30 TABLET | Refills: 0 | Status: SHIPPED | OUTPATIENT
Start: 2019-10-09 | End: 2019-10-09 | Stop reason: SDUPTHER

## 2019-10-09 RX ORDER — TRAMADOL HYDROCHLORIDE 50 MG/1
50 TABLET ORAL DAILY PRN
Qty: 30 TABLET | Refills: 0 | Status: SHIPPED | OUTPATIENT
Start: 2019-10-09 | End: 2020-02-12

## 2019-10-09 NOTE — TELEPHONE ENCOUNTER
"----- Message from Louis Salguero sent at 10/9/2019  1:19 PM CDT -----  Type:  Pharmacy Calling to Clarify an RX    Name of Caller:  [place]  Pharmacy Name:      [facility] [address] [address]  [address] [address] [address]  Phone: [phone] Fax: [phone]      Prescription Name:  traMADol (ULTRAM) 50 mg tablet       What do they need to clarify?:  Needs "Quantity Medically Necessary" for anything over 7 days  Best Call Back Number:  [phone]  Additional Information:      "

## 2019-10-10 ENCOUNTER — CLINICAL SUPPORT (OUTPATIENT)
Dept: CARDIOLOGY | Facility: CLINIC | Age: 79
End: 2019-10-10
Payer: MEDICARE

## 2019-10-10 PROCEDURE — 93297 REM INTERROG DEV EVAL ICPMS: CPT | Mod: HCNC,S$GLB,, | Performed by: INTERNAL MEDICINE

## 2019-10-10 PROCEDURE — 93299 CARDIAC DEVICE CHECK - REMOTE: ICD-10-PCS | Mod: HCNC,S$GLB,, | Performed by: INTERNAL MEDICINE

## 2019-10-10 PROCEDURE — 93299 CARDIAC DEVICE CHECK - REMOTE: CPT | Mod: HCNC,S$GLB,, | Performed by: INTERNAL MEDICINE

## 2019-10-10 PROCEDURE — 93297 CARDIAC DEVICE CHECK - REMOTE: ICD-10-PCS | Mod: HCNC,S$GLB,, | Performed by: INTERNAL MEDICINE

## 2019-11-07 ENCOUNTER — CLINICAL SUPPORT (OUTPATIENT)
Dept: CARDIOLOGY | Facility: CLINIC | Age: 79
End: 2019-11-07
Payer: MEDICARE

## 2019-11-07 PROCEDURE — 93296 REM INTERROG EVL PM/IDS: CPT | Mod: PBBFAC,HCNC,PO | Performed by: INTERNAL MEDICINE

## 2019-11-07 PROCEDURE — 93295 DEV INTERROG REMOTE 1/2/MLT: CPT | Mod: ,,, | Performed by: INTERNAL MEDICINE

## 2019-11-07 PROCEDURE — 93295 CARDIAC DEVICE CHECK - REMOTE: ICD-10-PCS | Mod: ,,, | Performed by: INTERNAL MEDICINE

## 2019-11-26 ENCOUNTER — CLINICAL SUPPORT (OUTPATIENT)
Dept: CARDIOLOGY | Facility: CLINIC | Age: 79
End: 2019-11-26
Attending: INTERNAL MEDICINE
Payer: MEDICARE

## 2019-11-26 DIAGNOSIS — Z95.810 ICD (IMPLANTABLE CARDIOVERTER-DEFIBRILLATOR) IN PLACE: ICD-10-CM

## 2019-11-26 DIAGNOSIS — I47.29 NSVT (NONSUSTAINED VENTRICULAR TACHYCARDIA): ICD-10-CM

## 2019-12-04 RX ORDER — ROSUVASTATIN CALCIUM 10 MG/1
TABLET, COATED ORAL
Qty: 90 TABLET | Refills: 0 | Status: SHIPPED | OUTPATIENT
Start: 2019-12-04 | End: 2020-02-13

## 2019-12-04 RX ORDER — CARVEDILOL 12.5 MG/1
TABLET ORAL
Qty: 180 TABLET | Refills: 0 | Status: SHIPPED | OUTPATIENT
Start: 2019-12-04 | End: 2020-02-13

## 2019-12-06 RX ORDER — CITALOPRAM 10 MG/1
TABLET ORAL
Qty: 90 TABLET | Refills: 1 | Status: SHIPPED | OUTPATIENT
Start: 2019-12-06 | End: 2020-08-25

## 2019-12-06 NOTE — PROGRESS NOTES
Refill Authorization Note     is requesting a refill authorization.    Brief assessment and rationale for refill: APPROVE: prr                Medication reconciliation completed: No                         Comments:   Requested Prescriptions   Pending Prescriptions Disp Refills    citalopram (CELEXA) 10 MG tablet [Pharmacy Med Name: CITALOPRAM HYDROBROMIDE 10 MG Tablet] 90 tablet 1     Sig: TAKE 1 TABLET EVERY DAY       Psychiatry:  Antidepressants - SSRI Passed - 12/4/2019  8:37 AM        Passed - Patient is at least 18 years old        Passed - Last BP in normal range within 360 days     BP Readings from Last 3 Encounters:   09/06/19 118/64   09/03/19 114/67   08/26/19 100/62              Passed - Office visit in past 6 months or future 90 days     Recent Outpatient Visits            3 months ago Pure hypercholesterolemia    [facility] GUNNAR Jones MD    3 months ago Ischemic cardiomyopathy    [facility] [place]o Guerrero MD    3 months ago Closed fracture of multiple ribs of left side with routine healing, subsequent encounter    [facility] Rohit Sterling MD    9 months ago Essential hypertension    [facility] GUNNAR Jones MD    9 months ago Encounter for preventive health examination    [facility] Kayleigh Erickson, SHAYNA          Future Appointments              In 1 month [facility]UNNAR Jones MD [facility]    In 2 months [facility] DEVICE [facility]    In 2 months PACEMAKER, [facility]    In 6 months [place] Guerrero MD [facility]

## 2019-12-09 NOTE — PROGRESS NOTES
Refill Routing Note     Medication(s) are not appropriate for processing by [facility]:    Medication Outside of Protocol    Appointments  past 15m or future 3m with PCP    Date Provider   Last Visit   9/6/2019 GUNNAR Jones MD   Next Visit   1/17/2020 GUNNAR Jones MD       Automatic Epic Protocol Generated Data:    Requested Prescriptions   Pending Prescriptions Disp Refills    traMADol (ULTRAM) 50 mg tablet [Pharmacy Med Name: TRAMADOL HCL 50 MG TABLET] 30 tablet      Sig: Take 1 tablet (50 mg total) by mouth daily as needed for Pain.       Narcotics Refill Protocol Failed - 12/9/2019  1:29 PM        Failed - Patient seen within 3 months     Last visit with GUNNAR Jones MD: 9/6/2019  Last visit in [facility]: 9/6/2019    Patient's next visit in [facility]: 1/17/2020           Passed - Med not refilled within 4 weeks

## 2019-12-11 RX ORDER — TRAMADOL HYDROCHLORIDE 50 MG/1
50 TABLET ORAL DAILY PRN
Qty: 30 TABLET | Refills: 0 | Status: SHIPPED | OUTPATIENT
Start: 2019-12-11 | End: 2020-01-06 | Stop reason: SDUPTHER

## 2020-01-06 DIAGNOSIS — M17.0 OSTEOARTHRITIS OF BOTH KNEES, UNSPECIFIED OSTEOARTHRITIS TYPE: Primary | ICD-10-CM

## 2020-01-06 RX ORDER — GABAPENTIN 300 MG/1
300 CAPSULE ORAL 2 TIMES DAILY
Qty: 180 CAPSULE | Refills: 0 | Status: SHIPPED | OUTPATIENT
Start: 2020-01-06 | End: 2020-03-17 | Stop reason: SDUPTHER

## 2020-01-08 RX ORDER — TRAMADOL HYDROCHLORIDE 50 MG/1
50 TABLET ORAL DAILY PRN
Qty: 30 TABLET | Refills: 0 | Status: SHIPPED | OUTPATIENT
Start: 2020-01-08 | End: 2020-02-10 | Stop reason: SDUPTHER

## 2020-01-17 ENCOUNTER — OFFICE VISIT (OUTPATIENT)
Dept: FAMILY MEDICINE | Facility: CLINIC | Age: 80
End: 2020-01-17
Payer: MEDICARE

## 2020-01-17 VITALS
HEIGHT: 71 IN | HEART RATE: 46 BPM | WEIGHT: 207 LBS | TEMPERATURE: 98 F | DIASTOLIC BLOOD PRESSURE: 70 MMHG | SYSTOLIC BLOOD PRESSURE: 122 MMHG | BODY MASS INDEX: 28.98 KG/M2

## 2020-01-17 DIAGNOSIS — I48.0 PAF (PAROXYSMAL ATRIAL FIBRILLATION): ICD-10-CM

## 2020-01-17 DIAGNOSIS — Z00.00 WELLNESS EXAMINATION: Primary | ICD-10-CM

## 2020-01-17 DIAGNOSIS — D69.6 THROMBOCYTOPENIA: ICD-10-CM

## 2020-01-17 DIAGNOSIS — I50.42 CHRONIC COMBINED SYSTOLIC AND DIASTOLIC HEART FAILURE: ICD-10-CM

## 2020-01-17 DIAGNOSIS — I10 ESSENTIAL HYPERTENSION: ICD-10-CM

## 2020-01-17 DIAGNOSIS — M46.96 INFLAMMATORY SPONDYLOPATHY OF LUMBAR REGION: ICD-10-CM

## 2020-01-17 DIAGNOSIS — E78.00 PURE HYPERCHOLESTEROLEMIA: ICD-10-CM

## 2020-01-17 PROBLEM — S09.90XA CLOSED HEAD INJURY: Status: RESOLVED | Noted: 2019-08-17 | Resolved: 2020-01-17

## 2020-01-17 PROBLEM — S22.42XA MULTIPLE CLOSED FRACTURES OF RIBS OF LEFT SIDE: Status: RESOLVED | Noted: 2019-08-16 | Resolved: 2020-01-17

## 2020-01-17 PROCEDURE — 99499 RISK ADDL DX/OHS AUDIT: ICD-10-PCS | Mod: HCNC,S$GLB,, | Performed by: FAMILY MEDICINE

## 2020-01-17 PROCEDURE — 99999 PR PBB SHADOW E&M-EST. PATIENT-LVL III: CPT | Mod: PBBFAC,HCNC,, | Performed by: FAMILY MEDICINE

## 2020-01-17 PROCEDURE — 99499 UNLISTED E&M SERVICE: CPT | Mod: HCNC,S$GLB,, | Performed by: FAMILY MEDICINE

## 2020-01-17 PROCEDURE — 99397 PR PREVENTIVE VISIT,EST,65 & OVER: ICD-10-PCS | Mod: HCNC,S$GLB,, | Performed by: FAMILY MEDICINE

## 2020-01-17 PROCEDURE — 3078F DIAST BP <80 MM HG: CPT | Mod: HCNC,CPTII,S$GLB, | Performed by: FAMILY MEDICINE

## 2020-01-17 PROCEDURE — 3078F PR MOST RECENT DIASTOLIC BLOOD PRESSURE < 80 MM HG: ICD-10-PCS | Mod: HCNC,CPTII,S$GLB, | Performed by: FAMILY MEDICINE

## 2020-01-17 PROCEDURE — 3074F SYST BP LT 130 MM HG: CPT | Mod: HCNC,CPTII,S$GLB, | Performed by: FAMILY MEDICINE

## 2020-01-17 PROCEDURE — 99397 PER PM REEVAL EST PAT 65+ YR: CPT | Mod: HCNC,S$GLB,, | Performed by: FAMILY MEDICINE

## 2020-01-17 PROCEDURE — 3074F PR MOST RECENT SYSTOLIC BLOOD PRESSURE < 130 MM HG: ICD-10-PCS | Mod: HCNC,CPTII,S$GLB, | Performed by: FAMILY MEDICINE

## 2020-01-17 PROCEDURE — 99999 PR PBB SHADOW E&M-EST. PATIENT-LVL III: ICD-10-PCS | Mod: PBBFAC,HCNC,, | Performed by: FAMILY MEDICINE

## 2020-01-17 NOTE — PROGRESS NOTES
Subjective:       Patient ID: Ankit Ruff is a 79 y.o. male.    Chief Complaint: Hypertension    Here for wellness and f/u chronic medical issues. Doing well overall. No falls since last visit.      Hypertension   This is a chronic problem. The current episode started more than 1 year ago. The problem is controlled. Associated symptoms include neck pain. Pertinent negatives include no chest pain, headaches, palpitations or shortness of breath.   Hyperlipidemia   This is a chronic problem. The current episode started more than 1 year ago. The problem is controlled. Pertinent negatives include no chest pain or shortness of breath.     Review of Systems   Constitutional: Negative for activity change, chills, fever and unexpected weight change.   HENT: Positive for hearing loss. Negative for rhinorrhea and trouble swallowing.    Eyes: Negative for discharge and visual disturbance.   Respiratory: Negative for cough, chest tightness, shortness of breath and wheezing.    Cardiovascular: Negative for chest pain, palpitations and leg swelling.   Gastrointestinal: Negative for blood in stool, constipation, diarrhea and vomiting.   Endocrine: Negative for cold intolerance, heat intolerance, polydipsia and polyuria.   Genitourinary: Negative for difficulty urinating, hematuria and urgency.   Musculoskeletal: Positive for arthralgias and neck pain. Negative for joint swelling.   Neurological: Negative for weakness and headaches.   Psychiatric/Behavioral: Negative for confusion, decreased concentration and dysphoric mood. The patient is not nervous/anxious.        Objective:      Physical Exam   Constitutional: He appears well-developed and well-nourished.   HENT:   Head: Normocephalic and atraumatic.   Cardiovascular: Normal rate, regular rhythm and normal heart sounds.   Pulmonary/Chest: Effort normal and breath sounds normal.   Psychiatric: He has a normal mood and affect.   Nursing note and vitals reviewed.       Assessment:       1. Wellness examination    2. Essential hypertension    3. Pure hypercholesterolemia    4. Chronic combined systolic and diastolic heart failure    5. Thrombocytopenia    6. PAF (paroxysmal atrial fibrillation)    7. Inflammatory spondylopathy of lumbar region        Plan:       Wellness examination    Essential hypertension  -     Comprehensive metabolic panel; Future; Expected date: 01/17/2020  -     CBC auto differential; Future; Expected date: 01/17/2020  -     Lipid panel; Future; Expected date: 01/17/2020  -     TSH; Future; Expected date: 01/17/2020    Pure hypercholesterolemia  -     Comprehensive metabolic panel; Future; Expected date: 01/17/2020  -     CBC auto differential; Future; Expected date: 01/17/2020  -     Lipid panel; Future; Expected date: 01/17/2020  -     TSH; Future; Expected date: 01/17/2020    Chronic combined systolic and diastolic heart failure    Thrombocytopenia    PAF (paroxysmal atrial fibrillation)    Inflammatory spondylopathy of lumbar region    update labs and health maintenance  htn stable  Lipid stable  F/u with cardiology regarding icd  paf stable  Back pain stable currently  Monitor platelets  Will monitor chronic medical issues and continue current plan of care.        Follow up in about 6 months (around 7/17/2020), or if symptoms worsen or fail to improve.

## 2020-01-24 ENCOUNTER — LAB VISIT (OUTPATIENT)
Dept: LAB | Facility: HOSPITAL | Age: 80
End: 2020-01-24
Attending: FAMILY MEDICINE
Payer: MEDICARE

## 2020-01-24 DIAGNOSIS — E78.00 PURE HYPERCHOLESTEROLEMIA: ICD-10-CM

## 2020-01-24 DIAGNOSIS — I10 ESSENTIAL HYPERTENSION: ICD-10-CM

## 2020-01-24 LAB
ALBUMIN SERPL BCP-MCNC: 3.8 G/DL (ref 3.5–5.2)
ALP SERPL-CCNC: 64 U/L (ref 55–135)
ALT SERPL W/O P-5'-P-CCNC: 31 U/L (ref 10–44)
ANION GAP SERPL CALC-SCNC: 7 MMOL/L (ref 8–16)
AST SERPL-CCNC: 38 U/L (ref 10–40)
BASOPHILS # BLD AUTO: 0.04 K/UL (ref 0–0.2)
BASOPHILS NFR BLD: 0.9 % (ref 0–1.9)
BILIRUB SERPL-MCNC: 0.8 MG/DL (ref 0.1–1)
BUN SERPL-MCNC: 20 MG/DL (ref 8–23)
CALCIUM SERPL-MCNC: 9.2 MG/DL (ref 8.7–10.5)
CHLORIDE SERPL-SCNC: 106 MMOL/L (ref 95–110)
CHOLEST SERPL-MCNC: 139 MG/DL (ref 120–199)
CHOLEST/HDLC SERPL: 2.5 {RATIO} (ref 2–5)
CO2 SERPL-SCNC: 27 MMOL/L (ref 23–29)
CREAT SERPL-MCNC: 1 MG/DL (ref 0.5–1.4)
DIFFERENTIAL METHOD: ABNORMAL
EOSINOPHIL # BLD AUTO: 0.2 K/UL (ref 0–0.5)
EOSINOPHIL NFR BLD: 3.8 % (ref 0–8)
ERYTHROCYTE [DISTWIDTH] IN BLOOD BY AUTOMATED COUNT: 12.3 % (ref 11.5–14.5)
EST. GFR  (AFRICAN AMERICAN): >60 ML/MIN/1.73 M^2
EST. GFR  (NON AFRICAN AMERICAN): >60 ML/MIN/1.73 M^2
GLUCOSE SERPL-MCNC: 113 MG/DL (ref 70–110)
HCT VFR BLD AUTO: 40.1 % (ref 40–54)
HDLC SERPL-MCNC: 56 MG/DL (ref 40–75)
HDLC SERPL: 40.3 % (ref 20–50)
HGB BLD-MCNC: 13.4 G/DL (ref 14–18)
IMM GRANULOCYTES # BLD AUTO: 0.01 K/UL (ref 0–0.04)
IMM GRANULOCYTES NFR BLD AUTO: 0.2 % (ref 0–0.5)
LDLC SERPL CALC-MCNC: 70.8 MG/DL (ref 63–159)
LYMPHOCYTES # BLD AUTO: 1.1 K/UL (ref 1–4.8)
LYMPHOCYTES NFR BLD: 26.2 % (ref 18–48)
MCH RBC QN AUTO: 32.7 PG (ref 27–31)
MCHC RBC AUTO-ENTMCNC: 33.4 G/DL (ref 32–36)
MCV RBC AUTO: 98 FL (ref 82–98)
MONOCYTES # BLD AUTO: 0.4 K/UL (ref 0.3–1)
MONOCYTES NFR BLD: 9 % (ref 4–15)
NEUTROPHILS # BLD AUTO: 2.5 K/UL (ref 1.8–7.7)
NEUTROPHILS NFR BLD: 59.9 % (ref 38–73)
NONHDLC SERPL-MCNC: 83 MG/DL
NRBC BLD-RTO: 0 /100 WBC
PLATELET # BLD AUTO: 76 K/UL (ref 150–350)
PMV BLD AUTO: 11.2 FL (ref 9.2–12.9)
POTASSIUM SERPL-SCNC: 4.6 MMOL/L (ref 3.5–5.1)
PROT SERPL-MCNC: 6.6 G/DL (ref 6–8.4)
RBC # BLD AUTO: 4.1 M/UL (ref 4.6–6.2)
SODIUM SERPL-SCNC: 140 MMOL/L (ref 136–145)
TRIGL SERPL-MCNC: 61 MG/DL (ref 30–150)
TSH SERPL DL<=0.005 MIU/L-ACNC: 1.95 UIU/ML (ref 0.4–4)
WBC # BLD AUTO: 4.24 K/UL (ref 3.9–12.7)

## 2020-01-24 PROCEDURE — 80053 COMPREHEN METABOLIC PANEL: CPT | Mod: HCNC

## 2020-01-24 PROCEDURE — 85025 COMPLETE CBC W/AUTO DIFF WBC: CPT | Mod: HCNC

## 2020-01-24 PROCEDURE — 80061 LIPID PANEL: CPT | Mod: HCNC

## 2020-01-24 PROCEDURE — 36415 COLL VENOUS BLD VENIPUNCTURE: CPT | Mod: HCNC,PO

## 2020-01-24 PROCEDURE — 84443 ASSAY THYROID STIM HORMONE: CPT | Mod: HCNC

## 2020-02-10 DIAGNOSIS — M17.0 OSTEOARTHRITIS OF BOTH KNEES, UNSPECIFIED OSTEOARTHRITIS TYPE: ICD-10-CM

## 2020-02-11 NOTE — PROGRESS NOTES
Refill Routing Note     Medication(s) are appropriate for refill:    Medication Outside of Protocol    Of note, duplicate request, previous encounter routed to PCP    Appointments  past 15m or future 3m with PCP    Date Provider   Last Visit   1/17/2020 GUNNAR Jones MD   Next Visit   7/17/2020 GUNNAR Jones MD       Automatic Epic Protocol Generated Data:    Requested Prescriptions   Pending Prescriptions Disp Refills    traMADol (ULTRAM) 50 mg tablet [Pharmacy Med Name: TRAMADOL HCL 50 MG TABLET] 30 tablet      Sig: Take 1 tablet (50 mg total) by mouth daily as needed for Pain.       Narcotics Refill Protocol Passed - 2/10/2020  4:24 PM        Passed - Patient seen within 3 months     Last visit with GUNNAR Jones MD: 1/17/2020  Last visit in [facility]: 1/17/2020    Patient's next visit in [facility]: 2/26/2020           Passed - Med not refilled within 4 weeks           Note created:11:05 AM 02/11/2020

## 2020-02-12 RX ORDER — MELOXICAM 15 MG/1
15 TABLET ORAL
Qty: 90 TABLET | Refills: 1 | Status: SHIPPED | OUTPATIENT
Start: 2020-02-12 | End: 2020-07-30

## 2020-02-12 RX ORDER — TRAMADOL HYDROCHLORIDE 50 MG/1
50 TABLET ORAL DAILY PRN
Qty: 30 TABLET | Refills: 0 | Status: SHIPPED | OUTPATIENT
Start: 2020-02-12 | End: 2020-03-11 | Stop reason: SDUPTHER

## 2020-02-12 RX ORDER — TRAMADOL HYDROCHLORIDE 50 MG/1
50 TABLET ORAL DAILY PRN
Qty: 30 TABLET | OUTPATIENT
Start: 2020-02-12

## 2020-02-13 RX ORDER — ROSUVASTATIN CALCIUM 10 MG/1
TABLET, COATED ORAL
Qty: 90 TABLET | Refills: 0 | Status: SHIPPED | OUTPATIENT
Start: 2020-02-13 | End: 2020-03-17

## 2020-02-13 RX ORDER — CARVEDILOL 12.5 MG/1
TABLET ORAL
Qty: 180 TABLET | Refills: 0 | Status: SHIPPED | OUTPATIENT
Start: 2020-02-13 | End: 2020-03-17

## 2020-02-19 ENCOUNTER — DOCUMENTATION ONLY (OUTPATIENT)
Dept: CARDIOLOGY | Facility: CLINIC | Age: 80
End: 2020-02-19

## 2020-02-19 NOTE — PROGRESS NOTES
Attempted to contact pt, no answer, left voicemail canceling device check, in pacemaker clinic.  Apt is not needed right now.  Will continue to monitor pt from home monitor.  Will contact pt when apt is needed.  Contact pacemaker clinic for any questions.

## 2020-02-26 ENCOUNTER — OFFICE VISIT (OUTPATIENT)
Dept: FAMILY MEDICINE | Facility: CLINIC | Age: 80
End: 2020-02-26
Payer: MEDICARE

## 2020-02-26 VITALS
HEIGHT: 71 IN | OXYGEN SATURATION: 95 % | SYSTOLIC BLOOD PRESSURE: 120 MMHG | DIASTOLIC BLOOD PRESSURE: 62 MMHG | HEART RATE: 50 BPM | WEIGHT: 209.44 LBS | BODY MASS INDEX: 29.32 KG/M2

## 2020-02-26 DIAGNOSIS — I25.810 CORONARY ARTERY DISEASE INVOLVING AUTOLOGOUS VEIN CORONARY BYPASS GRAFT WITHOUT ANGINA PECTORIS: ICD-10-CM

## 2020-02-26 DIAGNOSIS — I50.42 CHRONIC COMBINED SYSTOLIC AND DIASTOLIC HEART FAILURE: ICD-10-CM

## 2020-02-26 DIAGNOSIS — Z95.810 ICD (IMPLANTABLE CARDIOVERTER-DEFIBRILLATOR) IN PLACE: Chronic | ICD-10-CM

## 2020-02-26 DIAGNOSIS — I27.9 PULMONARY HEART DISEASE: ICD-10-CM

## 2020-02-26 DIAGNOSIS — I48.0 PAF (PAROXYSMAL ATRIAL FIBRILLATION): ICD-10-CM

## 2020-02-26 DIAGNOSIS — Z00.00 ENCOUNTER FOR PREVENTIVE HEALTH EXAMINATION: Primary | ICD-10-CM

## 2020-02-26 DIAGNOSIS — I25.5 ISCHEMIC CARDIOMYOPATHY: Chronic | ICD-10-CM

## 2020-02-26 DIAGNOSIS — D69.6 THROMBOCYTOPENIA: ICD-10-CM

## 2020-02-26 DIAGNOSIS — I10 ESSENTIAL HYPERTENSION: ICD-10-CM

## 2020-02-26 DIAGNOSIS — E78.00 PURE HYPERCHOLESTEROLEMIA: ICD-10-CM

## 2020-02-26 PROCEDURE — 3078F PR MOST RECENT DIASTOLIC BLOOD PRESSURE < 80 MM HG: ICD-10-PCS | Mod: HCNC,CPTII,S$GLB, | Performed by: NURSE PRACTITIONER

## 2020-02-26 PROCEDURE — G0439 PR MEDICARE ANNUAL WELLNESS SUBSEQUENT VISIT: ICD-10-PCS | Mod: HCNC,S$GLB,, | Performed by: NURSE PRACTITIONER

## 2020-02-26 PROCEDURE — 99999 PR PBB SHADOW E&M-EST. PATIENT-LVL IV: CPT | Mod: PBBFAC,HCNC,, | Performed by: NURSE PRACTITIONER

## 2020-02-26 PROCEDURE — 3074F SYST BP LT 130 MM HG: CPT | Mod: HCNC,CPTII,S$GLB, | Performed by: NURSE PRACTITIONER

## 2020-02-26 PROCEDURE — 3074F PR MOST RECENT SYSTOLIC BLOOD PRESSURE < 130 MM HG: ICD-10-PCS | Mod: HCNC,CPTII,S$GLB, | Performed by: NURSE PRACTITIONER

## 2020-02-26 PROCEDURE — 3078F DIAST BP <80 MM HG: CPT | Mod: HCNC,CPTII,S$GLB, | Performed by: NURSE PRACTITIONER

## 2020-02-26 PROCEDURE — G0439 PPPS, SUBSEQ VISIT: HCPCS | Mod: HCNC,S$GLB,, | Performed by: NURSE PRACTITIONER

## 2020-02-26 PROCEDURE — 99999 PR PBB SHADOW E&M-EST. PATIENT-LVL IV: ICD-10-PCS | Mod: PBBFAC,HCNC,, | Performed by: NURSE PRACTITIONER

## 2020-02-26 NOTE — PROGRESS NOTES
"Ankit Ruff presented for a  Medicare AWV and comprehensive Health Risk Assessment today. The following components were reviewed and updated:    · Medical history  · Family History  · Social history  · Allergies and Current Medications  · Health Risk Assessment  · Health Maintenance  · Care Team     ** See Completed Assessments for Annual Wellness Visit within the encounter summary.**     The following assessments were completed:  · Living Situation  · CAGE  · Depression Screening  · Timed Get Up and Go  · Whisper Test  · Cognitive Function Screening      · Nutrition Screening  · ADL Screening  · PAQ Screening    Vitals:    02/26/20 0754   BP: 120/62   BP Location: Left arm   Patient Position: Sitting   BP Method: Medium (Manual)   Pulse: (!) 50   SpO2: 95%   Weight: 95 kg (209 lb 7 oz)   Height: 5' 11" (1.803 m)     Body mass index is 29.21 kg/m².  Physical Exam   Constitutional: He is oriented to person, place, and time. He appears well-nourished.   Cardiovascular: Normal rate, regular rhythm, normal heart sounds and intact distal pulses.   Pulmonary/Chest: Effort normal and breath sounds normal.   Neurological: He is alert and oriented to person, place, and time.   Skin: Skin is warm and dry.   Vitals reviewed.      Diagnoses and health risks identified today and associated recommendations/orders:    1. Encounter for preventive health examination  Reviewed and discussed health maintenance.      2. Pulmonary heart disease  Stable- continue current treatment and follow up routinely with PCP     3. ICD (implantable cardioverter-defibrillator) in place  Stable- continue current treatment and follow up routinely with PCP     4. Ischemic cardiomyopathy  Stable- continue current treatment and follow up routinely with PCP     5. PAF (paroxysmal atrial fibrillation)  Stable- continue current treatment and follow up routinely with PCP     6. Essential hypertension  Stable- continue current treatment and follow up " routinely with PCP     7. Pure hypercholesterolemia  Stable- continue current treatment and follow up routinely with PCP     8. Coronary artery disease involving autologous vein coronary bypass graft without angina pectoris  Stable- continue current treatment and follow up routinely with PCP     9. Chronic combined systolic and diastolic heart failure  Stable- continue current treatment and follow up routinely with PCP     10. Thrombocytopenia  Stable- continue current treatment and follow up routinely with PCP     Provided Ankit with a 5-10 year written screening schedule and personal prevention plan. Recommendations were developed using the USPSTF age appropriate recommendations. Education, counseling, and referrals were provided as needed. After Visit Summary printed and given to patient which includes a list of additional screenings\tests needed.    Haylee Mosley, NP

## 2020-02-26 NOTE — PATIENT INSTRUCTIONS
Counseling and Referral of Other Preventative  (Italic type indicates deductible and co-insurance are waived)    Patient Name: Ankit Ruff  Today's Date: 2/26/2020    Health Maintenance       Date Due Completion Date    Aspirin/Antiplatelet Therapy 02/26/2021 2/26/2020    Lipid Panel 01/24/2025 1/24/2020    TETANUS VACCINE 09/16/2025 9/16/2015        No orders of the defined types were placed in this encounter.    The following information is provided to all patients.  This information is to help you find resources for any of the problems found today that may be affecting your health:                Living healthy guide: www.[facility].[place].[facility]      Understanding Diabetes: www.diabetes.org      Eating healthy: www.cdc.gov/healthyweight      CDC home safety checklist: www.cdc.gov/steadi/patient.html      Agency on Aging: www.goea.[place].[facility]      Alcoholics anonymous (AA): www.aa.org      Physical Activity: www.karrie.nih.gov/wy1tnjk      Tobacco use: www.quitwithusla.org

## 2020-03-11 NOTE — PROGRESS NOTES
Refill Routing Note     Medication(s) are appropriate for refill:    Medication Outside of Protocol    Appointments  past 15m or future 3m with PCP    Date Provider   Last Visit   1/17/2020 GUNNAR Jones MD   Next Visit   7/17/2020 GUNNAR Jones MD       Automatic Epic Protocol Generated Data:    Requested Prescriptions   Pending Prescriptions Disp Refills    traMADoL (ULTRAM) 50 mg tablet [Pharmacy Med Name: tramadol 50 mg tablet] 30 tablet 0     Sig: Take 1 tablet (50 mg total) by mouth daily as needed for Pain.       Narcotics Refill Protocol Failed - 3/11/2020 11:30 AM        Failed - Med not refilled within 4 weeks        Passed - Patient seen within 3 months     Last visit with GUNNAR Jones MD: 1/17/2020  Last visit in [facility]: 2/26/2020    Patient's next visit in [facility]: 7/17/2020              Note created:12:34 PM 03/11/2020

## 2020-03-13 NOTE — TELEPHONE ENCOUNTER
----- Message from Harmony Mendez sent at 3/13/2020  3:45 PM CDT -----  Type:  RX Refill Request    Who Called:  Wife (Laila)  RX Name and Strength:  traMADol (ULTRAM) 50 mg tablet  Preferred Pharmacy with phone number:  [facility]  Best Call Back Number:  [phone]  Additional Information:

## 2020-03-15 RX ORDER — TRAMADOL HYDROCHLORIDE 50 MG/1
50 TABLET ORAL DAILY PRN
Qty: 30 TABLET | Refills: 0 | OUTPATIENT
Start: 2020-03-15

## 2020-03-15 RX ORDER — TRAMADOL HYDROCHLORIDE 50 MG/1
50 TABLET ORAL DAILY PRN
Qty: 30 TABLET | Refills: 0 | Status: SHIPPED | OUTPATIENT
Start: 2020-03-15 | End: 2020-04-09 | Stop reason: SDUPTHER

## 2020-03-17 DIAGNOSIS — M17.0 OSTEOARTHRITIS OF BOTH KNEES, UNSPECIFIED OSTEOARTHRITIS TYPE: ICD-10-CM

## 2020-03-17 DIAGNOSIS — N32.81 OAB (OVERACTIVE BLADDER): ICD-10-CM

## 2020-03-17 RX ORDER — CARVEDILOL 12.5 MG/1
TABLET ORAL
Qty: 180 TABLET | Refills: 0 | Status: SHIPPED | OUTPATIENT
Start: 2020-03-17 | End: 2020-10-05 | Stop reason: SDUPTHER

## 2020-03-17 RX ORDER — GABAPENTIN 300 MG/1
300 CAPSULE ORAL 2 TIMES DAILY
Qty: 180 CAPSULE | Refills: 0 | Status: SHIPPED | OUTPATIENT
Start: 2020-03-17 | End: 2020-06-18

## 2020-03-17 RX ORDER — OXYBUTYNIN CHLORIDE 5 MG/1
5 TABLET ORAL NIGHTLY
Qty: 90 TABLET | Refills: 3 | Status: SHIPPED | OUTPATIENT
Start: 2020-03-17 | End: 2021-03-31 | Stop reason: SDUPTHER

## 2020-03-17 RX ORDER — ROSUVASTATIN CALCIUM 10 MG/1
TABLET, COATED ORAL
Qty: 90 TABLET | Refills: 0 | Status: SHIPPED | OUTPATIENT
Start: 2020-03-17 | End: 2020-06-18

## 2020-04-09 NOTE — PROGRESS NOTES
Refill Routing Note     Medication(s) are appropriate for refill:    Medication Outside of Protocol    Appointments  past 15m or future 3m with PCP    Date Provider   Last Visit   1/17/2020 GUNNAR Jones MD   Next Visit   7/17/2020 GUNNAR Jones MD       Automatic Epic Protocol Generated Data:    Requested Prescriptions   Pending Prescriptions Disp Refills    traMADoL (ULTRAM) 50 mg tablet 30 tablet 0     Sig: Take 1 tablet (50 mg total) by mouth daily as needed for Pain.       Narcotics Refill Protocol Failed - 4/9/2020  8:18 AM        Failed - Med not refilled within 4 weeks        Passed - Patient seen within 3 months     Last visit with GUNNAR Jones MD: 1/17/2020  Last visit in [facility]: 2/26/2020    Patient's next visit in [facility]: 7/17/2020              Note created:8:47 AM 04/09/2020

## 2020-04-13 RX ORDER — TRAMADOL HYDROCHLORIDE 50 MG/1
50 TABLET ORAL DAILY PRN
Qty: 30 TABLET | Refills: 0 | Status: SHIPPED | OUTPATIENT
Start: 2020-04-13 | End: 2020-05-07 | Stop reason: SDUPTHER

## 2020-05-05 ENCOUNTER — PATIENT MESSAGE (OUTPATIENT)
Dept: ADMINISTRATIVE | Facility: HOSPITAL | Age: 80
End: 2020-05-05

## 2020-05-07 ENCOUNTER — PATIENT MESSAGE (OUTPATIENT)
Dept: CARDIOLOGY | Facility: CLINIC | Age: 80
End: 2020-05-07

## 2020-05-07 ENCOUNTER — CLINICAL SUPPORT (OUTPATIENT)
Dept: CARDIOLOGY | Facility: CLINIC | Age: 80
End: 2020-05-07
Payer: MEDICARE

## 2020-05-07 PROCEDURE — 93297 CARDIAC DEVICE CHECK - REMOTE: ICD-10-PCS | Mod: HCNC,S$GLB,, | Performed by: INTERNAL MEDICINE

## 2020-05-07 PROCEDURE — 93297 REM INTERROG DEV EVAL ICPMS: CPT | Mod: HCNC,S$GLB,, | Performed by: INTERNAL MEDICINE

## 2020-05-08 ENCOUNTER — PATIENT MESSAGE (OUTPATIENT)
Dept: CARDIOLOGY | Facility: CLINIC | Age: 80
End: 2020-05-08

## 2020-05-08 RX ORDER — TRAMADOL HYDROCHLORIDE 50 MG/1
50 TABLET ORAL DAILY PRN
Qty: 30 TABLET | Refills: 0 | Status: SHIPPED | OUTPATIENT
Start: 2020-05-08 | End: 2020-06-09 | Stop reason: SDUPTHER

## 2020-05-08 RX ORDER — TIZANIDINE 4 MG/1
4 TABLET ORAL 2 TIMES DAILY
Qty: 180 TABLET | Refills: 3 | Status: SHIPPED | OUTPATIENT
Start: 2020-05-08 | End: 2020-05-15 | Stop reason: SDUPTHER

## 2020-05-15 RX ORDER — TIZANIDINE 4 MG/1
4 TABLET ORAL 2 TIMES DAILY
Qty: 180 TABLET | Refills: 3 | Status: SHIPPED | OUTPATIENT
Start: 2020-05-15 | End: 2021-04-25

## 2020-06-03 ENCOUNTER — OFFICE VISIT (OUTPATIENT)
Dept: CARDIOLOGY | Facility: CLINIC | Age: 80
End: 2020-06-03
Payer: MEDICARE

## 2020-06-03 VITALS
WEIGHT: 208.13 LBS | HEART RATE: 49 BPM | SYSTOLIC BLOOD PRESSURE: 110 MMHG | BODY MASS INDEX: 29.14 KG/M2 | DIASTOLIC BLOOD PRESSURE: 61 MMHG | HEIGHT: 71 IN

## 2020-06-03 DIAGNOSIS — I10 ESSENTIAL HYPERTENSION: ICD-10-CM

## 2020-06-03 DIAGNOSIS — Z95.1 S/P CABG X 4: ICD-10-CM

## 2020-06-03 DIAGNOSIS — Z95.810 ICD (IMPLANTABLE CARDIOVERTER-DEFIBRILLATOR) IN PLACE: ICD-10-CM

## 2020-06-03 DIAGNOSIS — E78.00 PURE HYPERCHOLESTEROLEMIA: ICD-10-CM

## 2020-06-03 DIAGNOSIS — I48.0 PAF (PAROXYSMAL ATRIAL FIBRILLATION): ICD-10-CM

## 2020-06-03 DIAGNOSIS — I25.5 ISCHEMIC CARDIOMYOPATHY: Primary | ICD-10-CM

## 2020-06-03 DIAGNOSIS — I65.23 BILATERAL CAROTID ARTERY STENOSIS: ICD-10-CM

## 2020-06-03 PROCEDURE — 3074F PR MOST RECENT SYSTOLIC BLOOD PRESSURE < 130 MM HG: ICD-10-PCS | Mod: HCNC,CPTII,S$GLB, | Performed by: INTERNAL MEDICINE

## 2020-06-03 PROCEDURE — 99499 UNLISTED E&M SERVICE: CPT | Mod: HCNC,S$GLB,, | Performed by: INTERNAL MEDICINE

## 2020-06-03 PROCEDURE — 3074F SYST BP LT 130 MM HG: CPT | Mod: HCNC,CPTII,S$GLB, | Performed by: INTERNAL MEDICINE

## 2020-06-03 PROCEDURE — 99214 OFFICE O/P EST MOD 30 MIN: CPT | Mod: HCNC,S$GLB,, | Performed by: INTERNAL MEDICINE

## 2020-06-03 PROCEDURE — 1126F AMNT PAIN NOTED NONE PRSNT: CPT | Mod: HCNC,S$GLB,, | Performed by: INTERNAL MEDICINE

## 2020-06-03 PROCEDURE — 1159F PR MEDICATION LIST DOCUMENTED IN MEDICAL RECORD: ICD-10-PCS | Mod: HCNC,S$GLB,, | Performed by: INTERNAL MEDICINE

## 2020-06-03 PROCEDURE — 99214 PR OFFICE/OUTPT VISIT, EST, LEVL IV, 30-39 MIN: ICD-10-PCS | Mod: HCNC,S$GLB,, | Performed by: INTERNAL MEDICINE

## 2020-06-03 PROCEDURE — 99499 RISK ADDL DX/OHS AUDIT: ICD-10-PCS | Mod: HCNC,S$GLB,, | Performed by: INTERNAL MEDICINE

## 2020-06-03 PROCEDURE — 1159F MED LIST DOCD IN RCRD: CPT | Mod: HCNC,S$GLB,, | Performed by: INTERNAL MEDICINE

## 2020-06-03 PROCEDURE — 99999 PR PBB SHADOW E&M-EST. PATIENT-LVL III: ICD-10-PCS | Mod: PBBFAC,HCNC,, | Performed by: INTERNAL MEDICINE

## 2020-06-03 PROCEDURE — 99999 PR PBB SHADOW E&M-EST. PATIENT-LVL III: CPT | Mod: PBBFAC,HCNC,, | Performed by: INTERNAL MEDICINE

## 2020-06-03 PROCEDURE — 3078F PR MOST RECENT DIASTOLIC BLOOD PRESSURE < 80 MM HG: ICD-10-PCS | Mod: HCNC,CPTII,S$GLB, | Performed by: INTERNAL MEDICINE

## 2020-06-03 PROCEDURE — 1126F PR PAIN SEVERITY QUANTIFIED, NO PAIN PRESENT: ICD-10-PCS | Mod: HCNC,S$GLB,, | Performed by: INTERNAL MEDICINE

## 2020-06-03 PROCEDURE — 1101F PR PT FALLS ASSESS DOC 0-1 FALLS W/OUT INJ PAST YR: ICD-10-PCS | Mod: HCNC,CPTII,S$GLB, | Performed by: INTERNAL MEDICINE

## 2020-06-03 PROCEDURE — 1101F PT FALLS ASSESS-DOCD LE1/YR: CPT | Mod: HCNC,CPTII,S$GLB, | Performed by: INTERNAL MEDICINE

## 2020-06-03 PROCEDURE — 3078F DIAST BP <80 MM HG: CPT | Mod: HCNC,CPTII,S$GLB, | Performed by: INTERNAL MEDICINE

## 2020-06-03 NOTE — PROGRESS NOTES
Subjective:    Patient ID:  Ankit Ruff is a 79 y.o. male who presents for follow-up of ICM    HPI  He comes for follow up with no major problems, no chest pain, no shortness of breath.  FC II      Review of Systems   Constitution: Negative for decreased appetite, malaise/fatigue, weight gain and weight loss.   Cardiovascular: Negative for chest pain, dyspnea on exertion, leg swelling, palpitations and syncope.   Respiratory: Negative for cough and shortness of breath.    Gastrointestinal: Negative.    Neurological: Negative for weakness.   All other systems reviewed and are negative.       Objective:      Physical Exam   Constitutional: He is oriented to person, place, and time. He appears well-developed and well-nourished.   HENT:   Head: Normocephalic.   Eyes: Pupils are equal, round, and reactive to light.   Neck: Normal range of motion. Neck supple. No JVD present. Carotid bruit is not present. No thyromegaly present.   Cardiovascular: Normal rate, regular rhythm, normal heart sounds, intact distal pulses and normal pulses. PMI is not displaced. Exam reveals no gallop.   No murmur heard.  Pulmonary/Chest: Effort normal and breath sounds normal.   Abdominal: Soft. Normal appearance. He exhibits no mass. There is no hepatosplenomegaly. There is no tenderness.   Musculoskeletal: Normal range of motion. He exhibits no edema.   Neurological: He is alert and oriented to person, place, and time. He has normal strength and normal reflexes. No sensory deficit.   Skin: Skin is warm and intact.   Psychiatric: He has a normal mood and affect.   Nursing note and vitals reviewed.        Assessment:       1. Ischemic cardiomyopathy    2. PAF (paroxysmal atrial fibrillation)    3. ICD (implantable cardioverter-defibrillator) in place    4. S/P CABG x 4    5. Pure hypercholesterolemia    6. Essential hypertension         Plan:     Continue all cardiac medications  Regular exercise program  Weight loss  9 m f/u with  ccfd

## 2020-06-06 ENCOUNTER — CLINICAL SUPPORT (OUTPATIENT)
Dept: CARDIOLOGY | Facility: CLINIC | Age: 80
End: 2020-06-06
Payer: MEDICARE

## 2020-06-06 DIAGNOSIS — Z95.810 PRESENCE OF AUTOMATIC (IMPLANTABLE) CARDIAC DEFIBRILLATOR: ICD-10-CM

## 2020-06-06 PROCEDURE — 93297 REM INTERROG DEV EVAL ICPMS: CPT | Mod: HCNC,S$GLB,, | Performed by: INTERNAL MEDICINE

## 2020-06-06 PROCEDURE — 93297 CARDIAC DEVICE CHECK - REMOTE: ICD-10-PCS | Mod: HCNC,S$GLB,, | Performed by: INTERNAL MEDICINE

## 2020-06-08 ENCOUNTER — TELEPHONE (OUTPATIENT)
Dept: CARDIOLOGY | Facility: CLINIC | Age: 80
End: 2020-06-08

## 2020-06-08 NOTE — TELEPHONE ENCOUNTER
The patients' implantable cardiac device has reached ELECTIVE REPLACEMENT.     Current Device  and Model:        Date of MASOUD, if known: 6/8/2020    Pacemaker Dependent: no    Anticoagulation Status: NONE    Last EF- 30% (2018)    MRI compatible:  Lead is      Pt. Notified of RRT status    Message forwarded to Dr. Guerrero for review

## 2020-06-10 DIAGNOSIS — Z45.02 IMPLANTABLE CARDIOVERTER-DEFIBRILLATOR (ICD) GENERATOR END OF LIFE: Primary | ICD-10-CM

## 2020-06-10 DIAGNOSIS — Z03.818 ENCNTR FOR OBS FOR SUSP EXPSR TO OTH BIOLG AGENTS RULED OUT: Primary | ICD-10-CM

## 2020-06-29 ENCOUNTER — LAB VISIT (OUTPATIENT)
Dept: FAMILY MEDICINE | Facility: CLINIC | Age: 80
End: 2020-06-29
Payer: MEDICARE

## 2020-06-29 DIAGNOSIS — Z03.818 ENCNTR FOR OBS FOR SUSP EXPSR TO OTH BIOLG AGENTS RULED OUT: ICD-10-CM

## 2020-06-29 PROCEDURE — U0003 INFECTIOUS AGENT DETECTION BY NUCLEIC ACID (DNA OR RNA); SEVERE ACUTE RESPIRATORY SYNDROME CORONAVIRUS 2 (SARS-COV-2) (CORONAVIRUS DISEASE [COVID-19]), AMPLIFIED PROBE TECHNIQUE, MAKING USE OF HIGH THROUGHPUT TECHNOLOGIES AS DESCRIBED BY CMS-2020-01-R: HCPCS | Mod: HCNC

## 2020-06-30 LAB — SARS-COV-2 RNA RESP QL NAA+PROBE: NOT DETECTED

## 2020-07-01 DIAGNOSIS — I48.0 PAF (PAROXYSMAL ATRIAL FIBRILLATION): ICD-10-CM

## 2020-07-01 DIAGNOSIS — Z95.810 ICD (IMPLANTABLE CARDIOVERTER-DEFIBRILLATOR) IN PLACE: Primary | ICD-10-CM

## 2020-07-06 ENCOUNTER — CLINICAL SUPPORT (OUTPATIENT)
Dept: CARDIOLOGY | Facility: CLINIC | Age: 80
End: 2020-07-06
Payer: MEDICARE

## 2020-07-06 DIAGNOSIS — Z95.810 PRESENCE OF AUTOMATIC (IMPLANTABLE) CARDIAC DEFIBRILLATOR: ICD-10-CM

## 2020-07-06 PROCEDURE — 93297 CARDIAC DEVICE CHECK - REMOTE: ICD-10-PCS | Mod: HCNC,S$GLB,, | Performed by: INTERNAL MEDICINE

## 2020-07-06 PROCEDURE — 93297 REM INTERROG DEV EVAL ICPMS: CPT | Mod: HCNC,S$GLB,, | Performed by: INTERNAL MEDICINE

## 2020-07-10 ENCOUNTER — CLINICAL SUPPORT (OUTPATIENT)
Dept: CARDIOLOGY | Facility: CLINIC | Age: 80
End: 2020-07-10
Payer: MEDICARE

## 2020-07-10 DIAGNOSIS — I48.0 PAF (PAROXYSMAL ATRIAL FIBRILLATION): ICD-10-CM

## 2020-07-10 DIAGNOSIS — Z95.810 ICD (IMPLANTABLE CARDIOVERTER-DEFIBRILLATOR) IN PLACE: ICD-10-CM

## 2020-07-10 PROCEDURE — 93284 CARDIAC DEVICE CHECK - IN CLINIC & HOSPITAL: ICD-10-PCS | Mod: HCNC,S$GLB,, | Performed by: INTERNAL MEDICINE

## 2020-07-10 PROCEDURE — 93284 PRGRMG EVAL IMPLANTABLE DFB: CPT | Mod: HCNC,S$GLB,, | Performed by: INTERNAL MEDICINE

## 2020-07-24 RX ORDER — TRAMADOL HYDROCHLORIDE 50 MG/1
50 TABLET ORAL DAILY PRN
Qty: 30 TABLET | Refills: 0 | Status: SHIPPED | OUTPATIENT
Start: 2020-07-24 | End: 2020-09-18 | Stop reason: SDUPTHER

## 2020-07-29 DIAGNOSIS — I25.810 CORONARY ARTERY DISEASE INVOLVING AUTOLOGOUS VEIN CORONARY BYPASS GRAFT WITHOUT ANGINA PECTORIS: Primary | ICD-10-CM

## 2020-07-29 DIAGNOSIS — M17.0 OSTEOARTHRITIS OF BOTH KNEES, UNSPECIFIED OSTEOARTHRITIS TYPE: ICD-10-CM

## 2020-07-29 DIAGNOSIS — I10 ESSENTIAL HYPERTENSION: ICD-10-CM

## 2020-07-29 RX ORDER — PANTOPRAZOLE SODIUM 40 MG/1
40 TABLET, DELAYED RELEASE ORAL DAILY
Qty: 90 TABLET | Refills: 3 | Status: SHIPPED | OUTPATIENT
Start: 2020-07-29 | End: 2021-06-02

## 2020-07-29 RX ORDER — SPIRONOLACTONE 25 MG/1
TABLET ORAL
Qty: 20 TABLET | Refills: 3 | Status: SHIPPED | OUTPATIENT
Start: 2020-07-29 | End: 2021-06-02

## 2020-07-30 RX ORDER — MELOXICAM 15 MG/1
TABLET ORAL
Qty: 90 TABLET | Refills: 1 | Status: SHIPPED | OUTPATIENT
Start: 2020-07-30 | End: 2020-11-19

## 2020-07-30 NOTE — PROGRESS NOTES
Refill Routing Note   Medication(s) are not appropriate for processing by [facility]:       - Outside of protocol           Medication reconciliation completed: No      Automatic Epic Generated Protocol Data:        Requested Prescriptions   Pending Prescriptions Disp Refills    meloxicam (MOBIC) 15 MG tablet [Pharmacy Med Name: MELOXICAM 15 MG Tablet] 90 tablet 1     Sig: TAKE 1 TABLET EVERY DAY AS NEEDED       NSAIDs Protocol Passed - 7/29/2020  9:11 PM        Passed - Serum Creatinine less than 1.4 on file in the past 12 months     Lab Results   Component Value Date    CREATININE 0.80 07/01/2020    CREATININE 1.0 01/24/2020    CREATININE 0.73 08/17/2019     Lab Results   Component Value Date    EGFRNONAA >60 07/01/2020    EGFRNONAA >60.0 01/24/2020    EGFRNONAA >60 08/17/2019               Passed - Visit with authorizing provider in past 12 months or upcoming 90 days        Passed - HGB greater than 10 or HCT greater than 30 in past 12 months        Passed - AST in past 12 months      Lab Results   Component Value Date    AST 38 01/24/2020    AST 42 08/17/2019    AST 48 08/16/2019              Passed - Serum Potassium less than 5.2 on file in the past 12 months     Lab Results   Component Value Date    K 4.1 07/01/2020    K 4.6 01/24/2020    K 4.0 08/17/2019                  Passed - Blood Pressure below 139/89 on file in past 12 months      BP Readings from Last 3 Encounters:   07/01/20 (!) 159/78   06/03/20 110/61   02/26/20 120/62             Passed - ALT less than 95 in past 12 months     Lab Results   Component Value Date    ALT 31 01/24/2020    ALT 41 08/17/2019    ALT 46 (H) 08/16/2019                    Appointments  past 12m or future 3m with PCP    Date Provider   Last Visit   1/17/2020 GUNNAR Jones MD   Next Visit   Visit date not found GUNNAR Jones MD   ED visits in past 90 days: 0     Note composed:2:10 PM 07/30/2020

## 2020-08-05 ENCOUNTER — CLINICAL SUPPORT (OUTPATIENT)
Dept: CARDIOLOGY | Facility: CLINIC | Age: 80
End: 2020-08-05
Payer: MEDICARE

## 2020-08-05 DIAGNOSIS — Z95.810 PRESENCE OF AUTOMATIC (IMPLANTABLE) CARDIAC DEFIBRILLATOR: ICD-10-CM

## 2020-08-05 PROCEDURE — 93297 REM INTERROG DEV EVAL ICPMS: CPT | Mod: HCNC,S$GLB,, | Performed by: INTERNAL MEDICINE

## 2020-08-05 PROCEDURE — 93297 CARDIAC DEVICE CHECK - REMOTE: ICD-10-PCS | Mod: HCNC,S$GLB,, | Performed by: INTERNAL MEDICINE

## 2020-08-24 NOTE — TELEPHONE ENCOUNTER
No new care gaps identified.  Powered by [employer]. Reference number: [id number]. 8/24/2020 7:44:17 AM PREETIT

## 2020-08-25 NOTE — PROGRESS NOTES
Refill Routing Note   Medication(s) are not appropriate for processing by [facility]:       - Patient has been hospitalized since the last PCP visit        Medication Therapy Plan: CDMR; HOSPITALIZATION(7/1/20-Implantable cardioverter-defibrillator (ICD) generator end of life), DEFER TO YOU  Medication reconciliation completed: No      Automatic Epic Generated Protocol Data:        Requested Prescriptions   Pending Prescriptions Disp Refills    citalopram (CELEXA) 10 MG tablet [Pharmacy Med Name: CITALOPRAM HYDROBROMIDE 10 MG Tablet] 90 tablet 0     Sig: TAKE 1 TABLET EVERY DAY       Psychiatry:  Antidepressants - SSRI Passed - 8/24/2020  7:43 AM        Passed - Patient is at least 18 years old        Passed - Office visit in past 6 months or future 90 days.     Recent Outpatient Visits            2 months ago Ischemic cardiomyopathy    [facility] [place] Guerrero MD    6 months ago Encounter for preventive health examination    [facility] Haylee Mosley NP    7 months ago Wellness examination    [facility] GUNNAR Jones MD    11 months ago Pure hypercholesterolemia    [facility] GUNNAR Jones MD    11 months ago Ischemic cardiomyopathy    [facility] [place]o Guerrero MD          Future Appointments              In 1 week [facility]    In 6 months ECHO, [facility] [place]    In 6 months VASCULAR, [facility] [place]    In 6 months LAB, [place] [facility]    In 6 months [facility]                      Appointments  past 12m or future 3m with PCP    Date Provider   Last Visit   1/17/2020 GUNNAR Jones MD   Next Visit   Visit date not found GUNNAR Jones MD   ED visits in past 90 days: 0     Note composed:10:47 AM  08/25/2020

## 2020-08-26 RX ORDER — CITALOPRAM 10 MG/1
TABLET ORAL
Qty: 90 TABLET | Refills: 0 | Status: SHIPPED | OUTPATIENT
Start: 2020-08-26 | End: 2020-11-17

## 2020-09-02 NOTE — PROGRESS NOTES
Refill Routing Note   Medication(s) are not appropriate for processing by [facility]:       - Outside of protocol        Medication Therapy Plan: CDMR; OUTSIDE OF PROTOCOL, ROUTE TO YOU  Medication reconciliation completed: No      Automatic Epic Generated Protocol Data:        Requested Prescriptions   Pending Prescriptions Disp Refills    traMADoL (ULTRAM) 50 mg tablet [Pharmacy Med Name: tramadol 50 mg tablet] 30 tablet 0     Sig: Take 1 tablet (50 mg total) by mouth daily as needed for Pain.       Narcotics Refill Protocol Failed - 9/2/2020 11:47 AM        Failed - Patient seen within 3 months     Last visit with GUNNAR Jones MD: 1/17/2020  Last visit in [facility]: Visit date not found    Patient's next visit in [facility]: Visit date not found           Passed - Med not refilled within 4 weeks       Opiods Refill Protocol Failed - 9/2/2020 11:47 AM        Failed - Recent visit with authorizing provider in the past 3 months        Failed - Patient has signed pain contract        Passed - No positive pregnancy test in past 12 months        Off-Protocol Failed - 9/2/2020 11:47 AM        Failed - Medication not assigned to a protocol, review manually.        Passed - Office visit in past 12 months or future 90 days.     Recent Outpatient Visits            3 months ago Ischemic cardiomyopathy    [facility] Romeo Guerrero MD    6 months ago Encounter for preventive health examination    [facility] Haylee Mosley NP    7 months ago Wellness examination    [facility] GUNNAR Jones MD    12 months ago Pure hypercholesterolemia    [facility] GUNNAR Jones MD    1 year ago Ischemic cardiomyopathy    [facility] Romeo Guerrero MD          Future Appointments              In 6 months ECHO, [facility], [place]    In 6 months [facility]    In 6 months LAB, [place] [facility]    In 6 months [facility]errero MD [place] [facility]                      Appointments  past 12m or future 3m with PCP    Date Provider   Last Visit   1/17/2020 GUNNAR Jones MD   Next Visit   Visit date not found GUNNAR Jones MD   ED visits in past 90 days: 0     Note composed:1:00 PM 09/02/2020

## 2020-09-04 ENCOUNTER — CLINICAL SUPPORT (OUTPATIENT)
Dept: CARDIOLOGY | Facility: CLINIC | Age: 80
End: 2020-09-04
Payer: MEDICARE

## 2020-09-04 DIAGNOSIS — Z95.810 PRESENCE OF AUTOMATIC (IMPLANTABLE) CARDIAC DEFIBRILLATOR: ICD-10-CM

## 2020-09-04 PROCEDURE — 93297 CARDIAC DEVICE CHECK - REMOTE: ICD-10-PCS | Mod: HCNC,S$GLB,, | Performed by: INTERNAL MEDICINE

## 2020-09-04 PROCEDURE — 93297 REM INTERROG DEV EVAL ICPMS: CPT | Mod: HCNC,S$GLB,, | Performed by: INTERNAL MEDICINE

## 2020-09-04 RX ORDER — TRAMADOL HYDROCHLORIDE 50 MG/1
50 TABLET ORAL DAILY PRN
Qty: 30 TABLET | Refills: 0 | OUTPATIENT
Start: 2020-09-04

## 2020-09-04 NOTE — TELEPHONE ENCOUNTER
Provider Staff:     Please note denial of medication.     Refused by: GUNNAR Jones MD  Refusal reason: Patient needs an appointment     Thanks!  [facility]     Note composed: 09/04/2020 2:50 PM

## 2020-09-09 ENCOUNTER — TELEPHONE (OUTPATIENT)
Dept: FAMILY MEDICINE | Facility: CLINIC | Age: 80
End: 2020-09-09

## 2020-09-09 NOTE — TELEPHONE ENCOUNTER
----- Message from Kylie Carter sent at 9/9/2020  3:02 PM CDT -----  Contact: Toya/Laila [phone]  Requesting an earlier appt date or time    Next available appt: 11/23/2020    Nature of the appt: Annual Physical    Does patient have appt scheduled?: No    Please contact patient to schedule earlier appt if available but notify patient either way.    Thank You

## 2020-09-16 ENCOUNTER — TELEPHONE (OUTPATIENT)
Dept: FAMILY MEDICINE | Facility: CLINIC | Age: 80
End: 2020-09-16

## 2020-09-16 DIAGNOSIS — R05.9 COUGH: Primary | ICD-10-CM

## 2020-09-16 DIAGNOSIS — Z20.822 EXPOSURE TO COVID-19 VIRUS: ICD-10-CM

## 2020-09-16 DIAGNOSIS — R52 BODY ACHES: ICD-10-CM

## 2020-09-16 NOTE — TELEPHONE ENCOUNTER
----- Message from Marielos Abbasi MA sent at 9/16/2020  4:16 PM CDT -----  Type: Needs Medical Advice  Who Called:  Laila  Stephen Call Back Number:   Additional Information: patient's spouse would like information about spouse and covid exposure and symptoms.  He has an appt scheduled for sept 18.  Please call to discuss

## 2020-09-16 NOTE — TELEPHONE ENCOUNTER
Pt's wife states that about 11 days ago, pt started having diarrhea, cough, fever, body aches and states that pt's daughter also had those symptoms and tested positive for covid, so she states that she is pretty sure that it was pt has. She states that pt has an appt on 9/18 and didn't know what they should do. She states that pt still has a cough and body aches but no fever. Attempted to schedule a VV on 9/18 instead, but pt's wife states that pt cannot do that and would like to know if they can have an audio visit instead? Please advise.

## 2020-09-18 ENCOUNTER — OFFICE VISIT (OUTPATIENT)
Dept: FAMILY MEDICINE | Facility: CLINIC | Age: 80
End: 2020-09-18
Payer: MEDICARE

## 2020-09-18 ENCOUNTER — HOSPITAL ENCOUNTER (OUTPATIENT)
Dept: RADIOLOGY | Facility: HOSPITAL | Age: 80
Discharge: HOME | End: 2020-09-18
Attending: INTERNAL MEDICINE
Payer: MEDICARE

## 2020-09-18 ENCOUNTER — TELEPHONE (OUTPATIENT)
Dept: FAMILY MEDICINE | Facility: CLINIC | Age: 80
End: 2020-09-18

## 2020-09-18 VITALS
DIASTOLIC BLOOD PRESSURE: 62 MMHG | SYSTOLIC BLOOD PRESSURE: 102 MMHG | OXYGEN SATURATION: 94 % | TEMPERATURE: 98 F | BODY MASS INDEX: 27.93 KG/M2 | HEIGHT: 71 IN | HEART RATE: 56 BPM | WEIGHT: 199.5 LBS

## 2020-09-18 DIAGNOSIS — M46.96 INFLAMMATORY SPONDYLOPATHY OF LUMBAR REGION: ICD-10-CM

## 2020-09-18 DIAGNOSIS — R05.9 COUGH: ICD-10-CM

## 2020-09-18 DIAGNOSIS — E78.00 PURE HYPERCHOLESTEROLEMIA: ICD-10-CM

## 2020-09-18 DIAGNOSIS — R09.89 BIBASILAR CRACKLES: ICD-10-CM

## 2020-09-18 DIAGNOSIS — I10 ESSENTIAL HYPERTENSION: Primary | ICD-10-CM

## 2020-09-18 PROCEDURE — 99499 RISK ADDL DX/OHS AUDIT: ICD-10-PCS | Mod: HCNC,S$GLB,, | Performed by: INTERNAL MEDICINE

## 2020-09-18 PROCEDURE — 1159F PR MEDICATION LIST DOCUMENTED IN MEDICAL RECORD: ICD-10-PCS | Mod: HCNC,S$GLB,, | Performed by: INTERNAL MEDICINE

## 2020-09-18 PROCEDURE — 71046 X-RAY EXAM CHEST 2 VIEWS: CPT | Mod: TC,HCNC,FY,PO

## 2020-09-18 PROCEDURE — 3078F DIAST BP <80 MM HG: CPT | Mod: HCNC,CPTII,S$GLB, | Performed by: INTERNAL MEDICINE

## 2020-09-18 PROCEDURE — 71046 XR CHEST PA AND LATERAL: ICD-10-PCS | Mod: 26,HCNC,, | Performed by: RADIOLOGY

## 2020-09-18 PROCEDURE — 1126F PR PAIN SEVERITY QUANTIFIED, NO PAIN PRESENT: ICD-10-PCS | Mod: HCNC,S$GLB,, | Performed by: INTERNAL MEDICINE

## 2020-09-18 PROCEDURE — 71046 X-RAY EXAM CHEST 2 VIEWS: CPT | Mod: 26,HCNC,, | Performed by: RADIOLOGY

## 2020-09-18 PROCEDURE — 99213 OFFICE O/P EST LOW 20 MIN: CPT | Mod: HCNC,S$GLB,, | Performed by: INTERNAL MEDICINE

## 2020-09-18 PROCEDURE — 1101F PR PT FALLS ASSESS DOC 0-1 FALLS W/OUT INJ PAST YR: ICD-10-PCS | Mod: HCNC,CPTII,S$GLB, | Performed by: INTERNAL MEDICINE

## 2020-09-18 PROCEDURE — 99499 UNLISTED E&M SERVICE: CPT | Mod: HCNC,S$GLB,, | Performed by: INTERNAL MEDICINE

## 2020-09-18 PROCEDURE — 3078F PR MOST RECENT DIASTOLIC BLOOD PRESSURE < 80 MM HG: ICD-10-PCS | Mod: HCNC,CPTII,S$GLB, | Performed by: INTERNAL MEDICINE

## 2020-09-18 PROCEDURE — 99999 PR PBB SHADOW E&M-EST. PATIENT-LVL IV: CPT | Mod: PBBFAC,HCNC,, | Performed by: INTERNAL MEDICINE

## 2020-09-18 PROCEDURE — 1126F AMNT PAIN NOTED NONE PRSNT: CPT | Mod: HCNC,S$GLB,, | Performed by: INTERNAL MEDICINE

## 2020-09-18 PROCEDURE — 99213 PR OFFICE/OUTPT VISIT, EST, LEVL III, 20-29 MIN: ICD-10-PCS | Mod: HCNC,S$GLB,, | Performed by: INTERNAL MEDICINE

## 2020-09-18 PROCEDURE — 1101F PT FALLS ASSESS-DOCD LE1/YR: CPT | Mod: HCNC,CPTII,S$GLB, | Performed by: INTERNAL MEDICINE

## 2020-09-18 PROCEDURE — 3074F SYST BP LT 130 MM HG: CPT | Mod: HCNC,CPTII,S$GLB, | Performed by: INTERNAL MEDICINE

## 2020-09-18 PROCEDURE — 3074F PR MOST RECENT SYSTOLIC BLOOD PRESSURE < 130 MM HG: ICD-10-PCS | Mod: HCNC,CPTII,S$GLB, | Performed by: INTERNAL MEDICINE

## 2020-09-18 PROCEDURE — 1159F MED LIST DOCD IN RCRD: CPT | Mod: HCNC,S$GLB,, | Performed by: INTERNAL MEDICINE

## 2020-09-18 PROCEDURE — 99999 PR PBB SHADOW E&M-EST. PATIENT-LVL IV: ICD-10-PCS | Mod: PBBFAC,HCNC,, | Performed by: INTERNAL MEDICINE

## 2020-09-18 RX ORDER — TRAMADOL HYDROCHLORIDE 50 MG/1
50 TABLET ORAL DAILY PRN
Qty: 30 TABLET | Refills: 0 | Status: SHIPPED | OUTPATIENT
Start: 2020-09-18 | End: 2020-10-16 | Stop reason: SDUPTHER

## 2020-09-18 NOTE — TELEPHONE ENCOUNTER
----- Message from Vera Coley sent at 9/17/2020  2:10 PM CDT -----  Type: Needs Medical Advice  Who Called:  Laila  Stephen Call Back Number:   Additional Information: She is calling back about her message of yesterday.  She has not received a return call.  Please call her as soon as possible. Thank you!

## 2020-09-18 NOTE — PROGRESS NOTES
Subjective:       Patient ID: Ankit Ruff is a 80 y.o. male.    Chief Complaint: No chief complaint on file.      HPI:  New to me.  Past medical history includes coronary artery disease status post CABG x4, paroxysmal atrial fibrillation, presence of ICD, combined systolic and diastolic heart failure, essential hypertension, and pure hypercholesterolemia    Here for annual and med refills    Recent labs (July of 2020) unremarkable except for mild normocytic.    Daughter tested + for COVID 2 weeks. Prior to be tested she visited him (they did not wear mask) He has cough, subjective fever, body aches. Afebrile today, O2 sat 94%, BP ok. He has be getting ultram from Dr. Jones. He would like to get ultram refilled. He wants a PCP that will continue write Rx for Pain. I told him I am not the PCP for him. I am trying to minimize narcotic prescriptions.     He was supposed to get drive though testing today but they sent him here. Will send to drive through testing after this apt.   Hypertension- controlled  Hyperlipidemia-lipids at goal January 2020  CAD- stable on meds  CHF- stable on meds.     Current Outpatient Medications on File Prior to Visit   Medication Sig Dispense Refill    aspirin (ECOTRIN) 81 MG EC tablet Take 81 mg by mouth once daily.        carvediloL (COREG) 12.5 MG tablet TAKE 1 TABLET TWICE DAILY 180 tablet 0    citalopram (CELEXA) 10 MG tablet TAKE 1 TABLET EVERY DAY 90 tablet 0    gabapentin (NEURONTIN) 300 MG capsule TAKE 1 CAPSULE TWICE DAILY 180 capsule 0    meloxicam (MOBIC) 15 MG tablet TAKE 1 TABLET EVERY DAY AS NEEDED 90 tablet 1    oxybutynin (DITROPAN) 5 MG Tab TAKE 1 TABLET (5 MG TOTAL) BY MOUTH EVERY EVENING. 90 tablet 3    pantoprazole (PROTONIX) 40 MG tablet Take 1 tablet (40 mg total) by mouth once daily. 90 tablet 3    rosuvastatin (CRESTOR) 10 MG tablet TAKE 1 TABLET EVERY DAY 90 tablet 0    SHINGRIX, PF, 50 mcg/0.5 mL injection       spironolactone (ALDACTONE) 25 MG  tablet TAKE 1/2 TABLET DAILY ON MONDAY, WEDNESDAY & FRIDAY 20 tablet 3    tiZANidine (ZANAFLEX) 4 MG tablet Take 1 tablet (4 mg total) by mouth 2 (two) times daily. (Patient taking differently: Take 4 mg by mouth nightly. ) 180 tablet 3    [DISCONTINUED] traMADoL (ULTRAM) 50 mg tablet Take 1 tablet (50 mg total) by mouth daily as needed for Pain. 30 tablet 0     No current facility-administered medications on file prior to visit.      I personally reviewed past medical, family and social history.  Review of Systems   Constitutional: Negative for activity change and fever.   HENT: Negative for sore throat and trouble swallowing.    Eyes: Negative for pain and visual disturbance.   Respiratory: Positive for cough. Negative for shortness of breath and wheezing.    Cardiovascular: Negative for chest pain, palpitations and leg swelling.   Gastrointestinal: Negative for abdominal pain, blood in stool, diarrhea, nausea and vomiting.   Endocrine: Negative for cold intolerance and polyuria.   Genitourinary: Negative for decreased urine volume and dysuria.   Musculoskeletal: Negative for gait problem and neck pain.   Skin: Negative for rash.   Neurological: Negative for dizziness, syncope and light-headedness.   Psychiatric/Behavioral: Negative for dysphoric mood. The patient is not nervous/anxious.          Objective:     Vitals:    09/18/20 0801   BP: 102/62   Pulse: (!) 56   Temp: 98.3 °F (36.8 °C)        Physical Exam  Constitutional:       General: He is not in acute distress.     Appearance: He is well-developed.   HENT:      Head: Normocephalic and atraumatic.   Eyes:      Pupils: Pupils are equal, round, and reactive to light.   Neck:      Musculoskeletal: Neck supple.      Thyroid: No thyromegaly.   Cardiovascular:      Rate and Rhythm: Normal rate and regular rhythm.      Heart sounds: Normal heart sounds. No murmur. No friction rub. No gallop.    Pulmonary:      Effort: Pulmonary effort is normal. No respiratory  distress.      Breath sounds: Normal breath sounds. No wheezing.      Comments: crackles  Abdominal:      General: Bowel sounds are normal.      Palpations: Abdomen is soft.      Tenderness: There is no abdominal tenderness.   Skin:     General: Skin is warm.      Findings: No rash.   Neurological:      Mental Status: He is alert and oriented to person, place, and time.      Cranial Nerves: No cranial nerve deficit.   Psychiatric:         Behavior: Behavior normal.           Assessment/Plan   Diagnoses and all orders for this visit:    Essential hypertension    Pure hypercholesterolemia    Inflammatory spondylopathy of lumbar region  -     traMADoL (ULTRAM) 50 mg tablet; Take 1 tablet (50 mg total) by mouth daily as needed for Pain.    Cough  -     X-Ray Chest PA And Lateral; Future    Bibasilar crackles  -     X-Ray Chest PA And Lateral; Future

## 2020-09-19 DIAGNOSIS — U07.1 COVID-19 VIRUS DETECTED: ICD-10-CM

## 2020-09-20 ENCOUNTER — TELEPHONE (OUTPATIENT)
Dept: FAMILY MEDICINE | Facility: CLINIC | Age: 80
End: 2020-09-20

## 2020-09-20 DIAGNOSIS — U07.1 COVID-19 VIRUS DETECTED: Primary | ICD-10-CM

## 2020-09-20 NOTE — TELEPHONE ENCOUNTER
To be on the safe side, I would quarantine 14 days from from the day of first symptoms (NOT first exposure) and 72 hrs after last fever without fever reducing meds.     Will order covid monitoring.

## 2020-09-20 NOTE — TELEPHONE ENCOUNTER
----- Message from Bre Ortez NP sent at 9/19/2020  4:52 PM CDT -----  Dr. Patterson Question:  How long should he quarntine?  Result note that CXR shows no pneumonia but that Covid test detected Covid given per Dr. Patterson's direction.    Questions:  How long he should quarantine.  He was exposed 2 weeks ago tomorrow.  Pt states understanding.  BMcCoy APRN, CNP

## 2020-09-22 ENCOUNTER — TELEPHONE (OUTPATIENT)
Dept: CARDIOLOGY | Facility: CLINIC | Age: 80
End: 2020-09-22

## 2020-09-22 NOTE — TELEPHONE ENCOUNTER
AF noted during device remote check:    Overall burden: 0.6% since 8/22/2020    Max duration seen: 4 hrs 14 mins, EGM c/w irregular R-R intervals (single chamber ICD)    Most recent episode: 9/6/2020    Ventricular rates:  Mostly controlled, max V rate 125 bpm    Anticoagulation status:  none    Patient symptoms: Pt. Reports he has been having a cold for a few weeks and recently diagnosed with Covid-19.      Pt. Had RFA in 2016    Meds:  Coreg 12.5 mg bid    See attached for event    Message forwarded to Dr. Guerrero for review

## 2020-09-23 ENCOUNTER — TELEPHONE (OUTPATIENT)
Dept: CARDIOLOGY | Facility: CLINIC | Age: 80
End: 2020-09-23

## 2020-09-29 ENCOUNTER — PATIENT MESSAGE (OUTPATIENT)
Dept: OTHER | Facility: OTHER | Age: 80
End: 2020-09-29

## 2020-10-04 ENCOUNTER — CLINICAL SUPPORT (OUTPATIENT)
Dept: CARDIOLOGY | Facility: CLINIC | Age: 80
End: 2020-10-04
Payer: MEDICARE

## 2020-10-04 DIAGNOSIS — Z95.810 PRESENCE OF AUTOMATIC (IMPLANTABLE) CARDIAC DEFIBRILLATOR: ICD-10-CM

## 2020-10-05 ENCOUNTER — IMMUNIZATION (OUTPATIENT)
Dept: PHARMACY | Facility: CLINIC | Age: 80
End: 2020-10-05
Payer: MEDICARE

## 2020-10-07 ENCOUNTER — PATIENT MESSAGE (OUTPATIENT)
Dept: CARDIOLOGY | Facility: CLINIC | Age: 80
End: 2020-10-07

## 2020-10-07 DIAGNOSIS — M46.96 INFLAMMATORY SPONDYLOPATHY OF LUMBAR REGION: ICD-10-CM

## 2020-10-07 RX ORDER — CARVEDILOL 12.5 MG/1
12.5 TABLET ORAL 2 TIMES DAILY
Qty: 14 TABLET | Refills: 0 | Status: SHIPPED | OUTPATIENT
Start: 2020-10-07 | End: 2021-02-22 | Stop reason: SDUPTHER

## 2020-10-08 RX ORDER — TRAMADOL HYDROCHLORIDE 50 MG/1
50 TABLET ORAL DAILY PRN
Qty: 30 TABLET | Refills: 0 | OUTPATIENT
Start: 2020-10-08

## 2020-10-08 NOTE — TELEPHONE ENCOUNTER
Please do not schedule apt with me for refill. I have already seen him. I will not refill pain meds for him. He will need to be seen in another POD for pain med refill.

## 2020-10-16 ENCOUNTER — PATIENT MESSAGE (OUTPATIENT)
Dept: FAMILY MEDICINE | Facility: CLINIC | Age: 80
End: 2020-10-16

## 2020-10-16 DIAGNOSIS — M46.96 INFLAMMATORY SPONDYLOPATHY OF LUMBAR REGION: ICD-10-CM

## 2020-10-16 RX ORDER — TRAMADOL HYDROCHLORIDE 50 MG/1
50 TABLET ORAL DAILY PRN
Qty: 30 TABLET | Refills: 0 | Status: SHIPPED | OUTPATIENT
Start: 2020-10-16 | End: 2020-11-20 | Stop reason: SDUPTHER

## 2020-10-20 ENCOUNTER — PATIENT MESSAGE (OUTPATIENT)
Dept: FAMILY MEDICINE | Facility: CLINIC | Age: 80
End: 2020-10-20

## 2020-10-26 ENCOUNTER — OFFICE VISIT (OUTPATIENT)
Dept: PHYSICAL MEDICINE AND REHAB | Facility: CLINIC | Age: 80
End: 2020-10-26
Payer: MEDICARE

## 2020-10-26 VITALS — HEIGHT: 71 IN | BODY MASS INDEX: 27.86 KG/M2 | WEIGHT: 199 LBS

## 2020-10-26 DIAGNOSIS — M65.342 ACQUIRED TRIGGER FINGER OF LEFT RING FINGER: Primary | ICD-10-CM

## 2020-10-26 PROCEDURE — 1101F PT FALLS ASSESS-DOCD LE1/YR: CPT | Mod: HCNC,CPTII,S$GLB, | Performed by: PHYSICAL MEDICINE & REHABILITATION

## 2020-10-26 PROCEDURE — 99213 OFFICE O/P EST LOW 20 MIN: CPT | Mod: 25,HCNC,GC,S$GLB | Performed by: PHYSICAL MEDICINE & REHABILITATION

## 2020-10-26 PROCEDURE — 1159F PR MEDICATION LIST DOCUMENTED IN MEDICAL RECORD: ICD-10-PCS | Mod: HCNC,S$GLB,, | Performed by: PHYSICAL MEDICINE & REHABILITATION

## 2020-10-26 PROCEDURE — 99999 PR PBB SHADOW E&M-EST. PATIENT-LVL III: ICD-10-PCS | Mod: PBBFAC,HCNC,, | Performed by: PHYSICAL MEDICINE & REHABILITATION

## 2020-10-26 PROCEDURE — 76942 ECHO GUIDE FOR BIOPSY: CPT | Mod: HCNC,S$GLB,, | Performed by: PHYSICAL MEDICINE & REHABILITATION

## 2020-10-26 PROCEDURE — 99999 PR PBB SHADOW E&M-EST. PATIENT-LVL III: CPT | Mod: PBBFAC,HCNC,, | Performed by: PHYSICAL MEDICINE & REHABILITATION

## 2020-10-26 PROCEDURE — 1125F PR PAIN SEVERITY QUANTIFIED, PAIN PRESENT: ICD-10-PCS | Mod: HCNC,S$GLB,, | Performed by: PHYSICAL MEDICINE & REHABILITATION

## 2020-10-26 PROCEDURE — 20550 NJX 1 TENDON SHEATH/LIGAMENT: CPT | Mod: HCNC,LT,S$GLB, | Performed by: PHYSICAL MEDICINE & REHABILITATION

## 2020-10-26 PROCEDURE — 76942 TENDON SHEATH: L RING MCP: ICD-10-PCS | Mod: HCNC,S$GLB,, | Performed by: PHYSICAL MEDICINE & REHABILITATION

## 2020-10-26 PROCEDURE — 1159F MED LIST DOCD IN RCRD: CPT | Mod: HCNC,S$GLB,, | Performed by: PHYSICAL MEDICINE & REHABILITATION

## 2020-10-26 PROCEDURE — 1125F AMNT PAIN NOTED PAIN PRSNT: CPT | Mod: HCNC,S$GLB,, | Performed by: PHYSICAL MEDICINE & REHABILITATION

## 2020-10-26 PROCEDURE — 99213 PR OFFICE/OUTPT VISIT, EST, LEVL III, 20-29 MIN: ICD-10-PCS | Mod: 25,HCNC,GC,S$GLB | Performed by: PHYSICAL MEDICINE & REHABILITATION

## 2020-10-26 PROCEDURE — 20550 TENDON SHEATH: L RING MCP: ICD-10-PCS | Mod: HCNC,LT,S$GLB, | Performed by: PHYSICAL MEDICINE & REHABILITATION

## 2020-10-26 PROCEDURE — 1101F PR PT FALLS ASSESS DOC 0-1 FALLS W/OUT INJ PAST YR: ICD-10-PCS | Mod: HCNC,CPTII,S$GLB, | Performed by: PHYSICAL MEDICINE & REHABILITATION

## 2020-10-26 RX ORDER — BETAMETHASONE SODIUM PHOSPHATE AND BETAMETHASONE ACETATE 3; 3 MG/ML; MG/ML
1.5 INJECTION, SUSPENSION INTRA-ARTICULAR; INTRALESIONAL; INTRAMUSCULAR; SOFT TISSUE
Status: DISCONTINUED | OUTPATIENT
Start: 2020-10-26 | End: 2020-10-26 | Stop reason: HOSPADM

## 2020-10-26 RX ADMIN — BETAMETHASONE SODIUM PHOSPHATE AND BETAMETHASONE ACETATE 1.5 MG: 3; 3 INJECTION, SUSPENSION INTRA-ARTICULAR; INTRALESIONAL; INTRAMUSCULAR; SOFT TISSUE at 08:10

## 2020-10-26 NOTE — PROGRESS NOTES
"[facility]    CHIEF COMPLAINT:   Chief Complaint   Patient presents with    Hand Pain     Finger     HISTORY OF PRESENT ILLNESS: Ankit Ruff is a 80 y.o. male with PMH of CAD, CHF (s/p ICD placement) who presents to me for the first time for evaluation of left trigger finger. Current symptoms are localized to the left 4th digit. Symptoms started over six months ago. He noticed symptoms worsened about 1-2 mo ago, when he woke up one morning and the ringer finger was stuck in a flexed position. Since that time, he has experienced daily symptoms of catching and popping of the 4th digit with flexion and extension. He has occasional very mild pain (2/10), localized over the 4th digit flexor tendon when the catching occurs. Symptoms are partricularly evident throughout the day when he is using his hands doing work. Denies any pain at rest or consistent locking. He also denies any redness, but has noticed a small "ball shaped" swelling over the tendon of the 4th digit on the palmar surface. Symtpoms dont wake him up at night. He continues to take Meloxicam for arthritis, but has not taken any other medication for current symptoms. He denies any specific therapy. He has not seen any other providers for current complaint. Of note, he has a history of trigger finger release on the right 10-15 yrs ago by Dr. Ashley at [facility]. He has been treated in this clinic for thumb and knee pain.     Review of Systems   Constitutional: Negative for fever.   HENT: Negative for drooling.    Eyes: Negative for discharge.   Respiratory: Negative for choking.    Cardiovascular: Negative for chest pain.   Genitourinary: Negative for flank pain.   Skin: Negative for wound.   Allergic/Immunologic: Negative for immunocompromised state.   Neurological: Negative for tremors and syncope.   Psychiatric/Behavioral: Negative for behavioral problems.     Past Medical History:   Past Medical History:   Diagnosis Date    Anxiety  "    Arthritis     Atrial fibrillation     CAD (coronary artery disease)     Cataract     OU    CHF (congestive heart failure)     Defibrillator discharge     Heart failure     Hyperlipidemia     ICD (implantable cardiac defibrillator) in place     Ischemic cardiomyopathy     Myocardial infarction     Sciatica     had seen Dr. Amy Canela in the past    Squamous cell carcinoma     Left cheek 4-2016        Past Surgical History:   Past Surgical History:   Procedure Laterality Date    APPENDECTOMY      CARDIAC SURGERY      cabg    Defibulater      FRACTURE SURGERY      HERNIA REPAIR      INJECTION OF ANESTHETIC AGENT AROUND NERVE Right 12/21/2018    Procedure: diagnsotic block to the genicular nerve branches to the right knee;  Surgeon: Sj Gonzales MD;  Location: [facility] OR;  Service: Orthopedics;  Laterality: Right;    INTERNAL NEUROLYSIS USING OPERATING MICROSCOPE Right 2/1/2019    Procedure: cooled radiofrequency ablation to the genicular nerve branches of the right knee;  Surgeon: Sj Gonzales MD;  Location: [facility] OR;  Service: Orthopedics;  Laterality: Right;    KNEE ARTHROSCOPY      SKIN BIOPSY      TONSILLECTOMY         Family History: History reviewed. No pertinent family history.    Medications:   Current Outpatient Medications on File Prior to Visit   Medication Sig Dispense Refill    aspirin (ECOTRIN) 81 MG EC tablet Take 81 mg by mouth once daily.        carvediloL (COREG) 12.5 MG tablet TAKE 1 TABLET TWICE DAILY 180 tablet 0    carvediloL (COREG) 12.5 MG tablet Take 1 tablet (12.5 mg total) by mouth 2 (two) times daily. 14 tablet 0    citalopram (CELEXA) 10 MG tablet TAKE 1 TABLET EVERY DAY 90 tablet 0    gabapentin (NEURONTIN) 300 MG capsule TAKE 1 CAPSULE TWICE DAILY 180 capsule 0    meloxicam (MOBIC) 15 MG tablet TAKE 1 TABLET EVERY DAY AS NEEDED 90 tablet 1    oxybutynin (DITROPAN) 5 MG Tab TAKE 1 TABLET (5 MG TOTAL) BY MOUTH EVERY EVENING. 90 tablet 3     pantoprazole (PROTONIX) 40 MG tablet Take 1 tablet (40 mg total) by mouth once daily. 90 tablet 3    rosuvastatin (CRESTOR) 10 MG tablet TAKE 1 TABLET EVERY DAY 90 tablet 0    SHINGRIX, PF, 50 mcg/0.5 mL injection       spironolactone (ALDACTONE) 25 MG tablet TAKE 1/2 TABLET DAILY ON MONDAY, WEDNESDAY & FRIDAY 20 tablet 3    tiZANidine (ZANAFLEX) 4 MG tablet Take 1 tablet (4 mg total) by mouth 2 (two) times daily. (Patient taking differently: Take 4 mg by mouth nightly. ) 180 tablet 3    traMADoL (ULTRAM) 50 mg tablet Take 1 tablet (50 mg total) by mouth daily as needed for Pain. 30 tablet 0     No current facility-administered medications on file prior to visit.        Allergies:   Review of patient's allergies indicates:   Allergen Reactions    Penicillins Rash       Social History:   Social History     Socioeconomic History    Marital status:      Spouse name: Not on file    Number of children: Not on file    Years of education: Not on file    Highest education level: Not on file   Occupational History    Not on file   Social Needs    Financial resource strain: Not hard at all    Food insecurity     Worry: Never true     Inability: Never true    Transportation needs     Medical: No     Non-medical: No   Tobacco Use    Smoking status: Former Smoker     Types: Cigarettes     Quit date: 1977     Years since quittin.2    Smokeless tobacco: Never Used   Substance and Sexual Activity    Alcohol use: Yes     Frequency: 2-4 times a month     Drinks per session: 1 or 2     Binge frequency: Never     Comment: socially    Drug use: No    Sexual activity: Not on file   Lifestyle    Physical activity     Days per week: Not on file     Minutes per session: Not on file    Stress: Not at all   Relationships    Social connections     Talks on phone: More than three times a week     Gets together: Patient refused     Attends Religion service: Not on file     Active member of club or  "organization: Patient refused     Attends meetings of clubs or organizations: Patient refused     Relationship status:    Other Topics Concern    Not on file   Social History Narrative    Not on file     Ankit is a former .      PHYSICAL EXAMINATION:   General    Vitals:    10/26/20 0811   Weight: 90.3 kg (199 lb)   Height: 5' 11" (1.803 m)     Constitutional: Oriented to person, place, and time. No apparent distress. Pleasant.  HENT:   Head: Normocephalic and atraumatic.   Eyes: Right eye exhibits no discharge. Left eye exhibits no discharge. No scleral icterus.   Pulmonary/Chest: Effort normal. No respiratory distress.   Abdominal: There is no guarding.   Neurological: Alert and oriented to person, place, and time.   Psychiatric: Behavior is normal.   Right Hand Exam   Right hand exam is normal.    Range of Motion   The patient has normal right wrist ROM.   Wrist   Extension: normal   Flexion: normal     Muscle Strength   Wrist extension: 5/5   Wrist flexion: 5/5   : 5/5     Other   Erythema: absent  Scars: present  Sensation: normal  Pulse: present    Comments:  Well healed, oblique surgical incision scar on right hand over the palmar aspect of the 4th metacarpal    Multiple sun spots on BL hands      Left Hand Exam     Tenderness   The patient is experiencing no tenderness.     Range of Motion   The patient has normal left wrist ROM.  Wrist   Extension: normal   Flexion: normal     Muscle Strength   Wrist extension: 5/5   Wrist flexion: 5/5   :  5/5     Tests   Phalens Sign: negative    Other   Erythema: absent  Scars: absent  Sensation: normal  Pulse: present    Comments:  Small round swelling over the digital flexor tendon of the 4th digit. Palpable catch over the A1 pulley on the palmar aspect of the 4th digit during active flexion and extension. Reduced during passive flexion and extension.     Multiple sun spots on BL hands        INSPECTION: There is no swelling, ecchymoses, " erythema or gross deformity of the hand.    ASSESSMENT:   Mr. Morgan Ruff is an 80 yr old M with:  Left 4th digit trigger finger    PLAN:     1. Time was spent reviewing the above diagnosis in depth with Ankit today, including acute management and rehabilitation. We discussed the pathophysiology, anatomy, prognosis, and treatment options of trigger finger, including conservative management with rest, therapy, activity modification, and medication. We also discussed more invasive options including corticosteroid injections and percutaneous trigger finger release. Patient is interested in pursuing corticosteroid injection into the trigger finger under ultrasound guidance. We performed Left 4th digit trigger finger injection under ultrasound today in the clinic. See procedure note for further detail. Patient tolerated procedure well.     2. We discussed expected outcomes following procedure with the patient, and next steps. We can expect this procedure to provide relief; however, relief may be limited. If symptoms return, we can consider scheduling the patient for in-office percutaneous trigger finger release under ultrasound.     3.RTC prn.     The above note was completed, in part, with the aid of Dragon dictation software/hardware. Translation errors may be present.

## 2020-10-26 NOTE — PROCEDURES
Tendon Sheath: L ring MCP    Date/Time: 10/26/2020 8:30 AM  Performed by: Sj Gonzales MD  Authorized by: Sj Gonzales MD     Consent Done?:  Yes (Verbal)  Indications:  Pain  Site marked: the procedure site was marked    Timeout: prior to procedure the correct patient, procedure, and site was verified    Prep: patient was prepped and draped in usual sterile fashion      Local anesthesia used?: No    Location:  Ring finger  Site:  L ring MCP  Ultrasonic guidance for needle placement?: Yes    Needle size:  27 G  Approach:  Volar (in plane, dist to prox)  Medications:  1.5 mg betamethasone acetate-betamethasone sodium phosphate 6 mg/mL  Patient tolerance:  Patient tolerated the procedure well with no immediate complications    Additional Comments: Ultrasound guidance was used for correct needle placement, the images were saved will be uploaded to EMR.

## 2020-11-01 ENCOUNTER — CLINICAL SUPPORT (OUTPATIENT)
Dept: CARDIOLOGY | Facility: CLINIC | Age: 80
End: 2020-11-01
Payer: MEDICARE

## 2020-11-01 DIAGNOSIS — Z95.810 PRESENCE OF AUTOMATIC (IMPLANTABLE) CARDIAC DEFIBRILLATOR: ICD-10-CM

## 2020-11-01 PROCEDURE — 93296 REM INTERROG EVL PM/IDS: CPT | Mod: PBBFAC,HCNC,PO | Performed by: INTERNAL MEDICINE

## 2020-11-01 PROCEDURE — 93295 DEV INTERROG REMOTE 1/2/MLT: CPT | Mod: ,,, | Performed by: INTERNAL MEDICINE

## 2020-11-01 PROCEDURE — 93295 CARDIAC DEVICE CHECK - REMOTE: ICD-10-PCS | Mod: ,,, | Performed by: INTERNAL MEDICINE

## 2020-11-03 ENCOUNTER — CLINICAL SUPPORT (OUTPATIENT)
Dept: CARDIOLOGY | Facility: CLINIC | Age: 80
End: 2020-11-03
Payer: MEDICARE

## 2020-11-03 DIAGNOSIS — Z95.810 PRESENCE OF AUTOMATIC (IMPLANTABLE) CARDIAC DEFIBRILLATOR: ICD-10-CM

## 2020-11-03 PROCEDURE — 93297 CARDIAC DEVICE CHECK - REMOTE: ICD-10-PCS | Mod: HCNC,S$GLB,, | Performed by: INTERNAL MEDICINE

## 2020-11-03 PROCEDURE — 93297 REM INTERROG DEV EVAL ICPMS: CPT | Mod: HCNC,S$GLB,, | Performed by: INTERNAL MEDICINE

## 2020-11-17 DIAGNOSIS — M17.0 OSTEOARTHRITIS OF BOTH KNEES, UNSPECIFIED OSTEOARTHRITIS TYPE: ICD-10-CM

## 2020-11-17 RX ORDER — CITALOPRAM 10 MG/1
TABLET ORAL
Qty: 90 TABLET | Refills: 0 | Status: SHIPPED | OUTPATIENT
Start: 2020-11-17 | End: 2021-02-23

## 2020-11-17 NOTE — TELEPHONE ENCOUNTER
No new care gaps identified.  Powered by [employer]. Reference number: [id number]. 11/17/2020 8:18:56 AM   CST

## 2020-11-18 NOTE — PROGRESS NOTES
Refill Routing Note   Medication(s) are not appropriate for processing by [facility] for the following reason(s):     - Outside of protocol    [facility] action(s):  Route  Approve     Medication Therapy Plan: WILL lobo  Medication reconciliation completed: No   Automatic Epic Generated Protocol Data:    Orders Placed This Encounter    citalopram (CELEXA) 10 MG tablet      Requested Prescriptions   Pending Prescriptions Disp Refills    meloxicam (MOBIC) 15 MG tablet [Pharmacy Med Name: MELOXICAM 15 MG Tablet] 90 tablet 1     Sig: TAKE 1 TABLET EVERY DAY AS NEEDED       NSAIDs Protocol Passed - 11/17/2020  8:18 AM        Passed - Serum Creatinine less than 1.4 on file in the past 12 months     Lab Results   Component Value Date    CREATININE 0.80 07/01/2020    CREATININE 1.0 01/24/2020    CREATININE 0.73 08/17/2019     Lab Results   Component Value Date    EGFRNONAA >60 07/01/2020    EGFRNONAA >60.0 01/24/2020    EGFRNONAA >60 08/17/2019               Passed - Visit with authorizing provider in past 12 months or upcoming 90 days        Passed - HGB greater than 10 or HCT greater than 30 in past 12 months        Passed - AST in past 12 months      Lab Results   Component Value Date    AST 38 01/24/2020    AST 42 08/17/2019    AST 48 08/16/2019              Passed - Serum Potassium less than 5.2 on file in the past 12 months     Lab Results   Component Value Date    K 4.1 07/01/2020    K 4.6 01/24/2020    K 4.0 08/17/2019                  Passed - Blood Pressure below 139/89 on file in past 12 months      BP Readings from Last 3 Encounters:   09/18/20 102/62   07/01/20 (!) 159/78   06/03/20 110/61             Passed - ALT less than 95 in past 12 months     Lab Results   Component Value Date    ALT 31 01/24/2020    ALT 41 08/17/2019    ALT 46 (H) 08/16/2019               Signed Prescriptions Disp Refills    citalopram (CELEXA) 10 MG tablet 90 tablet 0     Sig: TAKE 1 TABLET EVERY DAY       Psychiatry:   Antidepressants - SSRI Passed - 11/17/2020  8:18 AM        Passed - Patient is at least 18 years old        Passed - Office visit in past 12 months or future 90 days     Recent Outpatient Visits            3 weeks ago Acquired trigger finger of left ring finger    [place] [facility] Sj Gonzales MD    2 months ago Essential hypertension    [facility] Milton Patterson DO    5 months ago Ischemic cardiomyopathy    [facility] Romeo Guerrero MD    8 months ago Encounter for preventive health examination    [facility] Haylee Mosley, SHAYNA    10 months ago Wellness examination    [facility] GUNNAR Jones MD          Future Appointments              In 2 weeks HOME MONITOR DEVICE CHECK, [facility] - [facility], [place]    In 2 months GUNNAR Jones MD [facility], [place]    In 2 months HOME MONITOR DEVICE CHECK, [facility] [place] - [facility], [place]    In 3 months ECHO, [place] [facility], [place]    In 3 months VASCULAR, [facility] [place] - [facility], [place]    In 3 months LAB, [place] [facility]    In 3 months Romeo Guerrero MD [place] [facility], [place]                      Appointments  past 12m or future 3m with PCP    Date Provider   Last Visit   1/17/2020 GUNNAR Jones MD   Next Visit   1/21/2021 GUNNAR Jones MD   ED visits in past 90 days: 0        Note composed:6:02 PM 11/17/2020

## 2020-11-19 RX ORDER — MELOXICAM 15 MG/1
TABLET ORAL
Qty: 90 TABLET | Refills: 0 | Status: SHIPPED | OUTPATIENT
Start: 2020-11-19 | End: 2021-02-24

## 2020-12-03 ENCOUNTER — CLINICAL SUPPORT (OUTPATIENT)
Dept: CARDIOLOGY | Facility: CLINIC | Age: 80
End: 2020-12-03
Payer: MEDICARE

## 2020-12-03 DIAGNOSIS — Z95.810 PRESENCE OF AUTOMATIC (IMPLANTABLE) CARDIAC DEFIBRILLATOR: ICD-10-CM

## 2020-12-03 PROCEDURE — 93297 CARDIAC DEVICE CHECK - REMOTE: ICD-10-PCS | Mod: HCNC,S$GLB,, | Performed by: INTERNAL MEDICINE

## 2020-12-03 PROCEDURE — 93297 REM INTERROG DEV EVAL ICPMS: CPT | Mod: HCNC,S$GLB,, | Performed by: INTERNAL MEDICINE

## 2020-12-11 ENCOUNTER — PATIENT MESSAGE (OUTPATIENT)
Dept: OTHER | Facility: OTHER | Age: 80
End: 2020-12-11

## 2020-12-29 DIAGNOSIS — M46.96 INFLAMMATORY SPONDYLOPATHY OF LUMBAR REGION: ICD-10-CM

## 2020-12-30 RX ORDER — TRAMADOL HYDROCHLORIDE 50 MG/1
50 TABLET ORAL DAILY PRN
Qty: 30 TABLET | Refills: 0 | Status: SHIPPED | OUTPATIENT
Start: 2020-12-30 | End: 2021-01-28 | Stop reason: SDUPTHER

## 2021-01-02 ENCOUNTER — CLINICAL SUPPORT (OUTPATIENT)
Dept: CARDIOLOGY | Facility: CLINIC | Age: 81
End: 2021-01-02
Payer: MEDICARE

## 2021-01-02 DIAGNOSIS — Z95.810 PRESENCE OF AUTOMATIC (IMPLANTABLE) CARDIAC DEFIBRILLATOR: ICD-10-CM

## 2021-01-02 PROCEDURE — 93297 REM INTERROG DEV EVAL ICPMS: CPT | Mod: HCNC,S$GLB,, | Performed by: INTERNAL MEDICINE

## 2021-01-02 PROCEDURE — 93297 CARDIAC DEVICE CHECK - REMOTE: ICD-10-PCS | Mod: HCNC,S$GLB,, | Performed by: INTERNAL MEDICINE

## 2021-01-15 ENCOUNTER — PATIENT MESSAGE (OUTPATIENT)
Dept: CARDIOLOGY | Facility: CLINIC | Age: 81
End: 2021-01-15

## 2021-01-15 DIAGNOSIS — I10 ESSENTIAL HYPERTENSION: Primary | ICD-10-CM

## 2021-01-15 RX ORDER — CARVEDILOL 12.5 MG/1
12.5 TABLET ORAL 2 TIMES DAILY
Qty: 180 TABLET | Refills: 3 | Status: SHIPPED | OUTPATIENT
Start: 2021-01-15 | End: 2021-02-22 | Stop reason: SDUPTHER

## 2021-01-21 ENCOUNTER — OFFICE VISIT (OUTPATIENT)
Dept: FAMILY MEDICINE | Facility: CLINIC | Age: 81
End: 2021-01-21
Payer: MEDICARE

## 2021-01-21 VITALS
HEART RATE: 56 BPM | HEIGHT: 71 IN | TEMPERATURE: 98 F | SYSTOLIC BLOOD PRESSURE: 110 MMHG | OXYGEN SATURATION: 96 % | BODY MASS INDEX: 27.93 KG/M2 | WEIGHT: 199.5 LBS | DIASTOLIC BLOOD PRESSURE: 60 MMHG

## 2021-01-21 DIAGNOSIS — D69.6 THROMBOCYTOPENIA: ICD-10-CM

## 2021-01-21 DIAGNOSIS — Z95.1 S/P CABG X 4: ICD-10-CM

## 2021-01-21 DIAGNOSIS — E78.5 HYPERLIPIDEMIA, UNSPECIFIED HYPERLIPIDEMIA TYPE: ICD-10-CM

## 2021-01-21 DIAGNOSIS — I11.0 HYPERTENSIVE HEART DISEASE WITH HEART FAILURE: ICD-10-CM

## 2021-01-21 DIAGNOSIS — M46.96 INFLAMMATORY SPONDYLOPATHY OF LUMBAR REGION: ICD-10-CM

## 2021-01-21 DIAGNOSIS — I10 ESSENTIAL HYPERTENSION: ICD-10-CM

## 2021-01-21 DIAGNOSIS — I48.0 PAF (PAROXYSMAL ATRIAL FIBRILLATION): ICD-10-CM

## 2021-01-21 DIAGNOSIS — Z00.00 WELLNESS EXAMINATION: Primary | ICD-10-CM

## 2021-01-21 PROCEDURE — 99397 PR PREVENTIVE VISIT,EST,65 & OVER: ICD-10-PCS | Mod: S$GLB,,, | Performed by: FAMILY MEDICINE

## 2021-01-21 PROCEDURE — 3074F PR MOST RECENT SYSTOLIC BLOOD PRESSURE < 130 MM HG: ICD-10-PCS | Mod: CPTII,S$GLB,, | Performed by: FAMILY MEDICINE

## 2021-01-21 PROCEDURE — 99499 UNLISTED E&M SERVICE: CPT | Mod: HCNC,S$GLB,, | Performed by: FAMILY MEDICINE

## 2021-01-21 PROCEDURE — 1101F PT FALLS ASSESS-DOCD LE1/YR: CPT | Mod: CPTII,S$GLB,, | Performed by: FAMILY MEDICINE

## 2021-01-21 PROCEDURE — 99999 PR PBB SHADOW E&M-EST. PATIENT-LVL IV: CPT | Mod: PBBFAC,,, | Performed by: FAMILY MEDICINE

## 2021-01-21 PROCEDURE — 1126F AMNT PAIN NOTED NONE PRSNT: CPT | Mod: S$GLB,,, | Performed by: FAMILY MEDICINE

## 2021-01-21 PROCEDURE — 3078F DIAST BP <80 MM HG: CPT | Mod: CPTII,S$GLB,, | Performed by: FAMILY MEDICINE

## 2021-01-21 PROCEDURE — 1101F PR PT FALLS ASSESS DOC 0-1 FALLS W/OUT INJ PAST YR: ICD-10-PCS | Mod: CPTII,S$GLB,, | Performed by: FAMILY MEDICINE

## 2021-01-21 PROCEDURE — 3288F PR FALLS RISK ASSESSMENT DOCUMENTED: ICD-10-PCS | Mod: CPTII,S$GLB,, | Performed by: FAMILY MEDICINE

## 2021-01-21 PROCEDURE — 1126F PR PAIN SEVERITY QUANTIFIED, NO PAIN PRESENT: ICD-10-PCS | Mod: S$GLB,,, | Performed by: FAMILY MEDICINE

## 2021-01-21 PROCEDURE — 3074F SYST BP LT 130 MM HG: CPT | Mod: CPTII,S$GLB,, | Performed by: FAMILY MEDICINE

## 2021-01-21 PROCEDURE — 99499 RISK ADDL DX/OHS AUDIT: ICD-10-PCS | Mod: HCNC,S$GLB,, | Performed by: FAMILY MEDICINE

## 2021-01-21 PROCEDURE — 99397 PER PM REEVAL EST PAT 65+ YR: CPT | Mod: S$GLB,,, | Performed by: FAMILY MEDICINE

## 2021-01-21 PROCEDURE — 99999 PR PBB SHADOW E&M-EST. PATIENT-LVL IV: ICD-10-PCS | Mod: PBBFAC,,, | Performed by: FAMILY MEDICINE

## 2021-01-21 PROCEDURE — 3288F FALL RISK ASSESSMENT DOCD: CPT | Mod: CPTII,S$GLB,, | Performed by: FAMILY MEDICINE

## 2021-01-21 PROCEDURE — 3078F PR MOST RECENT DIASTOLIC BLOOD PRESSURE < 80 MM HG: ICD-10-PCS | Mod: CPTII,S$GLB,, | Performed by: FAMILY MEDICINE

## 2021-01-28 DIAGNOSIS — M46.96 INFLAMMATORY SPONDYLOPATHY OF LUMBAR REGION: ICD-10-CM

## 2021-01-30 ENCOUNTER — CLINICAL SUPPORT (OUTPATIENT)
Dept: CARDIOLOGY | Facility: CLINIC | Age: 81
End: 2021-01-30
Payer: MEDICARE

## 2021-01-30 DIAGNOSIS — Z95.810 PRESENCE OF AUTOMATIC (IMPLANTABLE) CARDIAC DEFIBRILLATOR: ICD-10-CM

## 2021-02-01 ENCOUNTER — CLINICAL SUPPORT (OUTPATIENT)
Dept: CARDIOLOGY | Facility: CLINIC | Age: 81
End: 2021-02-01
Payer: MEDICARE

## 2021-02-01 DIAGNOSIS — Z95.810 PRESENCE OF AUTOMATIC (IMPLANTABLE) CARDIAC DEFIBRILLATOR: ICD-10-CM

## 2021-02-01 PROCEDURE — 93297 CARDIAC DEVICE CHECK - REMOTE: ICD-10-PCS | Mod: S$GLB,,, | Performed by: INTERNAL MEDICINE

## 2021-02-01 PROCEDURE — 93297 REM INTERROG DEV EVAL ICPMS: CPT | Mod: S$GLB,,, | Performed by: INTERNAL MEDICINE

## 2021-02-02 RX ORDER — TRAMADOL HYDROCHLORIDE 50 MG/1
50 TABLET ORAL DAILY PRN
Qty: 30 TABLET | Refills: 0 | Status: SHIPPED | OUTPATIENT
Start: 2021-02-02 | End: 2021-03-31 | Stop reason: SDUPTHER

## 2021-02-20 DIAGNOSIS — M17.0 OSTEOARTHRITIS OF BOTH KNEES, UNSPECIFIED OSTEOARTHRITIS TYPE: ICD-10-CM

## 2021-02-22 ENCOUNTER — PATIENT MESSAGE (OUTPATIENT)
Dept: CARDIOLOGY | Facility: CLINIC | Age: 81
End: 2021-02-22

## 2021-02-22 ENCOUNTER — OFFICE VISIT (OUTPATIENT)
Dept: FAMILY MEDICINE | Facility: CLINIC | Age: 81
End: 2021-02-22
Payer: MEDICARE

## 2021-02-22 VITALS
BODY MASS INDEX: 28.21 KG/M2 | OXYGEN SATURATION: 97 % | HEIGHT: 71 IN | DIASTOLIC BLOOD PRESSURE: 68 MMHG | HEART RATE: 50 BPM | SYSTOLIC BLOOD PRESSURE: 118 MMHG | WEIGHT: 201.5 LBS

## 2021-02-22 DIAGNOSIS — M46.96 INFLAMMATORY SPONDYLOPATHY OF LUMBAR REGION: ICD-10-CM

## 2021-02-22 DIAGNOSIS — I10 ESSENTIAL HYPERTENSION: ICD-10-CM

## 2021-02-22 DIAGNOSIS — I25.810 CORONARY ARTERY DISEASE INVOLVING AUTOLOGOUS VEIN CORONARY BYPASS GRAFT WITHOUT ANGINA PECTORIS: ICD-10-CM

## 2021-02-22 DIAGNOSIS — I27.9 PULMONARY HEART DISEASE: ICD-10-CM

## 2021-02-22 DIAGNOSIS — Z45.02 IMPLANTABLE CARDIOVERTER-DEFIBRILLATOR (ICD) GENERATOR END OF LIFE: ICD-10-CM

## 2021-02-22 DIAGNOSIS — M65.30 TRIGGER FINGER OF LEFT HAND, UNSPECIFIED FINGER: ICD-10-CM

## 2021-02-22 DIAGNOSIS — D69.6 THROMBOCYTOPENIA: ICD-10-CM

## 2021-02-22 DIAGNOSIS — I48.0 PAF (PAROXYSMAL ATRIAL FIBRILLATION): ICD-10-CM

## 2021-02-22 DIAGNOSIS — I50.42 CHRONIC COMBINED SYSTOLIC AND DIASTOLIC HEART FAILURE: ICD-10-CM

## 2021-02-22 DIAGNOSIS — Z00.00 ENCOUNTER FOR PREVENTIVE HEALTH EXAMINATION: Primary | ICD-10-CM

## 2021-02-22 DIAGNOSIS — E78.5 HYPERLIPIDEMIA, UNSPECIFIED HYPERLIPIDEMIA TYPE: ICD-10-CM

## 2021-02-22 PROCEDURE — 3288F PR FALLS RISK ASSESSMENT DOCUMENTED: ICD-10-PCS | Mod: CPTII,S$GLB,, | Performed by: NURSE PRACTITIONER

## 2021-02-22 PROCEDURE — 1126F PR PAIN SEVERITY QUANTIFIED, NO PAIN PRESENT: ICD-10-PCS | Mod: S$GLB,,, | Performed by: NURSE PRACTITIONER

## 2021-02-22 PROCEDURE — 99999 PR PBB SHADOW E&M-EST. PATIENT-LVL V: ICD-10-PCS | Mod: PBBFAC,,, | Performed by: NURSE PRACTITIONER

## 2021-02-22 PROCEDURE — 3288F FALL RISK ASSESSMENT DOCD: CPT | Mod: CPTII,S$GLB,, | Performed by: NURSE PRACTITIONER

## 2021-02-22 PROCEDURE — 99999 PR PBB SHADOW E&M-EST. PATIENT-LVL V: CPT | Mod: PBBFAC,,, | Performed by: NURSE PRACTITIONER

## 2021-02-22 PROCEDURE — G0439 PPPS, SUBSEQ VISIT: HCPCS | Mod: S$GLB,,, | Performed by: NURSE PRACTITIONER

## 2021-02-22 PROCEDURE — 1101F PT FALLS ASSESS-DOCD LE1/YR: CPT | Mod: CPTII,S$GLB,, | Performed by: NURSE PRACTITIONER

## 2021-02-22 PROCEDURE — 3074F SYST BP LT 130 MM HG: CPT | Mod: CPTII,S$GLB,, | Performed by: NURSE PRACTITIONER

## 2021-02-22 PROCEDURE — 3078F DIAST BP <80 MM HG: CPT | Mod: CPTII,S$GLB,, | Performed by: NURSE PRACTITIONER

## 2021-02-22 PROCEDURE — G0439 PR MEDICARE ANNUAL WELLNESS SUBSEQUENT VISIT: ICD-10-PCS | Mod: S$GLB,,, | Performed by: NURSE PRACTITIONER

## 2021-02-22 PROCEDURE — 1126F AMNT PAIN NOTED NONE PRSNT: CPT | Mod: S$GLB,,, | Performed by: NURSE PRACTITIONER

## 2021-02-22 PROCEDURE — 1101F PR PT FALLS ASSESS DOC 0-1 FALLS W/OUT INJ PAST YR: ICD-10-PCS | Mod: CPTII,S$GLB,, | Performed by: NURSE PRACTITIONER

## 2021-02-22 PROCEDURE — 3078F PR MOST RECENT DIASTOLIC BLOOD PRESSURE < 80 MM HG: ICD-10-PCS | Mod: CPTII,S$GLB,, | Performed by: NURSE PRACTITIONER

## 2021-02-22 PROCEDURE — 3074F PR MOST RECENT SYSTOLIC BLOOD PRESSURE < 130 MM HG: ICD-10-PCS | Mod: CPTII,S$GLB,, | Performed by: NURSE PRACTITIONER

## 2021-02-23 RX ORDER — CITALOPRAM 10 MG/1
TABLET ORAL
Qty: 90 TABLET | Refills: 3 | Status: SHIPPED | OUTPATIENT
Start: 2021-02-23 | End: 2022-02-01

## 2021-02-24 RX ORDER — MELOXICAM 15 MG/1
TABLET ORAL
Qty: 90 TABLET | Refills: 0 | Status: SHIPPED | OUTPATIENT
Start: 2021-02-24 | End: 2021-04-30 | Stop reason: SDUPTHER

## 2021-03-01 ENCOUNTER — CLINICAL SUPPORT (OUTPATIENT)
Dept: CARDIOLOGY | Facility: CLINIC | Age: 81
End: 2021-03-01
Attending: INTERNAL MEDICINE
Payer: MEDICARE

## 2021-03-01 ENCOUNTER — LAB VISIT (OUTPATIENT)
Dept: LAB | Facility: HOSPITAL | Age: 81
End: 2021-03-01
Attending: INTERNAL MEDICINE
Payer: MEDICARE

## 2021-03-01 VITALS — BODY MASS INDEX: 28.14 KG/M2 | WEIGHT: 201 LBS | HEIGHT: 71 IN

## 2021-03-01 DIAGNOSIS — Z95.810 ICD (IMPLANTABLE CARDIOVERTER-DEFIBRILLATOR) IN PLACE: Chronic | ICD-10-CM

## 2021-03-01 DIAGNOSIS — E78.00 PURE HYPERCHOLESTEROLEMIA: ICD-10-CM

## 2021-03-01 DIAGNOSIS — I48.0 PAF (PAROXYSMAL ATRIAL FIBRILLATION): ICD-10-CM

## 2021-03-01 DIAGNOSIS — Z95.1 S/P CABG X 4: ICD-10-CM

## 2021-03-01 DIAGNOSIS — I10 ESSENTIAL HYPERTENSION: ICD-10-CM

## 2021-03-01 DIAGNOSIS — Z95.810 ICD (IMPLANTABLE CARDIOVERTER-DEFIBRILLATOR) IN PLACE: ICD-10-CM

## 2021-03-01 DIAGNOSIS — I25.5 ISCHEMIC CARDIOMYOPATHY: Chronic | ICD-10-CM

## 2021-03-01 DIAGNOSIS — I65.23 BILATERAL CAROTID ARTERY STENOSIS: ICD-10-CM

## 2021-03-01 DIAGNOSIS — I25.5 ISCHEMIC CARDIOMYOPATHY: ICD-10-CM

## 2021-03-01 LAB
ASCENDING AORTA: 3.54 CM
AV INDEX (PROSTH): 0.67
AV MEAN GRADIENT: 5 MMHG
AV PEAK GRADIENT: 9 MMHG
AV VALVE AREA: 2.52 CM2
AV VELOCITY RATIO: 0.62
BASOPHILS # BLD AUTO: 0.02 K/UL (ref 0–0.2)
BASOPHILS NFR BLD: 0.5 % (ref 0–1.9)
BSA FOR ECHO PROCEDURE: 2.14 M2
CV ECHO LV RWT: 0.19 CM
DIFFERENTIAL METHOD: ABNORMAL
DOP CALC AO PEAK VEL: 1.51 M/S
DOP CALC AO VTI: 40.07 CM
DOP CALC LVOT AREA: 3.8 CM2
DOP CALC LVOT DIAMETER: 2.19 CM
DOP CALC LVOT PEAK VEL: 0.94 M/S
DOP CALC LVOT STROKE VOLUME: 100.9 CM3
DOP CALCLVOT PEAK VEL VTI: 26.8 CM
E WAVE DECELERATION TIME: 271.42 MSEC
E/A RATIO: 1.79
E/E' RATIO: 15.69 M/S
ECHO LV POSTERIOR WALL: 0.55 CM (ref 0.6–1.1)
EOSINOPHIL # BLD AUTO: 0.2 K/UL (ref 0–0.5)
EOSINOPHIL NFR BLD: 4.1 % (ref 0–8)
ERYTHROCYTE [DISTWIDTH] IN BLOOD BY AUTOMATED COUNT: 12.4 % (ref 11.5–14.5)
FRACTIONAL SHORTENING: 17 % (ref 28–44)
HCT VFR BLD AUTO: 38.6 % (ref 40–54)
HGB BLD-MCNC: 12.8 G/DL (ref 14–18)
IMM GRANULOCYTES # BLD AUTO: 0 K/UL (ref 0–0.04)
IMM GRANULOCYTES NFR BLD AUTO: 0 % (ref 0–0.5)
INTERVENTRICULAR SEPTUM: 0.85 CM (ref 0.6–1.1)
IVRT: 121.79 MSEC
LA MAJOR: 6.56 CM
LA MINOR: 6.61 CM
LA WIDTH: 5.41 CM
LEFT ATRIUM SIZE: 5.36 CM
LEFT ATRIUM VOLUME INDEX: 76.9 ML/M2
LEFT ATRIUM VOLUME: 162.3 CM3
LEFT CBA DIAS: 17 CM/S
LEFT CBA SYS: 60 CM/S
LEFT CCA DIST DIAS: 18 CM/S
LEFT CCA DIST SYS: 68 CM/S
LEFT CCA MID DIAS: 13 CM/S
LEFT CCA MID SYS: 55 CM/S
LEFT CCA PROX DIAS: 16 CM/S
LEFT CCA PROX SYS: 59 CM/S
LEFT ECA DIAS: 12 CM/S
LEFT ECA SYS: 83 CM/S
LEFT ICA DIST DIAS: 26 CM/S
LEFT ICA DIST SYS: 91 CM/S
LEFT ICA MID DIAS: 29 CM/S
LEFT ICA MID SYS: 97 CM/S
LEFT ICA PROX DIAS: 22 CM/S
LEFT ICA PROX SYS: 77 CM/S
LEFT INTERNAL DIMENSION IN SYSTOLE: 4.83 CM (ref 2.1–4)
LEFT VENTRICLE DIASTOLIC VOLUME INDEX: 79.96 ML/M2
LEFT VENTRICLE DIASTOLIC VOLUME: 168.72 ML
LEFT VENTRICLE MASS INDEX: 71 G/M2
LEFT VENTRICLE SYSTOLIC VOLUME INDEX: 51.7 ML/M2
LEFT VENTRICLE SYSTOLIC VOLUME: 109.05 ML
LEFT VENTRICULAR INTERNAL DIMENSION IN DIASTOLE: 5.83 CM (ref 3.5–6)
LEFT VENTRICULAR MASS: 150.18 G
LEFT VERTEBRAL DIAS: 13 CM/S
LEFT VERTEBRAL SYS: 52 CM/S
LV LATERAL E/E' RATIO: 11.33 M/S
LV SEPTAL E/E' RATIO: 25.5 M/S
LYMPHOCYTES # BLD AUTO: 1.2 K/UL (ref 1–4.8)
LYMPHOCYTES NFR BLD: 26.7 % (ref 18–48)
MCH RBC QN AUTO: 32.4 PG (ref 27–31)
MCHC RBC AUTO-ENTMCNC: 33.2 G/DL (ref 32–36)
MCV RBC AUTO: 98 FL (ref 82–98)
MONOCYTES # BLD AUTO: 0.4 K/UL (ref 0.3–1)
MONOCYTES NFR BLD: 8.3 % (ref 4–15)
MV A" WAVE DURATION": 12.84 MSEC
MV MEAN GRADIENT: 1 MMHG
MV PEAK A VEL: 0.57 M/S
MV PEAK E VEL: 1.02 M/S
MV PEAK GRADIENT: 9 MMHG
MV STENOSIS PRESSURE HALF TIME: 97.72 MS
MV VALVE AREA P 1/2 METHOD: 2.25 CM2
NEUTROPHILS # BLD AUTO: 2.6 K/UL (ref 1.8–7.7)
NEUTROPHILS NFR BLD: 60.4 % (ref 38–73)
NRBC BLD-RTO: 0 /100 WBC
OHS CV CAROTID RIGHT ICA EDV HIGHEST: 24
OHS CV CAROTID ULTRASOUND LEFT ICA/CCA RATIO: 1.43
OHS CV CAROTID ULTRASOUND RIGHT ICA/CCA RATIO: 0.79
OHS CV PV CAROTID LEFT HIGHEST CCA: 68
OHS CV PV CAROTID LEFT HIGHEST ICA: 97
OHS CV PV CAROTID RIGHT HIGHEST CCA: 106
OHS CV PV CAROTID RIGHT HIGHEST ICA: 84
OHS CV US CAROTID LEFT HIGHEST EDV: 29
PISA MRMAX VEL: 0.05 M/S
PISA TR MAX VEL: 3.37 M/S
PLATELET # BLD AUTO: 70 K/UL (ref 150–350)
PMV BLD AUTO: 11 FL (ref 9.2–12.9)
PULM VEIN S/D RATIO: 0.92
PV PEAK D VEL: 0.61 M/S
PV PEAK S VEL: 0.56 M/S
RA MAJOR: 5.54 CM
RA PRESSURE: 8 MMHG
RA WIDTH: 4.23 CM
RBC # BLD AUTO: 3.95 M/UL (ref 4.6–6.2)
RIGHT CBA DIAS: 14 CM/S
RIGHT CBA SYS: 72 CM/S
RIGHT CCA DIST DIAS: 20 CM/S
RIGHT CCA DIST SYS: 106 CM/S
RIGHT CCA MID DIAS: 13 CM/S
RIGHT CCA MID SYS: 59 CM/S
RIGHT CCA PROX DIAS: 11 CM/S
RIGHT CCA PROX SYS: 66 CM/S
RIGHT ECA DIAS: 17 CM/S
RIGHT ECA SYS: 158 CM/S
RIGHT ICA DIST DIAS: 17 CM/S
RIGHT ICA DIST SYS: 54 CM/S
RIGHT ICA MID DIAS: 24 CM/S
RIGHT ICA MID SYS: 81 CM/S
RIGHT ICA PROX DIAS: 24 CM/S
RIGHT ICA PROX SYS: 84 CM/S
RIGHT VENTRICULAR END-DIASTOLIC DIMENSION: 5.47 CM
RIGHT VERTEBRAL DIAS: 12 CM/S
RIGHT VERTEBRAL SYS: 44 CM/S
RV TISSUE DOPPLER FREE WALL SYSTOLIC VELOCITY 1 (APICAL 4 CHAMBER VIEW): 10.87 CM/S
SINUS: 3.86 CM
STJ: 3.16 CM
TDI LATERAL: 0.09 M/S
TDI SEPTAL: 0.04 M/S
TDI: 0.07 M/S
TR MAX PG: 45 MMHG
TRICUSPID ANNULAR PLANE SYSTOLIC EXCURSION: 1.77 CM
TV REST PULMONARY ARTERY PRESSURE: 53 MMHG
WBC # BLD AUTO: 4.34 K/UL (ref 3.9–12.7)

## 2021-03-01 PROCEDURE — 83880 ASSAY OF NATRIURETIC PEPTIDE: CPT

## 2021-03-01 PROCEDURE — 99999 PR PBB SHADOW E&M-EST. PATIENT-LVL I: ICD-10-PCS | Mod: PBBFAC,,,

## 2021-03-01 PROCEDURE — 80061 LIPID PANEL: CPT

## 2021-03-01 PROCEDURE — 85025 COMPLETE CBC W/AUTO DIFF WBC: CPT

## 2021-03-01 PROCEDURE — 99999 PR PBB SHADOW E&M-EST. PATIENT-LVL I: CPT | Mod: PBBFAC,,,

## 2021-03-01 PROCEDURE — 36415 COLL VENOUS BLD VENIPUNCTURE: CPT | Mod: PO

## 2021-03-01 PROCEDURE — 93880 CV US DOPPLER CAROTID (CUPID ONLY): ICD-10-PCS | Mod: S$GLB,,, | Performed by: INTERNAL MEDICINE

## 2021-03-01 PROCEDURE — 80048 BASIC METABOLIC PNL TOTAL CA: CPT

## 2021-03-01 PROCEDURE — 80076 HEPATIC FUNCTION PANEL: CPT

## 2021-03-01 PROCEDURE — 84443 ASSAY THYROID STIM HORMONE: CPT

## 2021-03-01 PROCEDURE — 93880 EXTRACRANIAL BILAT STUDY: CPT | Mod: S$GLB,,, | Performed by: INTERNAL MEDICINE

## 2021-03-02 LAB
ALBUMIN SERPL BCP-MCNC: 3.9 G/DL (ref 3.5–5.2)
ALP SERPL-CCNC: 53 U/L (ref 55–135)
ALT SERPL W/O P-5'-P-CCNC: 22 U/L (ref 10–44)
ANION GAP SERPL CALC-SCNC: 8 MMOL/L (ref 8–16)
AST SERPL-CCNC: 32 U/L (ref 10–40)
BILIRUB DIRECT SERPL-MCNC: 0.5 MG/DL (ref 0.1–0.3)
BILIRUB SERPL-MCNC: 1 MG/DL (ref 0.1–1)
BNP SERPL-MCNC: 223 PG/ML (ref 0–99)
BUN SERPL-MCNC: 21 MG/DL (ref 8–23)
CALCIUM SERPL-MCNC: 8.9 MG/DL (ref 8.7–10.5)
CHLORIDE SERPL-SCNC: 105 MMOL/L (ref 95–110)
CHOLEST SERPL-MCNC: 135 MG/DL (ref 120–199)
CHOLEST/HDLC SERPL: 2.3 {RATIO} (ref 2–5)
CO2 SERPL-SCNC: 27 MMOL/L (ref 23–29)
CREAT SERPL-MCNC: 0.9 MG/DL (ref 0.5–1.4)
EST. GFR  (AFRICAN AMERICAN): >60 ML/MIN/1.73 M^2
EST. GFR  (NON AFRICAN AMERICAN): >60 ML/MIN/1.73 M^2
GLUCOSE SERPL-MCNC: 102 MG/DL (ref 70–110)
HDLC SERPL-MCNC: 59 MG/DL (ref 40–75)
HDLC SERPL: 43.7 % (ref 20–50)
LDLC SERPL CALC-MCNC: 63.6 MG/DL (ref 63–159)
NONHDLC SERPL-MCNC: 76 MG/DL
POTASSIUM SERPL-SCNC: 4.8 MMOL/L (ref 3.5–5.1)
PROT SERPL-MCNC: 6.8 G/DL (ref 6–8.4)
SODIUM SERPL-SCNC: 140 MMOL/L (ref 136–145)
TRIGL SERPL-MCNC: 62 MG/DL (ref 30–150)
TSH SERPL DL<=0.005 MIU/L-ACNC: 1.44 UIU/ML (ref 0.4–4)

## 2021-03-03 ENCOUNTER — CLINICAL SUPPORT (OUTPATIENT)
Dept: CARDIOLOGY | Facility: CLINIC | Age: 81
End: 2021-03-03
Payer: MEDICARE

## 2021-03-03 ENCOUNTER — OFFICE VISIT (OUTPATIENT)
Dept: ORTHOPEDICS | Facility: CLINIC | Age: 81
End: 2021-03-03
Payer: MEDICARE

## 2021-03-03 VITALS — DIASTOLIC BLOOD PRESSURE: 61 MMHG | SYSTOLIC BLOOD PRESSURE: 135 MMHG | HEART RATE: 56 BPM

## 2021-03-03 DIAGNOSIS — Z00.00 ENCOUNTER FOR PREVENTIVE HEALTH EXAMINATION: ICD-10-CM

## 2021-03-03 DIAGNOSIS — M65.30 TRIGGER FINGER OF LEFT HAND, UNSPECIFIED FINGER: ICD-10-CM

## 2021-03-03 DIAGNOSIS — M65.342 TRIGGER FINGER, LEFT RING FINGER: Primary | ICD-10-CM

## 2021-03-03 DIAGNOSIS — Z95.810 PRESENCE OF AUTOMATIC (IMPLANTABLE) CARDIAC DEFIBRILLATOR: ICD-10-CM

## 2021-03-03 PROCEDURE — 3075F SYST BP GE 130 - 139MM HG: CPT | Mod: CPTII,S$GLB,, | Performed by: ORTHOPAEDIC SURGERY

## 2021-03-03 PROCEDURE — 99999 PR PBB SHADOW E&M-EST. PATIENT-LVL III: CPT | Mod: PBBFAC,,, | Performed by: ORTHOPAEDIC SURGERY

## 2021-03-03 PROCEDURE — 1125F AMNT PAIN NOTED PAIN PRSNT: CPT | Mod: S$GLB,,, | Performed by: ORTHOPAEDIC SURGERY

## 2021-03-03 PROCEDURE — 3075F PR MOST RECENT SYSTOLIC BLOOD PRESS GE 130-139MM HG: ICD-10-PCS | Mod: CPTII,S$GLB,, | Performed by: ORTHOPAEDIC SURGERY

## 2021-03-03 PROCEDURE — 1125F PR PAIN SEVERITY QUANTIFIED, PAIN PRESENT: ICD-10-PCS | Mod: S$GLB,,, | Performed by: ORTHOPAEDIC SURGERY

## 2021-03-03 PROCEDURE — 1101F PR PT FALLS ASSESS DOC 0-1 FALLS W/OUT INJ PAST YR: ICD-10-PCS | Mod: CPTII,S$GLB,, | Performed by: ORTHOPAEDIC SURGERY

## 2021-03-03 PROCEDURE — 93297 CARDIAC DEVICE CHECK - REMOTE: ICD-10-PCS | Mod: S$GLB,,, | Performed by: INTERNAL MEDICINE

## 2021-03-03 PROCEDURE — 20550 TENDON SHEATH: ICD-10-PCS | Mod: F3,S$GLB,, | Performed by: ORTHOPAEDIC SURGERY

## 2021-03-03 PROCEDURE — 3288F PR FALLS RISK ASSESSMENT DOCUMENTED: ICD-10-PCS | Mod: CPTII,S$GLB,, | Performed by: ORTHOPAEDIC SURGERY

## 2021-03-03 PROCEDURE — 20550 NJX 1 TENDON SHEATH/LIGAMENT: CPT | Mod: F3,S$GLB,, | Performed by: ORTHOPAEDIC SURGERY

## 2021-03-03 PROCEDURE — 99203 PR OFFICE/OUTPT VISIT, NEW, LEVL III, 30-44 MIN: ICD-10-PCS | Mod: 25,S$GLB,, | Performed by: ORTHOPAEDIC SURGERY

## 2021-03-03 PROCEDURE — 99999 PR PBB SHADOW E&M-EST. PATIENT-LVL III: ICD-10-PCS | Mod: PBBFAC,,, | Performed by: ORTHOPAEDIC SURGERY

## 2021-03-03 PROCEDURE — 1159F PR MEDICATION LIST DOCUMENTED IN MEDICAL RECORD: ICD-10-PCS | Mod: S$GLB,,, | Performed by: ORTHOPAEDIC SURGERY

## 2021-03-03 PROCEDURE — 3288F FALL RISK ASSESSMENT DOCD: CPT | Mod: CPTII,S$GLB,, | Performed by: ORTHOPAEDIC SURGERY

## 2021-03-03 PROCEDURE — 3078F DIAST BP <80 MM HG: CPT | Mod: CPTII,S$GLB,, | Performed by: ORTHOPAEDIC SURGERY

## 2021-03-03 PROCEDURE — 3078F PR MOST RECENT DIASTOLIC BLOOD PRESSURE < 80 MM HG: ICD-10-PCS | Mod: CPTII,S$GLB,, | Performed by: ORTHOPAEDIC SURGERY

## 2021-03-03 PROCEDURE — 1101F PT FALLS ASSESS-DOCD LE1/YR: CPT | Mod: CPTII,S$GLB,, | Performed by: ORTHOPAEDIC SURGERY

## 2021-03-03 PROCEDURE — 99203 OFFICE O/P NEW LOW 30 MIN: CPT | Mod: 25,S$GLB,, | Performed by: ORTHOPAEDIC SURGERY

## 2021-03-03 PROCEDURE — 93297 REM INTERROG DEV EVAL ICPMS: CPT | Mod: S$GLB,,, | Performed by: INTERNAL MEDICINE

## 2021-03-03 PROCEDURE — 1159F MED LIST DOCD IN RCRD: CPT | Mod: S$GLB,,, | Performed by: ORTHOPAEDIC SURGERY

## 2021-03-03 RX ORDER — TRIAMCINOLONE ACETONIDE 40 MG/ML
40 INJECTION, SUSPENSION INTRA-ARTICULAR; INTRAMUSCULAR
Status: DISCONTINUED | OUTPATIENT
Start: 2021-03-03 | End: 2021-03-03 | Stop reason: HOSPADM

## 2021-03-03 RX ADMIN — TRIAMCINOLONE ACETONIDE 40 MG: 40 INJECTION, SUSPENSION INTRA-ARTICULAR; INTRAMUSCULAR at 08:03

## 2021-03-09 ENCOUNTER — OFFICE VISIT (OUTPATIENT)
Dept: CARDIOLOGY | Facility: CLINIC | Age: 81
End: 2021-03-09
Payer: MEDICARE

## 2021-03-09 VITALS
DIASTOLIC BLOOD PRESSURE: 64 MMHG | HEIGHT: 71 IN | SYSTOLIC BLOOD PRESSURE: 114 MMHG | BODY MASS INDEX: 27.66 KG/M2 | WEIGHT: 197.56 LBS | HEART RATE: 48 BPM

## 2021-03-09 DIAGNOSIS — I25.5 ISCHEMIC CARDIOMYOPATHY: Primary | Chronic | ICD-10-CM

## 2021-03-09 DIAGNOSIS — E78.5 HYPERLIPIDEMIA, UNSPECIFIED HYPERLIPIDEMIA TYPE: ICD-10-CM

## 2021-03-09 DIAGNOSIS — Z95.1 S/P CABG X 4: ICD-10-CM

## 2021-03-09 DIAGNOSIS — I50.42 CHRONIC COMBINED SYSTOLIC AND DIASTOLIC HEART FAILURE: ICD-10-CM

## 2021-03-09 DIAGNOSIS — Z95.810 ICD (IMPLANTABLE CARDIOVERTER-DEFIBRILLATOR) IN PLACE: Chronic | ICD-10-CM

## 2021-03-09 DIAGNOSIS — I48.0 PAF (PAROXYSMAL ATRIAL FIBRILLATION): ICD-10-CM

## 2021-03-09 PROCEDURE — 3074F SYST BP LT 130 MM HG: CPT | Mod: CPTII,S$GLB,, | Performed by: INTERNAL MEDICINE

## 2021-03-09 PROCEDURE — 1126F PR PAIN SEVERITY QUANTIFIED, NO PAIN PRESENT: ICD-10-PCS | Mod: S$GLB,,, | Performed by: INTERNAL MEDICINE

## 2021-03-09 PROCEDURE — 99214 OFFICE O/P EST MOD 30 MIN: CPT | Mod: S$GLB,,, | Performed by: INTERNAL MEDICINE

## 2021-03-09 PROCEDURE — 1101F PR PT FALLS ASSESS DOC 0-1 FALLS W/OUT INJ PAST YR: ICD-10-PCS | Mod: CPTII,S$GLB,, | Performed by: INTERNAL MEDICINE

## 2021-03-09 PROCEDURE — 3288F FALL RISK ASSESSMENT DOCD: CPT | Mod: CPTII,S$GLB,, | Performed by: INTERNAL MEDICINE

## 2021-03-09 PROCEDURE — 1126F AMNT PAIN NOTED NONE PRSNT: CPT | Mod: S$GLB,,, | Performed by: INTERNAL MEDICINE

## 2021-03-09 PROCEDURE — 3078F PR MOST RECENT DIASTOLIC BLOOD PRESSURE < 80 MM HG: ICD-10-PCS | Mod: CPTII,S$GLB,, | Performed by: INTERNAL MEDICINE

## 2021-03-09 PROCEDURE — 3074F PR MOST RECENT SYSTOLIC BLOOD PRESSURE < 130 MM HG: ICD-10-PCS | Mod: CPTII,S$GLB,, | Performed by: INTERNAL MEDICINE

## 2021-03-09 PROCEDURE — 99999 PR PBB SHADOW E&M-EST. PATIENT-LVL III: CPT | Mod: PBBFAC,,, | Performed by: INTERNAL MEDICINE

## 2021-03-09 PROCEDURE — 1159F MED LIST DOCD IN RCRD: CPT | Mod: S$GLB,,, | Performed by: INTERNAL MEDICINE

## 2021-03-09 PROCEDURE — 99214 PR OFFICE/OUTPT VISIT, EST, LEVL IV, 30-39 MIN: ICD-10-PCS | Mod: S$GLB,,, | Performed by: INTERNAL MEDICINE

## 2021-03-09 PROCEDURE — 99999 PR PBB SHADOW E&M-EST. PATIENT-LVL III: ICD-10-PCS | Mod: PBBFAC,,, | Performed by: INTERNAL MEDICINE

## 2021-03-09 PROCEDURE — 1101F PT FALLS ASSESS-DOCD LE1/YR: CPT | Mod: CPTII,S$GLB,, | Performed by: INTERNAL MEDICINE

## 2021-03-09 PROCEDURE — 1159F PR MEDICATION LIST DOCUMENTED IN MEDICAL RECORD: ICD-10-PCS | Mod: S$GLB,,, | Performed by: INTERNAL MEDICINE

## 2021-03-09 PROCEDURE — 3078F DIAST BP <80 MM HG: CPT | Mod: CPTII,S$GLB,, | Performed by: INTERNAL MEDICINE

## 2021-03-09 PROCEDURE — 3288F PR FALLS RISK ASSESSMENT DOCUMENTED: ICD-10-PCS | Mod: CPTII,S$GLB,, | Performed by: INTERNAL MEDICINE

## 2021-03-17 ENCOUNTER — TELEPHONE (OUTPATIENT)
Dept: CARDIOLOGY | Facility: CLINIC | Age: 81
End: 2021-03-17

## 2021-03-22 ENCOUNTER — TELEPHONE (OUTPATIENT)
Dept: CARDIOLOGY | Facility: CLINIC | Age: 81
End: 2021-03-22

## 2021-03-31 DIAGNOSIS — N32.81 OAB (OVERACTIVE BLADDER): ICD-10-CM

## 2021-03-31 RX ORDER — OXYBUTYNIN CHLORIDE 5 MG/1
5 TABLET ORAL NIGHTLY
Qty: 90 TABLET | Refills: 3 | Status: SHIPPED | OUTPATIENT
Start: 2021-03-31 | End: 2022-02-20

## 2021-04-02 ENCOUNTER — CLINICAL SUPPORT (OUTPATIENT)
Dept: CARDIOLOGY | Facility: CLINIC | Age: 81
End: 2021-04-02
Payer: MEDICARE

## 2021-04-02 DIAGNOSIS — Z95.810 PRESENCE OF AUTOMATIC (IMPLANTABLE) CARDIAC DEFIBRILLATOR: ICD-10-CM

## 2021-04-02 PROCEDURE — 93297 CARDIAC DEVICE CHECK - REMOTE: ICD-10-PCS | Mod: HCNC,S$GLB,, | Performed by: INTERNAL MEDICINE

## 2021-04-02 PROCEDURE — 93297 REM INTERROG DEV EVAL ICPMS: CPT | Mod: HCNC,S$GLB,, | Performed by: INTERNAL MEDICINE

## 2021-04-30 ENCOUNTER — CLINICAL SUPPORT (OUTPATIENT)
Dept: CARDIOLOGY | Facility: CLINIC | Age: 81
End: 2021-04-30
Payer: MEDICARE

## 2021-04-30 DIAGNOSIS — Z95.810 PRESENCE OF AUTOMATIC (IMPLANTABLE) CARDIAC DEFIBRILLATOR: ICD-10-CM

## 2021-04-30 DIAGNOSIS — M17.0 OSTEOARTHRITIS OF BOTH KNEES, UNSPECIFIED OSTEOARTHRITIS TYPE: ICD-10-CM

## 2021-04-30 DIAGNOSIS — M46.96 INFLAMMATORY SPONDYLOPATHY OF LUMBAR REGION: ICD-10-CM

## 2021-04-30 PROCEDURE — 93296 REM INTERROG EVL PM/IDS: CPT | Mod: HCNC,S$GLB,, | Performed by: INTERNAL MEDICINE

## 2021-04-30 PROCEDURE — 93296 CARDIAC DEVICE CHECK - REMOTE: ICD-10-PCS | Mod: HCNC,S$GLB,, | Performed by: INTERNAL MEDICINE

## 2021-04-30 RX ORDER — MELOXICAM 15 MG/1
15 TABLET ORAL
Qty: 90 TABLET | Refills: 0 | Status: SHIPPED | OUTPATIENT
Start: 2021-04-30 | End: 2021-06-02

## 2021-04-30 RX ORDER — TRAMADOL HYDROCHLORIDE 50 MG/1
50 TABLET ORAL DAILY PRN
Qty: 30 TABLET | Refills: 0 | Status: SHIPPED | OUTPATIENT
Start: 2021-04-30 | End: 2021-06-07 | Stop reason: SDUPTHER

## 2021-05-02 ENCOUNTER — CLINICAL SUPPORT (OUTPATIENT)
Dept: CARDIOLOGY | Facility: CLINIC | Age: 81
End: 2021-05-02
Payer: MEDICARE

## 2021-05-02 DIAGNOSIS — Z95.810 PRESENCE OF AUTOMATIC (IMPLANTABLE) CARDIAC DEFIBRILLATOR: ICD-10-CM

## 2021-05-02 PROCEDURE — 93297 CARDIAC DEVICE CHECK - REMOTE: ICD-10-PCS | Mod: HCNC,S$GLB,, | Performed by: INTERNAL MEDICINE

## 2021-05-02 PROCEDURE — 93297 REM INTERROG DEV EVAL ICPMS: CPT | Mod: HCNC,S$GLB,, | Performed by: INTERNAL MEDICINE

## 2021-06-01 ENCOUNTER — CLINICAL SUPPORT (OUTPATIENT)
Dept: CARDIOLOGY | Facility: HOSPITAL | Age: 81
End: 2021-06-01
Payer: MEDICARE

## 2021-06-01 ENCOUNTER — HOSPITAL ENCOUNTER (OUTPATIENT)
Dept: CARDIOLOGY | Facility: HOSPITAL | Age: 81
Discharge: HOME | End: 2021-06-01
Payer: MEDICARE

## 2021-06-01 DIAGNOSIS — M17.0 OSTEOARTHRITIS OF BOTH KNEES, UNSPECIFIED OSTEOARTHRITIS TYPE: ICD-10-CM

## 2021-06-01 DIAGNOSIS — Z95.810 PRESENCE OF AUTOMATIC (IMPLANTABLE) CARDIAC DEFIBRILLATOR: ICD-10-CM

## 2021-06-01 PROCEDURE — 93297 REM INTERROG DEV EVAL ICPMS: CPT | Mod: HCNC,,, | Performed by: INTERNAL MEDICINE

## 2021-06-01 PROCEDURE — G2066 INTER DEVC REMOTE 30D: HCPCS | Mod: HCNC,PO | Performed by: INTERNAL MEDICINE

## 2021-06-01 PROCEDURE — 93297 CARDIAC DEVICE CHECK - REMOTE: ICD-10-PCS | Mod: HCNC,,, | Performed by: INTERNAL MEDICINE

## 2021-06-02 RX ORDER — MELOXICAM 15 MG/1
TABLET ORAL
Qty: 90 TABLET | Refills: 0 | Status: SHIPPED | OUTPATIENT
Start: 2021-06-02 | End: 2021-06-07 | Stop reason: SDUPTHER

## 2021-06-07 DIAGNOSIS — M46.96 INFLAMMATORY SPONDYLOPATHY OF LUMBAR REGION: ICD-10-CM

## 2021-06-07 DIAGNOSIS — M17.0 OSTEOARTHRITIS OF BOTH KNEES, UNSPECIFIED OSTEOARTHRITIS TYPE: ICD-10-CM

## 2021-06-09 ENCOUNTER — OFFICE VISIT (OUTPATIENT)
Dept: PHYSICAL MEDICINE AND REHAB | Facility: CLINIC | Age: 81
End: 2021-06-09
Payer: MEDICARE

## 2021-06-09 VITALS — HEIGHT: 69 IN | WEIGHT: 200.81 LBS | BODY MASS INDEX: 29.74 KG/M2

## 2021-06-09 DIAGNOSIS — M67.922 TENDINOPATHY OF LEFT BICEPS TENDON: ICD-10-CM

## 2021-06-09 PROBLEM — M67.929 BICEPS TENDINOPATHY: Status: ACTIVE | Noted: 2021-06-09

## 2021-06-09 PROCEDURE — 20550 NJX 1 TENDON SHEATH/LIGAMENT: CPT | Mod: HCNC,LT,S$GLB, | Performed by: PHYSICAL MEDICINE & REHABILITATION

## 2021-06-09 PROCEDURE — 99499 UNLISTED E&M SERVICE: CPT | Mod: HCNC,S$GLB,, | Performed by: PHYSICAL MEDICINE & REHABILITATION

## 2021-06-09 PROCEDURE — 99999 PR PBB SHADOW E&M-EST. PATIENT-LVL III: ICD-10-PCS | Mod: PBBFAC,HCNC,, | Performed by: PHYSICAL MEDICINE & REHABILITATION

## 2021-06-09 PROCEDURE — 76942 TENDON SHEATH: ICD-10-PCS | Mod: HCNC,S$GLB,, | Performed by: PHYSICAL MEDICINE & REHABILITATION

## 2021-06-09 PROCEDURE — 99213 PR OFFICE/OUTPT VISIT, EST, LEVL III, 20-29 MIN: ICD-10-PCS | Mod: 25,HCNC,GC,S$GLB | Performed by: PHYSICAL MEDICINE & REHABILITATION

## 2021-06-09 PROCEDURE — 99213 OFFICE O/P EST LOW 20 MIN: CPT | Mod: 25,HCNC,GC,S$GLB | Performed by: PHYSICAL MEDICINE & REHABILITATION

## 2021-06-09 PROCEDURE — 76942 ECHO GUIDE FOR BIOPSY: CPT | Mod: HCNC,S$GLB,, | Performed by: PHYSICAL MEDICINE & REHABILITATION

## 2021-06-09 PROCEDURE — 1125F AMNT PAIN NOTED PAIN PRSNT: CPT | Mod: HCNC,S$GLB,, | Performed by: PHYSICAL MEDICINE & REHABILITATION

## 2021-06-09 PROCEDURE — 1125F PR PAIN SEVERITY QUANTIFIED, PAIN PRESENT: ICD-10-PCS | Mod: HCNC,S$GLB,, | Performed by: PHYSICAL MEDICINE & REHABILITATION

## 2021-06-09 PROCEDURE — 99999 PR PBB SHADOW E&M-EST. PATIENT-LVL III: CPT | Mod: PBBFAC,HCNC,, | Performed by: PHYSICAL MEDICINE & REHABILITATION

## 2021-06-09 PROCEDURE — 1159F MED LIST DOCD IN RCRD: CPT | Mod: HCNC,S$GLB,, | Performed by: PHYSICAL MEDICINE & REHABILITATION

## 2021-06-09 PROCEDURE — 1159F PR MEDICATION LIST DOCUMENTED IN MEDICAL RECORD: ICD-10-PCS | Mod: HCNC,S$GLB,, | Performed by: PHYSICAL MEDICINE & REHABILITATION

## 2021-06-09 PROCEDURE — 99499 RISK ADDL DX/OHS AUDIT: ICD-10-PCS | Mod: HCNC,S$GLB,, | Performed by: PHYSICAL MEDICINE & REHABILITATION

## 2021-06-09 PROCEDURE — 20550 TENDON SHEATH: ICD-10-PCS | Mod: HCNC,LT,S$GLB, | Performed by: PHYSICAL MEDICINE & REHABILITATION

## 2021-06-09 RX ORDER — BETAMETHASONE SODIUM PHOSPHATE AND BETAMETHASONE ACETATE 3; 3 MG/ML; MG/ML
6 INJECTION, SUSPENSION INTRA-ARTICULAR; INTRALESIONAL; INTRAMUSCULAR; SOFT TISSUE
Status: DISCONTINUED | OUTPATIENT
Start: 2021-06-09 | End: 2021-06-09 | Stop reason: HOSPADM

## 2021-06-09 RX ADMIN — BETAMETHASONE SODIUM PHOSPHATE AND BETAMETHASONE ACETATE 6 MG: 3; 3 INJECTION, SUSPENSION INTRA-ARTICULAR; INTRALESIONAL; INTRAMUSCULAR; SOFT TISSUE at 08:06

## 2021-06-10 RX ORDER — MELOXICAM 15 MG/1
15 TABLET ORAL
Qty: 90 TABLET | Refills: 0 | Status: SHIPPED | OUTPATIENT
Start: 2021-06-10 | End: 2021-06-15 | Stop reason: SDUPTHER

## 2021-06-10 RX ORDER — TRAMADOL HYDROCHLORIDE 50 MG/1
50 TABLET ORAL DAILY PRN
Qty: 30 TABLET | Refills: 0 | Status: SHIPPED | OUTPATIENT
Start: 2021-06-10 | End: 2021-07-23 | Stop reason: SDUPTHER

## 2021-06-15 DIAGNOSIS — I25.5 ISCHEMIC CARDIOMYOPATHY: ICD-10-CM

## 2021-06-15 DIAGNOSIS — Z95.810 ICD (IMPLANTABLE CARDIOVERTER-DEFIBRILLATOR) IN PLACE: Primary | ICD-10-CM

## 2021-07-01 ENCOUNTER — CLINICAL SUPPORT (OUTPATIENT)
Dept: CARDIOLOGY | Facility: HOSPITAL | Age: 81
End: 2021-07-01
Payer: MEDICARE

## 2021-07-01 ENCOUNTER — HOSPITAL ENCOUNTER (OUTPATIENT)
Dept: CARDIOLOGY | Facility: HOSPITAL | Age: 81
Discharge: HOME | End: 2021-07-01
Payer: MEDICARE

## 2021-07-01 DIAGNOSIS — Z95.810 PRESENCE OF AUTOMATIC (IMPLANTABLE) CARDIAC DEFIBRILLATOR: ICD-10-CM

## 2021-07-01 PROCEDURE — 93297 CARDIAC DEVICE CHECK - REMOTE: ICD-10-PCS | Mod: ,,, | Performed by: INTERNAL MEDICINE

## 2021-07-01 PROCEDURE — G2066 INTER DEVC REMOTE 30D: HCPCS | Mod: PO | Performed by: INTERNAL MEDICINE

## 2021-07-01 PROCEDURE — 93297 REM INTERROG DEV EVAL ICPMS: CPT | Mod: ,,, | Performed by: INTERNAL MEDICINE

## 2021-07-16 DIAGNOSIS — M17.0 OSTEOARTHRITIS OF BOTH KNEES, UNSPECIFIED OSTEOARTHRITIS TYPE: ICD-10-CM

## 2021-07-19 RX ORDER — MELOXICAM 15 MG/1
15 TABLET ORAL
Qty: 90 TABLET | Refills: 0 | Status: SHIPPED | OUTPATIENT
Start: 2021-07-19 | End: 2021-07-28 | Stop reason: SDUPTHER

## 2021-07-23 ENCOUNTER — LAB VISIT (OUTPATIENT)
Dept: LAB | Facility: HOSPITAL | Age: 81
End: 2021-07-23
Attending: FAMILY MEDICINE
Payer: MEDICARE

## 2021-07-23 ENCOUNTER — OFFICE VISIT (OUTPATIENT)
Dept: FAMILY MEDICINE | Facility: CLINIC | Age: 81
End: 2021-07-23
Payer: MEDICARE

## 2021-07-23 VITALS
DIASTOLIC BLOOD PRESSURE: 64 MMHG | HEART RATE: 96 BPM | BODY MASS INDEX: 29.88 KG/M2 | HEIGHT: 69 IN | TEMPERATURE: 98 F | WEIGHT: 201.75 LBS | SYSTOLIC BLOOD PRESSURE: 132 MMHG | OXYGEN SATURATION: 96 %

## 2021-07-23 DIAGNOSIS — I10 ESSENTIAL HYPERTENSION: Primary | ICD-10-CM

## 2021-07-23 DIAGNOSIS — E78.5 HYPERLIPIDEMIA, UNSPECIFIED HYPERLIPIDEMIA TYPE: ICD-10-CM

## 2021-07-23 DIAGNOSIS — I10 ESSENTIAL HYPERTENSION: ICD-10-CM

## 2021-07-23 DIAGNOSIS — M46.96 INFLAMMATORY SPONDYLOPATHY OF LUMBAR REGION: ICD-10-CM

## 2021-07-23 LAB
BASOPHILS # BLD AUTO: 0.03 K/UL (ref 0–0.2)
BASOPHILS NFR BLD: 0.7 % (ref 0–1.9)
DIFFERENTIAL METHOD: ABNORMAL
EOSINOPHIL # BLD AUTO: 0.2 K/UL (ref 0–0.5)
EOSINOPHIL NFR BLD: 3.5 % (ref 0–8)
ERYTHROCYTE [DISTWIDTH] IN BLOOD BY AUTOMATED COUNT: 12.5 % (ref 11.5–14.5)
HCT VFR BLD AUTO: 35.3 % (ref 40–54)
HGB BLD-MCNC: 11.6 G/DL (ref 14–18)
IMM GRANULOCYTES # BLD AUTO: 0.01 K/UL (ref 0–0.04)
IMM GRANULOCYTES NFR BLD AUTO: 0.2 % (ref 0–0.5)
LYMPHOCYTES # BLD AUTO: 1.1 K/UL (ref 1–4.8)
LYMPHOCYTES NFR BLD: 25.7 % (ref 18–48)
MCH RBC QN AUTO: 31.6 PG (ref 27–31)
MCHC RBC AUTO-ENTMCNC: 32.9 G/DL (ref 32–36)
MCV RBC AUTO: 96 FL (ref 82–98)
MONOCYTES # BLD AUTO: 0.4 K/UL (ref 0.3–1)
MONOCYTES NFR BLD: 9.2 % (ref 4–15)
NEUTROPHILS # BLD AUTO: 2.6 K/UL (ref 1.8–7.7)
NEUTROPHILS NFR BLD: 60.7 % (ref 38–73)
NRBC BLD-RTO: 0 /100 WBC
PLATELET # BLD AUTO: 61 K/UL (ref 150–450)
PMV BLD AUTO: 12.2 FL (ref 9.2–12.9)
RBC # BLD AUTO: 3.67 M/UL (ref 4.6–6.2)
WBC # BLD AUTO: 4.24 K/UL (ref 3.9–12.7)

## 2021-07-23 PROCEDURE — 80061 LIPID PANEL: CPT | Mod: HCNC | Performed by: FAMILY MEDICINE

## 2021-07-23 PROCEDURE — 85025 COMPLETE CBC W/AUTO DIFF WBC: CPT | Mod: HCNC | Performed by: FAMILY MEDICINE

## 2021-07-23 PROCEDURE — 1125F AMNT PAIN NOTED PAIN PRSNT: CPT | Mod: HCNC,CPTII,S$GLB, | Performed by: FAMILY MEDICINE

## 2021-07-23 PROCEDURE — 99499 UNLISTED E&M SERVICE: CPT | Mod: HCNC,S$GLB,, | Performed by: FAMILY MEDICINE

## 2021-07-23 PROCEDURE — 1125F PR PAIN SEVERITY QUANTIFIED, PAIN PRESENT: ICD-10-PCS | Mod: HCNC,CPTII,S$GLB, | Performed by: FAMILY MEDICINE

## 2021-07-23 PROCEDURE — 3075F PR MOST RECENT SYSTOLIC BLOOD PRESS GE 130-139MM HG: ICD-10-PCS | Mod: HCNC,CPTII,S$GLB, | Performed by: FAMILY MEDICINE

## 2021-07-23 PROCEDURE — 36415 COLL VENOUS BLD VENIPUNCTURE: CPT | Mod: HCNC,PO | Performed by: FAMILY MEDICINE

## 2021-07-23 PROCEDURE — 1159F MED LIST DOCD IN RCRD: CPT | Mod: HCNC,CPTII,S$GLB, | Performed by: FAMILY MEDICINE

## 2021-07-23 PROCEDURE — 1101F PR PT FALLS ASSESS DOC 0-1 FALLS W/OUT INJ PAST YR: ICD-10-PCS | Mod: HCNC,CPTII,S$GLB, | Performed by: FAMILY MEDICINE

## 2021-07-23 PROCEDURE — 1159F PR MEDICATION LIST DOCUMENTED IN MEDICAL RECORD: ICD-10-PCS | Mod: HCNC,CPTII,S$GLB, | Performed by: FAMILY MEDICINE

## 2021-07-23 PROCEDURE — 80053 COMPREHEN METABOLIC PANEL: CPT | Mod: HCNC | Performed by: FAMILY MEDICINE

## 2021-07-23 PROCEDURE — 1101F PT FALLS ASSESS-DOCD LE1/YR: CPT | Mod: HCNC,CPTII,S$GLB, | Performed by: FAMILY MEDICINE

## 2021-07-23 PROCEDURE — 3078F DIAST BP <80 MM HG: CPT | Mod: HCNC,CPTII,S$GLB, | Performed by: FAMILY MEDICINE

## 2021-07-23 PROCEDURE — 99999 PR PBB SHADOW E&M-EST. PATIENT-LVL III: ICD-10-PCS | Mod: PBBFAC,HCNC,, | Performed by: FAMILY MEDICINE

## 2021-07-23 PROCEDURE — 3075F SYST BP GE 130 - 139MM HG: CPT | Mod: HCNC,CPTII,S$GLB, | Performed by: FAMILY MEDICINE

## 2021-07-23 PROCEDURE — 99999 PR PBB SHADOW E&M-EST. PATIENT-LVL III: CPT | Mod: PBBFAC,HCNC,, | Performed by: FAMILY MEDICINE

## 2021-07-23 PROCEDURE — 99499 RISK ADDL DX/OHS AUDIT: ICD-10-PCS | Mod: HCNC,S$GLB,, | Performed by: FAMILY MEDICINE

## 2021-07-23 PROCEDURE — 99213 PR OFFICE/OUTPT VISIT, EST, LEVL III, 20-29 MIN: ICD-10-PCS | Mod: HCNC,S$GLB,, | Performed by: FAMILY MEDICINE

## 2021-07-23 PROCEDURE — 99213 OFFICE O/P EST LOW 20 MIN: CPT | Mod: HCNC,S$GLB,, | Performed by: FAMILY MEDICINE

## 2021-07-23 PROCEDURE — 3288F PR FALLS RISK ASSESSMENT DOCUMENTED: ICD-10-PCS | Mod: HCNC,CPTII,S$GLB, | Performed by: FAMILY MEDICINE

## 2021-07-23 PROCEDURE — 3078F PR MOST RECENT DIASTOLIC BLOOD PRESSURE < 80 MM HG: ICD-10-PCS | Mod: HCNC,CPTII,S$GLB, | Performed by: FAMILY MEDICINE

## 2021-07-23 PROCEDURE — 84443 ASSAY THYROID STIM HORMONE: CPT | Mod: HCNC | Performed by: FAMILY MEDICINE

## 2021-07-23 PROCEDURE — 3288F FALL RISK ASSESSMENT DOCD: CPT | Mod: HCNC,CPTII,S$GLB, | Performed by: FAMILY MEDICINE

## 2021-07-23 RX ORDER — CICLOPIROX 80 MG/ML
SOLUTION TOPICAL NIGHTLY
Qty: 6.6 ML | Refills: 0 | Status: SHIPPED | OUTPATIENT
Start: 2021-07-23 | End: 2022-02-01

## 2021-07-23 RX ORDER — TRAMADOL HYDROCHLORIDE 50 MG/1
50 TABLET ORAL DAILY PRN
Qty: 90 TABLET | Refills: 0 | Status: SHIPPED | OUTPATIENT
Start: 2021-07-23 | End: 2021-09-17 | Stop reason: SDUPTHER

## 2021-07-23 RX ORDER — MUPIROCIN 20 MG/G
OINTMENT TOPICAL
COMMUNITY
Start: 2021-06-14 | End: 2022-02-01

## 2021-07-23 RX ORDER — PREDNISONE 20 MG/1
TABLET ORAL
COMMUNITY
Start: 2021-06-16 | End: 2022-02-01

## 2021-07-24 LAB
ALBUMIN SERPL BCP-MCNC: 3.6 G/DL (ref 3.5–5.2)
ALP SERPL-CCNC: 57 U/L (ref 55–135)
ALT SERPL W/O P-5'-P-CCNC: 21 U/L (ref 10–44)
ANION GAP SERPL CALC-SCNC: 7 MMOL/L (ref 8–16)
AST SERPL-CCNC: 30 U/L (ref 10–40)
BILIRUB SERPL-MCNC: 0.9 MG/DL (ref 0.1–1)
BUN SERPL-MCNC: 24 MG/DL (ref 8–23)
CALCIUM SERPL-MCNC: 8.9 MG/DL (ref 8.7–10.5)
CHLORIDE SERPL-SCNC: 106 MMOL/L (ref 95–110)
CHOLEST SERPL-MCNC: 127 MG/DL (ref 120–199)
CHOLEST/HDLC SERPL: 2.3 {RATIO} (ref 2–5)
CO2 SERPL-SCNC: 25 MMOL/L (ref 23–29)
CREAT SERPL-MCNC: 0.9 MG/DL (ref 0.5–1.4)
EST. GFR  (AFRICAN AMERICAN): >60 ML/MIN/1.73 M^2
EST. GFR  (NON AFRICAN AMERICAN): >60 ML/MIN/1.73 M^2
GLUCOSE SERPL-MCNC: 100 MG/DL (ref 70–110)
HDLC SERPL-MCNC: 55 MG/DL (ref 40–75)
HDLC SERPL: 43.3 % (ref 20–50)
LDLC SERPL CALC-MCNC: 58 MG/DL (ref 63–159)
NONHDLC SERPL-MCNC: 72 MG/DL
POTASSIUM SERPL-SCNC: 4.6 MMOL/L (ref 3.5–5.1)
PROT SERPL-MCNC: 6.4 G/DL (ref 6–8.4)
SODIUM SERPL-SCNC: 138 MMOL/L (ref 136–145)
TRIGL SERPL-MCNC: 70 MG/DL (ref 30–150)
TSH SERPL DL<=0.005 MIU/L-ACNC: 1.46 UIU/ML (ref 0.4–4)

## 2021-07-28 DIAGNOSIS — M17.0 OSTEOARTHRITIS OF BOTH KNEES, UNSPECIFIED OSTEOARTHRITIS TYPE: ICD-10-CM

## 2021-07-29 ENCOUNTER — HOSPITAL ENCOUNTER (OUTPATIENT)
Dept: CARDIOLOGY | Facility: HOSPITAL | Age: 81
Discharge: HOME | End: 2021-07-29
Payer: MEDICARE

## 2021-07-29 ENCOUNTER — CLINICAL SUPPORT (OUTPATIENT)
Dept: CARDIOLOGY | Facility: HOSPITAL | Age: 81
End: 2021-07-29
Payer: MEDICARE

## 2021-07-29 DIAGNOSIS — Z95.810 PRESENCE OF AUTOMATIC (IMPLANTABLE) CARDIAC DEFIBRILLATOR: ICD-10-CM

## 2021-07-29 PROCEDURE — 93295 CARDIAC DEVICE CHECK - REMOTE: ICD-10-PCS | Mod: ,,, | Performed by: INTERNAL MEDICINE

## 2021-07-29 PROCEDURE — 93295 DEV INTERROG REMOTE 1/2/MLT: CPT | Mod: ,,, | Performed by: INTERNAL MEDICINE

## 2021-07-29 PROCEDURE — 93296 REM INTERROG EVL PM/IDS: CPT | Mod: PO | Performed by: INTERNAL MEDICINE

## 2021-07-30 RX ORDER — MELOXICAM 15 MG/1
15 TABLET ORAL
Qty: 90 TABLET | Refills: 0 | Status: SHIPPED | OUTPATIENT
Start: 2021-07-30 | End: 2021-08-09 | Stop reason: SDUPTHER

## 2021-07-31 ENCOUNTER — CLINICAL SUPPORT (OUTPATIENT)
Dept: CARDIOLOGY | Facility: HOSPITAL | Age: 81
End: 2021-07-31
Payer: MEDICARE

## 2021-07-31 DIAGNOSIS — Z95.810 PRESENCE OF AUTOMATIC (IMPLANTABLE) CARDIAC DEFIBRILLATOR: ICD-10-CM

## 2021-07-31 PROCEDURE — 93297 CARDIAC DEVICE CHECK - REMOTE: ICD-10-PCS | Mod: HCNC,,, | Performed by: INTERNAL MEDICINE

## 2021-07-31 PROCEDURE — 93297 REM INTERROG DEV EVAL ICPMS: CPT | Mod: HCNC,,, | Performed by: INTERNAL MEDICINE

## 2021-07-31 PROCEDURE — G2066 INTER DEVC REMOTE 30D: HCPCS | Mod: HCNC,PO | Performed by: INTERNAL MEDICINE

## 2021-08-09 DIAGNOSIS — M17.0 OSTEOARTHRITIS OF BOTH KNEES, UNSPECIFIED OSTEOARTHRITIS TYPE: ICD-10-CM

## 2021-08-09 RX ORDER — MELOXICAM 15 MG/1
15 TABLET ORAL DAILY PRN
Qty: 90 TABLET | Refills: 0 | Status: SHIPPED | OUTPATIENT
Start: 2021-08-09 | End: 2021-10-25 | Stop reason: SDUPTHER

## 2021-08-17 ENCOUNTER — OFFICE VISIT (OUTPATIENT)
Dept: FAMILY MEDICINE | Facility: CLINIC | Age: 81
End: 2021-08-17
Payer: MEDICARE

## 2021-08-17 ENCOUNTER — HOSPITAL ENCOUNTER (OUTPATIENT)
Dept: RADIOLOGY | Facility: HOSPITAL | Age: 81
Discharge: HOME | End: 2021-08-17
Attending: FAMILY MEDICINE
Payer: MEDICARE

## 2021-08-17 ENCOUNTER — IMMUNIZATION (OUTPATIENT)
Dept: FAMILY MEDICINE | Facility: CLINIC | Age: 81
End: 2021-08-17
Payer: MEDICARE

## 2021-08-17 VITALS
HEART RATE: 58 BPM | WEIGHT: 204.81 LBS | DIASTOLIC BLOOD PRESSURE: 70 MMHG | SYSTOLIC BLOOD PRESSURE: 138 MMHG | OXYGEN SATURATION: 96 % | HEIGHT: 69 IN | BODY MASS INDEX: 30.33 KG/M2

## 2021-08-17 DIAGNOSIS — M25.512 ACUTE PAIN OF LEFT SHOULDER: Primary | ICD-10-CM

## 2021-08-17 DIAGNOSIS — Z23 NEED FOR VACCINATION: Primary | ICD-10-CM

## 2021-08-17 DIAGNOSIS — M25.512 ACUTE PAIN OF LEFT SHOULDER: ICD-10-CM

## 2021-08-17 PROCEDURE — 73030 X-RAY EXAM OF SHOULDER: CPT | Mod: TC,50,HCNC,FY,PO

## 2021-08-17 PROCEDURE — 99213 OFFICE O/P EST LOW 20 MIN: CPT | Mod: HCNC,S$GLB,, | Performed by: FAMILY MEDICINE

## 2021-08-17 PROCEDURE — 1100F PR PT FALLS ASSESS DOC 2+ FALLS/FALL W/INJURY/YR: ICD-10-PCS | Mod: HCNC,CPTII,S$GLB, | Performed by: FAMILY MEDICINE

## 2021-08-17 PROCEDURE — 99999 PR PBB SHADOW E&M-EST. PATIENT-LVL IV: ICD-10-PCS | Mod: PBBFAC,HCNC,, | Performed by: FAMILY MEDICINE

## 2021-08-17 PROCEDURE — 0001A COVID-19, MRNA, LNP-S, PF, 30 MCG/0.3 ML DOSE VACCINE: CPT | Mod: HCNC,CV19,, | Performed by: RADIOLOGY

## 2021-08-17 PROCEDURE — 3288F FALL RISK ASSESSMENT DOCD: CPT | Mod: HCNC,CPTII,S$GLB, | Performed by: FAMILY MEDICINE

## 2021-08-17 PROCEDURE — 1159F MED LIST DOCD IN RCRD: CPT | Mod: HCNC,CPTII,S$GLB, | Performed by: FAMILY MEDICINE

## 2021-08-17 PROCEDURE — 73030 X-RAY EXAM OF SHOULDER: CPT | Mod: 26,50,HCNC, | Performed by: RADIOLOGY

## 2021-08-17 PROCEDURE — 3075F PR MOST RECENT SYSTOLIC BLOOD PRESS GE 130-139MM HG: ICD-10-PCS | Mod: HCNC,CPTII,S$GLB, | Performed by: FAMILY MEDICINE

## 2021-08-17 PROCEDURE — 99999 PR PBB SHADOW E&M-EST. PATIENT-LVL IV: CPT | Mod: PBBFAC,HCNC,, | Performed by: FAMILY MEDICINE

## 2021-08-17 PROCEDURE — 73030 XR SHOULDER TRAUMA 3 VIEW BILATERAL: ICD-10-PCS | Mod: 26,50,HCNC, | Performed by: RADIOLOGY

## 2021-08-17 PROCEDURE — 99213 PR OFFICE/OUTPT VISIT, EST, LEVL III, 20-29 MIN: ICD-10-PCS | Mod: HCNC,S$GLB,, | Performed by: FAMILY MEDICINE

## 2021-08-17 PROCEDURE — 91300 COVID-19, MRNA, LNP-S, PF, 30 MCG/0.3 ML DOSE VACCINE: CPT | Mod: HCNC,,, | Performed by: RADIOLOGY

## 2021-08-17 PROCEDURE — 3075F SYST BP GE 130 - 139MM HG: CPT | Mod: HCNC,CPTII,S$GLB, | Performed by: FAMILY MEDICINE

## 2021-08-17 PROCEDURE — 91300 COVID-19, MRNA, LNP-S, PF, 30 MCG/0.3 ML DOSE VACCINE: ICD-10-PCS | Mod: HCNC,,, | Performed by: RADIOLOGY

## 2021-08-17 PROCEDURE — 1100F PTFALLS ASSESS-DOCD GE2>/YR: CPT | Mod: HCNC,CPTII,S$GLB, | Performed by: FAMILY MEDICINE

## 2021-08-17 PROCEDURE — 0001A COVID-19, MRNA, LNP-S, PF, 30 MCG/0.3 ML DOSE VACCINE: ICD-10-PCS | Mod: HCNC,CV19,, | Performed by: RADIOLOGY

## 2021-08-17 PROCEDURE — 1159F PR MEDICATION LIST DOCUMENTED IN MEDICAL RECORD: ICD-10-PCS | Mod: HCNC,CPTII,S$GLB, | Performed by: FAMILY MEDICINE

## 2021-08-17 PROCEDURE — 3288F PR FALLS RISK ASSESSMENT DOCUMENTED: ICD-10-PCS | Mod: HCNC,CPTII,S$GLB, | Performed by: FAMILY MEDICINE

## 2021-08-17 PROCEDURE — 3078F DIAST BP <80 MM HG: CPT | Mod: HCNC,CPTII,S$GLB, | Performed by: FAMILY MEDICINE

## 2021-08-17 PROCEDURE — 3078F PR MOST RECENT DIASTOLIC BLOOD PRESSURE < 80 MM HG: ICD-10-PCS | Mod: HCNC,CPTII,S$GLB, | Performed by: FAMILY MEDICINE

## 2021-08-17 RX ORDER — BETAMETHASONE SODIUM PHOSPHATE AND BETAMETHASONE ACETATE 3; 3 MG/ML; MG/ML
INJECTION, SUSPENSION INTRA-ARTICULAR; INTRALESIONAL; INTRAMUSCULAR; SOFT TISSUE
COMMUNITY
Start: 2021-06-09 | End: 2022-01-31

## 2021-08-18 ENCOUNTER — OFFICE VISIT (OUTPATIENT)
Dept: PHYSICAL MEDICINE AND REHAB | Facility: CLINIC | Age: 81
End: 2021-08-18
Payer: MEDICARE

## 2021-08-18 VITALS — WEIGHT: 204.81 LBS | HEIGHT: 69 IN | BODY MASS INDEX: 30.33 KG/M2

## 2021-08-18 DIAGNOSIS — M25.512 ACUTE PAIN OF LEFT SHOULDER: Primary | ICD-10-CM

## 2021-08-18 DIAGNOSIS — M75.82 ROTATOR CUFF TENDINITIS, LEFT: ICD-10-CM

## 2021-08-18 PROCEDURE — 1125F AMNT PAIN NOTED PAIN PRSNT: CPT | Mod: HCNC,CPTII,S$GLB, | Performed by: PHYSICAL MEDICINE & REHABILITATION

## 2021-08-18 PROCEDURE — 20611 DRAIN/INJ JOINT/BURSA W/US: CPT | Mod: HCNC,LT,S$GLB, | Performed by: PHYSICAL MEDICINE & REHABILITATION

## 2021-08-18 PROCEDURE — 3288F PR FALLS RISK ASSESSMENT DOCUMENTED: ICD-10-PCS | Mod: HCNC,CPTII,S$GLB, | Performed by: PHYSICAL MEDICINE & REHABILITATION

## 2021-08-18 PROCEDURE — 99999 PR PBB SHADOW E&M-EST. PATIENT-LVL III: CPT | Mod: PBBFAC,HCNC,, | Performed by: PHYSICAL MEDICINE & REHABILITATION

## 2021-08-18 PROCEDURE — 1101F PT FALLS ASSESS-DOCD LE1/YR: CPT | Mod: HCNC,CPTII,S$GLB, | Performed by: PHYSICAL MEDICINE & REHABILITATION

## 2021-08-18 PROCEDURE — 1125F PR PAIN SEVERITY QUANTIFIED, PAIN PRESENT: ICD-10-PCS | Mod: HCNC,CPTII,S$GLB, | Performed by: PHYSICAL MEDICINE & REHABILITATION

## 2021-08-18 PROCEDURE — 1160F RVW MEDS BY RX/DR IN RCRD: CPT | Mod: HCNC,CPTII,S$GLB, | Performed by: PHYSICAL MEDICINE & REHABILITATION

## 2021-08-18 PROCEDURE — 1160F PR REVIEW ALL MEDS BY PRESCRIBER/CLIN PHARMACIST DOCUMENTED: ICD-10-PCS | Mod: HCNC,CPTII,S$GLB, | Performed by: PHYSICAL MEDICINE & REHABILITATION

## 2021-08-18 PROCEDURE — 99213 PR OFFICE/OUTPT VISIT, EST, LEVL III, 20-29 MIN: ICD-10-PCS | Mod: 25,HCNC,GC,S$GLB | Performed by: PHYSICAL MEDICINE & REHABILITATION

## 2021-08-18 PROCEDURE — 99213 OFFICE O/P EST LOW 20 MIN: CPT | Mod: 25,HCNC,GC,S$GLB | Performed by: PHYSICAL MEDICINE & REHABILITATION

## 2021-08-18 PROCEDURE — 1159F PR MEDICATION LIST DOCUMENTED IN MEDICAL RECORD: ICD-10-PCS | Mod: HCNC,CPTII,S$GLB, | Performed by: PHYSICAL MEDICINE & REHABILITATION

## 2021-08-18 PROCEDURE — 99999 PR PBB SHADOW E&M-EST. PATIENT-LVL III: ICD-10-PCS | Mod: PBBFAC,HCNC,, | Performed by: PHYSICAL MEDICINE & REHABILITATION

## 2021-08-18 PROCEDURE — 1159F MED LIST DOCD IN RCRD: CPT | Mod: HCNC,CPTII,S$GLB, | Performed by: PHYSICAL MEDICINE & REHABILITATION

## 2021-08-18 PROCEDURE — 3288F FALL RISK ASSESSMENT DOCD: CPT | Mod: HCNC,CPTII,S$GLB, | Performed by: PHYSICAL MEDICINE & REHABILITATION

## 2021-08-18 PROCEDURE — 1101F PR PT FALLS ASSESS DOC 0-1 FALLS W/OUT INJ PAST YR: ICD-10-PCS | Mod: HCNC,CPTII,S$GLB, | Performed by: PHYSICAL MEDICINE & REHABILITATION

## 2021-08-18 PROCEDURE — 20611 LARGE JOINT ASPIRATION/INJECTION: L SUBACROMIAL BURSA: ICD-10-PCS | Mod: HCNC,LT,S$GLB, | Performed by: PHYSICAL MEDICINE & REHABILITATION

## 2021-08-18 RX ORDER — BETAMETHASONE SODIUM PHOSPHATE AND BETAMETHASONE ACETATE 3; 3 MG/ML; MG/ML
6 INJECTION, SUSPENSION INTRA-ARTICULAR; INTRALESIONAL; INTRAMUSCULAR; SOFT TISSUE
Status: DISCONTINUED | OUTPATIENT
Start: 2021-08-18 | End: 2021-08-18 | Stop reason: HOSPADM

## 2021-08-18 RX ADMIN — BETAMETHASONE SODIUM PHOSPHATE AND BETAMETHASONE ACETATE 6 MG: 3; 3 INJECTION, SUSPENSION INTRA-ARTICULAR; INTRALESIONAL; INTRAMUSCULAR; SOFT TISSUE at 08:08

## 2021-08-27 ENCOUNTER — PATIENT MESSAGE (OUTPATIENT)
Dept: FAMILY MEDICINE | Facility: CLINIC | Age: 81
End: 2021-08-27

## 2021-08-27 DIAGNOSIS — M17.0 OSTEOARTHRITIS OF BOTH KNEES, UNSPECIFIED OSTEOARTHRITIS TYPE: ICD-10-CM

## 2021-08-30 ENCOUNTER — CLINICAL SUPPORT (OUTPATIENT)
Dept: CARDIOLOGY | Facility: HOSPITAL | Age: 81
End: 2021-08-30
Payer: MEDICARE

## 2021-08-30 DIAGNOSIS — Z95.810 PRESENCE OF AUTOMATIC (IMPLANTABLE) CARDIAC DEFIBRILLATOR: ICD-10-CM

## 2021-08-30 PROCEDURE — 93297 REM INTERROG DEV EVAL ICPMS: CPT | Mod: HCNC,,, | Performed by: INTERNAL MEDICINE

## 2021-08-30 PROCEDURE — G2066 INTER DEVC REMOTE 30D: HCPCS | Mod: HCNC,PO | Performed by: INTERNAL MEDICINE

## 2021-08-30 PROCEDURE — 93297 CARDIAC DEVICE CHECK - REMOTE: ICD-10-PCS | Mod: HCNC,,, | Performed by: INTERNAL MEDICINE

## 2021-09-01 RX ORDER — GABAPENTIN 300 MG/1
300 CAPSULE ORAL 2 TIMES DAILY
Qty: 180 CAPSULE | Refills: 0 | Status: SHIPPED | OUTPATIENT
Start: 2021-09-01 | End: 2021-12-01

## 2021-09-17 DIAGNOSIS — M46.96 INFLAMMATORY SPONDYLOPATHY OF LUMBAR REGION: ICD-10-CM

## 2021-09-17 RX ORDER — TRAMADOL HYDROCHLORIDE 50 MG/1
50 TABLET ORAL DAILY PRN
Qty: 90 TABLET | Refills: 0 | Status: SHIPPED | OUTPATIENT
Start: 2021-09-17 | End: 2021-10-25 | Stop reason: SDUPTHER

## 2021-09-23 ENCOUNTER — TELEPHONE (OUTPATIENT)
Dept: CARDIOLOGY | Facility: HOSPITAL | Age: 81
End: 2021-09-23

## 2021-10-01 ENCOUNTER — CLINICAL SUPPORT (OUTPATIENT)
Dept: CARDIOLOGY | Facility: HOSPITAL | Age: 81
End: 2021-10-01
Payer: MEDICARE

## 2021-10-01 DIAGNOSIS — Z95.810 PRESENCE OF AUTOMATIC (IMPLANTABLE) CARDIAC DEFIBRILLATOR: ICD-10-CM

## 2021-10-01 PROCEDURE — 93297 REM INTERROG DEV EVAL ICPMS: CPT | Mod: HCNC,,, | Performed by: INTERNAL MEDICINE

## 2021-10-01 PROCEDURE — G2066 INTER DEVC REMOTE 30D: HCPCS | Mod: HCNC,PO | Performed by: INTERNAL MEDICINE

## 2021-10-01 PROCEDURE — 93297 CARDIAC DEVICE CHECK - REMOTE: ICD-10-PCS | Mod: HCNC,,, | Performed by: INTERNAL MEDICINE

## 2021-10-11 ENCOUNTER — OFFICE VISIT (OUTPATIENT)
Dept: CARDIOLOGY | Facility: CLINIC | Age: 81
End: 2021-10-11
Payer: MEDICARE

## 2021-10-11 ENCOUNTER — CLINICAL SUPPORT (OUTPATIENT)
Dept: CARDIOLOGY | Facility: HOSPITAL | Age: 81
End: 2021-10-11
Attending: INTERNAL MEDICINE
Payer: MEDICARE

## 2021-10-11 VITALS
HEART RATE: 50 BPM | HEIGHT: 69 IN | BODY MASS INDEX: 30.3 KG/M2 | WEIGHT: 204.56 LBS | DIASTOLIC BLOOD PRESSURE: 62 MMHG | SYSTOLIC BLOOD PRESSURE: 132 MMHG

## 2021-10-11 DIAGNOSIS — Z45.02 IMPLANTABLE CARDIOVERTER-DEFIBRILLATOR (ICD) GENERATOR END OF LIFE: ICD-10-CM

## 2021-10-11 DIAGNOSIS — I48.0 PAF (PAROXYSMAL ATRIAL FIBRILLATION): ICD-10-CM

## 2021-10-11 DIAGNOSIS — Z95.810 ICD (IMPLANTABLE CARDIOVERTER-DEFIBRILLATOR) IN PLACE: ICD-10-CM

## 2021-10-11 DIAGNOSIS — I25.5 ISCHEMIC CARDIOMYOPATHY: Primary | Chronic | ICD-10-CM

## 2021-10-11 DIAGNOSIS — I25.5 ISCHEMIC CARDIOMYOPATHY: ICD-10-CM

## 2021-10-11 PROCEDURE — 99999 PR PBB SHADOW E&M-EST. PATIENT-LVL IV: CPT | Mod: PBBFAC,HCNC,, | Performed by: INTERNAL MEDICINE

## 2021-10-11 PROCEDURE — 3075F PR MOST RECENT SYSTOLIC BLOOD PRESS GE 130-139MM HG: ICD-10-PCS | Mod: HCNC,CPTII,S$GLB, | Performed by: INTERNAL MEDICINE

## 2021-10-11 PROCEDURE — 1160F PR REVIEW ALL MEDS BY PRESCRIBER/CLIN PHARMACIST DOCUMENTED: ICD-10-PCS | Mod: HCNC,CPTII,S$GLB, | Performed by: INTERNAL MEDICINE

## 2021-10-11 PROCEDURE — 99499 UNLISTED E&M SERVICE: CPT | Mod: S$GLB,,, | Performed by: INTERNAL MEDICINE

## 2021-10-11 PROCEDURE — 99499 RISK ADDL DX/OHS AUDIT: ICD-10-PCS | Mod: S$GLB,,, | Performed by: INTERNAL MEDICINE

## 2021-10-11 PROCEDURE — 99214 OFFICE O/P EST MOD 30 MIN: CPT | Mod: HCNC,S$GLB,, | Performed by: INTERNAL MEDICINE

## 2021-10-11 PROCEDURE — 1159F MED LIST DOCD IN RCRD: CPT | Mod: HCNC,CPTII,S$GLB, | Performed by: INTERNAL MEDICINE

## 2021-10-11 PROCEDURE — 3078F PR MOST RECENT DIASTOLIC BLOOD PRESSURE < 80 MM HG: ICD-10-PCS | Mod: HCNC,CPTII,S$GLB, | Performed by: INTERNAL MEDICINE

## 2021-10-11 PROCEDURE — 93284 PRGRMG EVAL IMPLANTABLE DFB: CPT | Mod: 26,HCNC,, | Performed by: INTERNAL MEDICINE

## 2021-10-11 PROCEDURE — 99999 PR PBB SHADOW E&M-EST. PATIENT-LVL IV: ICD-10-PCS | Mod: PBBFAC,HCNC,, | Performed by: INTERNAL MEDICINE

## 2021-10-11 PROCEDURE — 3288F PR FALLS RISK ASSESSMENT DOCUMENTED: ICD-10-PCS | Mod: HCNC,CPTII,S$GLB, | Performed by: INTERNAL MEDICINE

## 2021-10-11 PROCEDURE — 1126F AMNT PAIN NOTED NONE PRSNT: CPT | Mod: HCNC,CPTII,S$GLB, | Performed by: INTERNAL MEDICINE

## 2021-10-11 PROCEDURE — 1159F PR MEDICATION LIST DOCUMENTED IN MEDICAL RECORD: ICD-10-PCS | Mod: HCNC,CPTII,S$GLB, | Performed by: INTERNAL MEDICINE

## 2021-10-11 PROCEDURE — 3288F FALL RISK ASSESSMENT DOCD: CPT | Mod: HCNC,CPTII,S$GLB, | Performed by: INTERNAL MEDICINE

## 2021-10-11 PROCEDURE — 99214 PR OFFICE/OUTPT VISIT, EST, LEVL IV, 30-39 MIN: ICD-10-PCS | Mod: HCNC,S$GLB,, | Performed by: INTERNAL MEDICINE

## 2021-10-11 PROCEDURE — 3075F SYST BP GE 130 - 139MM HG: CPT | Mod: HCNC,CPTII,S$GLB, | Performed by: INTERNAL MEDICINE

## 2021-10-11 PROCEDURE — 1126F PR PAIN SEVERITY QUANTIFIED, NO PAIN PRESENT: ICD-10-PCS | Mod: HCNC,CPTII,S$GLB, | Performed by: INTERNAL MEDICINE

## 2021-10-11 PROCEDURE — 93284 CARDIAC DEVICE CHECK - IN CLINIC & HOSPITAL: ICD-10-PCS | Mod: 26,HCNC,, | Performed by: INTERNAL MEDICINE

## 2021-10-11 PROCEDURE — 1160F RVW MEDS BY RX/DR IN RCRD: CPT | Mod: HCNC,CPTII,S$GLB, | Performed by: INTERNAL MEDICINE

## 2021-10-11 PROCEDURE — 1101F PR PT FALLS ASSESS DOC 0-1 FALLS W/OUT INJ PAST YR: ICD-10-PCS | Mod: HCNC,CPTII,S$GLB, | Performed by: INTERNAL MEDICINE

## 2021-10-11 PROCEDURE — 1101F PT FALLS ASSESS-DOCD LE1/YR: CPT | Mod: HCNC,CPTII,S$GLB, | Performed by: INTERNAL MEDICINE

## 2021-10-11 PROCEDURE — 3078F DIAST BP <80 MM HG: CPT | Mod: HCNC,CPTII,S$GLB, | Performed by: INTERNAL MEDICINE

## 2021-10-25 DIAGNOSIS — M46.96 INFLAMMATORY SPONDYLOPATHY OF LUMBAR REGION: ICD-10-CM

## 2021-10-25 DIAGNOSIS — M17.0 OSTEOARTHRITIS OF BOTH KNEES, UNSPECIFIED OSTEOARTHRITIS TYPE: ICD-10-CM

## 2021-10-27 ENCOUNTER — CLINICAL SUPPORT (OUTPATIENT)
Dept: CARDIOLOGY | Facility: HOSPITAL | Age: 81
End: 2021-10-27
Payer: MEDICARE

## 2021-10-27 DIAGNOSIS — Z95.810 PRESENCE OF AUTOMATIC (IMPLANTABLE) CARDIAC DEFIBRILLATOR: ICD-10-CM

## 2021-10-27 PROCEDURE — 93295 CARDIAC DEVICE CHECK - REMOTE: ICD-10-PCS | Mod: HCNC,,, | Performed by: INTERNAL MEDICINE

## 2021-10-27 PROCEDURE — 93296 REM INTERROG EVL PM/IDS: CPT | Mod: HCNC,PO | Performed by: INTERNAL MEDICINE

## 2021-10-27 PROCEDURE — 93295 DEV INTERROG REMOTE 1/2/MLT: CPT | Mod: HCNC,,, | Performed by: INTERNAL MEDICINE

## 2021-10-27 RX ORDER — TRAMADOL HYDROCHLORIDE 50 MG/1
50 TABLET ORAL DAILY PRN
Qty: 90 TABLET | Refills: 0 | Status: SHIPPED | OUTPATIENT
Start: 2021-10-27 | End: 2022-01-11 | Stop reason: SDUPTHER

## 2021-10-27 RX ORDER — MELOXICAM 15 MG/1
15 TABLET ORAL DAILY PRN
Qty: 90 TABLET | Refills: 0 | Status: SHIPPED | OUTPATIENT
Start: 2021-10-27 | End: 2021-12-01

## 2021-10-31 ENCOUNTER — CLINICAL SUPPORT (OUTPATIENT)
Dept: CARDIOLOGY | Facility: HOSPITAL | Age: 81
End: 2021-10-31
Payer: MEDICARE

## 2021-10-31 DIAGNOSIS — Z95.810 PRESENCE OF AUTOMATIC (IMPLANTABLE) CARDIAC DEFIBRILLATOR: ICD-10-CM

## 2021-10-31 PROCEDURE — G2066 INTER DEVC REMOTE 30D: HCPCS | Mod: HCNC,PO | Performed by: INTERNAL MEDICINE

## 2021-10-31 PROCEDURE — 93297 REM INTERROG DEV EVAL ICPMS: CPT | Mod: HCNC,,, | Performed by: INTERNAL MEDICINE

## 2021-10-31 PROCEDURE — 93297 CARDIAC DEVICE CHECK - REMOTE: ICD-10-PCS | Mod: HCNC,,, | Performed by: INTERNAL MEDICINE

## 2021-11-25 DIAGNOSIS — M17.0 OSTEOARTHRITIS OF BOTH KNEES, UNSPECIFIED OSTEOARTHRITIS TYPE: ICD-10-CM

## 2021-12-01 DIAGNOSIS — M17.0 OSTEOARTHRITIS OF BOTH KNEES, UNSPECIFIED OSTEOARTHRITIS TYPE: ICD-10-CM

## 2021-12-01 RX ORDER — MELOXICAM 15 MG/1
TABLET ORAL
Qty: 90 TABLET | Refills: 0 | Status: SHIPPED | OUTPATIENT
Start: 2021-12-01 | End: 2022-02-18 | Stop reason: SDUPTHER

## 2021-12-01 RX ORDER — GABAPENTIN 300 MG/1
CAPSULE ORAL
Qty: 180 CAPSULE | Refills: 0 | Status: SHIPPED | OUTPATIENT
Start: 2021-12-01 | End: 2022-02-18 | Stop reason: SDUPTHER

## 2021-12-02 ENCOUNTER — CLINICAL SUPPORT (OUTPATIENT)
Dept: CARDIOLOGY | Facility: HOSPITAL | Age: 81
End: 2021-12-02
Payer: MEDICARE

## 2021-12-02 DIAGNOSIS — Z95.810 PRESENCE OF AUTOMATIC (IMPLANTABLE) CARDIAC DEFIBRILLATOR: ICD-10-CM

## 2021-12-02 PROCEDURE — G2066 INTER DEVC REMOTE 30D: HCPCS | Mod: HCNC,PO | Performed by: INTERNAL MEDICINE

## 2021-12-02 PROCEDURE — 93297 REM INTERROG DEV EVAL ICPMS: CPT | Mod: HCNC,,, | Performed by: INTERNAL MEDICINE

## 2021-12-02 PROCEDURE — 93297 CARDIAC DEVICE CHECK - REMOTE: ICD-10-PCS | Mod: HCNC,,, | Performed by: INTERNAL MEDICINE

## 2021-12-17 ENCOUNTER — PES CALL (OUTPATIENT)
Dept: ADMINISTRATIVE | Facility: CLINIC | Age: 81
End: 2021-12-17
Payer: MEDICARE

## 2022-01-11 ENCOUNTER — PATIENT MESSAGE (OUTPATIENT)
Dept: CARDIOLOGY | Facility: CLINIC | Age: 82
End: 2022-01-11
Payer: MEDICARE

## 2022-01-11 DIAGNOSIS — M46.96 INFLAMMATORY SPONDYLOPATHY OF LUMBAR REGION: ICD-10-CM

## 2022-01-11 NOTE — TELEPHONE ENCOUNTER
No new care gaps identified.  Powered by [employer]. Reference number: [id number].   1/11/2022 2:40:30 PM CST

## 2022-01-13 ENCOUNTER — PATIENT MESSAGE (OUTPATIENT)
Dept: CARDIOLOGY | Facility: CLINIC | Age: 82
End: 2022-01-13
Payer: MEDICARE

## 2022-01-14 RX ORDER — TRAMADOL HYDROCHLORIDE 50 MG/1
50 TABLET ORAL DAILY PRN
Qty: 90 TABLET | Refills: 0 | Status: SHIPPED | OUTPATIENT
Start: 2022-01-14 | End: 2022-04-18 | Stop reason: SDUPTHER

## 2022-01-20 ENCOUNTER — IMMUNIZATION (OUTPATIENT)
Dept: PHARMACY | Facility: CLINIC | Age: 82
End: 2022-01-20
Payer: MEDICARE

## 2022-01-20 ENCOUNTER — OFFICE VISIT (OUTPATIENT)
Dept: OPTOMETRY | Facility: CLINIC | Age: 82
End: 2022-01-20
Payer: MEDICARE

## 2022-01-20 DIAGNOSIS — H25.13 NUCLEAR SCLEROSIS, BILATERAL: Primary | ICD-10-CM

## 2022-01-20 DIAGNOSIS — H16.213 EXPOSURE KERATOCONJUNCTIVITIS OF BOTH EYES: ICD-10-CM

## 2022-01-20 DIAGNOSIS — H52.4 HYPEROPIA WITH ASTIGMATISM AND PRESBYOPIA, BILATERAL: ICD-10-CM

## 2022-01-20 DIAGNOSIS — H52.203 HYPEROPIA WITH ASTIGMATISM AND PRESBYOPIA, BILATERAL: ICD-10-CM

## 2022-01-20 DIAGNOSIS — Z13.5 GLAUCOMA SCREENING: ICD-10-CM

## 2022-01-20 DIAGNOSIS — H52.03 HYPEROPIA WITH ASTIGMATISM AND PRESBYOPIA, BILATERAL: ICD-10-CM

## 2022-01-20 DIAGNOSIS — H43.813 POSTERIOR VITREOUS DETACHMENT, BILATERAL: ICD-10-CM

## 2022-01-20 PROCEDURE — 92004 PR EYE EXAM, NEW PATIENT,COMPREHESV: ICD-10-PCS | Mod: HCNC,S$GLB,, | Performed by: OPTOMETRIST

## 2022-01-20 PROCEDURE — 99999 PR PBB SHADOW E&M-EST. PATIENT-LVL IV: CPT | Mod: PBBFAC,HCNC,, | Performed by: OPTOMETRIST

## 2022-01-20 PROCEDURE — 3288F PR FALLS RISK ASSESSMENT DOCUMENTED: ICD-10-PCS | Mod: HCNC,CPTII,S$GLB, | Performed by: OPTOMETRIST

## 2022-01-20 PROCEDURE — 1101F PR PT FALLS ASSESS DOC 0-1 FALLS W/OUT INJ PAST YR: ICD-10-PCS | Mod: HCNC,CPTII,S$GLB, | Performed by: OPTOMETRIST

## 2022-01-20 PROCEDURE — 3288F FALL RISK ASSESSMENT DOCD: CPT | Mod: HCNC,CPTII,S$GLB, | Performed by: OPTOMETRIST

## 2022-01-20 PROCEDURE — 92015 PR REFRACTION: ICD-10-PCS | Mod: HCNC,S$GLB,, | Performed by: OPTOMETRIST

## 2022-01-20 PROCEDURE — 92015 DETERMINE REFRACTIVE STATE: CPT | Mod: HCNC,S$GLB,, | Performed by: OPTOMETRIST

## 2022-01-20 PROCEDURE — 92004 COMPRE OPH EXAM NEW PT 1/>: CPT | Mod: HCNC,S$GLB,, | Performed by: OPTOMETRIST

## 2022-01-20 PROCEDURE — 1126F AMNT PAIN NOTED NONE PRSNT: CPT | Mod: HCNC,CPTII,S$GLB, | Performed by: OPTOMETRIST

## 2022-01-20 PROCEDURE — 1159F MED LIST DOCD IN RCRD: CPT | Mod: HCNC,CPTII,S$GLB, | Performed by: OPTOMETRIST

## 2022-01-20 PROCEDURE — 1101F PT FALLS ASSESS-DOCD LE1/YR: CPT | Mod: HCNC,CPTII,S$GLB, | Performed by: OPTOMETRIST

## 2022-01-20 PROCEDURE — 1159F PR MEDICATION LIST DOCUMENTED IN MEDICAL RECORD: ICD-10-PCS | Mod: HCNC,CPTII,S$GLB, | Performed by: OPTOMETRIST

## 2022-01-20 PROCEDURE — 99999 PR PBB SHADOW E&M-EST. PATIENT-LVL IV: ICD-10-PCS | Mod: PBBFAC,HCNC,, | Performed by: OPTOMETRIST

## 2022-01-20 PROCEDURE — 1126F PR PAIN SEVERITY QUANTIFIED, NO PAIN PRESENT: ICD-10-PCS | Mod: HCNC,CPTII,S$GLB, | Performed by: OPTOMETRIST

## 2022-01-20 RX ORDER — FLUOROMETHOLONE 1 MG/ML
SUSPENSION/ DROPS OPHTHALMIC
Qty: 5 ML | Refills: 1 | Status: SHIPPED | OUTPATIENT
Start: 2022-01-20 | End: 2023-01-20

## 2022-01-20 NOTE — PATIENT INSTRUCTIONS
"DRY EYES -- BURNING OR DANTE SYMPTOMS:  Use Over The Counter artificial tears as needed for dry eye symptoms.   Some common brands include:  Systane, Optive, Refresh, and Thera-Tears.  These drops can be used as frequently as desired, but may be most helpful use during long periods of concentrated work.  For example, reading / working at the computer. Start with 3-4x per day.     Nighttime Ophthalmic gel or ointments are available: Refresh PM, Genteal, and Lacrilube.    Avoid drops that "get redness out" (Visine, Murine, Clear Eyes), as these may contain medication that could further irritate the eyes, especially with chronic use.    ALLERGY EYES -- ITCHING SYMPTOMS:  Over the counter medications include--Pataday, Zaditor, and Alaway.  Use as directed 1-2 drops daily for symptoms of itching / watering eyes.  These drops will not help for dry eye or exposure symptoms.    REDNESS RELIEF:  Lumify---is a good redness reliever that will not cause irritation if used chronically.         FLASHES / FLOATERS / POSTERIOR VITREOUS DETACHMENT    Call the clinic if you have any further changes in symptoms.  Including:  Increased numbers of floaters or flashing lights, dimness or darkness that moves through or stays constant in your vision, or any pain in the eye (s).    You may sometimes see small specks or clouds moving in your field of vision.  They are called FLOATERS.  You can often see them when looking at a plain background, like a blank wall or blue dionne.  Floaters are actually tiny clumps of gel or cells inside the VITREOUS, the clear jelly-like fluid that fills the inside of your eye.    While these objects look like they are in front of your eye, they are actually floating inside.  What you see are the shadows they cast on the RETINA, the nerve layer at the back of the eye that senses light and allows you to see.      POSTERIOR VITREOUS DETACHMENT    The appearance of new floaters may be alarming.  If you suddenly " develop new floaters, you should contact your eye care professional  right away.    The retina can tear if the shrinking vitreous pulls away from the wall of the eye.  This sometimes causes a small amount of bleeding in the eye that may appear as new floaters.    A torn retina is always a serious problem, since it can lead to a retinal detachment.  You should see your eye care professional as soon as possible if:     even one new floater appears suddenly;   you see sudden flashes of light;   you notice other symptoms, like the loss of side vision, or a curtain closes down in your vision        POSTERIOR VITREOUS DETACHMENT is more common for people who:     are nearsighted;   have had cataract surgery;   have had YAG laser surgery of the eye;   have had inflammation inside the eye;   are over age 60.      While some floaters may remain visible, many of them will fade over time and become less noticeable.  Even if you've had some floaters for years, you should have your eyes checked as soon as possible if you notice new ones.    FLASHING LIGHTS    When the vitreous gel rubs or pulls on the retina, you may see what look like flashing lights or lightning streaks.  These flashes can appear off and on for several weeks or months.      Some people experience flashes of light that appear as jagged lines or heat waves in both eyes, lasting 10-20 minutes.  These flashes are caused by a spasm of blood vessels in the brain, which is called a migraine.    If a headache follows these flashes, it's called a migraine headache.  If   no headache occurs, these flashes are called Ophthalmic or Ocular Migraine.            Early Cataracts--not visually significant for surgery consultation.    What Are Cataracts?  A clear lens in the eye focuses light. This lets the eye see images sharply. With age, the lens slowly becomes cloudy. The cloudy lens is a cataract. A cataract scatters light and makes it hard for the eye to  focus. Cataracts often form in both eyes. But one lens may cloud faster than the other.      The Aging of Your Lens    Your lens may cloud so slowly that you don`t notice any vision changes at first. But as the cataract gets worse, the eye has a harder time focusing. In early stages, glasses may help you see better. As the lens gets cloudier, your doctor may recommend surgery to restore your vision.

## 2022-01-20 NOTE — PROGRESS NOTES
HPI     Routine eye exam-dle-2018    Pt denies any blurred vision. Needing updated glasses rx. Complains of dry   eyes- using AT prn.     Last edited by Brooke Flowers on 1/20/2022  3:08 PM. (History)        ROS     Positive for: Eyes    Negative for: Constitutional, Gastrointestinal, Neurological, Skin,   Genitourinary, Musculoskeletal, HENT, Endocrine, Cardiovascular,   Respiratory, Psychiatric, Allergic/Imm, Heme/Lymph    Last edited by ARMOND Horner, OD on 1/20/2022  3:22 PM. (History)        Assessment /Plan     For exam results, see Encounter Report.    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 Not to exceed 10 days use.  Dispense: 5 mL; Refill: 1    Glaucoma screening    Hyperopia with astigmatism and presbyopia, bilateral      1. Early vis sig, not ready for consult, cautions night driving  Gave info, CE possible 2-3 yrs  2. RD precautions given and reviewed. Patient knows to call/ message if any further changes in symptoms occur.  3. Longstanding w/ transient s/s, some allergy component   Lid hygiene sheet, scrubs qhs or qod  Continue ATs daily and gel/ jr qhs  Sent fml to pharmacy for short term use of flare ups---not for chronic use  Message if not effective  4. Not suspect  5. Updated specs rx, gave copy, fill prn  Ok continue with current prn    Discussed and educated patient on current findings /plan.  RTC 1 year, prn if any changes / issues

## 2022-01-24 ENCOUNTER — LAB VISIT (OUTPATIENT)
Dept: LAB | Facility: HOSPITAL | Age: 82
End: 2022-01-24
Attending: FAMILY MEDICINE
Payer: MEDICARE

## 2022-01-24 ENCOUNTER — OFFICE VISIT (OUTPATIENT)
Dept: FAMILY MEDICINE | Facility: CLINIC | Age: 82
End: 2022-01-24
Payer: MEDICARE

## 2022-01-24 ENCOUNTER — OFFICE VISIT (OUTPATIENT)
Dept: ORTHOPEDICS | Facility: CLINIC | Age: 82
End: 2022-01-24
Payer: MEDICARE

## 2022-01-24 VITALS
DIASTOLIC BLOOD PRESSURE: 68 MMHG | SYSTOLIC BLOOD PRESSURE: 128 MMHG | OXYGEN SATURATION: 95 % | WEIGHT: 207.69 LBS | HEART RATE: 50 BPM | HEIGHT: 69 IN | BODY MASS INDEX: 30.76 KG/M2

## 2022-01-24 VITALS — WEIGHT: 207.69 LBS | HEIGHT: 69 IN | BODY MASS INDEX: 30.76 KG/M2

## 2022-01-24 DIAGNOSIS — D69.6 THROMBOCYTOPENIA: ICD-10-CM

## 2022-01-24 DIAGNOSIS — Z00.00 WELLNESS EXAMINATION: Primary | ICD-10-CM

## 2022-01-24 DIAGNOSIS — I10 ESSENTIAL HYPERTENSION: ICD-10-CM

## 2022-01-24 DIAGNOSIS — Z01.818 PRE-OP TESTING: Primary | ICD-10-CM

## 2022-01-24 DIAGNOSIS — M65.342 TRIGGER FINGER, LEFT RING FINGER: Primary | ICD-10-CM

## 2022-01-24 DIAGNOSIS — I50.42 CHRONIC COMBINED SYSTOLIC AND DIASTOLIC HEART FAILURE: ICD-10-CM

## 2022-01-24 DIAGNOSIS — M46.96 INFLAMMATORY SPONDYLOPATHY OF LUMBAR REGION: ICD-10-CM

## 2022-01-24 DIAGNOSIS — I48.0 PAF (PAROXYSMAL ATRIAL FIBRILLATION): ICD-10-CM

## 2022-01-24 DIAGNOSIS — E78.5 HYPERLIPIDEMIA, UNSPECIFIED HYPERLIPIDEMIA TYPE: ICD-10-CM

## 2022-01-24 LAB
BASOPHILS # BLD AUTO: 0.02 K/UL (ref 0–0.2)
BASOPHILS NFR BLD: 0.5 % (ref 0–1.9)
DIFFERENTIAL METHOD: ABNORMAL
EOSINOPHIL # BLD AUTO: 0.2 K/UL (ref 0–0.5)
EOSINOPHIL NFR BLD: 4 % (ref 0–8)
ERYTHROCYTE [DISTWIDTH] IN BLOOD BY AUTOMATED COUNT: 12.4 % (ref 11.5–14.5)
HCT VFR BLD AUTO: 34.5 % (ref 40–54)
HGB BLD-MCNC: 11.7 G/DL (ref 14–18)
IMM GRANULOCYTES # BLD AUTO: 0 K/UL (ref 0–0.04)
IMM GRANULOCYTES NFR BLD AUTO: 0 % (ref 0–0.5)
LYMPHOCYTES # BLD AUTO: 0.9 K/UL (ref 1–4.8)
LYMPHOCYTES NFR BLD: 23.5 % (ref 18–48)
MCH RBC QN AUTO: 32.6 PG (ref 27–31)
MCHC RBC AUTO-ENTMCNC: 33.9 G/DL (ref 32–36)
MCV RBC AUTO: 96 FL (ref 82–98)
MONOCYTES # BLD AUTO: 0.4 K/UL (ref 0.3–1)
MONOCYTES NFR BLD: 9.2 % (ref 4–15)
NEUTROPHILS # BLD AUTO: 2.4 K/UL (ref 1.8–7.7)
NEUTROPHILS NFR BLD: 62.8 % (ref 38–73)
NRBC BLD-RTO: 0 /100 WBC
PLATELET # BLD AUTO: 63 K/UL (ref 150–450)
PMV BLD AUTO: 11 FL (ref 9.2–12.9)
RBC # BLD AUTO: 3.59 M/UL (ref 4.6–6.2)
WBC # BLD AUTO: 3.79 K/UL (ref 3.9–12.7)

## 2022-01-24 PROCEDURE — 1125F AMNT PAIN NOTED PAIN PRSNT: CPT | Mod: HCNC,CPTII,S$GLB, | Performed by: ORTHOPAEDIC SURGERY

## 2022-01-24 PROCEDURE — 3288F PR FALLS RISK ASSESSMENT DOCUMENTED: ICD-10-PCS | Mod: HCNC,CPTII,S$GLB, | Performed by: FAMILY MEDICINE

## 2022-01-24 PROCEDURE — 99499 RISK ADDL DX/OHS AUDIT: ICD-10-PCS | Mod: S$GLB,,, | Performed by: FAMILY MEDICINE

## 2022-01-24 PROCEDURE — 1101F PT FALLS ASSESS-DOCD LE1/YR: CPT | Mod: HCNC,CPTII,S$GLB, | Performed by: ORTHOPAEDIC SURGERY

## 2022-01-24 PROCEDURE — 3288F FALL RISK ASSESSMENT DOCD: CPT | Mod: HCNC,CPTII,S$GLB, | Performed by: ORTHOPAEDIC SURGERY

## 2022-01-24 PROCEDURE — 99999 PR PBB SHADOW E&M-EST. PATIENT-LVL IV: CPT | Mod: PBBFAC,HCNC,, | Performed by: FAMILY MEDICINE

## 2022-01-24 PROCEDURE — 80053 COMPREHEN METABOLIC PANEL: CPT | Mod: HCNC | Performed by: FAMILY MEDICINE

## 2022-01-24 PROCEDURE — 1125F PR PAIN SEVERITY QUANTIFIED, PAIN PRESENT: ICD-10-PCS | Mod: HCNC,CPTII,S$GLB, | Performed by: FAMILY MEDICINE

## 2022-01-24 PROCEDURE — 99999 PR PBB SHADOW E&M-EST. PATIENT-LVL V: CPT | Mod: PBBFAC,HCNC,, | Performed by: ORTHOPAEDIC SURGERY

## 2022-01-24 PROCEDURE — 1101F PR PT FALLS ASSESS DOC 0-1 FALLS W/OUT INJ PAST YR: ICD-10-PCS | Mod: HCNC,CPTII,S$GLB, | Performed by: FAMILY MEDICINE

## 2022-01-24 PROCEDURE — 1101F PR PT FALLS ASSESS DOC 0-1 FALLS W/OUT INJ PAST YR: ICD-10-PCS | Mod: HCNC,CPTII,S$GLB, | Performed by: ORTHOPAEDIC SURGERY

## 2022-01-24 PROCEDURE — 3288F FALL RISK ASSESSMENT DOCD: CPT | Mod: HCNC,CPTII,S$GLB, | Performed by: FAMILY MEDICINE

## 2022-01-24 PROCEDURE — 99397 PER PM REEVAL EST PAT 65+ YR: CPT | Mod: HCNC,S$GLB,, | Performed by: FAMILY MEDICINE

## 2022-01-24 PROCEDURE — 99214 PR OFFICE/OUTPT VISIT, EST, LEVL IV, 30-39 MIN: ICD-10-PCS | Mod: HCNC,S$GLB,, | Performed by: ORTHOPAEDIC SURGERY

## 2022-01-24 PROCEDURE — 3288F PR FALLS RISK ASSESSMENT DOCUMENTED: ICD-10-PCS | Mod: HCNC,CPTII,S$GLB, | Performed by: ORTHOPAEDIC SURGERY

## 2022-01-24 PROCEDURE — 99499 UNLISTED E&M SERVICE: CPT | Mod: S$GLB,,, | Performed by: FAMILY MEDICINE

## 2022-01-24 PROCEDURE — 1159F PR MEDICATION LIST DOCUMENTED IN MEDICAL RECORD: ICD-10-PCS | Mod: HCNC,CPTII,S$GLB, | Performed by: ORTHOPAEDIC SURGERY

## 2022-01-24 PROCEDURE — 1160F PR REVIEW ALL MEDS BY PRESCRIBER/CLIN PHARMACIST DOCUMENTED: ICD-10-PCS | Mod: HCNC,CPTII,S$GLB, | Performed by: ORTHOPAEDIC SURGERY

## 2022-01-24 PROCEDURE — 3078F DIAST BP <80 MM HG: CPT | Mod: HCNC,CPTII,S$GLB, | Performed by: FAMILY MEDICINE

## 2022-01-24 PROCEDURE — 1101F PT FALLS ASSESS-DOCD LE1/YR: CPT | Mod: HCNC,CPTII,S$GLB, | Performed by: FAMILY MEDICINE

## 2022-01-24 PROCEDURE — 1159F MED LIST DOCD IN RCRD: CPT | Mod: HCNC,CPTII,S$GLB, | Performed by: ORTHOPAEDIC SURGERY

## 2022-01-24 PROCEDURE — 1159F MED LIST DOCD IN RCRD: CPT | Mod: HCNC,CPTII,S$GLB, | Performed by: FAMILY MEDICINE

## 2022-01-24 PROCEDURE — 99999 PR PBB SHADOW E&M-EST. PATIENT-LVL IV: ICD-10-PCS | Mod: PBBFAC,HCNC,, | Performed by: FAMILY MEDICINE

## 2022-01-24 PROCEDURE — 1125F AMNT PAIN NOTED PAIN PRSNT: CPT | Mod: HCNC,CPTII,S$GLB, | Performed by: FAMILY MEDICINE

## 2022-01-24 PROCEDURE — 3074F SYST BP LT 130 MM HG: CPT | Mod: HCNC,CPTII,S$GLB, | Performed by: FAMILY MEDICINE

## 2022-01-24 PROCEDURE — 1159F PR MEDICATION LIST DOCUMENTED IN MEDICAL RECORD: ICD-10-PCS | Mod: HCNC,CPTII,S$GLB, | Performed by: FAMILY MEDICINE

## 2022-01-24 PROCEDURE — 99214 OFFICE O/P EST MOD 30 MIN: CPT | Mod: HCNC,S$GLB,, | Performed by: ORTHOPAEDIC SURGERY

## 2022-01-24 PROCEDURE — 85025 COMPLETE CBC W/AUTO DIFF WBC: CPT | Mod: HCNC | Performed by: FAMILY MEDICINE

## 2022-01-24 PROCEDURE — 3074F PR MOST RECENT SYSTOLIC BLOOD PRESSURE < 130 MM HG: ICD-10-PCS | Mod: HCNC,CPTII,S$GLB, | Performed by: FAMILY MEDICINE

## 2022-01-24 PROCEDURE — 99999 PR PBB SHADOW E&M-EST. PATIENT-LVL V: ICD-10-PCS | Mod: PBBFAC,HCNC,, | Performed by: ORTHOPAEDIC SURGERY

## 2022-01-24 PROCEDURE — 3078F PR MOST RECENT DIASTOLIC BLOOD PRESSURE < 80 MM HG: ICD-10-PCS | Mod: HCNC,CPTII,S$GLB, | Performed by: FAMILY MEDICINE

## 2022-01-24 PROCEDURE — 1125F PR PAIN SEVERITY QUANTIFIED, PAIN PRESENT: ICD-10-PCS | Mod: HCNC,CPTII,S$GLB, | Performed by: ORTHOPAEDIC SURGERY

## 2022-01-24 PROCEDURE — 1160F RVW MEDS BY RX/DR IN RCRD: CPT | Mod: HCNC,CPTII,S$GLB, | Performed by: ORTHOPAEDIC SURGERY

## 2022-01-24 PROCEDURE — 36415 COLL VENOUS BLD VENIPUNCTURE: CPT | Mod: HCNC,PO | Performed by: FAMILY MEDICINE

## 2022-01-24 PROCEDURE — 99397 PR PREVENTIVE VISIT,EST,65 & OVER: ICD-10-PCS | Mod: HCNC,S$GLB,, | Performed by: FAMILY MEDICINE

## 2022-01-24 RX ORDER — LIDOCAINE HYDROCHLORIDE 10 MG/ML
1 INJECTION, SOLUTION EPIDURAL; INFILTRATION; INTRACAUDAL; PERINEURAL ONCE
Status: CANCELLED | OUTPATIENT
Start: 2022-01-24 | End: 2022-01-24

## 2022-01-24 NOTE — PROGRESS NOTES
2022    Chief Complaint:  Chief Complaint   Patient presents with    Left Hand - Pain       HPI:  Ankit Ruff is a 81 y.o. male, who presents to clinic today he has a history of left ring finger triggering.  He has been injected in the past.  States that he continues to have return of the triggering that it is causing dysfunction and pain.  He is here today to discuss further treatment options.    PMHX:  Past Medical History:   Diagnosis Date    Anxiety     Arthritis     Atrial fibrillation     CAD (coronary artery disease)     Cataract     OU    CHF (congestive heart failure)     Defibrillator discharge     Hearing loss     Heart failure     Hyperlipidemia     ICD (implantable cardiac defibrillator) in place     Ischemic cardiomyopathy     Myocardial infarction     Sciatica     had seen Dr. Amy Canela in the past    Squamous cell carcinoma     Left cheek         PSHX:  Past Surgical History:   Procedure Laterality Date    APPENDECTOMY      CARDIAC SURGERY      cabg    Defibulater      FRACTURE SURGERY      HERNIA REPAIR      INJECTION OF ANESTHETIC AGENT AROUND NERVE Right 2018    Procedure: diagnsotic block to the genicular nerve branches to the right knee;  Surgeon: Sj Gonzales MD;  Location: [facility] OR;  Service: Orthopedics;  Laterality: Right;    INTERNAL NEUROLYSIS USING OPERATING MICROSCOPE Right 2019    Procedure: cooled radiofrequency ablation to the genicular nerve branches of the right knee;  Surgeon: Sj Gonzales MD;  Location: [facility] OR;  Service: Orthopedics;  Laterality: Right;    KNEE ARTHROSCOPY      SKIN BIOPSY      TONSILLECTOMY         FMHX:  No family history on file.    SOCHX:  Social History     Tobacco Use    Smoking status: Former Smoker     Types: Cigarettes     Quit date: 1977     Years since quittin.4    Smokeless tobacco: Never Used   Substance Use Topics    Alcohol use: Yes     Comment: socially        ALLERGIES:  Penicillins    CURRENT MEDICATIONS:  Current Outpatient Medications on File Prior to Visit   Medication Sig Dispense Refill    aspirin (ECOTRIN) 81 MG EC tablet Take 81 mg by mouth once daily.      betamethasone acetate-betamethasone sodium phosphate (CELESTONE) 6 mg/mL injection       carvediloL (COREG) 12.5 MG tablet Take 1 tablet (12.5 mg total) by mouth 2 (two) times daily. 180 tablet 3    ciclopirox (PENLAC) 8 % Soln Apply topically nightly. 6.6 mL 0    citalopram (CELEXA) 10 MG tablet TAKE 1 TABLET EVERY DAY 90 tablet 3    fluorometholone 0.1% (FML) 0.1 % DrpS Place one drop into both eyes three times daily for inflammation Not to exceed 10 days use. (Patient taking differently: Place one drop into both eyes three times daily for inflammation Not to exceed 10 days use.) 5 mL 1    gabapentin (NEURONTIN) 300 MG capsule TAKE 1 CAPSULE TWICE DAILY 180 capsule 0    meloxicam (MOBIC) 15 MG tablet TAKE 1 TABLET EVERY DAY AS NEEDED 90 tablet 0    mupirocin (BACTROBAN) 2 % ointment Apply 1 application (topical) 3 times per day for 10 days      oxybutynin (DITROPAN) 5 MG Tab Take 1 tablet (5 mg total) by mouth every evening. 90 tablet 3    pantoprazole (PROTONIX) 40 MG tablet TAKE 1 TABLET (40 MG TOTAL) BY MOUTH ONCE DAILY. 90 tablet 3    predniSONE (DELTASONE) 20 MG tablet TAKE 1 TABLET BY MOUTH EVERY DAY FOR 2 DAYS, TAKE 1 TABLET BY MOUTH EVERY DAY FOR 2 DAYS THEN TAKE 1/2 TABLET BY MOUTH EVERY DAY FOR 2 DAYS      rosuvastatin (CRESTOR) 10 MG tablet Take 1 tablet (10 mg total) by mouth once daily. 90 tablet 3    SHINGRIX, PF, 50 mcg/0.5 mL injection       spironolactone (ALDACTONE) 25 MG tablet TAKE 1/2 TABLET DAILY ON MONDAY, WEDNESDAY AND FRIDAY 20 tablet 3    tiZANidine (ZANAFLEX) 4 MG tablet TAKE 1 TABLET TWICE DAILY 180 tablet 3    traMADoL (ULTRAM) 50 mg tablet Take 1 tablet (50 mg total) by mouth daily as needed for Pain. 90 tablet 0     No current facility-administered  "medications on file prior to visit.       REVIEW OF SYSTEMS:  LISBETH    GENERAL PHYSICAL EXAM:   Ht 5' 9" (1.753 m)   Wt 94.2 kg (207 lb 10.8 oz)   BMI 30.67 kg/m²    GEN: well developed, well nourished, no acute distress   HENT: Normocephalic, atraumatic   EYES: No discharge, conjunctiva normal   NECK: Supple, non-tender   PULM: No wheezing, no respiratory distress   CV: RRR   ABD: Soft, non-tender    ORTHO EXAM:   Examination of the left hand reveals that there is no edema.  There are no major skin changes.  Palpation does produce tenderness over the A1 pulley of the ring finger.  Flexion and extension of that finger is limited due to pain and severe active triggering.  He does report grossly intact sensation in the median radial ulnar distribution and capillary refill is less than 2 seconds in all the digits    RADIOLOGY:   None    ASSESSMENT:   Left ring finger triggering    PLAN:  1. I have discussed treatment options going further.  I have discussed the possibility of trigger finger release.  After discussion of the risks and benefits of trigger finger release informed consent has been obtained    2. Will proceed with left ring finger trigger release under local anesthesia    3. Will follow up with me 2 weeks postoperatively   "

## 2022-01-24 NOTE — PATIENT INSTRUCTIONS
Surgery Instructions:     Your surgery is scheduled on 02/03/22 at the surgery center: [address], 1st floor, second entrance.    The pre-op department will be in contact with you prior to your procedure to review medications and instructions.       Nothing to eat or drink after midnight prior to day of surgery.    You should STOP taking any blood thinners 5 days prior to surgery.     Please obtain medical and cardiac clearance as advised prior to surgery. All preoperative testing should be done as soon as possible as it may be needed to obtain clearance.    Your pre op COVID-19 test collection is scheduled for 01/31/22 between 8am-12pm.  Please arrive at the drive thru at entrance 3 for this test collection.  You will not need to get out of your car.    The surgery center will contact you the day prior to surgery to advise you of your arrival time for surgery.     Your post op appointment is scheduled on 02/16/22 @ 10:40am .    You will be contacted by an automated text message every morning for for 14 days after your surgery inquiring if you have any COVID symptoms.  If you have any concerns regarding COVID please reply to the text message and then an [facility] On Call Registered Nurse will contact you later that day.

## 2022-01-24 NOTE — H&P (VIEW-ONLY)
2022    Chief Complaint:  Chief Complaint   Patient presents with    Left Hand - Pain       HPI:  Ankit Ruff is a 81 y.o. male, who presents to clinic today he has a history of left ring finger triggering.  He has been injected in the past.  States that he continues to have return of the triggering that it is causing dysfunction and pain.  He is here today to discuss further treatment options.    PMHX:  Past Medical History:   Diagnosis Date    Anxiety     Arthritis     Atrial fibrillation     CAD (coronary artery disease)     Cataract     OU    CHF (congestive heart failure)     Defibrillator discharge     Hearing loss     Heart failure     Hyperlipidemia     ICD (implantable cardiac defibrillator) in place     Ischemic cardiomyopathy     Myocardial infarction     Sciatica     had seen Dr. Amy Canela in the past    Squamous cell carcinoma     Left cheek         PSHX:  Past Surgical History:   Procedure Laterality Date    APPENDECTOMY      CARDIAC SURGERY      cabg    Defibulater      FRACTURE SURGERY      HERNIA REPAIR      INJECTION OF ANESTHETIC AGENT AROUND NERVE Right 2018    Procedure: diagnsotic block to the genicular nerve branches to the right knee;  Surgeon: Sj Gonzales MD;  Location: [facility] OR;  Service: Orthopedics;  Laterality: Right;    INTERNAL NEUROLYSIS USING OPERATING MICROSCOPE Right 2019    Procedure: cooled radiofrequency ablation to the genicular nerve branches of the right knee;  Surgeon: Sj Gonzales MD;  Location: [facility] OR;  Service: Orthopedics;  Laterality: Right;    KNEE ARTHROSCOPY      SKIN BIOPSY      TONSILLECTOMY         FMHX:  No family history on file.    SOCHX:  Social History     Tobacco Use    Smoking status: Former Smoker     Types: Cigarettes     Quit date: 1977     Years since quittin.4    Smokeless tobacco: Never Used   Substance Use Topics    Alcohol use: Yes     Comment: socially        ALLERGIES:  Penicillins    CURRENT MEDICATIONS:  Current Outpatient Medications on File Prior to Visit   Medication Sig Dispense Refill    aspirin (ECOTRIN) 81 MG EC tablet Take 81 mg by mouth once daily.      betamethasone acetate-betamethasone sodium phosphate (CELESTONE) 6 mg/mL injection       carvediloL (COREG) 12.5 MG tablet Take 1 tablet (12.5 mg total) by mouth 2 (two) times daily. 180 tablet 3    ciclopirox (PENLAC) 8 % Soln Apply topically nightly. 6.6 mL 0    citalopram (CELEXA) 10 MG tablet TAKE 1 TABLET EVERY DAY 90 tablet 3    fluorometholone 0.1% (FML) 0.1 % DrpS Place one drop into both eyes three times daily for inflammation Not to exceed 10 days use. (Patient taking differently: Place one drop into both eyes three times daily for inflammation Not to exceed 10 days use.) 5 mL 1    gabapentin (NEURONTIN) 300 MG capsule TAKE 1 CAPSULE TWICE DAILY 180 capsule 0    meloxicam (MOBIC) 15 MG tablet TAKE 1 TABLET EVERY DAY AS NEEDED 90 tablet 0    mupirocin (BACTROBAN) 2 % ointment Apply 1 application (topical) 3 times per day for 10 days      oxybutynin (DITROPAN) 5 MG Tab Take 1 tablet (5 mg total) by mouth every evening. 90 tablet 3    pantoprazole (PROTONIX) 40 MG tablet TAKE 1 TABLET (40 MG TOTAL) BY MOUTH ONCE DAILY. 90 tablet 3    predniSONE (DELTASONE) 20 MG tablet TAKE 1 TABLET BY MOUTH EVERY DAY FOR 2 DAYS, TAKE 1 TABLET BY MOUTH EVERY DAY FOR 2 DAYS THEN TAKE 1/2 TABLET BY MOUTH EVERY DAY FOR 2 DAYS      rosuvastatin (CRESTOR) 10 MG tablet Take 1 tablet (10 mg total) by mouth once daily. 90 tablet 3    SHINGRIX, PF, 50 mcg/0.5 mL injection       spironolactone (ALDACTONE) 25 MG tablet TAKE 1/2 TABLET DAILY ON MONDAY, WEDNESDAY AND FRIDAY 20 tablet 3    tiZANidine (ZANAFLEX) 4 MG tablet TAKE 1 TABLET TWICE DAILY 180 tablet 3    traMADoL (ULTRAM) 50 mg tablet Take 1 tablet (50 mg total) by mouth daily as needed for Pain. 90 tablet 0     No current facility-administered  "medications on file prior to visit.       REVIEW OF SYSTEMS:  LISBETH    GENERAL PHYSICAL EXAM:   Ht 5' 9" (1.753 m)   Wt 94.2 kg (207 lb 10.8 oz)   BMI 30.67 kg/m²    GEN: well developed, well nourished, no acute distress   HENT: Normocephalic, atraumatic   EYES: No discharge, conjunctiva normal   NECK: Supple, non-tender   PULM: No wheezing, no respiratory distress   CV: RRR   ABD: Soft, non-tender    ORTHO EXAM:   Examination of the left hand reveals that there is no edema.  There are no major skin changes.  Palpation does produce tenderness over the A1 pulley of the ring finger.  Flexion and extension of that finger is limited due to pain and severe active triggering.  He does report grossly intact sensation in the median radial ulnar distribution and capillary refill is less than 2 seconds in all the digits    RADIOLOGY:   None    ASSESSMENT:   Left ring finger triggering    PLAN:  1. I have discussed treatment options going further.  I have discussed the possibility of trigger finger release.  After discussion of the risks and benefits of trigger finger release informed consent has been obtained    2. Will proceed with left ring finger trigger release under local anesthesia    3. Will follow up with me 2 weeks postoperatively   "

## 2022-01-24 NOTE — PROGRESS NOTES
Subjective:       Patient ID: Ankit Ruff is a 81 y.o. male.    Chief Complaint: Hand Pain (Left ring finger having surgery)    Here for wellness and f/u chronic issues. Doing well overall.     Hypertension  This is a chronic problem. The current episode started more than 1 year ago. The problem is controlled. Pertinent negatives include no chest pain, palpitations or shortness of breath.   Hyperlipidemia  This is a chronic problem. The current episode started more than 1 year ago. Pertinent negatives include no chest pain or shortness of breath.     Review of Systems   Constitutional: Negative for chills and fever.   Respiratory: Negative for cough, chest tightness and shortness of breath.    Cardiovascular: Negative for chest pain, palpitations and leg swelling.   Endocrine: Negative for cold intolerance and heat intolerance.   Psychiatric/Behavioral: Negative for decreased concentration. The patient is not nervous/anxious.        Objective:      Physical Exam  Vitals and nursing note reviewed.   Constitutional:       Appearance: He is well-developed and well-nourished.   HENT:      Head: Normocephalic and atraumatic.   Cardiovascular:      Rate and Rhythm: Normal rate and regular rhythm.      Heart sounds: Normal heart sounds.   Pulmonary:      Effort: Pulmonary effort is normal.      Breath sounds: Normal breath sounds.   Psychiatric:         Mood and Affect: Mood and affect normal.         Assessment:       1. Wellness examination    2. Essential hypertension    3. Hyperlipidemia, unspecified hyperlipidemia type    4. Inflammatory spondylopathy of lumbar region    5. Thrombocytopenia    6. Chronic combined systolic and diastolic heart failure    7. PAF (paroxysmal atrial fibrillation)        Plan:       Wellness examination    Essential hypertension  -     Comprehensive Metabolic Panel; Future; Expected date: 01/24/2022    Hyperlipidemia, unspecified hyperlipidemia type    Inflammatory spondylopathy of  lumbar region    Thrombocytopenia  -     CBC Auto Differential; Future; Expected date: 01/24/2022    Chronic combined systolic and diastolic heart failure    PAF (paroxysmal atrial fibrillation)      htn stable  Lipid stable  Reviewed labs and will update cm  Will monitor chronic medical issues and continue current plan of care.  F/u with cardiology but paf stable  plt stable but update  Back pain stable    Follow up in about 6 months (around 7/24/2022), or if symptoms worsen or fail to improve.

## 2022-01-25 ENCOUNTER — CLINICAL SUPPORT (OUTPATIENT)
Dept: CARDIOLOGY | Facility: HOSPITAL | Age: 82
End: 2022-01-25
Payer: MEDICARE

## 2022-01-25 DIAGNOSIS — Z95.810 PRESENCE OF AUTOMATIC (IMPLANTABLE) CARDIAC DEFIBRILLATOR: ICD-10-CM

## 2022-01-25 LAB
ALBUMIN SERPL BCP-MCNC: 3.6 G/DL (ref 3.5–5.2)
ALP SERPL-CCNC: 66 U/L (ref 55–135)
ALT SERPL W/O P-5'-P-CCNC: 20 U/L (ref 10–44)
ANION GAP SERPL CALC-SCNC: 11 MMOL/L (ref 8–16)
AST SERPL-CCNC: 29 U/L (ref 10–40)
BILIRUB SERPL-MCNC: 1 MG/DL (ref 0.1–1)
BUN SERPL-MCNC: 16 MG/DL (ref 8–23)
CALCIUM SERPL-MCNC: 8.3 MG/DL (ref 8.7–10.5)
CHLORIDE SERPL-SCNC: 107 MMOL/L (ref 95–110)
CO2 SERPL-SCNC: 21 MMOL/L (ref 23–29)
CREAT SERPL-MCNC: 0.7 MG/DL (ref 0.5–1.4)
EST. GFR  (AFRICAN AMERICAN): >60 ML/MIN/1.73 M^2
EST. GFR  (NON AFRICAN AMERICAN): >60 ML/MIN/1.73 M^2
GLUCOSE SERPL-MCNC: 93 MG/DL (ref 70–110)
POTASSIUM SERPL-SCNC: 4.2 MMOL/L (ref 3.5–5.1)
PROT SERPL-MCNC: 6.3 G/DL (ref 6–8.4)
SODIUM SERPL-SCNC: 139 MMOL/L (ref 136–145)

## 2022-01-25 PROCEDURE — 93295 DEV INTERROG REMOTE 1/2/MLT: CPT | Mod: HCNC,,, | Performed by: INTERNAL MEDICINE

## 2022-01-25 PROCEDURE — 93295 CARDIAC DEVICE CHECK - REMOTE: ICD-10-PCS | Mod: HCNC,,, | Performed by: INTERNAL MEDICINE

## 2022-01-25 PROCEDURE — 93296 REM INTERROG EVL PM/IDS: CPT | Mod: HCNC,PO | Performed by: INTERNAL MEDICINE

## 2022-01-30 NOTE — TELEPHONE ENCOUNTER
No new care gaps identified.  Powered by [employer]. Reference number: [id number].   1/30/2022 3:03:56 AM CST

## 2022-01-31 ENCOUNTER — LAB VISIT (OUTPATIENT)
Dept: FAMILY MEDICINE | Facility: CLINIC | Age: 82
End: 2022-01-31
Payer: MEDICARE

## 2022-01-31 DIAGNOSIS — Z01.818 PRE-OP TESTING: ICD-10-CM

## 2022-01-31 PROCEDURE — U0005 INFEC AGEN DETEC AMPLI PROBE: HCPCS | Performed by: ORTHOPAEDIC SURGERY

## 2022-01-31 PROCEDURE — U0003 INFECTIOUS AGENT DETECTION BY NUCLEIC ACID (DNA OR RNA); SEVERE ACUTE RESPIRATORY SYNDROME CORONAVIRUS 2 (SARS-COV-2) (CORONAVIRUS DISEASE [COVID-19]), AMPLIFIED PROBE TECHNIQUE, MAKING USE OF HIGH THROUGHPUT TECHNOLOGIES AS DESCRIBED BY CMS-2020-01-R: HCPCS | Mod: HCNC | Performed by: ORTHOPAEDIC SURGERY

## 2022-02-01 ENCOUNTER — TELEPHONE (OUTPATIENT)
Dept: SURGERY | Facility: HOSPITAL | Age: 82
End: 2022-02-01
Payer: MEDICARE

## 2022-02-01 LAB
SARS-COV-2 RNA RESP QL NAA+PROBE: NOT DETECTED
SARS-COV-2- CYCLE NUMBER: NORMAL

## 2022-02-01 NOTE — TELEPHONE ENCOUNTER
Good afternoon,     Mr. Ruff is currently taking a daily baby aspirin. Please let him know if he needs to stop this prior to his trigger finger release on Thursday, 2/3. Thank you!

## 2022-02-03 ENCOUNTER — HOSPITAL ENCOUNTER (OUTPATIENT)
Facility: HOSPITAL | Age: 82
Discharge: HOME | End: 2022-02-03
Attending: ORTHOPAEDIC SURGERY | Admitting: ORTHOPAEDIC SURGERY
Payer: MEDICARE

## 2022-02-03 VITALS
DIASTOLIC BLOOD PRESSURE: 69 MMHG | SYSTOLIC BLOOD PRESSURE: 144 MMHG | OXYGEN SATURATION: 97 % | TEMPERATURE: 98 F | HEART RATE: 60 BPM | RESPIRATION RATE: 15 BRPM

## 2022-02-03 DIAGNOSIS — M65.342 TRIGGER FINGER, LEFT RING FINGER: ICD-10-CM

## 2022-02-03 PROCEDURE — 36000707: Mod: HCNC,PO | Performed by: ORTHOPAEDIC SURGERY

## 2022-02-03 PROCEDURE — 71000015 HC POSTOP RECOV 1ST HR: Mod: HCNC,PO | Performed by: ORTHOPAEDIC SURGERY

## 2022-02-03 PROCEDURE — 26055 INCISE FINGER TENDON SHEATH: CPT | Mod: F3,HCNC,, | Performed by: ORTHOPAEDIC SURGERY

## 2022-02-03 PROCEDURE — 63600175 PHARM REV CODE 636 W HCPCS: Mod: HCNC,PO | Performed by: ORTHOPAEDIC SURGERY

## 2022-02-03 PROCEDURE — 36000706: Mod: HCNC,PO | Performed by: ORTHOPAEDIC SURGERY

## 2022-02-03 PROCEDURE — 26055 PR INCISE FINGER TENDON SHEATH: ICD-10-PCS | Mod: F3,HCNC,, | Performed by: ORTHOPAEDIC SURGERY

## 2022-02-03 PROCEDURE — 25000003 PHARM REV CODE 250: Mod: HCNC,PO | Performed by: ORTHOPAEDIC SURGERY

## 2022-02-03 RX ORDER — LIDOCAINE HYDROCHLORIDE 10 MG/ML
INJECTION, SOLUTION EPIDURAL; INFILTRATION; INTRACAUDAL; PERINEURAL
Status: DISCONTINUED | OUTPATIENT
Start: 2022-02-03 | End: 2022-02-03 | Stop reason: HOSPADM

## 2022-02-03 RX ORDER — CLINDAMYCIN PHOSPHATE 600 MG/50ML
600 INJECTION, SOLUTION INTRAVENOUS
Status: COMPLETED | OUTPATIENT
Start: 2022-02-03 | End: 2022-02-03

## 2022-02-03 RX ORDER — IBUPROFEN 600 MG/1
600 TABLET ORAL EVERY 6 HOURS PRN
Qty: 4 TABLET | Refills: 0 | Status: SHIPPED | OUTPATIENT
Start: 2022-02-03 | End: 2022-09-09

## 2022-02-03 RX ORDER — LIDOCAINE HYDROCHLORIDE 10 MG/ML
1 INJECTION, SOLUTION EPIDURAL; INFILTRATION; INTRACAUDAL; PERINEURAL ONCE
Status: DISCONTINUED | OUTPATIENT
Start: 2022-02-03 | End: 2022-02-03 | Stop reason: HOSPADM

## 2022-02-03 RX ORDER — BUPIVACAINE HYDROCHLORIDE 2.5 MG/ML
INJECTION, SOLUTION EPIDURAL; INFILTRATION; INTRACAUDAL
Status: DISCONTINUED | OUTPATIENT
Start: 2022-02-03 | End: 2022-02-03 | Stop reason: HOSPADM

## 2022-02-03 RX ORDER — SODIUM CHLORIDE, SODIUM LACTATE, POTASSIUM CHLORIDE, CALCIUM CHLORIDE 600; 310; 30; 20 MG/100ML; MG/100ML; MG/100ML; MG/100ML
INJECTION, SOLUTION INTRAVENOUS CONTINUOUS
Status: DISCONTINUED | OUTPATIENT
Start: 2022-02-03 | End: 2022-02-03 | Stop reason: HOSPADM

## 2022-02-03 RX ADMIN — SODIUM CHLORIDE, SODIUM LACTATE, POTASSIUM CHLORIDE, AND CALCIUM CHLORIDE: .6; .31; .03; .02 INJECTION, SOLUTION INTRAVENOUS at 08:02

## 2022-02-03 RX ADMIN — CLINDAMYCIN IN 5 PERCENT DEXTROSE 600 MG: 12 INJECTION, SOLUTION INTRAVENOUS at 08:02

## 2022-02-03 NOTE — PATIENT INSTRUCTIONS
Procedure:  Trigger finger release    1. Please keep the dressing clean, dry, and in place. Do not take it off and do not get it wet.    2.  The operative dressing can be taken off in 1 week and the patient can begin washing the left hand in running water.  Please do not soak the hand in a tub of water or perform activities such as washing dishes.  The wound can be covered with a Band-Aid after the operative dressing has been taken off to prevent suture irritation.    3. Please keep the left arm and hand elevated for the 1st 24-48 hours to prevent swelling    4. Flexion and extension of the exposed fingers is encouraged, but do not attempt to push off or lift more than 1-2 pounds with left arm or hand    5. Please limit weightbearing to the left hand to 1-2 pounds. Light activity such as brushing your teeth, using a fork, or lifting a small drink are allowed starting today.    6. 600 mg of ibuprofen can be taken every 6 hours as needed for pain.  I did write a prescription for 600 mg tablets but over-the-counter ibuprofen can be taken as well.    7. If there are any questions or concerns please call Dr. Romo's office.    8.  Follow up with Dr. Romo in 2 weeks

## 2022-02-03 NOTE — OP NOTE
Ankit Ruff  1940    DATE OF SURGERY: 2/3/2022     PRE-OPERATIVE DIAGNOSIS:  Left ring finger triggering    POST-OPERATIVE DIAGNOSIS:  Left ring finger triggering     ANESTHESIA TYPE:  Local    BLOOD LOSS:  Less than 10 cc    TOURNIQUET TIME: none    SURGEON: Dr Romo    ASSISTANT: Giovanny Etienne    PROCEDURE:  Left ring finger trigger release    IMPLANTS:  None     SPECIMENS:  None    INDICATION:     Mr. Ruff has a history of left ring finger triggering.  He has undergone injection in the past and has return of his triggering.  Due to continued triggering and pain I discussed the possibility of trigger release.  Informed consent was then obtained    PROCEDURE IN DETAIL:     Mr. Ruff was transported to the operating room and was placed supine on the operating room table. All appropriate points were padded. The left arm and hand was prepped and draped in the normal sterile fashion. Time out was called. The correct patient, correct operative site, correct procedure, antibiotic administration which consisted of 600 mg of Cleocin, and allergies to medications which are to Penicillins  were reviewed. Time in was then called.     Attention was turned to the left hand.  A 1 cm incision was made directly over the A1 pulley.  The incision was carried through the skin.  Subcutaneous tissues were dissected with tenotomy scissors.  The A1 pulley was identified.  The A1 pulley was then released under direct visualization with tenotomy scissors.  With complete release of the pulley performed the tendon was visualized.  There is noted to be a mild amount of tendinosis noted.  The finger was then taken through an active range of motion there was noted to be no further triggering.  The wound was copiously irrigated.  Hemostasis was obtained.  The wound was closed with 4-0 nylon superficially.  The wound was dressed with Xeroform, gauze padding, cast padding and Ace wrap was placed to complete a bulky soft  dressing    The patient was awakened from anesthesia and was transported to the recovery room in stable condition. All lap, needle, sponge, and equipment counts were correct at the end of the case.    POST-OPERATIVE PLAN:     Patient will keep this dressing in place full time for 1 week at which time he can take it off and begin washing his hand.  He can begin gentle range of motion and activity with the hand tomorrow.  Will follow up with me in 2 weeks for suture removal

## 2022-02-03 NOTE — DISCHARGE SUMMARY
[facility] - Surgery  Discharge Note  Short Stay    Procedure(s) (LRB):  RELEASE, TRIGGER FINGER (Left)    OUTCOME: Patient tolerated treatment/procedure well without complication and is now ready for discharge.    DISPOSITION: Home or Self Care    FINAL DIAGNOSIS:  Trigger finger, left ring finger    FOLLOWUP: In clinic    DISCHARGE INSTRUCTIONS:    Discharge Procedure Orders   Diet general     Activity as tolerated     Keep surgical extremity elevated     Lifting restrictions   Order Comments: Please limit lifting with the left arm and hand to 1-2 lb     Call MD for:  temperature >100.4     Call MD for:  persistent nausea and vomiting     Call MD for:  severe uncontrolled pain     Call MD for:  difficulty breathing, headache or visual disturbances     Call MD for:  redness, tenderness, or signs of infection (pain, swelling, redness, odor or green/yellow discharge around incision site)     Call MD for:  hives     Call MD for:  persistent dizziness or light-headedness     Call MD for:  extreme fatigue     Wound care routine (specify)   Order Comments: Wound care routine:  The dressing to the left hand can be taken off after 1 week.  You can begin washing that hand in running water.  After the operative dressing has been removed please continue to cover the wound with a Band-Aid are soft dressing to prevent stitch irritation        TIME SPENT ON DISCHARGE: 15 minutes

## 2022-02-09 RX ORDER — CITALOPRAM 10 MG/1
TABLET ORAL
Qty: 90 TABLET | Refills: 3 | OUTPATIENT
Start: 2022-02-09

## 2022-02-09 NOTE — TELEPHONE ENCOUNTER
Quick DC. Inappropriate Request    Refill Authorization Note   Ankit Ruff  is requesting a refill authorization.  Brief Assessment and Rationale for Refill:  Quick Discontinue  Medication Therapy Plan:       Medication Reconciliation Completed:  No      Comments:   Pended Medication(s)       Requested Prescriptions     Refused Prescriptions Disp Refills    citalopram (CELEXA) 10 MG tablet [Pharmacy Med Name: CITALOPRAM HYDROBROMIDE 10 MG Tablet] 90 tablet 3     Sig: TAKE 1 TABLET EVERY DAY     Refused By: FRANDY FOWLER     Reason for Refusal: Refill not appropriate        Duplicate Pended Encounter(s)/ Last Prescribed Details: (includes pharmacy & prescriber details)   citalopram (CELEXA) 10 MG tablet [[id number]]  DISCONTINUED    Order Details  Dose, Route, Frequency: As Directed   Dispense Quantity: 90 tablet Refills: 3          Sig: TAKE 1 TABLET EVERY DAY         Start Date: 02/23/21 End Date: 02/01/22   Discontinued by: Jossie Lagunas RN on 2/1/2022 11:04   Reason: Patient no longer taking         Written Date: 02/23/21 Expiration Date: 02/23/22   Original Order:  citalopram (CELEXA) 10 MG tablet [[id number]]                Note composed:3:01 PM 02/09/2022

## 2022-02-16 ENCOUNTER — OFFICE VISIT (OUTPATIENT)
Dept: ORTHOPEDICS | Facility: CLINIC | Age: 82
End: 2022-02-16
Payer: MEDICARE

## 2022-02-16 VITALS
HEIGHT: 69 IN | HEART RATE: 46 BPM | DIASTOLIC BLOOD PRESSURE: 72 MMHG | BODY MASS INDEX: 30.66 KG/M2 | SYSTOLIC BLOOD PRESSURE: 127 MMHG | WEIGHT: 207 LBS

## 2022-02-16 DIAGNOSIS — M65.342 TRIGGER FINGER, LEFT RING FINGER: Primary | ICD-10-CM

## 2022-02-16 PROCEDURE — 99024 POSTOP FOLLOW-UP VISIT: CPT | Mod: HCNC,S$GLB,, | Performed by: ORTHOPAEDIC SURGERY

## 2022-02-16 PROCEDURE — 3074F PR MOST RECENT SYSTOLIC BLOOD PRESSURE < 130 MM HG: ICD-10-PCS | Mod: HCNC,CPTII,S$GLB, | Performed by: ORTHOPAEDIC SURGERY

## 2022-02-16 PROCEDURE — 99024 PR POST-OP FOLLOW-UP VISIT: ICD-10-PCS | Mod: HCNC,S$GLB,, | Performed by: ORTHOPAEDIC SURGERY

## 2022-02-16 PROCEDURE — 3078F DIAST BP <80 MM HG: CPT | Mod: HCNC,CPTII,S$GLB, | Performed by: ORTHOPAEDIC SURGERY

## 2022-02-16 PROCEDURE — 1159F MED LIST DOCD IN RCRD: CPT | Mod: HCNC,CPTII,S$GLB, | Performed by: ORTHOPAEDIC SURGERY

## 2022-02-16 PROCEDURE — 3288F FALL RISK ASSESSMENT DOCD: CPT | Mod: HCNC,CPTII,S$GLB, | Performed by: ORTHOPAEDIC SURGERY

## 2022-02-16 PROCEDURE — 1126F PR PAIN SEVERITY QUANTIFIED, NO PAIN PRESENT: ICD-10-PCS | Mod: HCNC,CPTII,S$GLB, | Performed by: ORTHOPAEDIC SURGERY

## 2022-02-16 PROCEDURE — 1126F AMNT PAIN NOTED NONE PRSNT: CPT | Mod: HCNC,CPTII,S$GLB, | Performed by: ORTHOPAEDIC SURGERY

## 2022-02-16 PROCEDURE — 99999 PR PBB SHADOW E&M-EST. PATIENT-LVL III: CPT | Mod: PBBFAC,HCNC,, | Performed by: ORTHOPAEDIC SURGERY

## 2022-02-16 PROCEDURE — 3288F PR FALLS RISK ASSESSMENT DOCUMENTED: ICD-10-PCS | Mod: HCNC,CPTII,S$GLB, | Performed by: ORTHOPAEDIC SURGERY

## 2022-02-16 PROCEDURE — 1159F PR MEDICATION LIST DOCUMENTED IN MEDICAL RECORD: ICD-10-PCS | Mod: HCNC,CPTII,S$GLB, | Performed by: ORTHOPAEDIC SURGERY

## 2022-02-16 PROCEDURE — 1101F PT FALLS ASSESS-DOCD LE1/YR: CPT | Mod: HCNC,CPTII,S$GLB, | Performed by: ORTHOPAEDIC SURGERY

## 2022-02-16 PROCEDURE — 1101F PR PT FALLS ASSESS DOC 0-1 FALLS W/OUT INJ PAST YR: ICD-10-PCS | Mod: HCNC,CPTII,S$GLB, | Performed by: ORTHOPAEDIC SURGERY

## 2022-02-16 PROCEDURE — 3074F SYST BP LT 130 MM HG: CPT | Mod: HCNC,CPTII,S$GLB, | Performed by: ORTHOPAEDIC SURGERY

## 2022-02-16 PROCEDURE — 99999 PR PBB SHADOW E&M-EST. PATIENT-LVL III: ICD-10-PCS | Mod: PBBFAC,HCNC,, | Performed by: ORTHOPAEDIC SURGERY

## 2022-02-16 PROCEDURE — 3078F PR MOST RECENT DIASTOLIC BLOOD PRESSURE < 80 MM HG: ICD-10-PCS | Mod: HCNC,CPTII,S$GLB, | Performed by: ORTHOPAEDIC SURGERY

## 2022-02-16 NOTE — PROGRESS NOTES
Mr. Ruff returns to clinic today.  He has a history of left ring finger trigger release.  He is 2 weeks postop.  He is overall doing well.  He has a small amount of stiffness and swelling but no other complaints    Physical exam:  Examination left hand reveals that the incision is healing well.  There are sutures in place.  There is minimal edema.  There is no erythema or drainage.  He is able to flex to within half a cm the distal palmar crease and fully extend without triggering    Assessment:  Status post left ring finger trigger release    Plan:    1. Sutures were removed today and Steri-Strips are placed    2. Will begin more aggressive range of motion    3. Will continue limited weight-bearing    4. Will follow up with me in 3 weeks for repeat evaluation

## 2022-03-09 ENCOUNTER — OFFICE VISIT (OUTPATIENT)
Dept: ORTHOPEDICS | Facility: CLINIC | Age: 82
End: 2022-03-09
Payer: MEDICARE

## 2022-03-09 VITALS — BODY MASS INDEX: 30.66 KG/M2 | HEIGHT: 69 IN | WEIGHT: 207 LBS

## 2022-03-09 DIAGNOSIS — M65.342 TRIGGER FINGER, LEFT RING FINGER: Primary | ICD-10-CM

## 2022-03-09 PROCEDURE — 1101F PT FALLS ASSESS-DOCD LE1/YR: CPT | Mod: CPTII,S$GLB,, | Performed by: ORTHOPAEDIC SURGERY

## 2022-03-09 PROCEDURE — 1101F PR PT FALLS ASSESS DOC 0-1 FALLS W/OUT INJ PAST YR: ICD-10-PCS | Mod: CPTII,S$GLB,, | Performed by: ORTHOPAEDIC SURGERY

## 2022-03-09 PROCEDURE — 1159F PR MEDICATION LIST DOCUMENTED IN MEDICAL RECORD: ICD-10-PCS | Mod: CPTII,S$GLB,, | Performed by: ORTHOPAEDIC SURGERY

## 2022-03-09 PROCEDURE — 1159F MED LIST DOCD IN RCRD: CPT | Mod: CPTII,S$GLB,, | Performed by: ORTHOPAEDIC SURGERY

## 2022-03-09 PROCEDURE — 1126F AMNT PAIN NOTED NONE PRSNT: CPT | Mod: CPTII,S$GLB,, | Performed by: ORTHOPAEDIC SURGERY

## 2022-03-09 PROCEDURE — 99999 PR PBB SHADOW E&M-EST. PATIENT-LVL III: CPT | Mod: PBBFAC,,, | Performed by: ORTHOPAEDIC SURGERY

## 2022-03-09 PROCEDURE — 99999 PR PBB SHADOW E&M-EST. PATIENT-LVL III: ICD-10-PCS | Mod: PBBFAC,,, | Performed by: ORTHOPAEDIC SURGERY

## 2022-03-09 PROCEDURE — 1126F PR PAIN SEVERITY QUANTIFIED, NO PAIN PRESENT: ICD-10-PCS | Mod: CPTII,S$GLB,, | Performed by: ORTHOPAEDIC SURGERY

## 2022-03-09 PROCEDURE — 3288F PR FALLS RISK ASSESSMENT DOCUMENTED: ICD-10-PCS | Mod: CPTII,S$GLB,, | Performed by: ORTHOPAEDIC SURGERY

## 2022-03-09 PROCEDURE — 3288F FALL RISK ASSESSMENT DOCD: CPT | Mod: CPTII,S$GLB,, | Performed by: ORTHOPAEDIC SURGERY

## 2022-03-09 PROCEDURE — 99024 PR POST-OP FOLLOW-UP VISIT: ICD-10-PCS | Mod: S$GLB,,, | Performed by: ORTHOPAEDIC SURGERY

## 2022-03-09 PROCEDURE — 99024 POSTOP FOLLOW-UP VISIT: CPT | Mod: S$GLB,,, | Performed by: ORTHOPAEDIC SURGERY

## 2022-03-09 NOTE — PROGRESS NOTES
Mr. Ruff returns to clinic today.  Has a history of left ring finger trigger release.  He is doing very well.  He has no new complaints.    Physical exam:  Examination left hand reveals the incision is well healed.  There is no significant edema.  He is able make a full composite fist and extend his fingers without triggering.  He is neurovascularly intact.    Assessment:  Left ring finger trigger release    Plan:    1. He is allowed to return to activity as tolerated    2. Follow up with me on a p.r.n. basis

## 2022-04-18 ENCOUNTER — OFFICE VISIT (OUTPATIENT)
Dept: CARDIOLOGY | Facility: CLINIC | Age: 82
End: 2022-04-18
Payer: MEDICARE

## 2022-04-18 VITALS
HEART RATE: 47 BPM | SYSTOLIC BLOOD PRESSURE: 105 MMHG | BODY MASS INDEX: 30.33 KG/M2 | HEIGHT: 69 IN | DIASTOLIC BLOOD PRESSURE: 58 MMHG | WEIGHT: 204.81 LBS

## 2022-04-18 DIAGNOSIS — Z95.1 S/P CABG X 4: Primary | ICD-10-CM

## 2022-04-18 DIAGNOSIS — Z45.02 IMPLANTABLE CARDIOVERTER-DEFIBRILLATOR (ICD) GENERATOR END OF LIFE: ICD-10-CM

## 2022-04-18 DIAGNOSIS — I48.0 PAF (PAROXYSMAL ATRIAL FIBRILLATION): ICD-10-CM

## 2022-04-18 DIAGNOSIS — M46.96 INFLAMMATORY SPONDYLOPATHY OF LUMBAR REGION: ICD-10-CM

## 2022-04-18 DIAGNOSIS — E78.2 MIXED HYPERLIPIDEMIA: ICD-10-CM

## 2022-04-18 DIAGNOSIS — I25.5 ISCHEMIC CARDIOMYOPATHY: Chronic | ICD-10-CM

## 2022-04-18 DIAGNOSIS — I10 ESSENTIAL HYPERTENSION: ICD-10-CM

## 2022-04-18 DIAGNOSIS — I50.42 CHRONIC COMBINED SYSTOLIC AND DIASTOLIC HEART FAILURE: ICD-10-CM

## 2022-04-18 PROCEDURE — 3078F DIAST BP <80 MM HG: CPT | Mod: CPTII,S$GLB,, | Performed by: INTERNAL MEDICINE

## 2022-04-18 PROCEDURE — 1101F PT FALLS ASSESS-DOCD LE1/YR: CPT | Mod: CPTII,S$GLB,, | Performed by: INTERNAL MEDICINE

## 2022-04-18 PROCEDURE — 99214 PR OFFICE/OUTPT VISIT, EST, LEVL IV, 30-39 MIN: ICD-10-PCS | Mod: S$GLB,,, | Performed by: INTERNAL MEDICINE

## 2022-04-18 PROCEDURE — 99999 PR PBB SHADOW E&M-EST. PATIENT-LVL III: CPT | Mod: PBBFAC,,, | Performed by: INTERNAL MEDICINE

## 2022-04-18 PROCEDURE — 1159F PR MEDICATION LIST DOCUMENTED IN MEDICAL RECORD: ICD-10-PCS | Mod: CPTII,S$GLB,, | Performed by: INTERNAL MEDICINE

## 2022-04-18 PROCEDURE — 3074F PR MOST RECENT SYSTOLIC BLOOD PRESSURE < 130 MM HG: ICD-10-PCS | Mod: CPTII,S$GLB,, | Performed by: INTERNAL MEDICINE

## 2022-04-18 PROCEDURE — 99214 OFFICE O/P EST MOD 30 MIN: CPT | Mod: S$GLB,,, | Performed by: INTERNAL MEDICINE

## 2022-04-18 PROCEDURE — 3074F SYST BP LT 130 MM HG: CPT | Mod: CPTII,S$GLB,, | Performed by: INTERNAL MEDICINE

## 2022-04-18 PROCEDURE — 1160F PR REVIEW ALL MEDS BY PRESCRIBER/CLIN PHARMACIST DOCUMENTED: ICD-10-PCS | Mod: CPTII,S$GLB,, | Performed by: INTERNAL MEDICINE

## 2022-04-18 PROCEDURE — 1126F AMNT PAIN NOTED NONE PRSNT: CPT | Mod: CPTII,S$GLB,, | Performed by: INTERNAL MEDICINE

## 2022-04-18 PROCEDURE — 1101F PR PT FALLS ASSESS DOC 0-1 FALLS W/OUT INJ PAST YR: ICD-10-PCS | Mod: CPTII,S$GLB,, | Performed by: INTERNAL MEDICINE

## 2022-04-18 PROCEDURE — 3288F FALL RISK ASSESSMENT DOCD: CPT | Mod: CPTII,S$GLB,, | Performed by: INTERNAL MEDICINE

## 2022-04-18 PROCEDURE — 3078F PR MOST RECENT DIASTOLIC BLOOD PRESSURE < 80 MM HG: ICD-10-PCS | Mod: CPTII,S$GLB,, | Performed by: INTERNAL MEDICINE

## 2022-04-18 PROCEDURE — 1126F PR PAIN SEVERITY QUANTIFIED, NO PAIN PRESENT: ICD-10-PCS | Mod: CPTII,S$GLB,, | Performed by: INTERNAL MEDICINE

## 2022-04-18 PROCEDURE — 3288F PR FALLS RISK ASSESSMENT DOCUMENTED: ICD-10-PCS | Mod: CPTII,S$GLB,, | Performed by: INTERNAL MEDICINE

## 2022-04-18 PROCEDURE — 1159F MED LIST DOCD IN RCRD: CPT | Mod: CPTII,S$GLB,, | Performed by: INTERNAL MEDICINE

## 2022-04-18 PROCEDURE — 99999 PR PBB SHADOW E&M-EST. PATIENT-LVL III: ICD-10-PCS | Mod: PBBFAC,,, | Performed by: INTERNAL MEDICINE

## 2022-04-18 PROCEDURE — 1160F RVW MEDS BY RX/DR IN RCRD: CPT | Mod: CPTII,S$GLB,, | Performed by: INTERNAL MEDICINE

## 2022-04-18 NOTE — PROGRESS NOTES
Subjective:    Patient ID:  Ankit Ruff is a 81 y.o. male who presents for follow-up of ICM, ICD, PAF    HPI  He comes for follow up with no major problems, no chest pain, no shortness of breath.  FC II-III      Review of Systems   Constitutional: Negative for decreased appetite, malaise/fatigue, weight gain and weight loss.   Cardiovascular: Negative for chest pain, dyspnea on exertion, leg swelling, palpitations and syncope.   Respiratory: Negative for cough and shortness of breath.    Gastrointestinal: Negative.    Neurological: Negative for weakness.   All other systems reviewed and are negative.       Objective:      Physical Exam  Vitals and nursing note reviewed.   Constitutional:       Appearance: Normal appearance. He is well-developed.   HENT:      Head: Normocephalic.   Eyes:      Pupils: Pupils are equal, round, and reactive to light.   Neck:      Thyroid: No thyromegaly.      Vascular: No carotid bruit or JVD.   Cardiovascular:      Rate and Rhythm: Normal rate and regular rhythm.      Chest Wall: PMI is not displaced.      Pulses: Normal pulses and intact distal pulses.      Heart sounds: Normal heart sounds. No murmur heard.    No gallop.   Pulmonary:      Effort: Pulmonary effort is normal.      Breath sounds: Normal breath sounds.   Abdominal:      Palpations: Abdomen is soft. There is no mass.      Tenderness: There is no abdominal tenderness.   Musculoskeletal:         General: Normal range of motion.      Cervical back: Normal range of motion and neck supple.   Skin:     General: Skin is warm.   Neurological:      Mental Status: He is alert and oriented to person, place, and time.      Sensory: No sensory deficit.      Deep Tendon Reflexes: Reflexes are normal and symmetric.           Assessment:       1. S/P CABG x 4    2. PAF (paroxysmal atrial fibrillation)    3. Ischemic cardiomyopathy    4. Implantable cardioverter-defibrillator (ICD) generator end of life    5. Mixed hyperlipidemia     6. Essential hypertension    7. Chronic combined systolic and diastolic heart failure         Plan:     Continue all cardiac medications  Regular exercise program  Weight loss  6 m f/u

## 2022-04-19 NOTE — TELEPHONE ENCOUNTER
No new care gaps identified.  Powered by [employer] by [facility]. Reference number: [id number].   4/18/2022 8:41:05 PM CDT

## 2022-04-21 ENCOUNTER — DOCUMENTATION ONLY (OUTPATIENT)
Dept: FAMILY MEDICINE | Facility: CLINIC | Age: 82
End: 2022-04-21
Payer: MEDICARE

## 2022-04-25 ENCOUNTER — CLINICAL SUPPORT (OUTPATIENT)
Dept: CARDIOLOGY | Facility: HOSPITAL | Age: 82
End: 2022-04-25
Payer: MEDICARE

## 2022-04-25 DIAGNOSIS — Z95.810 PRESENCE OF AUTOMATIC (IMPLANTABLE) CARDIAC DEFIBRILLATOR: ICD-10-CM

## 2022-04-25 PROCEDURE — 93296 REM INTERROG EVL PM/IDS: CPT | Mod: PO | Performed by: INTERNAL MEDICINE

## 2022-04-26 RX ORDER — TRAMADOL HYDROCHLORIDE 50 MG/1
50 TABLET ORAL DAILY PRN
Qty: 90 TABLET | Refills: 0 | Status: SHIPPED | OUTPATIENT
Start: 2022-04-26 | End: 2022-08-04 | Stop reason: SDUPTHER

## 2022-05-09 ENCOUNTER — PATIENT MESSAGE (OUTPATIENT)
Dept: SMOKING CESSATION | Facility: CLINIC | Age: 82
End: 2022-05-09
Payer: MEDICARE

## 2022-07-24 ENCOUNTER — CLINICAL SUPPORT (OUTPATIENT)
Dept: CARDIOLOGY | Facility: HOSPITAL | Age: 82
End: 2022-07-24
Payer: MEDICARE

## 2022-07-24 DIAGNOSIS — Z95.810 PRESENCE OF AUTOMATIC (IMPLANTABLE) CARDIAC DEFIBRILLATOR: ICD-10-CM

## 2022-07-24 PROCEDURE — 93295 CARDIAC DEVICE CHECK - REMOTE: ICD-10-PCS | Mod: ,,, | Performed by: INTERNAL MEDICINE

## 2022-07-24 PROCEDURE — 93296 REM INTERROG EVL PM/IDS: CPT | Mod: PO | Performed by: INTERNAL MEDICINE

## 2022-07-24 PROCEDURE — 93295 DEV INTERROG REMOTE 1/2/MLT: CPT | Mod: ,,, | Performed by: INTERNAL MEDICINE

## 2022-07-25 ENCOUNTER — OFFICE VISIT (OUTPATIENT)
Dept: FAMILY MEDICINE | Facility: CLINIC | Age: 82
End: 2022-07-25
Payer: MEDICARE

## 2022-07-25 ENCOUNTER — HOSPITAL ENCOUNTER (OUTPATIENT)
Dept: RADIOLOGY | Facility: HOSPITAL | Age: 82
Discharge: HOME | End: 2022-07-25
Attending: FAMILY MEDICINE
Payer: MEDICARE

## 2022-07-25 VITALS
HEART RATE: 57 BPM | BODY MASS INDEX: 30.7 KG/M2 | OXYGEN SATURATION: 95 % | DIASTOLIC BLOOD PRESSURE: 64 MMHG | HEIGHT: 69 IN | SYSTOLIC BLOOD PRESSURE: 132 MMHG | WEIGHT: 207.25 LBS

## 2022-07-25 DIAGNOSIS — M79.89 LEG SWELLING: ICD-10-CM

## 2022-07-25 DIAGNOSIS — E78.2 MIXED HYPERLIPIDEMIA: ICD-10-CM

## 2022-07-25 DIAGNOSIS — I25.810 CORONARY ARTERY DISEASE INVOLVING AUTOLOGOUS VEIN CORONARY BYPASS GRAFT WITHOUT ANGINA PECTORIS: ICD-10-CM

## 2022-07-25 DIAGNOSIS — D53.9 NUTRITIONAL ANEMIA: ICD-10-CM

## 2022-07-25 DIAGNOSIS — I10 ESSENTIAL HYPERTENSION: Primary | ICD-10-CM

## 2022-07-25 DIAGNOSIS — I10 ESSENTIAL HYPERTENSION: ICD-10-CM

## 2022-07-25 PROCEDURE — 3075F SYST BP GE 130 - 139MM HG: CPT | Mod: CPTII,S$GLB,, | Performed by: FAMILY MEDICINE

## 2022-07-25 PROCEDURE — 3078F PR MOST RECENT DIASTOLIC BLOOD PRESSURE < 80 MM HG: ICD-10-PCS | Mod: CPTII,S$GLB,, | Performed by: FAMILY MEDICINE

## 2022-07-25 PROCEDURE — 71046 X-RAY EXAM CHEST 2 VIEWS: CPT | Mod: 26,,, | Performed by: RADIOLOGY

## 2022-07-25 PROCEDURE — 1159F PR MEDICATION LIST DOCUMENTED IN MEDICAL RECORD: ICD-10-PCS | Mod: CPTII,S$GLB,, | Performed by: FAMILY MEDICINE

## 2022-07-25 PROCEDURE — 99214 PR OFFICE/OUTPT VISIT, EST, LEVL IV, 30-39 MIN: ICD-10-PCS | Mod: S$GLB,,, | Performed by: FAMILY MEDICINE

## 2022-07-25 PROCEDURE — 71046 X-RAY EXAM CHEST 2 VIEWS: CPT | Mod: TC,FY,PO

## 2022-07-25 PROCEDURE — 1101F PR PT FALLS ASSESS DOC 0-1 FALLS W/OUT INJ PAST YR: ICD-10-PCS | Mod: CPTII,S$GLB,, | Performed by: FAMILY MEDICINE

## 2022-07-25 PROCEDURE — 3288F FALL RISK ASSESSMENT DOCD: CPT | Mod: CPTII,S$GLB,, | Performed by: FAMILY MEDICINE

## 2022-07-25 PROCEDURE — 3075F PR MOST RECENT SYSTOLIC BLOOD PRESS GE 130-139MM HG: ICD-10-PCS | Mod: CPTII,S$GLB,, | Performed by: FAMILY MEDICINE

## 2022-07-25 PROCEDURE — 99999 PR PBB SHADOW E&M-EST. PATIENT-LVL III: ICD-10-PCS | Mod: PBBFAC,,, | Performed by: FAMILY MEDICINE

## 2022-07-25 PROCEDURE — 1126F AMNT PAIN NOTED NONE PRSNT: CPT | Mod: CPTII,S$GLB,, | Performed by: FAMILY MEDICINE

## 2022-07-25 PROCEDURE — 3078F DIAST BP <80 MM HG: CPT | Mod: CPTII,S$GLB,, | Performed by: FAMILY MEDICINE

## 2022-07-25 PROCEDURE — 1159F MED LIST DOCD IN RCRD: CPT | Mod: CPTII,S$GLB,, | Performed by: FAMILY MEDICINE

## 2022-07-25 PROCEDURE — 1101F PT FALLS ASSESS-DOCD LE1/YR: CPT | Mod: CPTII,S$GLB,, | Performed by: FAMILY MEDICINE

## 2022-07-25 PROCEDURE — 99214 OFFICE O/P EST MOD 30 MIN: CPT | Mod: S$GLB,,, | Performed by: FAMILY MEDICINE

## 2022-07-25 PROCEDURE — 99999 PR PBB SHADOW E&M-EST. PATIENT-LVL III: CPT | Mod: PBBFAC,,, | Performed by: FAMILY MEDICINE

## 2022-07-25 PROCEDURE — 71046 XR CHEST PA AND LATERAL: ICD-10-PCS | Mod: 26,,, | Performed by: RADIOLOGY

## 2022-07-25 PROCEDURE — 3288F PR FALLS RISK ASSESSMENT DOCUMENTED: ICD-10-PCS | Mod: CPTII,S$GLB,, | Performed by: FAMILY MEDICINE

## 2022-07-25 PROCEDURE — 1126F PR PAIN SEVERITY QUANTIFIED, NO PAIN PRESENT: ICD-10-PCS | Mod: CPTII,S$GLB,, | Performed by: FAMILY MEDICINE

## 2022-07-25 NOTE — PROGRESS NOTES
Subjective:       Patient ID: Ankit Ruff is a 82 y.o. male.    Chief Complaint: Leg Swelling and Fall (Multiple falls no injury/)    Here for f/u chronic issues. Doing well overall. Some foot swelling over last month. Stopped wearing socks.  Eating more watermelon and salt on it.      Hypertension  This is a chronic problem. The current episode started more than 1 year ago. The problem is controlled. Pertinent negatives include no chest pain, palpitations or shortness of breath.   Hyperlipidemia  This is a chronic problem. The current episode started more than 1 year ago. The problem is controlled. Pertinent negatives include no chest pain or shortness of breath.     Review of Systems   Constitutional: Negative for chills and fever.   Respiratory: Negative for cough, chest tightness and shortness of breath.    Cardiovascular: Negative for chest pain, palpitations and leg swelling.   Endocrine: Negative for cold intolerance and heat intolerance.   Psychiatric/Behavioral: Negative for decreased concentration. The patient is not nervous/anxious.        Objective:      Physical Exam  Vitals and nursing note reviewed.   Constitutional:       Appearance: He is well-developed.   HENT:      Head: Normocephalic and atraumatic.   Cardiovascular:      Rate and Rhythm: Normal rate and regular rhythm.      Heart sounds: Normal heart sounds.   Pulmonary:      Effort: Pulmonary effort is normal.      Breath sounds: Normal breath sounds.         Assessment:       1. Essential hypertension    2. Mixed hyperlipidemia    3. Coronary artery disease involving autologous vein coronary bypass graft without angina pectoris    4. Leg swelling    5. Nutritional anemia        Plan:       Essential hypertension  -     X-Ray Chest PA And Lateral; Future; Expected date: 07/25/2022  -     Echo; Future  -     CBC Without Differential; Future; Expected date: 07/25/2022  -     Comprehensive Metabolic Panel; Future; Expected date: 07/25/2022  -      Lipid Panel; Future; Expected date: 07/25/2022  -     TSH; Future; Expected date: 07/25/2022    Mixed hyperlipidemia    Coronary artery disease involving autologous vein coronary bypass graft without angina pectoris  -     B-TYPE NATRIURETIC PEPTIDE; Future; Expected date: 07/25/2022  -     X-Ray Chest PA And Lateral; Future; Expected date: 07/25/2022  -     Echo; Future  -     CBC Without Differential; Future; Expected date: 07/25/2022  -     Comprehensive Metabolic Panel; Future; Expected date: 07/25/2022  -     Lipid Panel; Future; Expected date: 07/25/2022  -     TSH; Future; Expected date: 07/25/2022    Leg swelling  -     X-Ray Chest PA And Lateral; Future; Expected date: 07/25/2022  -     Echo; Future    Nutritional anemia  -     Ferritin; Future; Expected date: 07/25/2022  -     Iron and TIBC; Future; Expected date: 07/25/2022  -     Vitamin B12; Future; Expected date: 07/25/2022  -     Folate; Future; Expected date: 07/25/2022    update labs and health maintenace  On spironolactone 1/2 tablet m,w,f currently  Discussed low sodium diet  Update cxr and echo  F/u with cards as planned or sooner if needed  Will monitor chronic medical issues and continue current plan of care.  htn stable    Follow up in about 6 months (around 1/25/2023), or if symptoms worsen or fail to improve.

## 2022-07-26 ENCOUNTER — CLINICAL SUPPORT (OUTPATIENT)
Dept: CARDIOLOGY | Facility: HOSPITAL | Age: 82
End: 2022-07-26
Attending: FAMILY MEDICINE
Payer: MEDICARE

## 2022-07-26 VITALS — BODY MASS INDEX: 30.66 KG/M2 | HEIGHT: 69 IN | HEART RATE: 60 BPM | WEIGHT: 207 LBS

## 2022-07-26 DIAGNOSIS — I10 ESSENTIAL HYPERTENSION: ICD-10-CM

## 2022-07-26 DIAGNOSIS — M79.89 LEG SWELLING: ICD-10-CM

## 2022-07-26 DIAGNOSIS — I25.810 CORONARY ARTERY DISEASE INVOLVING AUTOLOGOUS VEIN CORONARY BYPASS GRAFT WITHOUT ANGINA PECTORIS: ICD-10-CM

## 2022-07-26 PROCEDURE — 93306 TTE W/DOPPLER COMPLETE: CPT | Mod: PO

## 2022-07-26 PROCEDURE — 93306 TTE W/DOPPLER COMPLETE: CPT | Mod: 26,,, | Performed by: INTERNAL MEDICINE

## 2022-07-26 PROCEDURE — 93306 ECHO (CUPID ONLY): ICD-10-PCS | Mod: 26,,, | Performed by: INTERNAL MEDICINE

## 2022-07-27 LAB
ASCENDING AORTA: 3.37 CM
AV INDEX (PROSTH): 0.55
AV MEAN GRADIENT: 8 MMHG
AV PEAK GRADIENT: 15 MMHG
AV VALVE AREA: 1.9 CM2
AV VELOCITY RATIO: 0.56
BSA FOR ECHO PROCEDURE: 2.14 M2
CV ECHO LV RWT: 0.39 CM
DOP CALC AO PEAK VEL: 1.96 M/S
DOP CALC AO VTI: 48.46 CM
DOP CALC LVOT AREA: 3.4 CM2
DOP CALC LVOT DIAMETER: 2.09 CM
DOP CALC LVOT PEAK VEL: 1.09 M/S
DOP CALC LVOT STROKE VOLUME: 92 CM3
DOP CALCLVOT PEAK VEL VTI: 26.83 CM
E WAVE DECELERATION TIME: 248.52 MSEC
E/A RATIO: 1.55
E/E' RATIO: 16.14 M/S
ECHO LV POSTERIOR WALL: 1.2 CM (ref 0.6–1.1)
EJECTION FRACTION: 35 %
FRACTIONAL SHORTENING: 18 % (ref 28–44)
INTERVENTRICULAR SEPTUM: 1.36 CM (ref 0.6–1.1)
IVRT: 159.85 MSEC
LA MAJOR: 7.66 CM
LA MINOR: 8.04 CM
LA WIDTH: 4.32 CM
LEFT ATRIUM SIZE: 5.19 CM
LEFT ATRIUM VOLUME INDEX: 71.2 ML/M2
LEFT ATRIUM VOLUME: 149.52 CM3
LEFT INTERNAL DIMENSION IN SYSTOLE: 5.02 CM (ref 2.1–4)
LEFT VENTRICLE DIASTOLIC VOLUME INDEX: 88.55 ML/M2
LEFT VENTRICLE DIASTOLIC VOLUME: 185.95 ML
LEFT VENTRICLE MASS INDEX: 167 G/M2
LEFT VENTRICLE SYSTOLIC VOLUME INDEX: 56.8 ML/M2
LEFT VENTRICLE SYSTOLIC VOLUME: 119.24 ML
LEFT VENTRICULAR INTERNAL DIMENSION IN DIASTOLE: 6.09 CM (ref 3.5–6)
LEFT VENTRICULAR MASS: 351.16 G
LV LATERAL E/E' RATIO: 12.56 M/S
LV SEPTAL E/E' RATIO: 22.6 M/S
MV A" WAVE DURATION": 20.55 MSEC
MV PEAK A VEL: 0.73 M/S
MV PEAK E VEL: 1.13 M/S
MV STENOSIS PRESSURE HALF TIME: 72.07 MS
MV VALVE AREA P 1/2 METHOD: 3.05 CM2
PISA MRMAX VEL: 0.06 M/S
PISA TR MAX VEL: 2.92 M/S
PULM VEIN S/D RATIO: 1.33
PV PEAK D VEL: 0.6 M/S
PV PEAK S VEL: 0.8 M/S
RA MAJOR: 5.85 CM
RA PRESSURE: 8 MMHG
RA WIDTH: 3.8 CM
RV TISSUE DOPPLER FREE WALL SYSTOLIC VELOCITY 1 (APICAL 4 CHAMBER VIEW): 12.57 CM/S
SINUS: 3.82 CM
STJ: 2.96 CM
TDI LATERAL: 0.09 M/S
TDI SEPTAL: 0.05 M/S
TDI: 0.07 M/S
TR MAX PG: 34 MMHG
TRICUSPID ANNULAR PLANE SYSTOLIC EXCURSION: 2.31 CM
TV REST PULMONARY ARTERY PRESSURE: 42 MMHG

## 2022-07-31 ENCOUNTER — PATIENT MESSAGE (OUTPATIENT)
Dept: FAMILY MEDICINE | Facility: CLINIC | Age: 82
End: 2022-07-31
Payer: MEDICARE

## 2022-07-31 DIAGNOSIS — M46.96 INFLAMMATORY SPONDYLOPATHY OF LUMBAR REGION: ICD-10-CM

## 2022-08-04 RX ORDER — TRAMADOL HYDROCHLORIDE 50 MG/1
50 TABLET ORAL DAILY PRN
Qty: 90 TABLET | Refills: 0 | Status: SHIPPED | OUTPATIENT
Start: 2022-08-04 | End: 2022-11-03 | Stop reason: SDUPTHER

## 2022-08-04 RX ORDER — CITALOPRAM 10 MG/1
10 TABLET ORAL DAILY
Qty: 90 TABLET | Refills: 3 | Status: SHIPPED | OUTPATIENT
Start: 2022-08-04 | End: 2023-01-26

## 2022-08-04 NOTE — TELEPHONE ENCOUNTER
LOV 7/25/22    Pt is wanting citalopram which is not on his medication list ?    Also wants tramadol     To be sent to [facility] mail order

## 2022-08-15 ENCOUNTER — TELEPHONE (OUTPATIENT)
Dept: CARDIOLOGY | Facility: HOSPITAL | Age: 82
End: 2022-08-15
Payer: MEDICARE

## 2022-08-15 ENCOUNTER — OFFICE VISIT (OUTPATIENT)
Dept: PHYSICAL MEDICINE AND REHAB | Facility: CLINIC | Age: 82
End: 2022-08-15
Payer: MEDICARE

## 2022-08-15 VITALS — BODY MASS INDEX: 30.59 KG/M2 | WEIGHT: 206.56 LBS | HEIGHT: 69 IN

## 2022-08-15 DIAGNOSIS — M67.912 TENDINOPATHY OF LEFT ROTATOR CUFF: Primary | ICD-10-CM

## 2022-08-15 DIAGNOSIS — M67.922 TENDINOPATHY OF LEFT BICEPS TENDON: ICD-10-CM

## 2022-08-15 PROCEDURE — 1125F PR PAIN SEVERITY QUANTIFIED, PAIN PRESENT: ICD-10-PCS | Mod: CPTII,S$GLB,, | Performed by: PHYSICAL MEDICINE & REHABILITATION

## 2022-08-15 PROCEDURE — 1160F RVW MEDS BY RX/DR IN RCRD: CPT | Mod: CPTII,S$GLB,, | Performed by: PHYSICAL MEDICINE & REHABILITATION

## 2022-08-15 PROCEDURE — 1159F MED LIST DOCD IN RCRD: CPT | Mod: CPTII,S$GLB,, | Performed by: PHYSICAL MEDICINE & REHABILITATION

## 2022-08-15 PROCEDURE — 1101F PR PT FALLS ASSESS DOC 0-1 FALLS W/OUT INJ PAST YR: ICD-10-PCS | Mod: CPTII,S$GLB,, | Performed by: PHYSICAL MEDICINE & REHABILITATION

## 2022-08-15 PROCEDURE — 3288F PR FALLS RISK ASSESSMENT DOCUMENTED: ICD-10-PCS | Mod: CPTII,S$GLB,, | Performed by: PHYSICAL MEDICINE & REHABILITATION

## 2022-08-15 PROCEDURE — 99213 PR OFFICE/OUTPT VISIT, EST, LEVL III, 20-29 MIN: ICD-10-PCS | Mod: GC,S$GLB,, | Performed by: PHYSICAL MEDICINE & REHABILITATION

## 2022-08-15 PROCEDURE — 1125F AMNT PAIN NOTED PAIN PRSNT: CPT | Mod: CPTII,S$GLB,, | Performed by: PHYSICAL MEDICINE & REHABILITATION

## 2022-08-15 PROCEDURE — 99213 OFFICE O/P EST LOW 20 MIN: CPT | Mod: GC,S$GLB,, | Performed by: PHYSICAL MEDICINE & REHABILITATION

## 2022-08-15 PROCEDURE — 99999 PR PBB SHADOW E&M-EST. PATIENT-LVL III: CPT | Mod: PBBFAC,,, | Performed by: PHYSICAL MEDICINE & REHABILITATION

## 2022-08-15 PROCEDURE — 1101F PT FALLS ASSESS-DOCD LE1/YR: CPT | Mod: CPTII,S$GLB,, | Performed by: PHYSICAL MEDICINE & REHABILITATION

## 2022-08-15 PROCEDURE — 3288F FALL RISK ASSESSMENT DOCD: CPT | Mod: CPTII,S$GLB,, | Performed by: PHYSICAL MEDICINE & REHABILITATION

## 2022-08-15 PROCEDURE — 1160F PR REVIEW ALL MEDS BY PRESCRIBER/CLIN PHARMACIST DOCUMENTED: ICD-10-PCS | Mod: CPTII,S$GLB,, | Performed by: PHYSICAL MEDICINE & REHABILITATION

## 2022-08-15 PROCEDURE — 99999 PR PBB SHADOW E&M-EST. PATIENT-LVL III: ICD-10-PCS | Mod: PBBFAC,,, | Performed by: PHYSICAL MEDICINE & REHABILITATION

## 2022-08-15 PROCEDURE — 1159F PR MEDICATION LIST DOCUMENTED IN MEDICAL RECORD: ICD-10-PCS | Mod: CPTII,S$GLB,, | Performed by: PHYSICAL MEDICINE & REHABILITATION

## 2022-08-15 RX ORDER — METHYLPREDNISOLONE 4 MG/1
TABLET ORAL
Qty: 21 TABLET | Refills: 0 | Status: SHIPPED | OUTPATIENT
Start: 2022-08-15 | End: 2022-09-05

## 2022-08-15 NOTE — TELEPHONE ENCOUNTER
AF noted during ICD device remote check: single chamber ICD    Overall burden:0.4% since 5/27/22, 19.3% from 8/14/22-8/15/22    Max duration seen: 12 hrs. 54 mins. on 8/13/22      Most recent episode: 8/14      Ventricular rates:   Controlled      Anticoagulation status:  None on Aspirin 81 mg only      Meds:  Coreg 12.5 mg bid    Patient symptoms: Pt. asymptomatic.       Pt. placed on Medrol dosepack today for shoulder pain      Message sent to Dr. Guerrero for review

## 2022-08-15 NOTE — PROGRESS NOTES
[facility]    CHIEF COMPLAINT:   Chief Complaint   Patient presents with    Shoulder Pain     HISTORY OF PRESENT ILLNESS: Ankit Ruff is a 82 y.o. male who presents to me in follow up for evaluation of chronic left shoulder pain. Last clinic visit was August 2021 where we did a left SAB injection which provided substantial relief for several months. No injury or trauma since the last visit. He reports pain is similar to the last time we saw him. He has lost ROM and has pain in the anterior/ lateral aspect of the left shoulder. Denies any new numbness or tingling in the arm.   He also notes occasional aches and pains in the right knee and left hand.  He feels the left shoulder however is more bothersome as of late.    Review of Systems   Constitutional: Negative for fever.   HENT: Negative for drooling.    Eyes: Negative for discharge.   Respiratory: Negative for choking.    Cardiovascular: Negative for chest pain.   Genitourinary: Negative for flank pain.   Skin: Negative for wound.   Allergic/Immunologic: Negative for immunocompromised state.   Neurological: Negative for tremors and syncope.   Psychiatric/Behavioral: Negative for behavioral problems.     Past Medical History:   Past Medical History:   Diagnosis Date    Anxiety     Arthritis     Atrial fibrillation     CAD (coronary artery disease)     Cataract     OU    CHF (congestive heart failure)     Defibrillator discharge     Hearing loss     Heart failure     Hyperlipidemia     ICD (implantable cardiac defibrillator) in place     Ischemic cardiomyopathy     Myocardial infarction     Sciatica     had seen Dr. Amy Canela in the past    Squamous cell carcinoma     Left cheek 4-2016        Past Surgical History:   Past Surgical History:   Procedure Laterality Date    APPENDECTOMY      CARDIAC SURGERY      cabg    Defibulater      FRACTURE SURGERY      HERNIA REPAIR       INJECTION OF ANESTHETIC AGENT AROUND NERVE Right 12/21/2018    Procedure: diagnsotic block to the genicular nerve branches to the right knee;  Surgeon: Sj Gonzales MD;  Location: [facility] OR;  Service: Orthopedics;  Laterality: Right;    INTERNAL NEUROLYSIS USING OPERATING MICROSCOPE Right 2/1/2019    Procedure: cooled radiofrequency ablation to the genicular nerve branches of the right knee;  Surgeon: Sj Gonzales MD;  Location: [facility] OR;  Service: Orthopedics;  Laterality: Right;    KNEE ARTHROSCOPY      SKIN BIOPSY      TONSILLECTOMY      TRIGGER FINGER RELEASE Left 2/3/2022    Procedure: RELEASE, TRIGGER FINGER;  Surgeon: Bay Romo MD;  Location: [facility] OR;  Service: Orthopedics;  Laterality: Left;  Procedure:  Left ring finger trigger release    Position:  Supine    Anesthesia:  Anesthesia must be involved due to the presence of pacemaker defibrillator.  The patient will undergo local anesthesia only for this procedure    Equipment:  Basic handset       Family History: No family history on file.    Medications:   Current Outpatient Medications on File Prior to Visit   Medication Sig Dispense Refill    aspirin (ECOTRIN) 81 MG EC tablet Take 81 mg by mouth every evening.      carvediloL (COREG) 12.5 MG tablet TAKE 1 TABLET TWICE DAILY 180 tablet 3    citalopram (CELEXA) 10 MG tablet Take 1 tablet (10 mg total) by mouth once daily. 90 tablet 3    fluorometholone 0.1% (FML) 0.1 % DrpS Place one drop into both eyes three times daily for inflammation Not to exceed 10 days use. (Patient taking differently: Place one drop into both eyes three times daily for inflammation Not to exceed 10 days use.) 5 mL 1    gabapentin (NEURONTIN) 300 MG capsule Take 1 capsule (300 mg total) by mouth 2 (two) times daily. 180 capsule 0    ibuprofen (ADVIL,MOTRIN) 600 MG tablet Take 1 tablet (600 mg total) by mouth every 6 (six) hours as needed for Pain. 4 tablet 0    meloxicam (MOBIC) 15 MG tablet  Take 1 tablet (15 mg total) by mouth as needed. 90 tablet 0    oxybutynin (DITROPAN) 5 MG Tab TAKE 1 TABLET EVERY EVENING 90 tablet 3    pantoprazole (PROTONIX) 40 MG tablet TAKE 1 TABLET EVERY DAY 90 tablet 3    rosuvastatin (CRESTOR) 10 MG tablet TAKE 1 TABLET EVERY DAY (Patient taking differently: every evening.) 90 tablet 3    SHINGRIX, PF, 50 mcg/0.5 mL injection       spironolactone (ALDACTONE) 25 MG tablet TAKE 1/2 TABLET DAILY ON MONDAY, WEDNESDAY AND FRIDAY 20 tablet 3    tiZANidine (ZANAFLEX) 4 MG tablet TAKE 1 TABLET TWICE DAILY (Patient taking differently: Take 4 mg by mouth nightly as needed.) 180 tablet 3    traMADoL (ULTRAM) 50 mg tablet Take 1 tablet (50 mg total) by mouth daily as needed for Pain. 90 tablet 0     No current facility-administered medications on file prior to visit.       Allergies:   Review of patient's allergies indicates:   Allergen Reactions    Penicillins Rash       Social History:   Social History     Socioeconomic History    Marital status:    Tobacco Use    Smoking status: Former Smoker     Types: Cigarettes     Quit date: 1977     Years since quittin.0    Smokeless tobacco: Never Used   Substance and Sexual Activity    Alcohol use: Yes     Comment: socially    Drug use: No     Social Determinants of Health     Financial Resource Strain: Low Risk     Difficulty of Paying Living Expenses: Not hard at all   Food Insecurity: No Food Insecurity    Worried About Running Out of Food in the Last Year: Never true    Ran Out of Food in the Last Year: Never true   Transportation Needs: No Transportation Needs    Lack of Transportation (Medical): No    Lack of Transportation (Non-Medical): No   Physical Activity: Unknown    Days of Exercise per Week: Patient refused   Stress: No Stress Concern Present    Feeling of Stress : Only a little   Social Connections: Unknown    Frequency of Communication with Friends and Family: More than three times a week  "   Frequency of Social Gatherings with Friends and Family: Twice a week    Active Member of Clubs or Organizations: No    Attends Club or Organization Meetings: Never    Marital Status:      PHYSICAL EXAMINATION:   General    Vitals:    08/15/22 0901   Weight: 93.7 kg (206 lb 9.1 oz)   Height: 5' 9" (1.753 m)     Constitutional: Oriented to person, place, and time. No apparent distress. Pleasant.  HENT:   Head: Normocephalic and atraumatic.   Eyes: Right eye exhibits no discharge. Left eye exhibits no discharge. No scleral icterus.   Pulmonary/Chest: Effort normal. No respiratory distress.   Abdominal: There is no guarding.   Neurological: Alert and oriented to person, place, and time.   Psychiatric: Behavior is normal.     Left Shoulder Exam     Tenderness   Left shoulder tenderness location: Greater tuberosity.    Range of Motion   Active abduction: 140   Passive abduction: 150   Left shoulder external rotation: 75.   Forward flexion: 150   Left shoulder internal rotation 90 degrees: 55.     Muscle Strength   Abduction: 5/5   Internal rotation: 5/5   External rotation: 5/5   Biceps: 5/5     Tests   Apprehension: negative  Nation test: positive  Cross arm: negative  Impingement: negative  Drop arm: negative  Sulcus: absent    Other   Erythema: absent  Scars: absent  Sensation: normal  Pulse: present     Comments:  + Neers  + Empty can  - Yergasons  + Speeds    Pain with resisted abduction        INSPECTION: There is no swelling, ecchymoses, erythema or gross deformity of the left shoulder.    Imaging  Right Shoulder 8/17/21  FINDINGS:  Right shoulder: There is moderate hypertrophic degenerative change of the right acromioclavicular joint.  No fracture, dislocation, or osseous destructive process.  There is degenerative change versus CPPD of the right shoulder joint with inferomedial humeral and right glenoid rim osteophyte formation.  There is minimal right supraspinatus calcific tendinitis.  No " definite shoulder joint space narrowing on the axillary view.  There is a unipolar right chest cardiac pacemaker device in place and there are postoperative changes of median sternotomy and CABG.     Left shoulder: There is moderate hypertrophic degenerative change of the left acromioclavicular joint with possible loose body formation noted in the cranial aspect of the left acromioclavicular joint.  No fracture, dislocation, or osseous destructive process noted about the left shoulder.  There are postoperative changes of prior median sternotomy and CABG.  The left chest is clear.  No left rotator cuff calcific tendinitis.     Impression:     1. Advanced degenerative change of both AC joints.  2. Right rotator cuff calcific tendinitis.    Diagnostic Ultrasound Exam:  FINDINGS: Real time examination was reviewed from last visit.     The left shoulder was examined and findings were as follows: the long head of the biceps tendon was observed to have a normal fibular appearance and was positioned appropriately in the bicipital groove. The biceps tendon did not reveal subluxation on dynamic imaging. There is no visible evidence for coracohumeral impingement. The acromioclavicular joint has some age appropriate cortical irregularity consistent with degenerative change. There is no evidence for abnormal translation of the acromioclavicular joint on cross-body adduction.     The rotator cuff was examined in detail. The subscapularis is heterogenous in echotexture consistent with tendinopathy. The supraspinatus was without any gapping that would suggest significant tearing. There was significant cortical irregularity and bony erosions beneath the tendon with some signs of chronic tendinopathy.  There was cortical irregularity of the greater tuberosity present. The infraspinatus was heterogenous in internal echotexture consistent with tendonopathy. The teres minor was not assessed. The subacromial/subdeltoid bursa was somewhat  thickened.     There does not appear to be any effusion within the posterior glenohumeral recess. The posterosuperior glenoid labrum is somewhat irregular.    Data Reviewed: X-ray, ultrasound    Supportive Actions: Independent visualization of images or test specimens    ASSESSMENT:   1. Tendinopathy of left biceps tendon    2. Rotator cuff tendinitis, left         PLAN:     1. Mr. Ruff returns with recurrence of left shoulder pain without trauma or injury.  Today's physical exam reveals good range of motion and strength, therefore I have a low suspicion for any significant rotator cuff tearing.  Prior diagnostic ultrasound from last visit also failed to show any significant rotator cuff tearing.  We did discuss performing a targeted injection of the left subacromial bursa as he did report good relief from this procedure last visit.  However, he also notes pain at the right knee and left hand, which we have treated him for in the past as well.  We discussed perhaps trying a shotgun approach via an oral corticosteroid as opposed to a single joint injection. Rx provided for a Medrol Dosepak.  I instructed him to discontinue the use of any anti-inflammatory medicines when taking the corticosteroid.  He may continue his Ultram and Tylenol.    2. RTC as needed.     Go to the following website to look up your diagnosis and learn more about it:     [email or web address]     If you have questions or concerns, please contact us via MyChart or email.      Please call the office with any issues.      Dr. Sj Gonzales M.D., CAQ-SM, R-MSK  [facility]    The above note was completed, in part, with the aid of [employer] Fluency Direct dictation software/hardware. Translation errors may be present.

## 2022-08-25 ENCOUNTER — OFFICE VISIT (OUTPATIENT)
Dept: CARDIOLOGY | Facility: CLINIC | Age: 82
End: 2022-08-25
Payer: MEDICARE

## 2022-08-25 VITALS
HEIGHT: 69 IN | WEIGHT: 208.56 LBS | DIASTOLIC BLOOD PRESSURE: 69 MMHG | BODY MASS INDEX: 30.89 KG/M2 | SYSTOLIC BLOOD PRESSURE: 129 MMHG | HEART RATE: 47 BPM

## 2022-08-25 DIAGNOSIS — I25.810 CORONARY ARTERY DISEASE INVOLVING AUTOLOGOUS VEIN CORONARY BYPASS GRAFT WITHOUT ANGINA PECTORIS: Primary | ICD-10-CM

## 2022-08-25 DIAGNOSIS — I25.2 HISTORY OF MYOCARDIAL INFARCTION: ICD-10-CM

## 2022-08-25 DIAGNOSIS — I50.42 CHRONIC COMBINED SYSTOLIC AND DIASTOLIC HEART FAILURE: ICD-10-CM

## 2022-08-25 DIAGNOSIS — E78.2 MIXED HYPERLIPIDEMIA: ICD-10-CM

## 2022-08-25 DIAGNOSIS — Z95.810 ICD (IMPLANTABLE CARDIOVERTER-DEFIBRILLATOR) IN PLACE: Chronic | ICD-10-CM

## 2022-08-25 DIAGNOSIS — I10 ESSENTIAL HYPERTENSION: ICD-10-CM

## 2022-08-25 DIAGNOSIS — Z95.1 S/P CABG X 4: ICD-10-CM

## 2022-08-25 DIAGNOSIS — I25.5 ISCHEMIC CARDIOMYOPATHY: Chronic | ICD-10-CM

## 2022-08-25 DIAGNOSIS — I48.0 PAF (PAROXYSMAL ATRIAL FIBRILLATION): ICD-10-CM

## 2022-08-25 PROCEDURE — 3074F SYST BP LT 130 MM HG: CPT | Mod: CPTII,S$GLB,, | Performed by: PHYSICIAN ASSISTANT

## 2022-08-25 PROCEDURE — 1159F MED LIST DOCD IN RCRD: CPT | Mod: CPTII,S$GLB,, | Performed by: PHYSICIAN ASSISTANT

## 2022-08-25 PROCEDURE — 3074F PR MOST RECENT SYSTOLIC BLOOD PRESSURE < 130 MM HG: ICD-10-PCS | Mod: CPTII,S$GLB,, | Performed by: PHYSICIAN ASSISTANT

## 2022-08-25 PROCEDURE — 99999 PR PBB SHADOW E&M-EST. PATIENT-LVL III: ICD-10-PCS | Mod: PBBFAC,,, | Performed by: PHYSICIAN ASSISTANT

## 2022-08-25 PROCEDURE — 1101F PT FALLS ASSESS-DOCD LE1/YR: CPT | Mod: CPTII,S$GLB,, | Performed by: PHYSICIAN ASSISTANT

## 2022-08-25 PROCEDURE — 99214 OFFICE O/P EST MOD 30 MIN: CPT | Mod: S$GLB,,, | Performed by: PHYSICIAN ASSISTANT

## 2022-08-25 PROCEDURE — 1126F PR PAIN SEVERITY QUANTIFIED, NO PAIN PRESENT: ICD-10-PCS | Mod: CPTII,S$GLB,, | Performed by: PHYSICIAN ASSISTANT

## 2022-08-25 PROCEDURE — 3288F PR FALLS RISK ASSESSMENT DOCUMENTED: ICD-10-PCS | Mod: CPTII,S$GLB,, | Performed by: PHYSICIAN ASSISTANT

## 2022-08-25 PROCEDURE — 99214 PR OFFICE/OUTPT VISIT, EST, LEVL IV, 30-39 MIN: ICD-10-PCS | Mod: S$GLB,,, | Performed by: PHYSICIAN ASSISTANT

## 2022-08-25 PROCEDURE — 1126F AMNT PAIN NOTED NONE PRSNT: CPT | Mod: CPTII,S$GLB,, | Performed by: PHYSICIAN ASSISTANT

## 2022-08-25 PROCEDURE — 99999 PR PBB SHADOW E&M-EST. PATIENT-LVL III: CPT | Mod: PBBFAC,,, | Performed by: PHYSICIAN ASSISTANT

## 2022-08-25 PROCEDURE — 1159F PR MEDICATION LIST DOCUMENTED IN MEDICAL RECORD: ICD-10-PCS | Mod: CPTII,S$GLB,, | Performed by: PHYSICIAN ASSISTANT

## 2022-08-25 PROCEDURE — 3078F DIAST BP <80 MM HG: CPT | Mod: CPTII,S$GLB,, | Performed by: PHYSICIAN ASSISTANT

## 2022-08-25 PROCEDURE — 3078F PR MOST RECENT DIASTOLIC BLOOD PRESSURE < 80 MM HG: ICD-10-PCS | Mod: CPTII,S$GLB,, | Performed by: PHYSICIAN ASSISTANT

## 2022-08-25 PROCEDURE — 3288F FALL RISK ASSESSMENT DOCD: CPT | Mod: CPTII,S$GLB,, | Performed by: PHYSICIAN ASSISTANT

## 2022-08-25 PROCEDURE — 1101F PR PT FALLS ASSESS DOC 0-1 FALLS W/OUT INJ PAST YR: ICD-10-PCS | Mod: CPTII,S$GLB,, | Performed by: PHYSICIAN ASSISTANT

## 2022-08-25 NOTE — PROGRESS NOTES
Subjective:    Patient ID:  Ankit Ruff is a 82 y.o. male who presents for follow-up of atrial fibrillation.       HPI  Mr. Pham is a very pleasant gentleman who follows with Dr. Guerrero. He has a hx of atrial fibrillation and is s/p PVI in 2016 by Dr. Alejandre. His recent remote check showed atrial fibrillation. Overall burden:0.4% since 5/27/22, 19.3% from 8/14/22-8/15/22. Max duration seen: 12 hrs. 54 mins. on 8/13/22. Ventricular rates were controlled  He is on Aspirin 81 mg daily only. CHADSVASc is 4.     He had an echo on 7/26/22 that showed:  ·  The left ventricle is moderately enlarged with moderate eccentric hypertrophy and moderately decreased systolic function.  · The estimated ejection fraction is 35%.  · Grade II left ventricular diastolic dysfunction.  · Normal right ventricular size with normal right ventricular systolic function.  · Severe left atrial enlargement.  · Mild right atrial enlargement.  · There is mild aortic valve stenosis.  · Aortic valve area is 1.90 cm2; peak velocity is 1.96 m/s; mean gradient is 8 mmHg.  · Mild mitral regurgitation.  · Intermediate central venous pressure (8 mmHg).  · The estimated PA systolic pressure is 42 mmHg.     Review of Systems   Constitutional: Negative for chills, diaphoresis, fever, weight gain and weight loss.   HENT: Negative for sore throat.    Eyes: Negative for blurred vision, vision loss in left eye, vision loss in right eye and visual disturbance.   Cardiovascular: Negative for chest pain, claudication, dyspnea on exertion, leg swelling, near-syncope, orthopnea, palpitations, paroxysmal nocturnal dyspnea and syncope.   Respiratory: Negative for cough, hemoptysis, shortness of breath, sputum production and wheezing.    Endocrine: Negative for cold intolerance and heat intolerance.   Hematologic/Lymphatic: Negative for adenopathy. Does not bruise/bleed easily.   Skin: Negative for rash.   Musculoskeletal: Negative for falls, muscle weakness  "and myalgias.   Gastrointestinal: Negative for abdominal pain, change in bowel habit, constipation, diarrhea, melena and nausea.   Genitourinary: Negative for bladder incontinence.   Neurological: Negative for dizziness, focal weakness, headaches, light-headedness, numbness and weakness.   Psychiatric/Behavioral: Negative for altered mental status.         Vitals:    08/25/22 1325   BP: 129/69   BP Location: Left arm   Patient Position: Sitting   BP Method: Medium (Automatic)   Pulse: (!) 47   Weight: 94.6 kg (208 lb 8.9 oz)   Height: 5' 9" (1.753 m)   Body mass index is 30.8 kg/m².    Objective:    Physical Exam  Constitutional:       Appearance: He is well-developed.   HENT:      Head: Normocephalic and atraumatic.   Eyes:      Extraocular Movements: Extraocular movements intact.      Pupils: Pupils are equal, round, and reactive to light.   Neck:      Thyroid: No thyromegaly.      Vascular: No JVD.      Trachea: No tracheal deviation.   Cardiovascular:      Rate and Rhythm: Normal rate and regular rhythm.      Chest Wall: PMI is not displaced.      Pulses: Normal pulses and intact distal pulses.      Heart sounds: S1 normal and S2 normal. No murmur heard.    No friction rub. No gallop.   Pulmonary:      Effort: Pulmonary effort is normal. No respiratory distress.      Breath sounds: Normal breath sounds. No wheezing or rales.   Chest:      Chest wall: No tenderness.   Abdominal:      General: Bowel sounds are normal. There is no distension.      Palpations: Abdomen is soft. There is no mass.      Tenderness: There is no abdominal tenderness.   Musculoskeletal:         General: No tenderness. Normal range of motion.      Cervical back: Neck supple.   Skin:     General: Skin is warm and dry.      Findings: No rash.   Neurological:      General: No focal deficit present.      Mental Status: He is alert and oriented to person, place, and time.   Psychiatric:         Mood and Affect: Mood normal.         Behavior: " Behavior normal.           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 Currently in NSR.   CHADSVASc = 4.   Continue ASA 81 mg daily due to CAD.   Start Eliquis 5mg BID.   Continue other cardiac medications.   Risk factor modification including diet and exercise.   Avoidance of triggers.   F/U with Dr. Guerrero as scheduled.

## 2022-09-15 ENCOUNTER — TELEPHONE (OUTPATIENT)
Dept: CARDIOLOGY | Facility: CLINIC | Age: 82
End: 2022-09-15
Payer: MEDICARE

## 2022-09-15 NOTE — TELEPHONE ENCOUNTER
----- [facility] at 9/15/2022  9:54 AM CDT -----  Contact: Brandy daughter  Type:  Sooner Appointment Request    Caller is requesting a sooner appointment.  Caller declined first available appointment listed below.  Caller will not accept being placed on the waitlist and is requesting a message be sent to doctor.    Name of Caller:  Brandy  When is the first available appointment?  10/5  Symptoms:  increased fluid build up in legs pcp increased fluid pill but did not work and he has had one run of A fib and he went on the eloquis wants to talk about changes to his treatment  Best Call Back Number:  [phone] brandy if possible to mary   Additional Information:  thanks

## 2022-09-15 NOTE — TELEPHONE ENCOUNTER
No new care gaps identified.  [facility] Embedded Care Gaps. Reference number: [id number]. 8/01/2022   7:42:15 AM CDT   Pharmacy faxed in a request for prior authorization on:    Medication: Rozerem  Dosage: 8mg  Quantity requested:  30  Pharmacy for prescription has been selected.    Prior authorization request has been initiated and sent for completion.

## 2022-10-05 ENCOUNTER — OFFICE VISIT (OUTPATIENT)
Dept: CARDIOLOGY | Facility: CLINIC | Age: 82
End: 2022-10-05
Payer: MEDICARE

## 2022-10-05 VITALS
SYSTOLIC BLOOD PRESSURE: 111 MMHG | HEART RATE: 47 BPM | DIASTOLIC BLOOD PRESSURE: 64 MMHG | HEIGHT: 70 IN | BODY MASS INDEX: 29.73 KG/M2 | WEIGHT: 207.69 LBS

## 2022-10-05 DIAGNOSIS — I25.810 CORONARY ARTERY DISEASE INVOLVING AUTOLOGOUS VEIN CORONARY BYPASS GRAFT WITHOUT ANGINA PECTORIS: ICD-10-CM

## 2022-10-05 DIAGNOSIS — Z95.1 S/P CABG X 4: ICD-10-CM

## 2022-10-05 DIAGNOSIS — Z95.810 ICD (IMPLANTABLE CARDIOVERTER-DEFIBRILLATOR) IN PLACE: Chronic | ICD-10-CM

## 2022-10-05 DIAGNOSIS — I50.42 CHRONIC COMBINED SYSTOLIC AND DIASTOLIC HEART FAILURE: Primary | ICD-10-CM

## 2022-10-05 DIAGNOSIS — I48.0 PAF (PAROXYSMAL ATRIAL FIBRILLATION): ICD-10-CM

## 2022-10-05 PROCEDURE — 1160F RVW MEDS BY RX/DR IN RCRD: CPT | Mod: CPTII,S$GLB,, | Performed by: INTERNAL MEDICINE

## 2022-10-05 PROCEDURE — 99999 PR PBB SHADOW E&M-EST. PATIENT-LVL III: ICD-10-PCS | Mod: PBBFAC,,, | Performed by: INTERNAL MEDICINE

## 2022-10-05 PROCEDURE — 1159F PR MEDICATION LIST DOCUMENTED IN MEDICAL RECORD: ICD-10-PCS | Mod: CPTII,S$GLB,, | Performed by: INTERNAL MEDICINE

## 2022-10-05 PROCEDURE — 3074F SYST BP LT 130 MM HG: CPT | Mod: CPTII,S$GLB,, | Performed by: INTERNAL MEDICINE

## 2022-10-05 PROCEDURE — 1126F AMNT PAIN NOTED NONE PRSNT: CPT | Mod: CPTII,S$GLB,, | Performed by: INTERNAL MEDICINE

## 2022-10-05 PROCEDURE — 3078F DIAST BP <80 MM HG: CPT | Mod: CPTII,S$GLB,, | Performed by: INTERNAL MEDICINE

## 2022-10-05 PROCEDURE — 3074F PR MOST RECENT SYSTOLIC BLOOD PRESSURE < 130 MM HG: ICD-10-PCS | Mod: CPTII,S$GLB,, | Performed by: INTERNAL MEDICINE

## 2022-10-05 PROCEDURE — 1159F MED LIST DOCD IN RCRD: CPT | Mod: CPTII,S$GLB,, | Performed by: INTERNAL MEDICINE

## 2022-10-05 PROCEDURE — 1160F PR REVIEW ALL MEDS BY PRESCRIBER/CLIN PHARMACIST DOCUMENTED: ICD-10-PCS | Mod: CPTII,S$GLB,, | Performed by: INTERNAL MEDICINE

## 2022-10-05 PROCEDURE — 99499 UNLISTED E&M SERVICE: CPT | Mod: HCNC,S$GLB,, | Performed by: INTERNAL MEDICINE

## 2022-10-05 PROCEDURE — 99214 OFFICE O/P EST MOD 30 MIN: CPT | Mod: S$GLB,,, | Performed by: INTERNAL MEDICINE

## 2022-10-05 PROCEDURE — 99214 PR OFFICE/OUTPT VISIT, EST, LEVL IV, 30-39 MIN: ICD-10-PCS | Mod: S$GLB,,, | Performed by: INTERNAL MEDICINE

## 2022-10-05 PROCEDURE — 1126F PR PAIN SEVERITY QUANTIFIED, NO PAIN PRESENT: ICD-10-PCS | Mod: CPTII,S$GLB,, | Performed by: INTERNAL MEDICINE

## 2022-10-05 PROCEDURE — 3078F PR MOST RECENT DIASTOLIC BLOOD PRESSURE < 80 MM HG: ICD-10-PCS | Mod: CPTII,S$GLB,, | Performed by: INTERNAL MEDICINE

## 2022-10-05 PROCEDURE — 99999 PR PBB SHADOW E&M-EST. PATIENT-LVL III: CPT | Mod: PBBFAC,,, | Performed by: INTERNAL MEDICINE

## 2022-10-05 RX ORDER — SACUBITRIL AND VALSARTAN 24; 26 MG/1; MG/1
1 TABLET, FILM COATED ORAL 2 TIMES DAILY
Qty: 180 TABLET | Refills: 3 | OUTPATIENT
Start: 2022-10-05 | End: 2022-10-31

## 2022-10-05 RX ORDER — FUROSEMIDE 20 MG/1
20 TABLET ORAL
Qty: 20 TABLET | Refills: 3 | Status: SHIPPED | OUTPATIENT
Start: 2022-10-05 | End: 2022-10-31 | Stop reason: SDUPTHER

## 2022-10-05 NOTE — PROGRESS NOTES
Subjective:    Patient ID:  Ankit Ruff is a 82 y.o. male who presents for follow-up of Coronary Artery Disease      HPI  He comes for follow up with no major problems, no chest pain, no shortness of breath.  Leg swelling is main complaint    Review of Systems   Constitutional: Negative for decreased appetite, malaise/fatigue, weight gain and weight loss.   Cardiovascular:  Negative for chest pain, dyspnea on exertion, leg swelling, palpitations and syncope.   Respiratory:  Negative for cough and shortness of breath.    Gastrointestinal: Negative.    Neurological:  Negative for weakness.   All other systems reviewed and are negative.     Objective:      Physical Exam  Vitals and nursing note reviewed.   Constitutional:       Appearance: Normal appearance. He is well-developed.   HENT:      Head: Normocephalic.   Eyes:      Pupils: Pupils are equal, round, and reactive to light.   Neck:      Thyroid: No thyromegaly.      Vascular: No carotid bruit or JVD.   Cardiovascular:      Rate and Rhythm: Normal rate and regular rhythm.      Chest Wall: PMI is not displaced.      Pulses: Normal pulses and intact distal pulses.      Heart sounds: Normal heart sounds. No murmur heard.    No gallop.   Pulmonary:      Effort: Pulmonary effort is normal.      Breath sounds: Normal breath sounds.   Abdominal:      Palpations: Abdomen is soft. There is no mass.      Tenderness: There is no abdominal tenderness.   Musculoskeletal:         General: Normal range of motion.      Cervical back: Normal range of motion and neck supple.   Skin:     General: Skin is warm.   Neurological:      Mental Status: He is alert and oriented to person, place, and time.      Sensory: No sensory deficit.      Deep Tendon Reflexes: Reflexes are normal and symmetric.       Assessment:       1. Chronic combined systolic and diastolic heart failure    2. Coronary artery disease involving autologous vein coronary bypass graft without angina pectoris    3.  S/P CABG x 4    4. PAF (paroxysmal atrial fibrillation)    5. ICD (implantable cardioverter-defibrillator) in place         Plan:   Lasix x 3 days, then prn  Entresto 24/26  Continue all cardiac medications  Regular exercise program  Weight loss  6 week f/u

## 2022-10-11 ENCOUNTER — OFFICE VISIT (OUTPATIENT)
Dept: PHYSICAL MEDICINE AND REHAB | Facility: CLINIC | Age: 82
End: 2022-10-11
Payer: MEDICARE

## 2022-10-11 VITALS — WEIGHT: 207.25 LBS | BODY MASS INDEX: 29.67 KG/M2 | HEIGHT: 70 IN

## 2022-10-11 DIAGNOSIS — M67.912 TENDINOPATHY OF LEFT ROTATOR CUFF: Primary | ICD-10-CM

## 2022-10-11 PROCEDURE — 3288F FALL RISK ASSESSMENT DOCD: CPT | Mod: CPTII,S$GLB,, | Performed by: PHYSICAL MEDICINE & REHABILITATION

## 2022-10-11 PROCEDURE — 1160F RVW MEDS BY RX/DR IN RCRD: CPT | Mod: CPTII,S$GLB,, | Performed by: PHYSICAL MEDICINE & REHABILITATION

## 2022-10-11 PROCEDURE — 20611 DRAIN/INJ JOINT/BURSA W/US: CPT | Mod: LT,S$GLB,, | Performed by: PHYSICAL MEDICINE & REHABILITATION

## 2022-10-11 PROCEDURE — 1160F PR REVIEW ALL MEDS BY PRESCRIBER/CLIN PHARMACIST DOCUMENTED: ICD-10-PCS | Mod: CPTII,S$GLB,, | Performed by: PHYSICAL MEDICINE & REHABILITATION

## 2022-10-11 PROCEDURE — 1159F MED LIST DOCD IN RCRD: CPT | Mod: CPTII,S$GLB,, | Performed by: PHYSICAL MEDICINE & REHABILITATION

## 2022-10-11 PROCEDURE — 1101F PT FALLS ASSESS-DOCD LE1/YR: CPT | Mod: CPTII,S$GLB,, | Performed by: PHYSICAL MEDICINE & REHABILITATION

## 2022-10-11 PROCEDURE — 1159F PR MEDICATION LIST DOCUMENTED IN MEDICAL RECORD: ICD-10-PCS | Mod: CPTII,S$GLB,, | Performed by: PHYSICAL MEDICINE & REHABILITATION

## 2022-10-11 PROCEDURE — 20611 LARGE JOINT ASPIRATION/INJECTION: L SUBACROMIAL BURSA: ICD-10-PCS | Mod: LT,S$GLB,, | Performed by: PHYSICAL MEDICINE & REHABILITATION

## 2022-10-11 PROCEDURE — 3288F PR FALLS RISK ASSESSMENT DOCUMENTED: ICD-10-PCS | Mod: CPTII,S$GLB,, | Performed by: PHYSICAL MEDICINE & REHABILITATION

## 2022-10-11 PROCEDURE — 1101F PR PT FALLS ASSESS DOC 0-1 FALLS W/OUT INJ PAST YR: ICD-10-PCS | Mod: CPTII,S$GLB,, | Performed by: PHYSICAL MEDICINE & REHABILITATION

## 2022-10-11 PROCEDURE — 1125F PR PAIN SEVERITY QUANTIFIED, PAIN PRESENT: ICD-10-PCS | Mod: CPTII,S$GLB,, | Performed by: PHYSICAL MEDICINE & REHABILITATION

## 2022-10-11 PROCEDURE — 99213 OFFICE O/P EST LOW 20 MIN: CPT | Mod: 25,S$GLB,, | Performed by: PHYSICAL MEDICINE & REHABILITATION

## 2022-10-11 PROCEDURE — 99999 PR PBB SHADOW E&M-EST. PATIENT-LVL III: ICD-10-PCS | Mod: PBBFAC,,, | Performed by: PHYSICAL MEDICINE & REHABILITATION

## 2022-10-11 PROCEDURE — 99999 PR PBB SHADOW E&M-EST. PATIENT-LVL III: CPT | Mod: PBBFAC,,, | Performed by: PHYSICAL MEDICINE & REHABILITATION

## 2022-10-11 PROCEDURE — 1125F AMNT PAIN NOTED PAIN PRSNT: CPT | Mod: CPTII,S$GLB,, | Performed by: PHYSICAL MEDICINE & REHABILITATION

## 2022-10-11 PROCEDURE — 99213 PR OFFICE/OUTPT VISIT, EST, LEVL III, 20-29 MIN: ICD-10-PCS | Mod: 25,S$GLB,, | Performed by: PHYSICAL MEDICINE & REHABILITATION

## 2022-10-11 RX ORDER — BETAMETHASONE SODIUM PHOSPHATE AND BETAMETHASONE ACETATE 3; 3 MG/ML; MG/ML
6 INJECTION, SUSPENSION INTRA-ARTICULAR; INTRALESIONAL; INTRAMUSCULAR; SOFT TISSUE
Status: DISCONTINUED | OUTPATIENT
Start: 2022-10-11 | End: 2022-10-11 | Stop reason: HOSPADM

## 2022-10-11 RX ADMIN — BETAMETHASONE SODIUM PHOSPHATE AND BETAMETHASONE ACETATE 6 MG: 3; 3 INJECTION, SUSPENSION INTRA-ARTICULAR; INTRALESIONAL; INTRAMUSCULAR; SOFT TISSUE at 02:10

## 2022-10-11 NOTE — PROCEDURES
Large Joint Aspiration/Injection: L subacromial bursa    Date/Time: 10/11/2022 2:00 PM  Performed by: Sj Gonzales MD  Authorized by: Sj Gonzales MD     Consent Done?:  Yes (Verbal)  Indications:  Pain  Site marked: the procedure site was marked    Timeout: prior to procedure the correct patient, procedure, and site was verified    Prep: patient was prepped and draped in usual sterile fashion    Local anesthesia used?: No      Details:  Needle Size:  25 G  Ultrasonic Guidance for needle placement?: Yes    Images are saved and documented.  Approach: In plane, lateral to medial.  Location:  Shoulder  Site:  L subacromial bursa  Medications:  6 mg betamethasone acetate-betamethasone sodium phosphate 6 mg/mL  Patient tolerance:  Patient tolerated the procedure well with no immediate complications     Ultrasound guidance was used for correct needle placement, the images were saved will be uploaded to EMR.

## 2022-10-11 NOTE — PROGRESS NOTES
[facility]    CHIEF COMPLAINT:   Chief Complaint   Patient presents with    Shoulder Pain     C/o left shoulder pain     HISTORY OF PRESENT ILLNESS: Ankit Ruff is a 82 y.o. male who presents to me in follow up for evaluation of chronic left shoulder pain. Last clinic visit was August 2022.  At that time he was complaining of several musculoskeletal pain generator ears, therefore we elected to try an oral corticosteroid.  He notes this did provide some temporary relief but he has been experiencing more focal pain over the anterior left shoulder over the past several weeks.  He rates his pain as a 5 on a scale of 1-10.  He notes increased pain with lifting the arm front of him and to the side.    Review of Systems   Constitutional: Negative for fever.   HENT: Negative for drooling.    Eyes: Negative for discharge.   Respiratory: Negative for choking.    Cardiovascular: Negative for chest pain.   Genitourinary: Negative for flank pain.   Skin: Negative for wound.   Allergic/Immunologic: Negative for immunocompromised state.   Neurological: Negative for tremors and syncope.   Psychiatric/Behavioral: Negative for behavioral problems.     Past Medical History:   Past Medical History:   Diagnosis Date    Anxiety     Arthritis     Atrial fibrillation     CAD (coronary artery disease)     Cataract     OU    CHF (congestive heart failure)     Defibrillator discharge     Hearing loss     Heart failure     Hyperlipidemia     ICD (implantable cardiac defibrillator) in place     Ischemic cardiomyopathy     Myocardial infarction     Sciatica     had seen Dr. 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        Past Surgical History:   Past Surgical History:   Procedure Laterality Date    APPENDECTOMY      CARDIAC SURGERY      cabg    Defibulater      FRACTURE SURGERY      HERNIA REPAIR      INJECTION OF ANESTHETIC AGENT AROUND  NERVE Right 12/21/2018    Procedure: diagnsotic block to the genicular nerve branches to the right knee;  Surgeon: Sj Gonzales MD;  Location: [facility] OR;  Service: Orthopedics;  Laterality: Right;    INTERNAL NEUROLYSIS USING OPERATING MICROSCOPE Right 2/1/2019    Procedure: cooled radiofrequency ablation to the genicular nerve branches of the right knee;  Surgeon: Sj Gonzales MD;  Location: [facility] OR;  Service: Orthopedics;  Laterality: Right;    KNEE ARTHROSCOPY      SKIN BIOPSY      TONSILLECTOMY      TRIGGER FINGER RELEASE Left 2/3/2022    Procedure: RELEASE, TRIGGER FINGER;  Surgeon: Bay Romo MD;  Location: [facility] OR;  Service: Orthopedics;  Laterality: Left;  Procedure:  Left ring finger trigger release    Position:  Supine    Anesthesia:  Anesthesia must be involved due to the presence of pacemaker defibrillator.  The patient will undergo local anesthesia only for this procedure    Equipment:  Basic handset       Family History: History reviewed. No pertinent family history.    Medications:   Current Outpatient Medications on File Prior to Visit   Medication Sig Dispense Refill    apixaban (ELIQUIS) 5 mg Tab Take 1 tablet (5 mg total) by mouth 2 (two) times daily. 60 tablet 11    aspirin (ECOTRIN) 81 MG EC tablet Take 81 mg by mouth every evening.      carvediloL (COREG) 12.5 MG tablet TAKE 1 TABLET TWICE DAILY 180 tablet 3    citalopram (CELEXA) 10 MG tablet Take 1 tablet (10 mg total) by mouth once daily. 90 tablet 3    fluorometholone 0.1% (FML) 0.1 % DrpS Place one drop into both eyes three times daily for inflammation Not to exceed 10 days use. (Patient taking differently: Place one drop into both eyes three times daily for inflammation Not to exceed 10 days use.) 5 mL 1    furosemide (LASIX) 20 MG tablet Take 1 tablet (20 mg total) by mouth as needed. 20 tablet 3    gabapentin (NEURONTIN) 300 MG capsule TAKE 1 CAPSULE TWICE DAILY 180 capsule 0    meloxicam (MOBIC) 15 MG tablet  TAKE 1 TABLET (15 MG TOTAL) BY MOUTH AS NEEDED. (MAX DOSE 15MG PER DAY) 90 tablet 0    oxybutynin (DITROPAN) 5 MG Tab TAKE 1 TABLET EVERY EVENING 90 tablet 3    pantoprazole (PROTONIX) 40 MG tablet TAKE 1 TABLET EVERY DAY 90 tablet 3    rosuvastatin (CRESTOR) 10 MG tablet TAKE 1 TABLET EVERY DAY (Patient taking differently: every evening.) 90 tablet 3    sacubitriL-valsartan (ENTRESTO) 24-26 mg per tablet Take 1 tablet by mouth 2 (two) times daily. 180 tablet 3    SHINGRIX, PF, 50 mcg/0.5 mL injection       spironolactone (ALDACTONE) 25 MG tablet TAKE 1/2 TABLET DAILY ON MONDAY, WEDNESDAY AND FRIDAY 20 tablet 3    tiZANidine (ZANAFLEX) 4 MG tablet TAKE 1 TABLET TWICE DAILY (Patient taking differently: Take 4 mg by mouth nightly as needed.) 180 tablet 3    traMADoL (ULTRAM) 50 mg tablet Take 1 tablet (50 mg total) by mouth daily as needed for Pain. 90 tablet 0     No current facility-administered medications on file prior to visit.       Allergies:   Review of patient's allergies indicates:   Allergen Reactions    Penicillins Rash       Social History:   Social History     Socioeconomic History    Marital status:    Tobacco Use    Smoking status: Former     Types: Cigarettes     Quit date: 1977     Years since quittin.2    Smokeless tobacco: Never   Substance and Sexual Activity    Alcohol use: Yes     Comment: socially    Drug use: No     Social Determinants of Health     Financial Resource Strain: Low Risk     Difficulty of Paying Living Expenses: Not hard at all   Food Insecurity: No Food Insecurity    Worried About Running Out of Food in the Last Year: Never true    Ran Out of Food in the Last Year: Never true   Transportation Needs: No Transportation Needs    Lack of Transportation (Medical): No    Lack of Transportation (Non-Medical): No   Social Connections: Unknown    Frequency of Communication with Friends and Family: More than three times a week    Frequency of Social Gatherings with Friends  "and Family: Twice a week    Active Member of Clubs or Organizations: Patient refused    Attends Club or Organization Meetings: Patient refused    Marital Status: Patient refused   Housing Stability: Low Risk     Unable to Pay for Housing in the Last Year: No    Number of Places Lived in the Last Year: 1    Unstable Housing in the Last Year: No     PHYSICAL EXAMINATION:   General    Vitals:    10/11/22 1401   Weight: 94 kg (207 lb 3.7 oz)   Height: 5' 10" (1.778 m)     Constitutional: Oriented to person, place, and time. No apparent distress. Pleasant.  HENT:   Head: Normocephalic and atraumatic.   Eyes: Right eye exhibits no discharge. Left eye exhibits no discharge. No scleral icterus.   Pulmonary/Chest: Effort normal. No respiratory distress.   Abdominal: There is no guarding.   Neurological: Alert and oriented to person, place, and time.   Psychiatric: Behavior is normal.     Left Shoulder Exam     Tenderness   Left shoulder tenderness location: Greater tuberosity.    Range of Motion   Active abduction:  140   Passive abduction:  150   Left shoulder external rotation: 80.   Forward flexion:  150   Left shoulder internal rotation 90 degrees: 55.     Muscle Strength   Abduction: 4/5   Internal rotation: 5/5   External rotation: 5/5   Biceps: 5/5     Tests   Apprehension: negative  Nation test: positive  Cross arm: negative  Impingement: positive  Drop arm: negative  Sulcus: absent    Other   Erythema: absent  Scars: absent  Sensation: normal  Pulse: present     Comments:  + Neers  + Empty can  - Yergasons  + Speeds    Pain with resisted abduction      INSPECTION: There is no swelling, ecchymoses, erythema or gross deformity of the left shoulder.    Imaging  Right Shoulder 8/17/21  FINDINGS:  Right shoulder: There is moderate hypertrophic degenerative change of the right acromioclavicular joint.  No fracture, dislocation, or osseous destructive process.  There is degenerative change versus CPPD of the right " shoulder joint with inferomedial humeral and right glenoid rim osteophyte formation.  There is minimal right supraspinatus calcific tendinitis.  No definite shoulder joint space narrowing on the axillary view.  There is a unipolar right chest cardiac pacemaker device in place and there are postoperative changes of median sternotomy and CABG.     Left shoulder: There is moderate hypertrophic degenerative change of the left acromioclavicular joint with possible loose body formation noted in the cranial aspect of the left acromioclavicular joint.  No fracture, dislocation, or osseous destructive process noted about the left shoulder.  There are postoperative changes of prior median sternotomy and CABG.  The left chest is clear.  No left rotator cuff calcific tendinitis.     Impression:     1. Advanced degenerative change of both AC joints.  2. Right rotator cuff calcific tendinitis.    Data Reviewed: X-ray    Supportive Actions: Independent visualization of images or test specimens    ASSESSMENT:   1. Tendinopathy of left rotator cuff         PLAN:     1. Mr. Ruff returns with recurrence of left shoulder pain without trauma or injury.  Today's physical exam again reveals good range of motion and strength, therefore I have a low suspicion for any significant rotator cuff tearing.  Prior diagnostic ultrasound also failed to show any significant rotator cuff tearing.  We did discuss performing a targeted injection of the left subacromial bursa as he did report good relief from this procedure in the past.  He wished to proceed with injection today.  See procedure note for ultrasound-guided injection of corticosteroid to the left subacromial bursa.    2. RTC as needed.     Go to the following website to look up your diagnosis and learn more about it:     [email or web address].org     If you have questions or concerns, please contact us via MyChart or email.      Please call the office with any issues.      Dr. Sj Sanford  CATHIE Gonzales., CARINA-SM, R-MSK  [facility]    The above note was completed, in part, with the aid of [employer] Fluency Direct dictation software/hardware. Translation errors may be present.

## 2022-10-14 ENCOUNTER — TELEPHONE (OUTPATIENT)
Dept: CARDIOLOGY | Facility: HOSPITAL | Age: 82
End: 2022-10-14
Payer: MEDICARE

## 2022-10-22 ENCOUNTER — PES CALL (OUTPATIENT)
Dept: ADMINISTRATIVE | Facility: CLINIC | Age: 82
End: 2022-10-22
Payer: MEDICARE

## 2022-10-22 ENCOUNTER — CLINICAL SUPPORT (OUTPATIENT)
Dept: CARDIOLOGY | Facility: HOSPITAL | Age: 82
End: 2022-10-22
Payer: MEDICARE

## 2022-10-22 DIAGNOSIS — Z95.810 PRESENCE OF AUTOMATIC (IMPLANTABLE) CARDIAC DEFIBRILLATOR: ICD-10-CM

## 2022-10-22 PROCEDURE — 93296 REM INTERROG EVL PM/IDS: CPT | Mod: PO | Performed by: INTERNAL MEDICINE

## 2022-10-31 DIAGNOSIS — I50.42 CHRONIC COMBINED SYSTOLIC AND DIASTOLIC HEART FAILURE: Primary | ICD-10-CM

## 2022-10-31 RX ORDER — FUROSEMIDE 20 MG/1
20 TABLET ORAL
Qty: 20 TABLET | Refills: 3 | Status: SHIPPED | OUTPATIENT
Start: 2022-10-31 | End: 2023-02-06

## 2022-11-03 ENCOUNTER — TELEPHONE (OUTPATIENT)
Dept: FAMILY MEDICINE | Facility: CLINIC | Age: 82
End: 2022-11-03
Payer: MEDICARE

## 2022-11-09 ENCOUNTER — OFFICE VISIT (OUTPATIENT)
Dept: CARDIOLOGY | Facility: CLINIC | Age: 82
End: 2022-11-09
Payer: MEDICARE

## 2022-11-09 ENCOUNTER — CLINICAL SUPPORT (OUTPATIENT)
Dept: CARDIOLOGY | Facility: HOSPITAL | Age: 82
End: 2022-11-09
Attending: INTERNAL MEDICINE
Payer: MEDICARE

## 2022-11-09 VITALS
WEIGHT: 211.19 LBS | HEART RATE: 47 BPM | BODY MASS INDEX: 30.23 KG/M2 | SYSTOLIC BLOOD PRESSURE: 138 MMHG | HEIGHT: 70 IN | DIASTOLIC BLOOD PRESSURE: 66 MMHG

## 2022-11-09 DIAGNOSIS — I48.0 PAF (PAROXYSMAL ATRIAL FIBRILLATION): Primary | ICD-10-CM

## 2022-11-09 DIAGNOSIS — I25.5 ISCHEMIC CARDIOMYOPATHY: ICD-10-CM

## 2022-11-09 DIAGNOSIS — I25.5 ISCHEMIC CARDIOMYOPATHY: Chronic | ICD-10-CM

## 2022-11-09 DIAGNOSIS — Z95.810 ICD (IMPLANTABLE CARDIOVERTER-DEFIBRILLATOR) IN PLACE: ICD-10-CM

## 2022-11-09 DIAGNOSIS — I10 ESSENTIAL HYPERTENSION: ICD-10-CM

## 2022-11-09 DIAGNOSIS — Z95.1 S/P CABG X 4: ICD-10-CM

## 2022-11-09 DIAGNOSIS — I25.810 CORONARY ARTERY DISEASE INVOLVING AUTOLOGOUS VEIN CORONARY BYPASS GRAFT WITHOUT ANGINA PECTORIS: ICD-10-CM

## 2022-11-09 DIAGNOSIS — Z95.810 ICD (IMPLANTABLE CARDIOVERTER-DEFIBRILLATOR) IN PLACE: Chronic | ICD-10-CM

## 2022-11-09 PROCEDURE — 1159F MED LIST DOCD IN RCRD: CPT | Mod: CPTII,S$GLB,, | Performed by: INTERNAL MEDICINE

## 2022-11-09 PROCEDURE — 3078F PR MOST RECENT DIASTOLIC BLOOD PRESSURE < 80 MM HG: ICD-10-PCS | Mod: CPTII,S$GLB,, | Performed by: INTERNAL MEDICINE

## 2022-11-09 PROCEDURE — 93284 PRGRMG EVAL IMPLANTABLE DFB: CPT | Mod: 26,,, | Performed by: INTERNAL MEDICINE

## 2022-11-09 PROCEDURE — 1159F PR MEDICATION LIST DOCUMENTED IN MEDICAL RECORD: ICD-10-PCS | Mod: CPTII,S$GLB,, | Performed by: INTERNAL MEDICINE

## 2022-11-09 PROCEDURE — 3075F SYST BP GE 130 - 139MM HG: CPT | Mod: CPTII,S$GLB,, | Performed by: INTERNAL MEDICINE

## 2022-11-09 PROCEDURE — 99999 PR PBB SHADOW E&M-EST. PATIENT-LVL III: ICD-10-PCS | Mod: PBBFAC,,, | Performed by: INTERNAL MEDICINE

## 2022-11-09 PROCEDURE — 93284 CARDIAC DEVICE CHECK - IN CLINIC & HOSPITAL: ICD-10-PCS | Mod: 26,,, | Performed by: INTERNAL MEDICINE

## 2022-11-09 PROCEDURE — 99999 PR PBB SHADOW E&M-EST. PATIENT-LVL III: CPT | Mod: PBBFAC,,, | Performed by: INTERNAL MEDICINE

## 2022-11-09 PROCEDURE — 1160F PR REVIEW ALL MEDS BY PRESCRIBER/CLIN PHARMACIST DOCUMENTED: ICD-10-PCS | Mod: CPTII,S$GLB,, | Performed by: INTERNAL MEDICINE

## 2022-11-09 PROCEDURE — 3075F PR MOST RECENT SYSTOLIC BLOOD PRESS GE 130-139MM HG: ICD-10-PCS | Mod: CPTII,S$GLB,, | Performed by: INTERNAL MEDICINE

## 2022-11-09 PROCEDURE — 99214 OFFICE O/P EST MOD 30 MIN: CPT | Mod: S$GLB,,, | Performed by: INTERNAL MEDICINE

## 2022-11-09 PROCEDURE — 1126F PR PAIN SEVERITY QUANTIFIED, NO PAIN PRESENT: ICD-10-PCS | Mod: CPTII,S$GLB,, | Performed by: INTERNAL MEDICINE

## 2022-11-09 PROCEDURE — 1126F AMNT PAIN NOTED NONE PRSNT: CPT | Mod: CPTII,S$GLB,, | Performed by: INTERNAL MEDICINE

## 2022-11-09 PROCEDURE — 99214 PR OFFICE/OUTPT VISIT, EST, LEVL IV, 30-39 MIN: ICD-10-PCS | Mod: S$GLB,,, | Performed by: INTERNAL MEDICINE

## 2022-11-09 PROCEDURE — 1160F RVW MEDS BY RX/DR IN RCRD: CPT | Mod: CPTII,S$GLB,, | Performed by: INTERNAL MEDICINE

## 2022-11-09 PROCEDURE — 3078F DIAST BP <80 MM HG: CPT | Mod: CPTII,S$GLB,, | Performed by: INTERNAL MEDICINE

## 2022-11-09 NOTE — PROGRESS NOTES
Subjective:    Patient ID:  Ankit Ruff is a 82 y.o. male who presents for follow-up of Coronary Artery Disease      HPI  He was admitted to [facility] ER last week due to low BP. Entresto was stopped  Doing well now      Review of Systems   Constitutional: Negative for decreased appetite, malaise/fatigue, weight gain and weight loss.   Cardiovascular:  Negative for chest pain, dyspnea on exertion, leg swelling, palpitations and syncope.   Respiratory:  Negative for cough and shortness of breath.    Gastrointestinal: Negative.    Neurological:  Negative for weakness.   All other systems reviewed and are negative.     Objective:      Physical Exam  Vitals and nursing note reviewed.   Constitutional:       Appearance: Normal appearance. He is well-developed.   HENT:      Head: Normocephalic.   Eyes:      Pupils: Pupils are equal, round, and reactive to light.   Neck:      Thyroid: No thyromegaly.      Vascular: No carotid bruit or JVD.   Cardiovascular:      Rate and Rhythm: Normal rate and regular rhythm.      Chest Wall: PMI is not displaced.      Pulses: Normal pulses and intact distal pulses.      Heart sounds: Normal heart sounds. No murmur heard.    No gallop.   Pulmonary:      Effort: Pulmonary effort is normal.      Breath sounds: Normal breath sounds.   Abdominal:      Palpations: Abdomen is soft. There is no mass.      Tenderness: There is no abdominal tenderness.   Musculoskeletal:         General: Normal range of motion.      Cervical back: Normal range of motion and neck supple.   Skin:     General: Skin is warm.   Neurological:      Mental Status: He is alert and oriented to person, place, and time.      Sensory: No sensory deficit.      Deep Tendon Reflexes: Reflexes are normal and symmetric.         Assessment:       1. PAF (paroxysmal atrial fibrillation)    2. Coronary artery disease involving autologous vein coronary bypass graft without angina pectoris    3. S/P CABG x 4    4. Essential hypertension     5. ICD (implantable cardioverter-defibrillator) in place    6. Ischemic cardiomyopathy         Plan:     Continue all cardiac medications  Regular exercise program  Keep appt in 6 months

## 2022-11-25 ENCOUNTER — PES CALL (OUTPATIENT)
Dept: ADMINISTRATIVE | Facility: CLINIC | Age: 82
End: 2022-11-25
Payer: MEDICARE

## 2022-11-29 ENCOUNTER — PES CALL (OUTPATIENT)
Dept: ADMINISTRATIVE | Facility: CLINIC | Age: 82
End: 2022-11-29
Payer: MEDICARE

## 2022-12-08 ENCOUNTER — OFFICE VISIT (OUTPATIENT)
Dept: FAMILY MEDICINE | Facility: CLINIC | Age: 82
End: 2022-12-08
Payer: MEDICARE

## 2022-12-08 VITALS
SYSTOLIC BLOOD PRESSURE: 108 MMHG | WEIGHT: 207.88 LBS | BODY MASS INDEX: 29.83 KG/M2 | DIASTOLIC BLOOD PRESSURE: 62 MMHG | OXYGEN SATURATION: 95 % | HEART RATE: 51 BPM

## 2022-12-08 DIAGNOSIS — N64.4 BREAST PAIN: Primary | ICD-10-CM

## 2022-12-08 PROCEDURE — 1125F AMNT PAIN NOTED PAIN PRSNT: CPT | Mod: CPTII,S$GLB,, | Performed by: INTERNAL MEDICINE

## 2022-12-08 PROCEDURE — 1101F PT FALLS ASSESS-DOCD LE1/YR: CPT | Mod: CPTII,S$GLB,, | Performed by: INTERNAL MEDICINE

## 2022-12-08 PROCEDURE — 3288F FALL RISK ASSESSMENT DOCD: CPT | Mod: CPTII,S$GLB,, | Performed by: INTERNAL MEDICINE

## 2022-12-08 PROCEDURE — 99213 PR OFFICE/OUTPT VISIT, EST, LEVL III, 20-29 MIN: ICD-10-PCS | Mod: S$GLB,,, | Performed by: INTERNAL MEDICINE

## 2022-12-08 PROCEDURE — 3288F PR FALLS RISK ASSESSMENT DOCUMENTED: ICD-10-PCS | Mod: CPTII,S$GLB,, | Performed by: INTERNAL MEDICINE

## 2022-12-08 PROCEDURE — 3078F DIAST BP <80 MM HG: CPT | Mod: CPTII,S$GLB,, | Performed by: INTERNAL MEDICINE

## 2022-12-08 PROCEDURE — 1125F PR PAIN SEVERITY QUANTIFIED, PAIN PRESENT: ICD-10-PCS | Mod: CPTII,S$GLB,, | Performed by: INTERNAL MEDICINE

## 2022-12-08 PROCEDURE — 3078F PR MOST RECENT DIASTOLIC BLOOD PRESSURE < 80 MM HG: ICD-10-PCS | Mod: CPTII,S$GLB,, | Performed by: INTERNAL MEDICINE

## 2022-12-08 PROCEDURE — 1160F RVW MEDS BY RX/DR IN RCRD: CPT | Mod: CPTII,S$GLB,, | Performed by: INTERNAL MEDICINE

## 2022-12-08 PROCEDURE — 1101F PR PT FALLS ASSESS DOC 0-1 FALLS W/OUT INJ PAST YR: ICD-10-PCS | Mod: CPTII,S$GLB,, | Performed by: INTERNAL MEDICINE

## 2022-12-08 PROCEDURE — 99999 PR PBB SHADOW E&M-EST. PATIENT-LVL III: ICD-10-PCS | Mod: PBBFAC,,, | Performed by: INTERNAL MEDICINE

## 2022-12-08 PROCEDURE — 1159F PR MEDICATION LIST DOCUMENTED IN MEDICAL RECORD: ICD-10-PCS | Mod: CPTII,S$GLB,, | Performed by: INTERNAL MEDICINE

## 2022-12-08 PROCEDURE — 1159F MED LIST DOCD IN RCRD: CPT | Mod: CPTII,S$GLB,, | Performed by: INTERNAL MEDICINE

## 2022-12-08 PROCEDURE — 3074F PR MOST RECENT SYSTOLIC BLOOD PRESSURE < 130 MM HG: ICD-10-PCS | Mod: CPTII,S$GLB,, | Performed by: INTERNAL MEDICINE

## 2022-12-08 PROCEDURE — 99999 PR PBB SHADOW E&M-EST. PATIENT-LVL III: CPT | Mod: PBBFAC,,, | Performed by: INTERNAL MEDICINE

## 2022-12-08 PROCEDURE — 1160F PR REVIEW ALL MEDS BY PRESCRIBER/CLIN PHARMACIST DOCUMENTED: ICD-10-PCS | Mod: CPTII,S$GLB,, | Performed by: INTERNAL MEDICINE

## 2022-12-08 PROCEDURE — 99213 OFFICE O/P EST LOW 20 MIN: CPT | Mod: S$GLB,,, | Performed by: INTERNAL MEDICINE

## 2022-12-08 PROCEDURE — 3074F SYST BP LT 130 MM HG: CPT | Mod: CPTII,S$GLB,, | Performed by: INTERNAL MEDICINE

## 2022-12-08 NOTE — PROGRESS NOTES
Patient ID: Ankit Ruff is a 82 y.o. male.    Chief Complaint: Leg Swelling and Breast Pain      HPI     Coronary artery disease, CHF status post CABG, hypertension,  Seen at [facility] one-month ago for dizziness.  Entresto discontinued there. Saw Dr. Ellis and he was noted to be doing better.     Having chest wall tenderness around the nipple bilaterally. No chest pressure or Dyspnea.Not bothering him too much. Just wanted to get it checked out.     Assessment and plan     No gynecomastia, masses, skin lesions on exam, Including the palpation. He noted mild tenderness.  -Reassurance.     Diagnosis and orders   Ankit was seen today for leg swelling and breast pain.    Diagnoses and all orders for this visit:    Breast pain      Review of Systems   Constitutional:  Negative for fever.   Respiratory:  Negative for shortness of breath.    Cardiovascular:  Negative for chest pain.   Gastrointestinal:  Negative for abdominal pain.     Physical Exam  Cardiovascular:      Rate and Rhythm: Normal rate and regular rhythm.      Heart sounds: No murmur heard.    No gallop.   Pulmonary:      Breath sounds: Normal breath sounds. No wheezing or rhonchi.   Abdominal:      Palpations: Abdomen is soft.      Tenderness: There is no abdominal tenderness.   Musculoskeletal:         General: Normal range of motion.      Cervical back: Neck supple.      Comments: No gynecomastia, masses, skin lesions on exam, Including deep palpation bilaterally. He noted mild tenderness.   Skin:     General: Skin is warm.      Findings: No rash.   Neurological:      Mental Status: He is alert.   Psychiatric:         Behavior: Behavior normal.       Vitals:    12/08/22 1501   BP: 108/62   Pulse: (!) 51     Wt Readings from Last 3 Encounters:   12/08/22 1501 94.3 kg (207 lb 14.3 oz)   11/09/22 0858 95.8 kg (211 lb 3.2 oz)   10/31/22 0853 94 kg (207 lb 3.7 oz)      Body mass index is 29.83 kg/m².      Hypertension Medications               carvediloL  (COREG) 12.5 MG tablet TAKE 1 TABLET TWICE DAILY    furosemide (LASIX) 20 MG tablet Take 1 tablet (20 mg total) by mouth as needed.    spironolactone (ALDACTONE) 25 MG tablet TAKE 1/2 TABLET DAILY ON MONDAY, WEDNESDAY AND FRIDAY           Hyperlipidemia Medications               rosuvastatin (CRESTOR) 10 MG tablet TAKE 1 TABLET EVERY DAY           Medication List with Changes/Refills   Current Medications    APIXABAN (ELIQUIS) 5 MG TAB    Take 1 tablet (5 mg total) by mouth 2 (two) times daily.    ASPIRIN (ECOTRIN) 81 MG EC TABLET    Take 81 mg by mouth every evening.    CARVEDILOL (COREG) 12.5 MG TABLET    TAKE 1 TABLET TWICE DAILY    CITALOPRAM (CELEXA) 10 MG TABLET    Take 1 tablet (10 mg total) by mouth once daily.    FLUOROMETHOLONE 0.1% (FML) 0.1 % DRPS    Place one drop into both eyes three times daily for inflammation Not to exceed 10 days use.    FUROSEMIDE (LASIX) 20 MG TABLET    Take 1 tablet (20 mg total) by mouth as needed.    GABAPENTIN (NEURONTIN) 300 MG CAPSULE    Take 1 capsule (300 mg total) by mouth 2 (two) times daily.    MELOXICAM (MOBIC) 15 MG TABLET    Take 1 tablet (15 mg total) by mouth daily as needed for Pain.    OXYBUTYNIN (DITROPAN) 5 MG TAB    TAKE 1 TABLET EVERY EVENING    PANTOPRAZOLE (PROTONIX) 40 MG TABLET    TAKE 1 TABLET EVERY DAY    ROSUVASTATIN (CRESTOR) 10 MG TABLET    TAKE 1 TABLET EVERY DAY    SHINGRIX, PF, 50 MCG/0.5 ML INJECTION        SPIRONOLACTONE (ALDACTONE) 25 MG TABLET    TAKE 1/2 TABLET DAILY ON MONDAY, WEDNESDAY AND FRIDAY    TIZANIDINE (ZANAFLEX) 4 MG TABLET    TAKE 1 TABLET TWICE DAILY    TRAMADOL (ULTRAM) 50 MG TABLET    Take 1 tablet (50 mg total) by mouth daily as needed for Pain.       I personally reviewed past medical, family and social history.

## 2022-12-20 ENCOUNTER — OFFICE VISIT (OUTPATIENT)
Dept: FAMILY MEDICINE | Facility: CLINIC | Age: 82
End: 2022-12-20
Payer: MEDICARE

## 2022-12-20 VITALS
SYSTOLIC BLOOD PRESSURE: 110 MMHG | HEART RATE: 52 BPM | BODY MASS INDEX: 30.05 KG/M2 | OXYGEN SATURATION: 96 % | DIASTOLIC BLOOD PRESSURE: 60 MMHG | WEIGHT: 209.88 LBS | HEIGHT: 70 IN

## 2022-12-20 DIAGNOSIS — I50.42 CHRONIC COMBINED SYSTOLIC AND DIASTOLIC HEART FAILURE: ICD-10-CM

## 2022-12-20 DIAGNOSIS — I25.5 ISCHEMIC CARDIOMYOPATHY: Chronic | ICD-10-CM

## 2022-12-20 DIAGNOSIS — I65.23 BILATERAL CAROTID ARTERY STENOSIS: ICD-10-CM

## 2022-12-20 DIAGNOSIS — I25.810 CORONARY ARTERY DISEASE INVOLVING AUTOLOGOUS VEIN CORONARY BYPASS GRAFT WITHOUT ANGINA PECTORIS: ICD-10-CM

## 2022-12-20 DIAGNOSIS — I48.0 PAF (PAROXYSMAL ATRIAL FIBRILLATION): ICD-10-CM

## 2022-12-20 DIAGNOSIS — Z00.00 ENCOUNTER FOR PREVENTIVE HEALTH EXAMINATION: Primary | ICD-10-CM

## 2022-12-20 DIAGNOSIS — I10 ESSENTIAL HYPERTENSION: ICD-10-CM

## 2022-12-20 DIAGNOSIS — E78.2 MIXED HYPERLIPIDEMIA: ICD-10-CM

## 2022-12-20 PROCEDURE — 3074F PR MOST RECENT SYSTOLIC BLOOD PRESSURE < 130 MM HG: ICD-10-PCS | Mod: CPTII,S$GLB,, | Performed by: NURSE PRACTITIONER

## 2022-12-20 PROCEDURE — 1160F RVW MEDS BY RX/DR IN RCRD: CPT | Mod: CPTII,S$GLB,, | Performed by: NURSE PRACTITIONER

## 2022-12-20 PROCEDURE — 99999 PR PBB SHADOW E&M-EST. PATIENT-LVL IV: CPT | Mod: PBBFAC,,, | Performed by: NURSE PRACTITIONER

## 2022-12-20 PROCEDURE — G0439 PPPS, SUBSEQ VISIT: HCPCS | Mod: S$GLB,,, | Performed by: NURSE PRACTITIONER

## 2022-12-20 PROCEDURE — 99999 PR PBB SHADOW E&M-EST. PATIENT-LVL IV: ICD-10-PCS | Mod: PBBFAC,,, | Performed by: NURSE PRACTITIONER

## 2022-12-20 PROCEDURE — 3078F DIAST BP <80 MM HG: CPT | Mod: CPTII,S$GLB,, | Performed by: NURSE PRACTITIONER

## 2022-12-20 PROCEDURE — 1160F PR REVIEW ALL MEDS BY PRESCRIBER/CLIN PHARMACIST DOCUMENTED: ICD-10-PCS | Mod: CPTII,S$GLB,, | Performed by: NURSE PRACTITIONER

## 2022-12-20 PROCEDURE — 3078F PR MOST RECENT DIASTOLIC BLOOD PRESSURE < 80 MM HG: ICD-10-PCS | Mod: CPTII,S$GLB,, | Performed by: NURSE PRACTITIONER

## 2022-12-20 PROCEDURE — 1101F PR PT FALLS ASSESS DOC 0-1 FALLS W/OUT INJ PAST YR: ICD-10-PCS | Mod: CPTII,S$GLB,, | Performed by: NURSE PRACTITIONER

## 2022-12-20 PROCEDURE — 3288F FALL RISK ASSESSMENT DOCD: CPT | Mod: CPTII,S$GLB,, | Performed by: NURSE PRACTITIONER

## 2022-12-20 PROCEDURE — 1159F PR MEDICATION LIST DOCUMENTED IN MEDICAL RECORD: ICD-10-PCS | Mod: CPTII,S$GLB,, | Performed by: NURSE PRACTITIONER

## 2022-12-20 PROCEDURE — 1126F PR PAIN SEVERITY QUANTIFIED, NO PAIN PRESENT: ICD-10-PCS | Mod: CPTII,S$GLB,, | Performed by: NURSE PRACTITIONER

## 2022-12-20 PROCEDURE — 1101F PT FALLS ASSESS-DOCD LE1/YR: CPT | Mod: CPTII,S$GLB,, | Performed by: NURSE PRACTITIONER

## 2022-12-20 PROCEDURE — 1126F AMNT PAIN NOTED NONE PRSNT: CPT | Mod: CPTII,S$GLB,, | Performed by: NURSE PRACTITIONER

## 2022-12-20 PROCEDURE — 1159F MED LIST DOCD IN RCRD: CPT | Mod: CPTII,S$GLB,, | Performed by: NURSE PRACTITIONER

## 2022-12-20 PROCEDURE — G0439 PR MEDICARE ANNUAL WELLNESS SUBSEQUENT VISIT: ICD-10-PCS | Mod: S$GLB,,, | Performed by: NURSE PRACTITIONER

## 2022-12-20 PROCEDURE — 3288F PR FALLS RISK ASSESSMENT DOCUMENTED: ICD-10-PCS | Mod: CPTII,S$GLB,, | Performed by: NURSE PRACTITIONER

## 2022-12-20 PROCEDURE — 3074F SYST BP LT 130 MM HG: CPT | Mod: CPTII,S$GLB,, | Performed by: NURSE PRACTITIONER

## 2022-12-20 NOTE — PATIENT INSTRUCTIONS
Counseling and Referral of Other Preventative  (Italic type indicates deductible and co-insurance are waived)    Patient Name: Ankit Ruff  Today's Date: 12/20/2022    Health Maintenance       Date Due Completion Date    COVID-19 Vaccine (2 - Pfizer series) 09/07/2021 8/17/2021    TETANUS VACCINE 09/16/2025 9/16/2015    Lipid Panel 07/25/2027 7/25/2022        No orders of the defined types were placed in this encounter.      The following information is provided to all patients.  This information is to help you find resources for any of the problems found today that may be affecting your health:                Living healthy guide: www.[facility].[place].[facility]      Understanding Diabetes: www.diabetes.org      Eating healthy: www.cdc.gov/healthyweight      [facility] home safety checklist: www.cdc.gov/steadi/patient.html      Agency on Aging: www.goea.[place].[facility]      Alcoholics anonymous (AA): www.aa.org      Physical Activity: www.karrie.nih.gov/nu0hgsg      Tobacco use: www.quitwithusla.org

## 2022-12-20 NOTE — PROGRESS NOTES
"  Ankit Ruff presented for a  Medicare AWV and comprehensive Health Risk Assessment today. The following components were reviewed and updated:    Medical history  Family History  Social history  Allergies and Current Medications  Health Risk Assessment  Health Maintenance  Care Team         ** See Completed Assessments for Annual Wellness Visit within the encounter summary.**         The following assessments were completed:  Living Situation  CAGE  Depression Screening  Timed Get Up and Go  Whisper Test  Cognitive Function Screening      Nutrition Screening  ADL Screening  PAQ Screening    Review for Opioid Screening: Patient takes tramadol for back pain which is effective  Is followed by AFIA Jones MD encouraged pt to utilize alternative methods of pain relief, pt verbalizes understanding.  Encourage pt to request referral if pain becomes unreleived with current tx    Review for Substance Use Disorders: Patient does not abuse substance.      Vitals:    12/20/22 0900   BP: 110/60   BP Location: Left arm   Patient Position: Sitting   BP Method: Medium (Manual)   Pulse: (!) 52   SpO2: 96%   Weight: 95.2 kg (209 lb 14.1 oz)   Height: 5' 10" (1.778 m)     Body mass index is 30.11 kg/m².  Physical Exam  Vitals reviewed.   Constitutional:       General: He is not in acute distress.  HENT:      Head: Normocephalic.   Cardiovascular:      Rate and Rhythm: Normal rate.   Pulmonary:      Effort: Pulmonary effort is normal. No respiratory distress.   Skin:     General: Skin is warm.   Neurological:      General: No focal deficit present.      Mental Status: He is alert.   Psychiatric:         Mood and Affect: Mood normal.             Diagnoses and health risks identified today and associated recommendations/orders:    1. Encounter for preventive health examination  Reviewed health maintenance and provided recommendations  Recommend covid booster      2. 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 PAF (paroxysmal atrial fibrillation)  Continue to monitor  Followed by AFIA Jones MD .      4. Ischemic cardiomyopathy  Continue to monitor  Followed by Dr Guerrero.      5. Essential hypertension  Continue to monitor  Followed by AFIA Jones MD .      6. Mixed hyperlipidemia  Continue to monitor  Followed by AFIA Jones MD .      7. Coronary artery disease involving autologous vein coronary bypass graft without angina pectoris  Continue to monitor  Followed by AFIA Jones MD   No CP.      8.  Bilateral carotid artery disease   Continue to monitor  Followed by Dr Guerrero.    US 3/1/21      Provided Ankit with a 5-10 year written screening schedule and personal prevention plan. Recommendations were developed using the USPSTF age appropriate recommendations. Education, counseling, and referrals were provided as needed. After Visit Summary printed and given to patient which includes a list of additional screenings\tests needed.    Follow up in one year    NATALY Colunga offered to discuss advanced care planning, including how to pick a person who would make decisions for you if you were unable to make them for yourself, called a health care power of , and what kind of decisions you might make such as use of life sustaining treatments such as ventilators and tube feeding when faced with a life limiting illness recorded on a living will that they will need to know. (How you want to be cared for as you near the end of your natural life)     X Patient is interested in learning more about how to make advanced directives.  I provided them paperwork and offered to discuss this with them.

## 2022-12-23 ENCOUNTER — PES CALL (OUTPATIENT)
Dept: ADMINISTRATIVE | Facility: CLINIC | Age: 82
End: 2022-12-23
Payer: MEDICARE

## 2022-12-31 PROBLEM — I65.23 BILATERAL CAROTID ARTERY STENOSIS: Status: ACTIVE | Noted: 2022-12-31

## 2023-01-20 ENCOUNTER — CLINICAL SUPPORT (OUTPATIENT)
Dept: CARDIOLOGY | Facility: HOSPITAL | Age: 83
End: 2023-01-20
Payer: MEDICARE

## 2023-01-20 DIAGNOSIS — Z95.810 PRESENCE OF AUTOMATIC (IMPLANTABLE) CARDIAC DEFIBRILLATOR: ICD-10-CM

## 2023-01-20 PROCEDURE — 93295 DEV INTERROG REMOTE 1/2/MLT: CPT | Mod: HCNC,,, | Performed by: INTERNAL MEDICINE

## 2023-01-20 PROCEDURE — 93295 CARDIAC DEVICE CHECK - REMOTE: ICD-10-PCS | Mod: HCNC,,, | Performed by: INTERNAL MEDICINE

## 2023-01-20 PROCEDURE — 93296 REM INTERROG EVL PM/IDS: CPT | Mod: HCNC,PO | Performed by: INTERNAL MEDICINE

## 2023-01-26 ENCOUNTER — HOSPITAL ENCOUNTER (OUTPATIENT)
Dept: RADIOLOGY | Facility: HOSPITAL | Age: 83
Discharge: HOME | End: 2023-01-26
Attending: FAMILY MEDICINE
Payer: MEDICARE

## 2023-01-26 ENCOUNTER — OFFICE VISIT (OUTPATIENT)
Dept: FAMILY MEDICINE | Facility: CLINIC | Age: 83
End: 2023-01-26
Payer: MEDICARE

## 2023-01-26 VITALS
DIASTOLIC BLOOD PRESSURE: 52 MMHG | WEIGHT: 208.56 LBS | OXYGEN SATURATION: 95 % | HEART RATE: 50 BPM | SYSTOLIC BLOOD PRESSURE: 102 MMHG | BODY MASS INDEX: 29.86 KG/M2 | HEIGHT: 70 IN

## 2023-01-26 DIAGNOSIS — R07.81 PAIN IN RIB: ICD-10-CM

## 2023-01-26 DIAGNOSIS — R07.9 CHEST PAIN, UNSPECIFIED TYPE: ICD-10-CM

## 2023-01-26 DIAGNOSIS — D69.6 THROMBOCYTOPENIA: ICD-10-CM

## 2023-01-26 DIAGNOSIS — Z00.00 WELLNESS EXAMINATION: Primary | ICD-10-CM

## 2023-01-26 DIAGNOSIS — I50.42 CHRONIC COMBINED SYSTOLIC AND DIASTOLIC HEART FAILURE: ICD-10-CM

## 2023-01-26 DIAGNOSIS — M46.96 INFLAMMATORY SPONDYLOPATHY OF LUMBAR REGION: ICD-10-CM

## 2023-01-26 DIAGNOSIS — I10 ESSENTIAL HYPERTENSION: ICD-10-CM

## 2023-01-26 DIAGNOSIS — I48.0 PAF (PAROXYSMAL ATRIAL FIBRILLATION): ICD-10-CM

## 2023-01-26 DIAGNOSIS — E78.5 HYPERLIPIDEMIA, UNSPECIFIED HYPERLIPIDEMIA TYPE: ICD-10-CM

## 2023-01-26 PROCEDURE — 3074F SYST BP LT 130 MM HG: CPT | Mod: HCNC,CPTII,S$GLB, | Performed by: FAMILY MEDICINE

## 2023-01-26 PROCEDURE — 99397 PER PM REEVAL EST PAT 65+ YR: CPT | Mod: HCNC,S$GLB,, | Performed by: FAMILY MEDICINE

## 2023-01-26 PROCEDURE — 1125F PR PAIN SEVERITY QUANTIFIED, PAIN PRESENT: ICD-10-PCS | Mod: HCNC,CPTII,S$GLB, | Performed by: FAMILY MEDICINE

## 2023-01-26 PROCEDURE — 71100 X-RAY EXAM RIBS UNI 2 VIEWS: CPT | Mod: 26,HCNC,LT, | Performed by: RADIOLOGY

## 2023-01-26 PROCEDURE — 1101F PR PT FALLS ASSESS DOC 0-1 FALLS W/OUT INJ PAST YR: ICD-10-PCS | Mod: HCNC,CPTII,S$GLB, | Performed by: FAMILY MEDICINE

## 2023-01-26 PROCEDURE — 71046 X-RAY EXAM CHEST 2 VIEWS: CPT | Mod: 26,HCNC,, | Performed by: RADIOLOGY

## 2023-01-26 PROCEDURE — 99397 PR PREVENTIVE VISIT,EST,65 & OVER: ICD-10-PCS | Mod: HCNC,S$GLB,, | Performed by: FAMILY MEDICINE

## 2023-01-26 PROCEDURE — 1125F AMNT PAIN NOTED PAIN PRSNT: CPT | Mod: HCNC,CPTII,S$GLB, | Performed by: FAMILY MEDICINE

## 2023-01-26 PROCEDURE — 71100 XR RIBS 2 VIEW LEFT: ICD-10-PCS | Mod: 26,HCNC,LT, | Performed by: RADIOLOGY

## 2023-01-26 PROCEDURE — 99999 PR PBB SHADOW E&M-EST. PATIENT-LVL III: ICD-10-PCS | Mod: PBBFAC,HCNC,, | Performed by: FAMILY MEDICINE

## 2023-01-26 PROCEDURE — 1101F PT FALLS ASSESS-DOCD LE1/YR: CPT | Mod: HCNC,CPTII,S$GLB, | Performed by: FAMILY MEDICINE

## 2023-01-26 PROCEDURE — 71100 X-RAY EXAM RIBS UNI 2 VIEWS: CPT | Mod: TC,HCNC,FY,PO,LT

## 2023-01-26 PROCEDURE — 71046 X-RAY EXAM CHEST 2 VIEWS: CPT | Mod: TC,HCNC,FY,PO

## 2023-01-26 PROCEDURE — 3288F PR FALLS RISK ASSESSMENT DOCUMENTED: ICD-10-PCS | Mod: HCNC,CPTII,S$GLB, | Performed by: FAMILY MEDICINE

## 2023-01-26 PROCEDURE — 99999 PR PBB SHADOW E&M-EST. PATIENT-LVL III: CPT | Mod: PBBFAC,HCNC,, | Performed by: FAMILY MEDICINE

## 2023-01-26 PROCEDURE — 71046 XR CHEST PA AND LATERAL: ICD-10-PCS | Mod: 26,HCNC,, | Performed by: RADIOLOGY

## 2023-01-26 PROCEDURE — 3078F PR MOST RECENT DIASTOLIC BLOOD PRESSURE < 80 MM HG: ICD-10-PCS | Mod: HCNC,CPTII,S$GLB, | Performed by: FAMILY MEDICINE

## 2023-01-26 PROCEDURE — 3074F PR MOST RECENT SYSTOLIC BLOOD PRESSURE < 130 MM HG: ICD-10-PCS | Mod: HCNC,CPTII,S$GLB, | Performed by: FAMILY MEDICINE

## 2023-01-26 PROCEDURE — 3288F FALL RISK ASSESSMENT DOCD: CPT | Mod: HCNC,CPTII,S$GLB, | Performed by: FAMILY MEDICINE

## 2023-01-26 PROCEDURE — 3078F DIAST BP <80 MM HG: CPT | Mod: HCNC,CPTII,S$GLB, | Performed by: FAMILY MEDICINE

## 2023-01-26 NOTE — PROGRESS NOTES
Subjective:       Patient ID: Ankit Ruff is a 82 y.o. male.    Chief Complaint: Abdominal Pain (Left side pain where horse knocked in to patient into pipe) and Follow-up (Nipple pain both/ED in October reaction to entresto went to [facility])    Here for wellness and f/u multiple chronic medical issues. 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  Some breast pain bilateral as well since. Seen previously for this.    Review of Systems   Constitutional:  Negative for chills and fever.   Respiratory:  Negative for cough, chest tightness and shortness of breath.    Cardiovascular:  Negative for chest pain, palpitations and leg swelling.   Endocrine: Negative for cold intolerance and heat intolerance.   Musculoskeletal:  Positive for arthralgias and back pain.   Psychiatric/Behavioral:  Negative for decreased concentration. The patient is not nervous/anxious.      Objective:      Physical Exam  Vitals and nursing note reviewed.   Constitutional:       Appearance: He is well-developed.   HENT:      Head: Normocephalic and atraumatic.   Cardiovascular:      Rate and Rhythm: Normal rate and regular rhythm.      Heart sounds: Normal heart sounds.   Pulmonary:      Effort: Pulmonary effort is normal.      Breath sounds: Normal breath sounds.       Assessment:       1. Wellness examination    2. Hyperlipidemia, unspecified hyperlipidemia type    3. Essential hypertension    4. Inflammatory spondylopathy of lumbar region    5. Thrombocytopenia    6. Chronic combined systolic and diastolic heart failure    7. PAF (paroxysmal atrial fibrillation)    8. Pain in rib    9. Chest pain, unspecified type        Plan:       Wellness examination    Hyperlipidemia, unspecified hyperlipidemia type  -     Comprehensive Metabolic Panel; Future; Expected date: 01/26/2023  -     CBC Without Differential; Future; Expected date: 01/26/2023  -     Lipid Panel; Future; Expected date: 01/26/2023  -     TSH; Future; Expected date:  01/26/2023    Essential hypertension  -     Comprehensive Metabolic Panel; Future; Expected date: 01/26/2023  -     CBC Without Differential; Future; Expected date: 01/26/2023  -     Lipid Panel; Future; Expected date: 01/26/2023  -     TSH; Future; Expected date: 01/26/2023    Inflammatory spondylopathy of lumbar region    Thrombocytopenia  -     Comprehensive Metabolic Panel; Future; Expected date: 01/26/2023  -     CBC Without Differential; Future; Expected date: 01/26/2023  -     Lipid Panel; Future; Expected date: 01/26/2023  -     TSH; Future; Expected date: 01/26/2023    Chronic combined systolic and diastolic heart failure    PAF (paroxysmal atrial fibrillation)    Pain in rib  -     X-Ray Ribs 2 View Left; Future; Expected date: 01/26/2023    Chest pain, unspecified type  -     X-Ray Chest PA And Lateral; Future; Expected date: 01/26/2023      Paf and chf stable  Plt stable lipid stable  Htn stable  Back pain stable  Imaging for chest and ribs  Update labs and health maintenance  Will monitor chronic medical issues and continue current plan of care.  F/u with specialists as planned    Follow up in about 6 months (around 7/26/2023), or if symptoms worsen or fail to improve.

## 2023-01-30 ENCOUNTER — PATIENT MESSAGE (OUTPATIENT)
Dept: FAMILY MEDICINE | Facility: CLINIC | Age: 83
End: 2023-01-30
Payer: MEDICARE

## 2023-02-06 DIAGNOSIS — I50.42 CHRONIC COMBINED SYSTOLIC AND DIASTOLIC HEART FAILURE: ICD-10-CM

## 2023-02-06 RX ORDER — FUROSEMIDE 20 MG/1
TABLET ORAL
Qty: 45 TABLET | Refills: 3 | Status: SHIPPED | OUTPATIENT
Start: 2023-02-06 | End: 2023-02-07 | Stop reason: SDUPTHER

## 2023-02-07 DIAGNOSIS — Z00.00 ENCOUNTER FOR MEDICARE ANNUAL WELLNESS EXAM: ICD-10-CM

## 2023-02-07 RX ORDER — FUROSEMIDE 20 MG/1
20 TABLET ORAL DAILY PRN
Qty: 45 TABLET | Refills: 3 | Status: SHIPPED | OUTPATIENT
Start: 2023-02-07 | End: 2023-09-12 | Stop reason: SDUPTHER

## 2023-02-09 ENCOUNTER — OFFICE VISIT (OUTPATIENT)
Dept: OPTOMETRY | Facility: CLINIC | Age: 83
End: 2023-02-09
Payer: MEDICARE

## 2023-02-09 DIAGNOSIS — H16.213 EXPOSURE KERATOCONJUNCTIVITIS OF BOTH EYES: ICD-10-CM

## 2023-02-09 DIAGNOSIS — Z13.5 GLAUCOMA SCREENING: ICD-10-CM

## 2023-02-09 DIAGNOSIS — H52.4 HYPEROPIA WITH ASTIGMATISM AND PRESBYOPIA, BILATERAL: ICD-10-CM

## 2023-02-09 DIAGNOSIS — H52.03 HYPEROPIA WITH ASTIGMATISM AND PRESBYOPIA, BILATERAL: ICD-10-CM

## 2023-02-09 DIAGNOSIS — H43.813 POSTERIOR VITREOUS DETACHMENT, BILATERAL: ICD-10-CM

## 2023-02-09 DIAGNOSIS — H52.203 HYPEROPIA WITH ASTIGMATISM AND PRESBYOPIA, BILATERAL: ICD-10-CM

## 2023-02-09 DIAGNOSIS — H25.13 NUCLEAR SCLEROSIS, BILATERAL: Primary | ICD-10-CM

## 2023-02-09 DIAGNOSIS — Z00.00 ENCOUNTER FOR MEDICARE ANNUAL WELLNESS EXAM: ICD-10-CM

## 2023-02-09 PROCEDURE — 3288F PR FALLS RISK ASSESSMENT DOCUMENTED: ICD-10-PCS | Mod: HCNC,CPTII,S$GLB, | Performed by: OPTOMETRIST

## 2023-02-09 PROCEDURE — 1159F PR MEDICATION LIST DOCUMENTED IN MEDICAL RECORD: ICD-10-PCS | Mod: HCNC,CPTII,S$GLB, | Performed by: OPTOMETRIST

## 2023-02-09 PROCEDURE — 1159F MED LIST DOCD IN RCRD: CPT | Mod: HCNC,CPTII,S$GLB, | Performed by: OPTOMETRIST

## 2023-02-09 PROCEDURE — 3288F FALL RISK ASSESSMENT DOCD: CPT | Mod: HCNC,CPTII,S$GLB, | Performed by: OPTOMETRIST

## 2023-02-09 PROCEDURE — 92014 COMPRE OPH EXAM EST PT 1/>: CPT | Mod: HCNC,S$GLB,, | Performed by: OPTOMETRIST

## 2023-02-09 PROCEDURE — 99999 PR PBB SHADOW E&M-EST. PATIENT-LVL III: ICD-10-PCS | Mod: PBBFAC,HCNC,, | Performed by: OPTOMETRIST

## 2023-02-09 PROCEDURE — 1101F PT FALLS ASSESS-DOCD LE1/YR: CPT | Mod: HCNC,CPTII,S$GLB, | Performed by: OPTOMETRIST

## 2023-02-09 PROCEDURE — 92014 PR EYE EXAM, EST PATIENT,COMPREHESV: ICD-10-PCS | Mod: HCNC,S$GLB,, | Performed by: OPTOMETRIST

## 2023-02-09 PROCEDURE — 1126F PR PAIN SEVERITY QUANTIFIED, NO PAIN PRESENT: ICD-10-PCS | Mod: HCNC,CPTII,S$GLB, | Performed by: OPTOMETRIST

## 2023-02-09 PROCEDURE — 1126F AMNT PAIN NOTED NONE PRSNT: CPT | Mod: HCNC,CPTII,S$GLB, | Performed by: OPTOMETRIST

## 2023-02-09 PROCEDURE — 99999 PR PBB SHADOW E&M-EST. PATIENT-LVL III: CPT | Mod: PBBFAC,HCNC,, | Performed by: OPTOMETRIST

## 2023-02-09 PROCEDURE — 1101F PR PT FALLS ASSESS DOC 0-1 FALLS W/OUT INJ PAST YR: ICD-10-PCS | Mod: HCNC,CPTII,S$GLB, | Performed by: OPTOMETRIST

## 2023-02-09 NOTE — PROGRESS NOTES
HPI    DLS 01/20/2022  DR CRUZ    Pt states OU feel better.  He does not use the drops every day, but when   he needs it, which can be days in a row.  Warm compresses in the shower.    Got new glasses, likes them.  Only wears the new ones for special   occasions.  Wearing old glasses today.      FML as needed.    OTC   Last edited by Joselin Pedraza on 2/9/2023  9:55 AM.        ROS    Negative for: Constitutional, Gastrointestinal, Neurological, Skin,   Genitourinary, Musculoskeletal, HENT, Endocrine, Cardiovascular, Eyes,   Respiratory, Psychiatric, Allergic/Imm, Heme/Lymph  Last edited by ARMOND Cruz, OD on 2/9/2023 10:03 AM.        Assessment /Plan     For exam results, see Encounter Report.    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 Early vis sig, not ready for consult, cautions night driving  Gave info, CE possible 2-3 yrs  2. RD precautions given and reviewed. Patient knows to call/ message if any further changes in symptoms occur.  3. Longstanding w/ transient s/s, some allergy component   Lid hygiene sheet, scrubs qhs or qod  Continue ATs daily and gel/ jr qhs  Prn use of FML has been very effective per patient ---continue w daily/ prn use    4. Not suspect  5. No changes, no new refraction   Ok continue with current prn     Discussed and educated patient on current findings /plan.  RTC 1 year, prn if any changes / issues

## 2023-02-09 NOTE — PATIENT INSTRUCTIONS
"DRY EYES -- BURNING OR DANTE SYMPTOMS:  Use Over The Counter artificial tears as needed for dry eye symptoms.   Some common brands include:  Systane, Optive, Refresh, and Thera-Tears.  These drops can be used as frequently as desired, but may be most helpful use during long periods of concentrated work.  For example, reading / working at the computer. Start with 3-4x per day.     Nighttime Ophthalmic gel or ointments are available: Refresh PM, Genteal, and Lacrilube.    Avoid drops that "get redness out" (Visine, Murine, Clear Eyes), as these may contain medication that could further irritate the eyes, especially with chronic use.    ALLERGY EYES -- ITCHING SYMPTOMS:  Over the counter medications include--Pataday, Zaditor, and Alaway.  Use as directed 1-2 drops daily for symptoms of itching / watering eyes.  These drops will not help for dry eye or exposure symptoms.    REDNESS RELIEF:  Lumify---is a good redness reliever that will not cause irritation if used chronically.        FLASHES / FLOATERS / POSTERIOR VITREOUS DETACHMENT    Call the clinic if you have any further changes in symptoms.  Including:  Increased numbers of floaters or flashing lights, dimness or darkness that moves through or stays constant in your vision, or any pain in the eye (s).    You may sometimes see small specks or clouds moving in your field of vision.  They are called FLOATERS.  You can often see them when looking at a plain background, like a blank wall or blue dionne.  Floaters are actually tiny clumps of gel or cells inside the VITREOUS, the clear jelly-like fluid that fills the inside of your eye.    While these objects look like they are in front of your eye, they are actually floating inside.  What you see are the shadows they cast on the RETINA, the nerve layer at the back of the eye that senses light and allows you to see.      POSTERIOR VITREOUS DETACHMENT    The appearance of new floaters may be alarming.  If you suddenly " develop new floaters, you should contact your eye care professional  right away.    The retina can tear if the shrinking vitreous pulls away from the wall of the eye.  This sometimes causes a small amount of bleeding in the eye that may appear as new floaters.    A torn retina is always a serious problem, since it can lead to a retinal detachment.  You should see your eye care professional as soon as possible if:    even one new floater appears suddenly;  you see sudden flashes of light;  you notice other symptoms, like the loss of side vision, or a curtain closes down in your vision        POSTERIOR VITREOUS DETACHMENT is more common for people who:    are nearsighted;  have had cataract surgery;  have had YAG laser surgery of the eye;  have had inflammation inside the eye;  are over age 60.      While some floaters may remain visible, many of them will fade over time and become less noticeable.  Even if you've had some floaters for years, you should have your eyes checked as soon as possible if you notice new ones.    FLASHING LIGHTS    When the vitreous gel rubs or pulls on the retina, you may see what look like flashing lights or lightning streaks.  These flashes can appear off and on for several weeks or months.      Some people experience flashes of light that appear as jagged lines or heat waves in both eyes, lasting 10-20 minutes.  These flashes are caused by a spasm of blood vessels in the brain, which is called a migraine.    If a headache follows these flashes, it's called a migraine headache.  If   no headache occurs, these flashes are called Ophthalmic or Ocular Migraine.           Using the Amsler Grid  If you are at risk for vision loss, you may be told to check your eyesight regularly using the Amsler grid. Below is the grid and instructions for using it.         The Amsler grid helps you track changes in your vision.    How to Use the Amsler Grid  Use the grid in a well-lighted area.  Wear  glasses or contact lenses if you usually wear them.  Hold the grid at your normal reading distance (about 16 inches).  Cover your left eye.  With your right eye, look at the dot in the center of the grid.  While looking at the dot, notice if any of the lines look wavy, if any lines disappear, or if the boxes change shape.  Write down on a piece of paper any vision changes from the last time you used the grid.  Now repeat the exercise, this time covering your right eye.  Call your doctor right away if you notice any vision changes.  How Often Should I Check My Vision?  Use the Amsler grid as often as your eye doctor suggests. Keep the grid where youll remember to use it. Call your eye doctor right away if you notice any changes with your eyesight. This includes if your vision improves.  © 6488-4759 Karin [employer]oley, [address] [place] [address], [place] [address]. All rights reserved. This information is not intended as a substitute for professional medical care. Always follow your healthcare professional's instructions.

## 2023-02-19 ENCOUNTER — OFFICE VISIT (OUTPATIENT)
Dept: URGENT CARE | Facility: CLINIC | Age: 83
End: 2023-02-19
Payer: MEDICARE

## 2023-02-19 VITALS
RESPIRATION RATE: 16 BRPM | HEIGHT: 70 IN | SYSTOLIC BLOOD PRESSURE: 135 MMHG | DIASTOLIC BLOOD PRESSURE: 68 MMHG | OXYGEN SATURATION: 97 % | BODY MASS INDEX: 29.78 KG/M2 | HEART RATE: 65 BPM | WEIGHT: 208 LBS | TEMPERATURE: 98 F

## 2023-02-19 DIAGNOSIS — H61.21 IMPACTED CERUMEN OF RIGHT EAR: ICD-10-CM

## 2023-02-19 DIAGNOSIS — S00.411A ABRASION OF RIGHT EAR CANAL, INITIAL ENCOUNTER: Primary | ICD-10-CM

## 2023-02-19 DIAGNOSIS — R11.0 NAUSEA: ICD-10-CM

## 2023-02-19 PROCEDURE — 3078F PR MOST RECENT DIASTOLIC BLOOD PRESSURE < 80 MM HG: ICD-10-PCS | Mod: CPTII,S$GLB,, | Performed by: NURSE PRACTITIONER

## 2023-02-19 PROCEDURE — 3078F DIAST BP <80 MM HG: CPT | Mod: CPTII,S$GLB,, | Performed by: NURSE PRACTITIONER

## 2023-02-19 PROCEDURE — S0119 PR ONDANSETRON, ORAL, 4MG: ICD-10-PCS | Mod: S$GLB,,, | Performed by: FAMILY MEDICINE

## 2023-02-19 PROCEDURE — 99203 OFFICE O/P NEW LOW 30 MIN: CPT | Mod: S$GLB,,, | Performed by: NURSE PRACTITIONER

## 2023-02-19 PROCEDURE — 3077F SYST BP >= 140 MM HG: CPT | Mod: CPTII,S$GLB,, | Performed by: NURSE PRACTITIONER

## 2023-02-19 PROCEDURE — 1126F AMNT PAIN NOTED NONE PRSNT: CPT | Mod: CPTII,S$GLB,, | Performed by: NURSE PRACTITIONER

## 2023-02-19 PROCEDURE — 99203 PR OFFICE/OUTPT VISIT, NEW, LEVL III, 30-44 MIN: ICD-10-PCS | Mod: S$GLB,,, | Performed by: NURSE PRACTITIONER

## 2023-02-19 PROCEDURE — 1159F MED LIST DOCD IN RCRD: CPT | Mod: CPTII,S$GLB,, | Performed by: NURSE PRACTITIONER

## 2023-02-19 PROCEDURE — 1159F PR MEDICATION LIST DOCUMENTED IN MEDICAL RECORD: ICD-10-PCS | Mod: CPTII,S$GLB,, | Performed by: NURSE PRACTITIONER

## 2023-02-19 PROCEDURE — S0119 ONDANSETRON 4 MG: HCPCS | Mod: S$GLB,,, | Performed by: FAMILY MEDICINE

## 2023-02-19 PROCEDURE — 1160F RVW MEDS BY RX/DR IN RCRD: CPT | Mod: CPTII,S$GLB,, | Performed by: NURSE PRACTITIONER

## 2023-02-19 PROCEDURE — 1126F PR PAIN SEVERITY QUANTIFIED, NO PAIN PRESENT: ICD-10-PCS | Mod: CPTII,S$GLB,, | Performed by: NURSE PRACTITIONER

## 2023-02-19 PROCEDURE — 3077F PR MOST RECENT SYSTOLIC BLOOD PRESSURE >= 140 MM HG: ICD-10-PCS | Mod: CPTII,S$GLB,, | Performed by: NURSE PRACTITIONER

## 2023-02-19 PROCEDURE — 1160F PR REVIEW ALL MEDS BY PRESCRIBER/CLIN PHARMACIST DOCUMENTED: ICD-10-PCS | Mod: CPTII,S$GLB,, | Performed by: NURSE PRACTITIONER

## 2023-02-19 RX ORDER — OFLOXACIN 3 MG/ML
10 SOLUTION AURICULAR (OTIC) DAILY
Qty: 4.67 ML | Refills: 0 | Status: SHIPPED | OUTPATIENT
Start: 2023-02-19 | End: 2023-02-26

## 2023-02-19 RX ORDER — ONDANSETRON 4 MG/1
4 TABLET, ORALLY DISINTEGRATING ORAL
Status: COMPLETED | OUTPATIENT
Start: 2023-02-19 | End: 2023-02-19

## 2023-02-19 RX ADMIN — ONDANSETRON 4 MG: 4 TABLET, ORALLY DISINTEGRATING ORAL at 05:02

## 2023-02-19 NOTE — PATIENT INSTRUCTIONS

## 2023-02-19 NOTE — PROGRESS NOTES
"Subjective:       Patient ID: Ankit Ruff is a 82 y.o. male.    Vitals:  height is 5' 10" (1.778 m) and weight is 94.3 kg (208 lb). His oral temperature is 98.4 °F (36.9 °C). His blood pressure is 150/74 (abnormal) and his pulse is 68. His respiration is 16 and oxygen saturation is 98%.     Chief Complaint: Foreign Body in Ear    Patient presents today with possible foreign body in right ear.  Patient states he went to put in his hearing aid this morning and wasn't able to.  He states some black pieces fell out of his ear and would like it looked at.    Provider note begins below:  Patient reports that earlier today he put a Q-tip in his right ear to try and remove debris from his right ear.  Patient denies any fever or chills.  He wears bilateral hearing aids.  Denies any trauma.    Foreign Body in Ear  The incident occurred 3 to 6 hours ago. The foreign body is Unknown. The foreign body is suspected to be in the right ear. Pertinent negatives include no abdominal pain, chest pain, cough, fever, vomiting or wheezing.     Constitution: Negative. Negative for chills, fatigue and fever.   HENT:  Positive for ear discharge and foreign body in ear. Negative for ear pain, facial swelling and sinus pressure.    Neck: Negative for neck pain, neck stiffness and painful lymph nodes.   Cardiovascular: Negative.  Negative for chest pain and sob on exertion.   Eyes: Negative.  Negative for eye itching, eye pain and eye redness.   Respiratory: Negative.  Negative for chest tightness, cough, shortness of breath, wheezing and asthma.    Gastrointestinal: Negative.  Negative for abdominal pain, nausea and vomiting.   Endocrine: negative. excessive thirst.   Genitourinary: Negative.  Negative for dysuria, frequency, urgency and flank pain.   Musculoskeletal: Negative.  Negative for pain, trauma, joint pain and joint swelling.   Skin: Negative.  Negative for rash, wound, lesion and hives.   Allergic/Immunologic: Negative.  " Negative for eczema, asthma, hives, itching and sneezing.   Neurological: Negative.  Negative for dizziness, passing out, disorientation and altered mental status.   Hematologic/Lymphatic: Negative.  Negative for swollen lymph nodes.   Psychiatric/Behavioral: Negative.  Negative for altered mental status, disorientation and confusion.      Objective:      Physical Exam   Constitutional: He is oriented to person, place, and time. He appears well-developed. He is cooperative.  Non-toxic appearance. He does not appear ill. No distress.   HENT:   Head: Normocephalic and atraumatic.   Ears:   Right Ear: Hearing, tympanic membrane, external ear and ear canal normal. There is cerumen present (Mild.).   Left Ear: Hearing, tympanic membrane, external ear and ear canal normal.   Nose: Nose normal. No mucosal edema, rhinorrhea or nasal deformity. No epistaxis. Right sinus exhibits no maxillary sinus tenderness and no frontal sinus tenderness. Left sinus exhibits no maxillary sinus tenderness and no frontal sinus tenderness.   Mouth/Throat: Uvula is midline, oropharynx is clear and moist and mucous membranes are normal. No trismus in the jaw. Normal dentition. No uvula swelling. No oropharyngeal exudate, posterior oropharyngeal edema or posterior oropharyngeal erythema.   Abrasion noted to right ear canal at 6:00 o'clock.      Comments: Abrasion noted to right ear canal at 6:00 o'clock.  Eyes: Conjunctivae and lids are normal. No scleral icterus.   Neck: Trachea normal and phonation normal. Neck supple. No edema present. No erythema present. No neck rigidity present.   Cardiovascular: Normal rate, regular rhythm, normal heart sounds and normal pulses.   Pulmonary/Chest: Effort normal and breath sounds normal. No stridor. No respiratory distress. He has no decreased breath sounds. He has no wheezes. He has no rhonchi. He has no rales. He exhibits no tenderness.   Abdominal: Normal appearance.   Musculoskeletal: Normal range of  motion.         General: No deformity. Normal range of motion.   Neurological: He is alert and oriented to person, place, and time. He exhibits normal muscle tone. Coordination normal.   Skin: Skin is warm, dry, intact, not diaphoretic and not pale.   Psychiatric: His speech is normal and behavior is normal. Judgment and thought content normal.   Nursing note and vitals reviewed.  Following right ear irrigation patient became dizzy and nauseated.  Patient was given Zofran for nausea.  Patient reports that he gets dizzy when in certain positions.  Patient currently sitting in chair in no acute distress.  He is awake and alert answering all questions appropriately.  He is reporting that he feels better but is unable to drive.  Patient was able to contact a family member that is coming to pick him up.  Patient will retrieve his vehicle tomorrow from the urgent Care parking lot.      Assessment:       1. Abrasion of right ear canal, initial encounter    2. Nausea    3. Impacted cerumen of right ear          Plan:       FOLLOWUP  Follow up if symptoms worsen or fail to improve, for PLEASE CONTACT PCP OR CONTACT THE EMERGENCY ROOM..     PATIENT INSTRUCTIONS  Patient Instructions   INSTRUCTIONS:  - Rest.  - Drink plenty of fluids.  - Take Tylenol and/or Ibuprofen as directed as needed for fever/pain.  Do not take more than the recommended dose.  - follow up with your PCP within the next 1-2 weeks as needed.  - You must understand that you have received an Urgent Care treatment only and that you may be released before all of your medical problems are known or treated.   - You, the patient, will arrange for follow up care as instructed.   - If your condition worsens or fails to improve we recommend that you receive another evaluation at the ER immediately or contact your PCP to discuss your concerns.   - You can call [phone] or [phone] to help schedule an appointment with the appropriate provider.     -If you  smoke cigarettes, it would be beneficial for you to stop.         THANK YOU FOR ALLOWING ME TO PARTICIPATE IN YOUR HEALTHCARE,     Regis Morales NP   Abrasion of right ear canal, initial encounter  -     ofloxacin (FLOXIN) 0.3 % otic solution; Place 10 drops into the right ear once daily. for 7 days  Dispense: 4.67 mL; Refill: 0    Nausea  -     ondansetron disintegrating tablet 4 mg    Impacted cerumen of right ear  -     Ear wax removal

## 2023-03-17 ENCOUNTER — OFFICE VISIT (OUTPATIENT)
Dept: PRIMARY CARE CLINIC | Facility: CLINIC | Age: 83
End: 2023-03-17
Payer: MEDICARE

## 2023-03-17 VITALS
DIASTOLIC BLOOD PRESSURE: 76 MMHG | HEIGHT: 70 IN | SYSTOLIC BLOOD PRESSURE: 138 MMHG | BODY MASS INDEX: 29.03 KG/M2 | HEART RATE: 66 BPM | WEIGHT: 202.81 LBS | OXYGEN SATURATION: 98 %

## 2023-03-17 DIAGNOSIS — Z85.89 HISTORY OF SQUAMOUS CELL CARCINOMA: ICD-10-CM

## 2023-03-17 DIAGNOSIS — D69.2 SENILE PURPURA: ICD-10-CM

## 2023-03-17 DIAGNOSIS — D72.819 LEUKOPENIA, UNSPECIFIED TYPE: ICD-10-CM

## 2023-03-17 DIAGNOSIS — M47.816 LUMBAR ARTHROPATHY: ICD-10-CM

## 2023-03-17 DIAGNOSIS — M46.96 INFLAMMATORY SPONDYLOPATHY OF LUMBAR REGION: ICD-10-CM

## 2023-03-17 DIAGNOSIS — D69.6 THROMBOCYTOPENIA: ICD-10-CM

## 2023-03-17 DIAGNOSIS — M47.26 OSTEOARTHRITIS OF SPINE WITH RADICULOPATHY, LUMBAR REGION: ICD-10-CM

## 2023-03-17 DIAGNOSIS — D36.10 NEUROMA: ICD-10-CM

## 2023-03-17 DIAGNOSIS — I25.2 HISTORY OF MYOCARDIAL INFARCTION: ICD-10-CM

## 2023-03-17 DIAGNOSIS — I25.810 CORONARY ARTERY DISEASE INVOLVING AUTOLOGOUS VEIN CORONARY BYPASS GRAFT WITHOUT ANGINA PECTORIS: ICD-10-CM

## 2023-03-17 DIAGNOSIS — I25.5 ISCHEMIC CARDIOMYOPATHY: Chronic | ICD-10-CM

## 2023-03-17 DIAGNOSIS — Z79.899 POLYPHARMACY: ICD-10-CM

## 2023-03-17 DIAGNOSIS — Z95.1 S/P CABG X 4: ICD-10-CM

## 2023-03-17 DIAGNOSIS — I10 ESSENTIAL HYPERTENSION: ICD-10-CM

## 2023-03-17 DIAGNOSIS — I27.9 PULMONARY HEART DISEASE: ICD-10-CM

## 2023-03-17 DIAGNOSIS — R73.09 ELEVATED GLUCOSE: ICD-10-CM

## 2023-03-17 DIAGNOSIS — D64.9 ANEMIA, UNSPECIFIED TYPE: ICD-10-CM

## 2023-03-17 DIAGNOSIS — L98.9 SKIN LESION: ICD-10-CM

## 2023-03-17 DIAGNOSIS — H91.93 BILATERAL HEARING LOSS, UNSPECIFIED HEARING LOSS TYPE: ICD-10-CM

## 2023-03-17 DIAGNOSIS — Z76.89 ENCOUNTER TO ESTABLISH CARE: Primary | ICD-10-CM

## 2023-03-17 DIAGNOSIS — I48.0 PAF (PAROXYSMAL ATRIAL FIBRILLATION): ICD-10-CM

## 2023-03-17 DIAGNOSIS — Z86.59 HISTORY OF ANXIETY: ICD-10-CM

## 2023-03-17 DIAGNOSIS — I50.42 CHRONIC COMBINED SYSTOLIC AND DIASTOLIC HEART FAILURE: ICD-10-CM

## 2023-03-17 DIAGNOSIS — E78.2 MIXED HYPERLIPIDEMIA: ICD-10-CM

## 2023-03-17 PROBLEM — D72.829 LEUKOCYTOSIS: Status: ACTIVE | Noted: 2023-03-17

## 2023-03-17 PROCEDURE — 1158F PR ADVANCE CARE PLANNING DISCUSS DOCUMENTED IN MEDICAL RECORD: ICD-10-PCS | Mod: HCNC,CPTII,S$GLB, | Performed by: INTERNAL MEDICINE

## 2023-03-17 PROCEDURE — 1159F MED LIST DOCD IN RCRD: CPT | Mod: HCNC,CPTII,S$GLB, | Performed by: INTERNAL MEDICINE

## 2023-03-17 PROCEDURE — 99214 OFFICE O/P EST MOD 30 MIN: CPT | Mod: HCNC,S$GLB,, | Performed by: INTERNAL MEDICINE

## 2023-03-17 PROCEDURE — 1125F AMNT PAIN NOTED PAIN PRSNT: CPT | Mod: HCNC,CPTII,S$GLB, | Performed by: INTERNAL MEDICINE

## 2023-03-17 PROCEDURE — 3078F PR MOST RECENT DIASTOLIC BLOOD PRESSURE < 80 MM HG: ICD-10-PCS | Mod: HCNC,CPTII,S$GLB, | Performed by: INTERNAL MEDICINE

## 2023-03-17 PROCEDURE — 3288F PR FALLS RISK ASSESSMENT DOCUMENTED: ICD-10-PCS | Mod: HCNC,CPTII,S$GLB, | Performed by: INTERNAL MEDICINE

## 2023-03-17 PROCEDURE — 3075F SYST BP GE 130 - 139MM HG: CPT | Mod: HCNC,CPTII,S$GLB, | Performed by: INTERNAL MEDICINE

## 2023-03-17 PROCEDURE — 1125F PR PAIN SEVERITY QUANTIFIED, PAIN PRESENT: ICD-10-PCS | Mod: HCNC,CPTII,S$GLB, | Performed by: INTERNAL MEDICINE

## 2023-03-17 PROCEDURE — 99999 PR PBB SHADOW E&M-EST. PATIENT-LVL V: ICD-10-PCS | Mod: PBBFAC,HCNC,, | Performed by: INTERNAL MEDICINE

## 2023-03-17 PROCEDURE — 99999 PR PBB SHADOW E&M-EST. PATIENT-LVL V: CPT | Mod: PBBFAC,HCNC,, | Performed by: INTERNAL MEDICINE

## 2023-03-17 PROCEDURE — 1160F PR REVIEW ALL MEDS BY PRESCRIBER/CLIN PHARMACIST DOCUMENTED: ICD-10-PCS | Mod: HCNC,CPTII,S$GLB, | Performed by: INTERNAL MEDICINE

## 2023-03-17 PROCEDURE — 1158F ADVNC CARE PLAN TLK DOCD: CPT | Mod: HCNC,CPTII,S$GLB, | Performed by: INTERNAL MEDICINE

## 2023-03-17 PROCEDURE — 1160F RVW MEDS BY RX/DR IN RCRD: CPT | Mod: HCNC,CPTII,S$GLB, | Performed by: INTERNAL MEDICINE

## 2023-03-17 PROCEDURE — 3075F PR MOST RECENT SYSTOLIC BLOOD PRESS GE 130-139MM HG: ICD-10-PCS | Mod: HCNC,CPTII,S$GLB, | Performed by: INTERNAL MEDICINE

## 2023-03-17 PROCEDURE — 1100F PR PT FALLS ASSESS DOC 2+ FALLS/FALL W/INJURY/YR: ICD-10-PCS | Mod: HCNC,CPTII,S$GLB, | Performed by: INTERNAL MEDICINE

## 2023-03-17 PROCEDURE — 99214 PR OFFICE/OUTPT VISIT, EST, LEVL IV, 30-39 MIN: ICD-10-PCS | Mod: HCNC,S$GLB,, | Performed by: INTERNAL MEDICINE

## 2023-03-17 PROCEDURE — 1100F PTFALLS ASSESS-DOCD GE2>/YR: CPT | Mod: HCNC,CPTII,S$GLB, | Performed by: INTERNAL MEDICINE

## 2023-03-17 PROCEDURE — 1159F PR MEDICATION LIST DOCUMENTED IN MEDICAL RECORD: ICD-10-PCS | Mod: HCNC,CPTII,S$GLB, | Performed by: INTERNAL MEDICINE

## 2023-03-17 PROCEDURE — 3078F DIAST BP <80 MM HG: CPT | Mod: HCNC,CPTII,S$GLB, | Performed by: INTERNAL MEDICINE

## 2023-03-17 PROCEDURE — 3288F FALL RISK ASSESSMENT DOCD: CPT | Mod: HCNC,CPTII,S$GLB, | Performed by: INTERNAL MEDICINE

## 2023-03-17 NOTE — PROGRESS NOTES
"[facility] INITIAL VISIT NOTE      CHIEF COMPLAINT     Chief Complaint   Patient presents with    Establish Care     Patient presents to establish with PCP. Patient stated he couldn't get in to see his previous GP, Dr Melton, so came here. Patient is having bilateral breast tenderness and recent XR chest/left ribs was normal. Patient is concerned about breast cancer.       HPI     Ankit Ruff is a 82 y.o.  male who presents with a PMHx of  anxiety, arthritis, CAD, CHF, HLD, HTN, history of, MI, anemia, and ischemic cardiomyopathy, Afib, hearing loss, ICD in place, who presents today to establish care.     His main complaints and concerns are: breast tenderness and pain. To discuss with cardiologist.     CAD, history of MI, ischemic CDM: follows with cardiology. Current medications include aldactone, lasix 20mg , crestor.     History of anxiety: GAD7 today is 2. Patient reports feeling "fine"     Afib: no a/c. Rate controlled and asymptomatic.     Polypharmacy: on tramadol, mobic, neurontin. Quite confused.  Tried to explain he concerns with tramadol. He says he doesn't take it. Says someone gave him zanaflex for sleep. Explained to patient that I will not prescribe this for insomnia. No muslce spasm.       CHRONIC HEALTH ISSUES:   Past Medical History:  Past Medical History:   Diagnosis Date    Anxiety     Arthritis     Atrial fibrillation     CAD (coronary artery disease)     Cataract     OU    CHF (congestive heart failure)     Defibrillator discharge     Hearing loss     Heart failure     Hyperlipidemia     ICD (implantable cardiac defibrillator) in place     Ischemic cardiomyopathy     Myocardial infarction     Sciatica     had seen Dr. 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          Geriatric Assessment    ADLs : independent  iADLs: independent    Polypharmacy:see HPI.     Visual impairments: no change     Hearing impairment: very [place]. Even with his hearing " aids     Urinary incontinence: no but with nocturia.     Malnutrition: no    Gait/balance/falls: has been having falls. 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 Had xrays which were fine.    Depression: PHQ2. No depression    Sleep: sleeps well .     Cognitive Problems: minicog.4/5.     Environmental/social problems:     Functional status:     Support system:     Advanced care planning:  does not have on file. But discussed at length today and she will fill out the paperwork and return it to clinic to be scanned in.    Date: 03/17/2023    Power of   I initiated the process of advance care planning today and explained the importance of this process to the patient.  I introduced the concept of advance directives to the patient, as well. Then the patient received detailed information about the importance of designating a Health Care Power of  (HCPOA). He was also instructed to communicate with this person about their wishes for future healthcare, should he become sick and lose decision-making capacity. The patient has not previously appointed a HCPOA. After our discussion, the patient has not decided to complete a HCPOA. Will         Past Surgical History:  Past Surgical History:   Procedure Laterality Date    APPENDECTOMY      CARDIAC SURGERY      cabg    Defibulater      FRACTURE SURGERY      HERNIA REPAIR      INJECTION OF ANESTHETIC AGENT AROUND NERVE Right 12/21/2018    Procedure: diagnsotic block to the genicular nerve branches to the right knee;  Surgeon: Sj Gonzales MD;  Location: [facility] OR;  Service: Orthopedics;  Laterality: Right;    INTERNAL NEUROLYSIS USING OPERATING MICROSCOPE Right 2/1/2019    Procedure: cooled radiofrequency ablation to the genicular nerve branches of the right knee;  Surgeon: Sj Gonzales MD;  Location: [facility] OR;  Service: Orthopedics;  Laterality: Right;    KNEE ARTHROSCOPY      SKIN BIOPSY      TONSILLECTOMY      TRIGGER  FINGER RELEASE Left 2/3/2022    Procedure: RELEASE, TRIGGER FINGER;  Surgeon: Bay Romo MD;  Location: [facility];  Service: Orthopedics;  Laterality: Left;  Procedure:  Left ring finger trigger release    Position:  Supine    Anesthesia:  Anesthesia must be involved due to the presence of pacemaker defibrillator.  The patient will undergo local anesthesia only for this procedure    Equipment:  Basic handset       Allergies:  Review of patient's allergies indicates:   Allergen Reactions    Penicillins Rash    Entresto [sacubitril-valsartan]        Home Medications:  Prior to Admission medications    Medication Sig Start Date End Date Taking? Authorizing Provider   apixaban (ELIQUIS) 5 mg Tab Take one tablet (5mg) by mouth two times daily 2/6/23   Romeo Guerrero MD   aspirin (ECOTRIN) 81 MG EC tablet Take 81 mg by mouth every evening.    Historical Provider   furosemide (LASIX) 20 MG tablet Take 1 tablet (20 mg total) by mouth daily as needed (TAKE i TABLET DAILY AS NEEDED). 2/7/23   Romeo Guerrero MD   gabapentin (NEURONTIN) 300 MG capsule Take 1 capsule (300 mg total) by mouth 2 (two) times daily. 1/30/23   GUNNAR Jones MD   gabapentin (NEURONTIN) 300 MG capsule Take 1 capsule (300 mg total) by mouth 2 (two) times daily. 2/1/23   GUNNAR Jones MD   meloxicam (MOBIC) 15 MG tablet Take 1 tablet (15 mg total) by mouth daily as needed for Pain. 11/4/22   GUNNAR Jones MD   pantoprazole (PROTONIX) 40 MG tablet TAKE 1 TABLET EVERY DAY 2/1/23   Romeo Guerrero MD   rosuvastatin (CRESTOR) 10 MG tablet TAKE 1 TABLET EVERY DAY 11/21/22   Romeo Guerrero MD   SHINGRIX, PF, 50 mcg/0.5 mL injection  4/27/18   Historical Provider   spironolactone (ALDACTONE) 25 MG tablet TAKE 1/2 TABLET DAILY ON MONDAY, WEDNESDAY AND FRIDAY 2/1/23   Romeo Guerrero MD   traMADoL (ULTRAM) 50 mg tablet Take 1 tablet (50 mg total) by mouth daily as needed for Pain. 2/1/23   GUNNAR Stacy  "MD Karen       Family History:  History reviewed. No pertinent family history.    Social History:  Social History     Tobacco Use    Smoking status: Former     Types: Cigarettes     Quit date: 1977     Years since quittin.6    Smokeless tobacco: Never   Substance Use Topics    Alcohol use: Yes     Comment: socially    Drug use: No       Review of Systems:  ROS        PHYSICAL EXAM     /76   Pulse 66   Ht 5' 10" (1.778 m)   Wt 92 kg (202 lb 13.2 oz)   SpO2 98%   BMI 29.10 kg/m²     GEN - A+OX4, NAD   HEENT - PERRL, EOMI, OP clear. MMM.   Neck - No thyromegaly or cervical LAD. No thyroid masses felt.  CV - RRR, no m/r   Chest - CTAB, no wheezing or rhonchi  Abd - S/NT/ND/+BS.   Ext - 2+BDP and radial pulses. No LE edema.   Neuro - PERRL, EOMI, no nystagmus, eyebrow raise, facial sensation, hearing, m of mastication, smile, palatal raise, shoulder shrug, tongue protrusion symmetric and intact. 5/5 BUE and BLE strength. Sensation to light touch intact throughout. 2+ DTRs. Normal gait.   MSK - No spinal tenderness to palpation. Normal gait.   Skin - No rash.senile purpura      LABS     Previous labs reviewed.      ASSESSMENT/PLAN     Ankit Ruff is a 82 y.o. male with  1. Encounter to establish care  -medications reviewed and consolidated  -reviewed PMH, medications, HCM including vaccinations.   -reviewed most recent lab work. Reordered as needed. Discussed abnormalities with patient with time allowed for questions.   -reviewed clinic policy with patient including to arrive 15 mins before appointments, labs and results including expected turn around for results.   -outside records and consultants notes reviewed as time allowed. Updated treatment team with name of other providers.   -reviewed and confirmed patients contact information, preferred pharmacy etc.   -AVS provided after visit to summarized things discussed.   -The following assessments were completed:  Living " Situation  CAGE  Depression Screening  Timed Get Up and Go  Cognitive Function Screening-Minicog   Nutrition Screening  ADL Screening      2. Lumbar arthropathy  Overview:  Chronic and stablE      3. Osteoarthritis of spine with radiculopathy, lumbar region  Pain somewhat controlled controlled with tylenol says he doesn't take tramadol any mroe    4. History of anxiety  Overview:  Stable and controlled. Continue with current managament  Denies SI/HI      5. Bilateral hearing loss, unspecified hearing loss type  Overview:  Wears hearing aids.       6. Essential hypertension  Overview:  BP well controlled. Continue current medications  Limit salt intake       7. Coronary artery disease involving autologous vein coronary bypass graft without angina pectoris  Overview:  On ASA and statin.   LDL at goal. Continue to monitor  Asymptomatic       8. Pulmonary heart disease  Stable  Asymptomatic     9. History of myocardial infarction  On statin and ASA  Asymptomatic     10. S/P CABG x 4  Overview:  On ASA and statin.   Asymptomatic.      11. Mixed hyperlipidemia  Overview:  On statin. ASA  Encouraged proper diet and exercise       12. PAF (paroxysmal atrial fibrillation)  Overview:  Successful dccv  3/2013  On eliquis. Rate controlled  Asymptomatic  Continue       13. Chronic combined systolic and diastolic heart failure  Overview:  On aldactone.   Asymptomatic.   Continue with current medications      14. Ischemic cardiomyopathy  Asymptoamtic  Follows with cardiology    15. Inflammatory spondylopathy of lumbar region  Stable  Chronic     16. Leukopenia, unspecified type  Overview:  Monitor. May refer to heme onc      17. Polypharmacy  Reviewed all his medications     18. Anemia, unspecified type  Overview:  Stable and chronic.   Iron levels reviewed       19. Elevated glucose  Hgb a1c    20. Neuroma  S/p surgery years ago  -     Ambulatory referral/consult to Podiatry; Future; Expected date: 03/24/2023    21. Skin  lesion  -     Cancel: Ambulatory referral/consult to Dermatology; Future; Expected date: 03/24/2023    22. Senile purpura  Overview:    -benign skin condition. Often caused by the thin skin associated with aging.   -counseled patient on proper skin protection and moisturization and avoidance of skin trauma         23. Thrombocytopenia  Overview:  Resolved. Continue to monitor       24. History of squamous cell carcinoma  Overview:  Follows with dermatology            ACP: discussion had about ACP to include definition of ACP, HCP, CODE STATUS. Paperwork handed to patient, to review, discuss with family and return it to clinic to be scanned into the chart.   Advance Care Planning             My total time spent on this encounter was at least 60 minutes which included  the following activities: preparing to see the patient, performing a medically appropriate and/or evaluation, counseling and educating the patient and family/caregiver, ordering medications, tests, or procedures, referring and communicating with other healthcare providers, documenting clinical information in the electronic or other health record. This time is independent and non-overlapping.         RTC in 6 months, sooner if needed and depending on labs.    Quynh Alves MD  Board Certified Internist/Geriatrician  [facility]

## 2023-03-22 ENCOUNTER — OFFICE VISIT (OUTPATIENT)
Dept: PODIATRY | Facility: CLINIC | Age: 83
End: 2023-03-22
Payer: MEDICARE

## 2023-03-22 ENCOUNTER — HOSPITAL ENCOUNTER (OUTPATIENT)
Dept: RADIOLOGY | Facility: CLINIC | Age: 83
Discharge: HOME | End: 2023-03-22
Attending: PODIATRIST
Payer: MEDICARE

## 2023-03-22 VITALS — BODY MASS INDEX: 28.98 KG/M2 | WEIGHT: 202 LBS | OXYGEN SATURATION: 99 % | RESPIRATION RATE: 16 BRPM

## 2023-03-22 DIAGNOSIS — D36.10 NEUROMA: Primary | ICD-10-CM

## 2023-03-22 DIAGNOSIS — M79.671 RIGHT FOOT PAIN: ICD-10-CM

## 2023-03-22 PROCEDURE — 1125F PR PAIN SEVERITY QUANTIFIED, PAIN PRESENT: ICD-10-PCS | Mod: CPTII,S$GLB,, | Performed by: PODIATRIST

## 2023-03-22 PROCEDURE — 64455 NJX AA&/STRD PLTR COM DG NRV: CPT | Mod: RT,S$GLB,, | Performed by: PODIATRIST

## 2023-03-22 PROCEDURE — 99203 OFFICE O/P NEW LOW 30 MIN: CPT | Mod: 25,S$GLB,, | Performed by: PODIATRIST

## 2023-03-22 PROCEDURE — 73630 XR FOOT COMPLETE 3 VIEW RIGHT: ICD-10-PCS | Mod: RT,S$GLB,, | Performed by: RADIOLOGY

## 2023-03-22 PROCEDURE — 1159F MED LIST DOCD IN RCRD: CPT | Mod: CPTII,S$GLB,, | Performed by: PODIATRIST

## 2023-03-22 PROCEDURE — 99203 PR OFFICE/OUTPT VISIT, NEW, LEVL III, 30-44 MIN: ICD-10-PCS | Mod: 25,S$GLB,, | Performed by: PODIATRIST

## 2023-03-22 PROCEDURE — 1101F PR PT FALLS ASSESS DOC 0-1 FALLS W/OUT INJ PAST YR: ICD-10-PCS | Mod: CPTII,S$GLB,, | Performed by: PODIATRIST

## 2023-03-22 PROCEDURE — 1160F RVW MEDS BY RX/DR IN RCRD: CPT | Mod: CPTII,S$GLB,, | Performed by: PODIATRIST

## 2023-03-22 PROCEDURE — 1125F AMNT PAIN NOTED PAIN PRSNT: CPT | Mod: CPTII,S$GLB,, | Performed by: PODIATRIST

## 2023-03-22 PROCEDURE — 1101F PT FALLS ASSESS-DOCD LE1/YR: CPT | Mod: CPTII,S$GLB,, | Performed by: PODIATRIST

## 2023-03-22 PROCEDURE — 3288F FALL RISK ASSESSMENT DOCD: CPT | Mod: CPTII,S$GLB,, | Performed by: PODIATRIST

## 2023-03-22 PROCEDURE — 3288F PR FALLS RISK ASSESSMENT DOCUMENTED: ICD-10-PCS | Mod: CPTII,S$GLB,, | Performed by: PODIATRIST

## 2023-03-22 PROCEDURE — 1160F PR REVIEW ALL MEDS BY PRESCRIBER/CLIN PHARMACIST DOCUMENTED: ICD-10-PCS | Mod: CPTII,S$GLB,, | Performed by: PODIATRIST

## 2023-03-22 PROCEDURE — 73630 X-RAY EXAM OF FOOT: CPT | Mod: RT,S$GLB,, | Performed by: RADIOLOGY

## 2023-03-22 PROCEDURE — 64455 PR INJECT ANES/STEROID PLANTAR COMMON DIGITAL NERVE: ICD-10-PCS | Mod: RT,S$GLB,, | Performed by: PODIATRIST

## 2023-03-22 PROCEDURE — 1159F PR MEDICATION LIST DOCUMENTED IN MEDICAL RECORD: ICD-10-PCS | Mod: CPTII,S$GLB,, | Performed by: PODIATRIST

## 2023-03-22 RX ORDER — CARVEDILOL 12.5 MG/1
TABLET ORAL
COMMUNITY
Start: 2023-02-01 | End: 2023-05-25

## 2023-03-22 RX ORDER — BUPIVACAINE HYDROCHLORIDE 5 MG/ML
1.5 INJECTION, SOLUTION PERINEURAL
Status: COMPLETED | OUTPATIENT
Start: 2023-03-22 | End: 2023-03-22

## 2023-03-22 RX ORDER — DEXAMETHASONE SODIUM PHOSPHATE 4 MG/ML
4 INJECTION, SOLUTION INTRA-ARTICULAR; INTRALESIONAL; INTRAMUSCULAR; INTRAVENOUS; SOFT TISSUE
Status: COMPLETED | OUTPATIENT
Start: 2023-03-22 | End: 2023-03-22

## 2023-03-22 RX ORDER — METHYLPREDNISOLONE ACETATE 40 MG/ML
20 INJECTION, SUSPENSION INTRA-ARTICULAR; INTRALESIONAL; INTRAMUSCULAR; SOFT TISSUE
Status: COMPLETED | OUTPATIENT
Start: 2023-03-22 | End: 2023-03-22

## 2023-03-22 RX ADMIN — DEXAMETHASONE SODIUM PHOSPHATE 4 MG: 4 INJECTION, SOLUTION INTRA-ARTICULAR; INTRALESIONAL; INTRAMUSCULAR; INTRAVENOUS; SOFT TISSUE at 11:03

## 2023-03-22 RX ADMIN — BUPIVACAINE HYDROCHLORIDE 7.5 MG: 5 INJECTION, SOLUTION PERINEURAL at 11:03

## 2023-03-22 RX ADMIN — METHYLPREDNISOLONE ACETATE 20 MG: 40 INJECTION, SUSPENSION INTRA-ARTICULAR; INTRALESIONAL; INTRAMUSCULAR; SOFT TISSUE at 11:03

## 2023-03-22 NOTE — PROGRESS NOTES
[address] [facility] [address]  [place] [address]  Phone: [phone]   [phone]    Patient's PCP:Quynh Alves MD  Referring Provider: Dr. Quynh Alves    Subjective:      Chief Complaint:: Neuroma (In the top of the right foot )    DEMETRICE Ruff is a 82 y.o. male who presents today with a complaint of possible in the neuroma in the right foot lasting for years. Onset of symptoms years ago and reports no trauma.  Current symptoms include pain .  Aggravating factors are walking on a certain area of the foot. Symptoms have stayed the same . Treatment to date have included none.        Vitals:    03/22/23 1029   Resp: 16   SpO2: 99%   Weight: 91.6 kg (202 lb)   PainSc:   6   PainLoc: Foot      Shoe Size: 10    Past Surgical History:   Procedure Laterality Date    APPENDECTOMY      CARDIAC SURGERY      cabg    Defibulater      FRACTURE SURGERY      HERNIA REPAIR      INJECTION OF ANESTHETIC AGENT AROUND NERVE Right 12/21/2018    Procedure: diagnsotic block to the genicular nerve branches to the right knee;  Surgeon: Sj Gonzales MD;  Location: [facility] OR;  Service: Orthopedics;  Laterality: Right;    INTERNAL NEUROLYSIS USING OPERATING MICROSCOPE Right 2/1/2019    Procedure: cooled radiofrequency ablation to the genicular nerve branches of the right knee;  Surgeon: Sj Gonzales MD;  Location: [facility] OR;  Service: Orthopedics;  Laterality: Right;    KNEE ARTHROSCOPY      SKIN BIOPSY      TONSILLECTOMY      TRIGGER FINGER RELEASE Left 2/3/2022    Procedure: RELEASE, TRIGGER FINGER;  Surgeon: Bay Romo MD;  Location: [facility] OR;  Service: Orthopedics;  Laterality: Left;  Procedure:  Left ring finger trigger release    Position:  Supine    Anesthesia:  Anesthesia must be involved due to the presence of pacemaker defibrillator.  The patient will undergo local anesthesia only for this procedure    Equipment:  Basic handset     Past Medical History:   Diagnosis Date     Anxiety     Arthritis     Atrial fibrillation     CAD (coronary artery disease)     Cataract     OU    CHF (congestive heart failure)     Defibrillator discharge     Hearing loss     Heart failure     Hyperlipidemia     ICD (implantable cardiac defibrillator) in place     Ischemic cardiomyopathy     Myocardial infarction     Sciatica     had seen Dr. 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 No pertinent family history.     Social History:   Marital Status:   Alcohol History:  reports current alcohol use.  Tobacco History:  reports that he quit smoking about 45 years ago. His smoking use included cigarettes. He has never used smokeless tobacco.  Drug History:  reports no history of drug use.    Review of patient's allergies indicates:   Allergen Reactions    Penicillins Rash    Entresto [sacubitril-valsartan]        Current Outpatient Medications   Medication Sig Dispense Refill    apixaban (ELIQUIS) 5 mg Tab Take one tablet (5mg) by mouth two times daily (Patient not taking: Reported on 3/22/2023) 60 tablet 11    aspirin (ECOTRIN) 81 MG EC tablet Take 81 mg by mouth every evening.      carvediloL (COREG) 12.5 MG tablet Take by mouth.      furosemide (LASIX) 20 MG tablet Take 1 tablet (20 mg total) by mouth daily as needed (TAKE i TABLET DAILY AS NEEDED). (Patient not taking: Reported on 3/22/2023) 45 tablet 3    gabapentin (NEURONTIN) 300 MG capsule Take 1 capsule (300 mg total) by mouth 2 (two) times daily. (Patient not taking: Reported on 3/22/2023) 180 capsule 0    meloxicam (MOBIC) 15 MG tablet Take 1 tablet (15 mg total) by mouth daily as needed for Pain. (Patient not taking: Reported on 3/22/2023) 90 tablet 1    pantoprazole (PROTONIX) 40 MG tablet TAKE 1 TABLET EVERY DAY (Patient not taking: Reported on 3/22/2023) 90 tablet 3    rosuvastatin (CRESTOR) 10 MG tablet TAKE 1 TABLET EVERY DAY (Patient not taking: Reported on 3/22/2023) 90 tablet 3     SHINGRIX, PF, 50 mcg/0.5 mL injection       spironolactone (ALDACTONE) 25 MG tablet TAKE 1/2 TABLET DAILY ON MONDAY, WEDNESDAY AND FRIDAY (Patient not taking: Reported on 3/22/2023) 20 tablet 3     No current facility-administered medications for this visit.       Review of Systems   Constitutional:  Negative for chills, fatigue, fever and unexpected weight change.   HENT:  Negative for hearing loss and trouble swallowing.    Eyes:  Negative for photophobia and visual disturbance.   Respiratory:  Negative for cough, shortness of breath and wheezing.    Cardiovascular:  Positive for palpitations. Negative for chest pain and leg swelling.   Gastrointestinal:  Negative for abdominal pain and nausea.   Genitourinary:  Negative for dysuria and frequency.   Musculoskeletal:  Positive for myalgias. Negative for arthralgias, back pain, gait problem and joint swelling.   Skin:  Negative for rash and wound.   Neurological:  Negative for tremors, seizures, weakness, numbness and headaches.   Hematological:  Does not bruise/bleed easily.       Objective:        Physical Exam:   Foot Exam    General  General Appearance: appears stated age and healthy   Orientation: alert and oriented to person, place, and time   Affect: appropriate   Gait: unimpaired       Right Foot/Ankle     Inspection and Palpation  Ecchymosis: none  Tenderness: (Third intermetatarsal)  Swelling: (Mild lower extremity)  Arch: normal  Hallux limitus: yes  Skin Exam: skin intact; no drainage, no ulcer and no erythema   Neurovascular  Dorsalis pedis: 2+  Posterior tibial: 2+  Capillary Refill: 2+  Varicose veins: not present  Saphenous nerve sensation: normal  Tibial nerve sensation: normal  Superficial peroneal nerve sensation: normal  Deep peroneal nerve sensation: normal  Sural nerve sensation: normal    Edema  Type of edema: non-pitting    Muscle Strength  Ankle dorsiflexion: 5  Ankle plantar flexion: 5  Ankle inversion: 5  Ankle eversion: 5  Great toe  extension: 5  Great toe flexion: 5    Range of Motion    Normal right ankle ROM    Tests  Anterior drawer: negative   Talar tilt: negative   PT Tinel's sign: negative    Paresthesia: positive(3rd intermetatarsal)      Physical Exam  Cardiovascular:      Pulses:           Dorsalis pedis pulses are 2+ on the right side.        Posterior tibial pulses are 2+ on the right side.   Feet:      Right foot:      Skin integrity: No ulcer or erythema.             Right Ankle/Foot Exam     Range of Motion   The patient has normal right ankle ROM.        Muscle Strength   Right Lower Extremity   Ankle Dorsiflexion:  5   Plantar flexion:  5/5    Reflexes     Right Side   Paresthesia: present    Vascular Exam     Right Pulses  Dorsalis Pedis:      2+  Posterior Tibial:      2+         Imaging: X-Ray Foot Complete Right  Narrative: EXAMINATION:  XR FOOT COMPLETE 3 VIEW RIGHT    CLINICAL HISTORY:  . Pain in right foot    TECHNIQUE:  AP, lateral, and oblique views of the right foot were performed.    COMPARISON:  None    FINDINGS:  A fracture of the right foot is not seen.  A small heel spur is noted.  Mild degenerative changes are noted at the mid tarsal joints, 1st metatarsophalangeal joint and interphalangeal joint of the big toe as well as the interphalangeal joint of the 4 toes.  There is bone protrusion in the lateral surface of the distal head of the 5th metatarsal.  Juxta-articular bone erosion is not seen.  Soft tissue swelling is not noted.  Impression: Osteoarthritic changes at the mid tarsal joints, 1st metatarsophalangeal joint and the interphalangeal joint of the 5 toes.  Small heel spur.    Electronically signed by: Jaya Maharaj MD  Date:    03/22/2023  Time:    11:52               Assessment:       1. Neuroma    2. Right foot pain      Plan:   Neuroma  -     Ambulatory referral/consult to Podiatry  -     methylPREDNISolone acetate injection 20 mg  -     BUPivacaine injection 7.5 mg  -     dexAMETHasone injection 4  mg    Right foot pain  -     X-Ray Foot Complete Right  -     methylPREDNISolone acetate injection 20 mg  -     BUPivacaine injection 7.5 mg  -     dexAMETHasone injection 4 mg      Follow up if symptoms worsen or fail to improve.    Procedures Informed consent was obtained.  Time-out was called.  The area was prepped with alcohol, and a steroid injection was performed at right 3rd intermetatarsal space using 1.5 cc of 0.5% Marcaine w/out epi, 1 cc Dexamethasone, 0.5 cc Methylprednisolone. Patient tolerated the procedure well.           I counseled patient on causes and treatments for neuromas. Wearing tight or high-heeled shoes can cause a neuroma. Shoes that are too narrow or too pointed squeeze the bones in the ball of the foot. Shoes with high heels put extra pressure on the ends of the bones. When the bones are squeezed together, they pinch the nerve that runs between them. Taking off your shoes and rubbing the ball of your foot may decrease or relieve the pain. Recommend shoes with more room in the toe box. Sometimes steroid injection is necessary to alleviate symptoms. Discussed alcohol sclerosing injections as another option for conservative treatment.       Counseling:     I provided patient education verbally regarding:   Patient diagnosis, treatment options, as well as alternatives, risks, and benefits.     This note was created using Dragon voice recognition software that occasionally misinterpreted phrases or words.

## 2023-03-22 NOTE — PATIENT INSTRUCTIONS
Perez's Neuroma (Intermetatarsal Neuroma)    What Is a Neuroma?  A neuroma is a thickening of nerve tissue that may develop in various parts of the body. The most common neuroma in the foot is a Perez's neuroma, which occurs between the third and fourth toes. It is sometimes referred to as an intermetatarsal neuroma. Intermetatarsal describes its location in the ball of the foot between the metatarsal bones. Neuromas may also occur in other locations in the foot.    The thickening of the nerve that defines a neuroma is the result of compression and irritation of the nerve. This compression creates enlargement of the nerve, causing the symptoms of Perez's neuroma and eventually leading to permanent nerve damage.      Causes of Perez's Neuroma  Anything that causes compression or irritation of the nerve can lead to the development of a neuroma. One of the most common offenders is wearing shoes that have a tapered toe box or high-heeled shoes that cause the toes to be forced into the toe box. People with certain foot deformities--bunions, hammertoes, flatfeet or more flexible feet--are at higher risk for developing a neuroma. Other potential causes are activities that involve repetitive irritation to the ball of the foot, such as running or court sports. An injury or other type of trauma to the area may also lead to a neuroma.      Symptoms of Perez's Neuroma  If you have a Perez's neuroma, you may have one or more of these symptoms where the nerve damage is occurring:  Tingling, burning or numbness  Pain  A feeling that something is inside the ball of the foot  A feeling that there is something in the shoe or a sock is bunched up    The progression of a Perez's neuroma often follows this pattern:  The symptoms begin gradually. At first, they occur only occasionally when wearing narrow-toed shoes or performing certain aggravating activities.  The symptoms may go away temporarily by removing the shoe, massaging  the foot or avoiding aggravating shoes or activities.  Over time, the symptoms progressively worsen and may persist for several days or weeks.  The symptoms become more intense as the neuroma enlarges and the temporary changes in the nerve become permanent.      Diagnosis of Perez's Neuroma  To arrive at a diagnosis, the foot and ankle surgeon will obtain a thorough history of your symptoms and examine your foot. During the physical examination, the doctor attempts to reproduce your symptoms by manipulating your foot. Other tests or imaging studies may be performed.    The best time to see your foot and ankle surgeon is early in the development of symptoms. Early diagnosis of a Perez's neuroma greatly lessens the need for more invasive treatments and may help you avoid surgery.      Nonsurgical Treatment  In developing a treatment plan, your foot and ankle surgeon will first determine how long you have had the neuroma and will evaluate its stage of development. Treatment approaches vary according to the severity of the problem.  For mild to moderate neuromas, treatment options may include:  Padding. Padding techniques provide support for the metatarsal arch, thereby lessening the pressure on the nerve and decreasing the compression when walking.  Icing. Placing an ice pack on the affected area helps reduce swelling.  Orthotic devices. Custom orthotic devices provided by your foot and ankle surgeon provide the support needed to reduce pressure and compression on the nerve.  Activity modifications. Activities that put repetitive pressure on the neuroma should be avoided until the condition improves.  Shoe modifications. Wear shoes with a wide toe box and avoid narrow-toed shoes or shoes with high heels.  Medications. Oral nonsteroidal anti-inflammatory drugs (NSAIDs), such as ibuprofen, may be recommended to reduce pain and inflammation.  Injection therapy. Treatment may include injections of cortisone, local  anesthetics or other agents.      When Is Surgery Needed?  Surgery may be considered in patients who have not responded adequately to nonsurgical treatments. Your foot and ankle surgeon will determine the approach that is best for your condition. The length of the recovery period will vary depending on the procedure performed.  Regardless of whether you have undergone surgical or nonsurgical treatment, your surgeon will recommend long-term measures to help keep your symptoms from returning. These include appropriate footwear and modification of activities to reduce the repetitive pressure on the foot.      Why choose a foot and ankle surgeon?  Foot and ankle surgeons are the leading experts in foot and ankle care today. As doctors of podiatric medicine - also known as podiatrists, DPMs or occasionally foot and ankle doctors - they are the board-certified surgical specialists of the podiatric profession. Foot and ankle surgeons have more education and training specific to the foot and ankle than any other healthcare provider.  Foot and ankle surgeons treat all conditions affecting the foot and ankle, from the simple to the complex, in patients of all ages including Perez's neuroma. Their intensive education and training qualify foot and ankle surgeons to perform a wide range of surgeries, including any surgery that may be indicated for Perez's neuroma.

## 2023-04-19 ENCOUNTER — OFFICE VISIT (OUTPATIENT)
Dept: PRIMARY CARE CLINIC | Facility: CLINIC | Age: 83
End: 2023-04-19
Payer: MEDICARE

## 2023-04-19 VITALS
HEART RATE: 70 BPM | OXYGEN SATURATION: 96 % | RESPIRATION RATE: 20 BRPM | WEIGHT: 198.44 LBS | SYSTOLIC BLOOD PRESSURE: 138 MMHG | DIASTOLIC BLOOD PRESSURE: 72 MMHG | BODY MASS INDEX: 28.41 KG/M2 | HEIGHT: 70 IN

## 2023-04-19 DIAGNOSIS — I50.42 CHRONIC COMBINED SYSTOLIC AND DIASTOLIC HEART FAILURE: ICD-10-CM

## 2023-04-19 DIAGNOSIS — Z79.899 POLYPHARMACY: Primary | ICD-10-CM

## 2023-04-19 DIAGNOSIS — I10 ESSENTIAL HYPERTENSION: ICD-10-CM

## 2023-04-19 PROCEDURE — 1160F RVW MEDS BY RX/DR IN RCRD: CPT | Mod: HCNC,CPTII,S$GLB, | Performed by: INTERNAL MEDICINE

## 2023-04-19 PROCEDURE — 99214 PR OFFICE/OUTPT VISIT, EST, LEVL IV, 30-39 MIN: ICD-10-PCS | Mod: HCNC,S$GLB,, | Performed by: INTERNAL MEDICINE

## 2023-04-19 PROCEDURE — 1101F PR PT FALLS ASSESS DOC 0-1 FALLS W/OUT INJ PAST YR: ICD-10-PCS | Mod: HCNC,CPTII,S$GLB, | Performed by: INTERNAL MEDICINE

## 2023-04-19 PROCEDURE — 3075F SYST BP GE 130 - 139MM HG: CPT | Mod: HCNC,CPTII,S$GLB, | Performed by: INTERNAL MEDICINE

## 2023-04-19 PROCEDURE — 1125F PR PAIN SEVERITY QUANTIFIED, PAIN PRESENT: ICD-10-PCS | Mod: HCNC,CPTII,S$GLB, | Performed by: INTERNAL MEDICINE

## 2023-04-19 PROCEDURE — 1159F PR MEDICATION LIST DOCUMENTED IN MEDICAL RECORD: ICD-10-PCS | Mod: HCNC,CPTII,S$GLB, | Performed by: INTERNAL MEDICINE

## 2023-04-19 PROCEDURE — 3288F PR FALLS RISK ASSESSMENT DOCUMENTED: ICD-10-PCS | Mod: HCNC,CPTII,S$GLB, | Performed by: INTERNAL MEDICINE

## 2023-04-19 PROCEDURE — 99214 OFFICE O/P EST MOD 30 MIN: CPT | Mod: HCNC,S$GLB,, | Performed by: INTERNAL MEDICINE

## 2023-04-19 PROCEDURE — 99999 PR PBB SHADOW E&M-EST. PATIENT-LVL III: CPT | Mod: PBBFAC,HCNC,, | Performed by: INTERNAL MEDICINE

## 2023-04-19 PROCEDURE — 3288F FALL RISK ASSESSMENT DOCD: CPT | Mod: HCNC,CPTII,S$GLB, | Performed by: INTERNAL MEDICINE

## 2023-04-19 PROCEDURE — 3078F DIAST BP <80 MM HG: CPT | Mod: HCNC,CPTII,S$GLB, | Performed by: INTERNAL MEDICINE

## 2023-04-19 PROCEDURE — 3075F PR MOST RECENT SYSTOLIC BLOOD PRESS GE 130-139MM HG: ICD-10-PCS | Mod: HCNC,CPTII,S$GLB, | Performed by: INTERNAL MEDICINE

## 2023-04-19 PROCEDURE — 1160F PR REVIEW ALL MEDS BY PRESCRIBER/CLIN PHARMACIST DOCUMENTED: ICD-10-PCS | Mod: HCNC,CPTII,S$GLB, | Performed by: INTERNAL MEDICINE

## 2023-04-19 PROCEDURE — 1101F PT FALLS ASSESS-DOCD LE1/YR: CPT | Mod: HCNC,CPTII,S$GLB, | Performed by: INTERNAL MEDICINE

## 2023-04-19 PROCEDURE — 3078F PR MOST RECENT DIASTOLIC BLOOD PRESSURE < 80 MM HG: ICD-10-PCS | Mod: HCNC,CPTII,S$GLB, | Performed by: INTERNAL MEDICINE

## 2023-04-19 PROCEDURE — 1125F AMNT PAIN NOTED PAIN PRSNT: CPT | Mod: HCNC,CPTII,S$GLB, | Performed by: INTERNAL MEDICINE

## 2023-04-19 PROCEDURE — 99999 PR PBB SHADOW E&M-EST. PATIENT-LVL III: ICD-10-PCS | Mod: PBBFAC,HCNC,, | Performed by: INTERNAL MEDICINE

## 2023-04-19 PROCEDURE — 1159F MED LIST DOCD IN RCRD: CPT | Mod: HCNC,CPTII,S$GLB, | Performed by: INTERNAL MEDICINE

## 2023-04-19 RX ORDER — TAMSULOSIN HYDROCHLORIDE 0.4 MG/1
0.4 CAPSULE ORAL DAILY
Qty: 30 CAPSULE | Refills: 0 | Status: SHIPPED | OUTPATIENT
Start: 2023-04-19 | End: 2023-05-18 | Stop reason: SDUPTHER

## 2023-04-19 NOTE — PATIENT INSTRUCTIONS
Have to cut back on salt completedly given your ejection fration.   Weigh yourself daily. If 3-5 pounds weight loss then call your cardiologist or me as we may need to increase your fursomide

## 2023-04-19 NOTE — PROGRESS NOTES
INTERNAL MEDICINE PROGRESS/URGENT CARE NOTE    CHIEF COMPLAINT     No chief complaint on file.      HPI     Ankit Ruff is a 82 y.o.  male who presents for an urgent/follow up visit today.    Reviewed his medications with his as that is what hes here for.     Stopped taking eliquis which he was on for Afib. He stopped taking it because he had extensive bleeding. Told him id like him to discuss watchman device if he does not plan to take the eliquis for stroke prevention. Now takes ASA.     Wants me to take him off aldactone. Will have him see his cardiologist.     Anxiety: LELA 7 2/21 mild and controlled. Says his wife has moved to another state. 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 Hit his left chest and now with some pain.     Had a neuroma on his right leg. Had a steroid injection, which he says worked well. Says they wanted to try that before considering surgery.     Had sent in for a refill of his zanaflex which he uses for sleep. Told him cannot prescribe that for insomnia.       Reviewed his ECHO with his. Given his EF of 35% counseled him on heart failure management including limitin salt, weighing regularly and how to manage acute weight ghain. Ect. Told him I will not take him off any of his cardia cmedication    Past Medical History:  Past Medical History:   Diagnosis Date    Anxiety     Arthritis     Atrial fibrillation     CAD (coronary artery disease)     Cataract     OU    CHF (congestive heart failure)     Defibrillator discharge     Hearing loss     Heart failure     Hyperlipidemia     ICD (implantable cardiac defibrillator) in place     Ischemic cardiomyopathy     Myocardial infarction     Sciatica     had seen Dr. 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 Authorizing Provider   aspirin (ECOTRIN) 81 MG EC tablet Take 81 mg by mouth every evening.   Yes  "Historical Provider   furosemide (LASIX) 20 MG tablet Take 1 tablet (20 mg total) by mouth daily as needed (TAKE i TABLET DAILY AS NEEDED). 2/7/23  Yes Romeo Guerrero MD   gabapentin (NEURONTIN) 300 MG capsule Take 1 capsule (300 mg total) by mouth 2 (two) times daily. 1/30/23  Yes GUNNAR Jones MD   meloxicam (MOBIC) 15 MG tablet Take 1 tablet (15 mg total) by mouth daily as needed for Pain. 11/4/22  Yes GUNNAR Jones MD   pantoprazole (PROTONIX) 40 MG tablet TAKE 1 TABLET EVERY DAY 2/1/23  Yes Romeo Guerrero MD   rosuvastatin (CRESTOR) 10 MG tablet TAKE 1 TABLET EVERY DAY 11/21/22  Yes Romeo Guerrero MD   SHINGRIX, PF, 50 mcg/0.5 mL injection  4/27/18  Yes Historical Provider   spironolactone (ALDACTONE) 25 MG tablet TAKE 1/2 TABLET DAILY ON MONDAY, WEDNESDAY AND FRIDAY 2/1/23  Yes Romeo Guerrero MD   apixaban (ELIQUIS) 5 mg Tab Take one tablet (5mg) by mouth two times daily  Patient not taking: Reported on 3/22/2023 2/6/23   Romeo Guerrero MD   carvediloL (COREG) 12.5 MG tablet Take by mouth. 2/1/23   Historical Provider       Review of Systems:  Review of Systems            PHYSICAL EXAM     /72 (BP Location: Right arm, Patient Position: Sitting, BP Method: Medium (Manual))   Pulse 70   Resp 20   Ht 5' 10" (1.778 m)   Wt 90 kg (198 lb 6.6 oz)   SpO2 96%   BMI 28.47 kg/m²     GEN - A+OX4, NAD   HEENT - PERRL, EOMI, OP clear  Neck - No thyromegaly or cervical LAD. No thyroid masses felt.  CV - RRR, no m/r   Chest - CTAB, no wheezing or rhonchi  Abd - S/NT/ND/+BS.   Ext - 2+BDP and radial pulses. No C/C/E.  Skin - No rash.    LABS         ASSESSMENT/PLAN     Ankit Ruff is a 82 y.o. male with  1. Polypharmacy  Reviewed all his medications. CLEARED UP some confusion.   Will follow with cardiology    2. Essential hypertension  Overview:  BP well controlled. Continue current medications  Limit salt intake         WORRY SCORE 3, cardiac history    RTC " in 3 months, sooner if needed and depending on labs.    Quynh Alves MD  Board Certified Internist/Geriatrician  [facility] ([place])

## 2023-04-20 ENCOUNTER — OFFICE VISIT (OUTPATIENT)
Dept: FAMILY MEDICINE | Facility: CLINIC | Age: 83
End: 2023-04-20
Payer: MEDICARE

## 2023-04-20 ENCOUNTER — CLINICAL SUPPORT (OUTPATIENT)
Dept: CARDIOLOGY | Facility: HOSPITAL | Age: 83
End: 2023-04-20
Payer: MEDICARE

## 2023-04-20 VITALS
TEMPERATURE: 98 F | HEART RATE: 52 BPM | OXYGEN SATURATION: 97 % | SYSTOLIC BLOOD PRESSURE: 102 MMHG | DIASTOLIC BLOOD PRESSURE: 56 MMHG | WEIGHT: 201.06 LBS | BODY MASS INDEX: 28.78 KG/M2 | HEIGHT: 70 IN

## 2023-04-20 DIAGNOSIS — M46.96 INFLAMMATORY SPONDYLOPATHY OF LUMBAR REGION: ICD-10-CM

## 2023-04-20 DIAGNOSIS — D69.6 THROMBOCYTOPENIA: ICD-10-CM

## 2023-04-20 DIAGNOSIS — D69.2 SENILE PURPURA: ICD-10-CM

## 2023-04-20 DIAGNOSIS — Z00.00 ENCOUNTER FOR MEDICARE ANNUAL WELLNESS EXAM: ICD-10-CM

## 2023-04-20 DIAGNOSIS — Z95.810 PRESENCE OF AUTOMATIC (IMPLANTABLE) CARDIAC DEFIBRILLATOR: ICD-10-CM

## 2023-04-20 DIAGNOSIS — I27.9 PULMONARY HEART DISEASE: ICD-10-CM

## 2023-04-20 DIAGNOSIS — I50.42 CHRONIC COMBINED SYSTOLIC AND DIASTOLIC HEART FAILURE: ICD-10-CM

## 2023-04-20 DIAGNOSIS — I65.23 BILATERAL CAROTID ARTERY STENOSIS: ICD-10-CM

## 2023-04-20 DIAGNOSIS — I10 ESSENTIAL HYPERTENSION: ICD-10-CM

## 2023-04-20 DIAGNOSIS — Z00.00 ENCOUNTER FOR PREVENTIVE HEALTH EXAMINATION: Primary | ICD-10-CM

## 2023-04-20 DIAGNOSIS — I25.810 CORONARY ARTERY DISEASE INVOLVING AUTOLOGOUS VEIN CORONARY BYPASS GRAFT WITHOUT ANGINA PECTORIS: ICD-10-CM

## 2023-04-20 DIAGNOSIS — Z95.810 ICD (IMPLANTABLE CARDIOVERTER-DEFIBRILLATOR) IN PLACE: Chronic | ICD-10-CM

## 2023-04-20 DIAGNOSIS — E78.5 HYPERLIPIDEMIA, UNSPECIFIED HYPERLIPIDEMIA TYPE: ICD-10-CM

## 2023-04-20 DIAGNOSIS — I48.0 PAF (PAROXYSMAL ATRIAL FIBRILLATION): ICD-10-CM

## 2023-04-20 PROCEDURE — 1160F PR REVIEW ALL MEDS BY PRESCRIBER/CLIN PHARMACIST DOCUMENTED: ICD-10-PCS | Mod: HCNC,CPTII,S$GLB, | Performed by: NURSE PRACTITIONER

## 2023-04-20 PROCEDURE — 99999 PR PBB SHADOW E&M-EST. PATIENT-LVL V: CPT | Mod: PBBFAC,HCNC,, | Performed by: NURSE PRACTITIONER

## 2023-04-20 PROCEDURE — 3074F SYST BP LT 130 MM HG: CPT | Mod: HCNC,CPTII,S$GLB, | Performed by: NURSE PRACTITIONER

## 2023-04-20 PROCEDURE — 1159F MED LIST DOCD IN RCRD: CPT | Mod: HCNC,CPTII,S$GLB, | Performed by: NURSE PRACTITIONER

## 2023-04-20 PROCEDURE — 1160F RVW MEDS BY RX/DR IN RCRD: CPT | Mod: HCNC,CPTII,S$GLB, | Performed by: NURSE PRACTITIONER

## 2023-04-20 PROCEDURE — 1126F PR PAIN SEVERITY QUANTIFIED, NO PAIN PRESENT: ICD-10-PCS | Mod: HCNC,CPTII,S$GLB, | Performed by: NURSE PRACTITIONER

## 2023-04-20 PROCEDURE — 3288F FALL RISK ASSESSMENT DOCD: CPT | Mod: HCNC,CPTII,S$GLB, | Performed by: NURSE PRACTITIONER

## 2023-04-20 PROCEDURE — 1170F FXNL STATUS ASSESSED: CPT | Mod: HCNC,CPTII,S$GLB, | Performed by: NURSE PRACTITIONER

## 2023-04-20 PROCEDURE — G0439 PPPS, SUBSEQ VISIT: HCPCS | Mod: HCNC,S$GLB,, | Performed by: NURSE PRACTITIONER

## 2023-04-20 PROCEDURE — G0439 PR MEDICARE ANNUAL WELLNESS SUBSEQUENT VISIT: ICD-10-PCS | Mod: HCNC,S$GLB,, | Performed by: NURSE PRACTITIONER

## 2023-04-20 PROCEDURE — 99999 PR PBB SHADOW E&M-EST. PATIENT-LVL V: ICD-10-PCS | Mod: PBBFAC,HCNC,, | Performed by: NURSE PRACTITIONER

## 2023-04-20 PROCEDURE — 1101F PR PT FALLS ASSESS DOC 0-1 FALLS W/OUT INJ PAST YR: ICD-10-PCS | Mod: HCNC,CPTII,S$GLB, | Performed by: NURSE PRACTITIONER

## 2023-04-20 PROCEDURE — 3074F PR MOST RECENT SYSTOLIC BLOOD PRESSURE < 130 MM HG: ICD-10-PCS | Mod: HCNC,CPTII,S$GLB, | Performed by: NURSE PRACTITIONER

## 2023-04-20 PROCEDURE — 3078F PR MOST RECENT DIASTOLIC BLOOD PRESSURE < 80 MM HG: ICD-10-PCS | Mod: HCNC,CPTII,S$GLB, | Performed by: NURSE PRACTITIONER

## 2023-04-20 PROCEDURE — 3078F DIAST BP <80 MM HG: CPT | Mod: HCNC,CPTII,S$GLB, | Performed by: NURSE PRACTITIONER

## 2023-04-20 PROCEDURE — 1101F PT FALLS ASSESS-DOCD LE1/YR: CPT | Mod: HCNC,CPTII,S$GLB, | Performed by: NURSE PRACTITIONER

## 2023-04-20 PROCEDURE — 1126F AMNT PAIN NOTED NONE PRSNT: CPT | Mod: HCNC,CPTII,S$GLB, | Performed by: NURSE PRACTITIONER

## 2023-04-20 PROCEDURE — 3288F PR FALLS RISK ASSESSMENT DOCUMENTED: ICD-10-PCS | Mod: HCNC,CPTII,S$GLB, | Performed by: NURSE PRACTITIONER

## 2023-04-20 PROCEDURE — 1170F PR FUNCTIONAL STATUS ASSESSED: ICD-10-PCS | Mod: HCNC,CPTII,S$GLB, | Performed by: NURSE PRACTITIONER

## 2023-04-20 PROCEDURE — 93296 REM INTERROG EVL PM/IDS: CPT | Mod: HCNC,PO | Performed by: INTERNAL MEDICINE

## 2023-04-20 PROCEDURE — 1159F PR MEDICATION LIST DOCUMENTED IN MEDICAL RECORD: ICD-10-PCS | Mod: HCNC,CPTII,S$GLB, | Performed by: NURSE PRACTITIONER

## 2023-04-20 NOTE — PATIENT INSTRUCTIONS
Counseling and Referral of Other Preventative  (Italic type indicates deductible and co-insurance are waived)    Patient Name: Ankit Ruff  Today's Date: 4/20/2023    Health Maintenance       Date Due Completion Date    COVID-19 Vaccine (2 - Pfizer series) 09/07/2021 8/17/2021    TETANUS VACCINE 09/16/2025 9/16/2015    Lipid Panel 07/25/2027 7/25/2022        No orders of the defined types were placed in this encounter.      The following information is provided to all patients.  This information is to help you find resources for any of the problems found today that may be affecting your health:                Living healthy guide: www.[facility].[place].[facility]      Understanding Diabetes: www.diabetes.org      Eating healthy: www.cdc.gov/healthyweight      [facility] home safety checklist: www.cdc.gov/steadi/patient.html      Agency on Aging: www.goea.[place].[facility]      Alcoholics anonymous (AA): www.aa.org      Physical Activity: www.karrie.nih.gov/cw0mseb      Tobacco use: www.quit[place]us[place].org

## 2023-04-20 NOTE — PROGRESS NOTES
"  Ankit Ruff presented for a  Medicare AWV and comprehensive Health Risk Assessment today. The following components were reviewed and updated:    Medical history  Family History  Social history  Allergies and Current Medications  Health Risk Assessment  Health Maintenance  Care Team     ** See Completed Assessments for Annual Wellness Visit within the encounter summary.**     The following assessments were completed:  Living Situation  CAGE  Depression Screening  Timed Get Up and Go  Whisper Test  Cognitive Function Screening      Nutrition Screening  ADL Screening  PAQ Screening    Review for Opioid Screening: Pt does not have Rx for Opioids  Review for Substance Use Disorders: Patient does not use substance      Vitals:    04/20/23 0859   BP: (!) 102/56   Pulse: (!) 52   Temp: 98 °F (36.7 °C)   SpO2: 97%   Weight: 91.2 kg (201 lb 1 oz)   Height: 5' 10" (1.778 m)     Body mass index is 28.85 kg/m².  Physical Exam  Vitals reviewed.   Constitutional:       General: He is not in acute distress.  Pulmonary:      Effort: Pulmonary effort is normal. No respiratory distress.   Neurological:      Mental Status: He is alert and oriented to person, place, and time.   Psychiatric:         Mood and Affect: Mood normal.         Behavior: Behavior normal.         Thought Content: Thought content normal.         Judgment: Judgment normal.         Diagnoses and health risks identified today and associated recommendations/orders:    1. Encounter for preventive health examination  Reviewed and discussed health maintenance.      2. Encounter for Medicare annual wellness exam  - Ambulatory Referral/Consult to Enhanced Annual Wellness Visit (eAWV)    3. Senile purpura  Stable- continue current treatment and follow up routinely with PCP     4. Thrombocytopenia  Stable- continue current treatment and follow up routinely with PCP     5. Inflammatory spondylopathy of lumbar region  Stable- continue current treatment and follow up " routinely with PCP     6. Chronic combined systolic and diastolic heart failure  Stable- continue current treatment and follow up routinely with PCP and cardiology (Dr. Guerrero)    7. Bilateral carotid artery stenosis  Stable- continue current treatment and follow up routinely with PCP and cardiology (Dr. Guerrero)    8. Coronary artery disease involving autologous vein coronary bypass graft without angina pectoris  Stable- continue current treatment and follow up routinely with PCP and cardiology (Dr. Guerrero)    9. ICD (implantable cardioverter-defibrillator) in place  Stable- continue current treatment and follow up routinely with PCP and cardiology (Dr. Guerrero)    10. PAF (paroxysmal atrial fibrillation)  Stable- continue current treatment and follow up routinely with PCP and cardiology (Dr. Guerrero)    11. Pulmonary heart disease  Stable- continue current treatment and follow up routinely with PCP and cardiology (Dr. Guerrero)    12. Hyperlipidemia, unspecified hyperlipidemia type  Stable- continue current treatment and follow up routinely with PCP and cardiology (Dr. Guerrero)    13. Essential hypertension  Stable- continue current treatment and follow up routinely with PCP and cardiology (Dr. Guerrero)    Provided Ankit with a 5-10 year written screening schedule and personal prevention plan. Recommendations were developed using the USPSTF age appropriate recommendations. Education, counseling, and referrals were provided as needed. After Visit Summary printed and given to patient which includes a list of additional screenings\tests needed.    I offered to discuss advanced care planning, including how to pick a person who would make decisions for you if you were unable to make them for yourself, called a health care power of , and what kind of decisions you might make such as use of life sustaining treatments such as ventilators and tube feeding when faced with a life limiting illness  recorded on a living will that they will need to know. (How you want to be cared for as you near the end of your natural life)   X Patient is interested in learning more about how to make advanced directives.  I provided them paperwork and offered to discuss this with them.  Haylee Mosley, NP

## 2023-05-18 ENCOUNTER — OFFICE VISIT (OUTPATIENT)
Dept: PRIMARY CARE CLINIC | Facility: CLINIC | Age: 83
End: 2023-05-18
Payer: MEDICARE

## 2023-05-18 VITALS
HEART RATE: 50 BPM | HEIGHT: 70 IN | WEIGHT: 201.5 LBS | DIASTOLIC BLOOD PRESSURE: 88 MMHG | SYSTOLIC BLOOD PRESSURE: 130 MMHG | TEMPERATURE: 98 F | OXYGEN SATURATION: 97 % | BODY MASS INDEX: 28.85 KG/M2 | RESPIRATION RATE: 18 BRPM

## 2023-05-18 DIAGNOSIS — I10 ESSENTIAL HYPERTENSION: Primary | ICD-10-CM

## 2023-05-18 DIAGNOSIS — I48.0 PAF (PAROXYSMAL ATRIAL FIBRILLATION): ICD-10-CM

## 2023-05-18 DIAGNOSIS — M17.0 OSTEOARTHRITIS OF BOTH KNEES, UNSPECIFIED OSTEOARTHRITIS TYPE: ICD-10-CM

## 2023-05-18 PROCEDURE — 3079F PR MOST RECENT DIASTOLIC BLOOD PRESSURE 80-89 MM HG: ICD-10-PCS | Mod: CPTII,,, | Performed by: INTERNAL MEDICINE

## 2023-05-18 PROCEDURE — 3075F SYST BP GE 130 - 139MM HG: CPT | Mod: CPTII,,, | Performed by: INTERNAL MEDICINE

## 2023-05-18 PROCEDURE — 1160F RVW MEDS BY RX/DR IN RCRD: CPT | Mod: CPTII,,, | Performed by: INTERNAL MEDICINE

## 2023-05-18 PROCEDURE — 99213 OFFICE O/P EST LOW 20 MIN: CPT | Mod: ,,, | Performed by: INTERNAL MEDICINE

## 2023-05-18 PROCEDURE — 99213 PR OFFICE/OUTPT VISIT, EST, LEVL III, 20-29 MIN: ICD-10-PCS | Mod: ,,, | Performed by: INTERNAL MEDICINE

## 2023-05-18 PROCEDURE — 1159F MED LIST DOCD IN RCRD: CPT | Mod: CPTII,,, | Performed by: INTERNAL MEDICINE

## 2023-05-18 PROCEDURE — 99213 OFFICE O/P EST LOW 20 MIN: CPT | Mod: PN | Performed by: INTERNAL MEDICINE

## 2023-05-18 PROCEDURE — 1100F PR PT FALLS ASSESS DOC 2+ FALLS/FALL W/INJURY/YR: ICD-10-PCS | Mod: CPTII,,, | Performed by: INTERNAL MEDICINE

## 2023-05-18 PROCEDURE — 1160F PR REVIEW ALL MEDS BY PRESCRIBER/CLIN PHARMACIST DOCUMENTED: ICD-10-PCS | Mod: CPTII,,, | Performed by: INTERNAL MEDICINE

## 2023-05-18 PROCEDURE — 3288F FALL RISK ASSESSMENT DOCD: CPT | Mod: CPTII,,, | Performed by: INTERNAL MEDICINE

## 2023-05-18 PROCEDURE — 3075F PR MOST RECENT SYSTOLIC BLOOD PRESS GE 130-139MM HG: ICD-10-PCS | Mod: CPTII,,, | Performed by: INTERNAL MEDICINE

## 2023-05-18 PROCEDURE — 99999 PR PBB SHADOW E&M-EST. PATIENT-LVL III: CPT | Mod: PBBFAC,,, | Performed by: INTERNAL MEDICINE

## 2023-05-18 PROCEDURE — 99999 PR PBB SHADOW E&M-EST. PATIENT-LVL III: ICD-10-PCS | Mod: PBBFAC,,, | Performed by: INTERNAL MEDICINE

## 2023-05-18 PROCEDURE — 1125F AMNT PAIN NOTED PAIN PRSNT: CPT | Mod: CPTII,,, | Performed by: INTERNAL MEDICINE

## 2023-05-18 PROCEDURE — 1100F PTFALLS ASSESS-DOCD GE2>/YR: CPT | Mod: CPTII,,, | Performed by: INTERNAL MEDICINE

## 2023-05-18 PROCEDURE — 3288F PR FALLS RISK ASSESSMENT DOCUMENTED: ICD-10-PCS | Mod: CPTII,,, | Performed by: INTERNAL MEDICINE

## 2023-05-18 PROCEDURE — 1159F PR MEDICATION LIST DOCUMENTED IN MEDICAL RECORD: ICD-10-PCS | Mod: CPTII,,, | Performed by: INTERNAL MEDICINE

## 2023-05-18 PROCEDURE — 3079F DIAST BP 80-89 MM HG: CPT | Mod: CPTII,,, | Performed by: INTERNAL MEDICINE

## 2023-05-18 PROCEDURE — 1125F PR PAIN SEVERITY QUANTIFIED, PAIN PRESENT: ICD-10-PCS | Mod: CPTII,,, | Performed by: INTERNAL MEDICINE

## 2023-05-18 RX ORDER — GABAPENTIN 300 MG/1
300 CAPSULE ORAL 2 TIMES DAILY
Qty: 180 CAPSULE | Refills: 1 | Status: SHIPPED | OUTPATIENT
Start: 2023-05-18

## 2023-05-18 RX ORDER — TAMSULOSIN HYDROCHLORIDE 0.4 MG/1
0.4 CAPSULE ORAL DAILY
Qty: 90 CAPSULE | Refills: 1 | Status: SHIPPED | OUTPATIENT
Start: 2023-05-18 | End: 2023-07-31

## 2023-05-18 NOTE — PROGRESS NOTES
INTERNAL MEDICINE PROGRESS/URGENT CARE NOTE    CHIEF COMPLAINT     Chief Complaint   Patient presents with    Follow-up       HPI     Ankit Ruff is a 82 y.o.  male who presents with a PMHx of  anxiety, arthritis, CAD, CHF, HLD, HTN, history of, MI, anemia, and ischemic cardiomyopathy, Afib, hearing loss, ICD in place, who presents today to discuss the watchman device    At his last appointment, id mentioned this as he was very very concerned about all his bleeding and bruising from the eliquis he currently takes. So I mentioned this as an option. He is very active on his fam and always getting cuts and bruises.     He says he called his cardiologists office and told them he was to see me to discuss this. Told patient that while I think it would be appropriate for him, best to discuss with cardiology. But it would be advisable for him given his activity level and current bleeding and bruising.     When he was last seen, he was not feeling good with GI issues etc.today, he states he is feeling much much better. Sleeping well. Doing much better.     Home Medications:  Prior to Admission medications    Medication Sig Start Date End Date Taking? Authorizing Provider   apixaban (ELIQUIS) 5 mg Tab Take one tablet (5mg) by mouth two times daily 2/6/23  Yes Romeo Guerrero MD   aspirin (ECOTRIN) 81 MG EC tablet Take 81 mg by mouth every evening.   Yes Historical Provider   carvediloL (COREG) 12.5 MG tablet Take by mouth. 2/1/23  Yes Historical Provider   furosemide (LASIX) 20 MG tablet Take 1 tablet (20 mg total) by mouth daily as needed (TAKE i TABLET DAILY AS NEEDED). 2/7/23  Yes Romeo Guerrero MD   meloxicam (MOBIC) 15 MG tablet Take 1 tablet (15 mg total) by mouth daily as needed for Pain. 11/4/22  Yes GUNNAR Jones MD   pantoprazole (PROTONIX) 40 MG tablet TAKE 1 TABLET EVERY DAY 2/1/23  Yes Romeo Guerrero MD   rosuvastatin (CRESTOR) 10 MG tablet TAKE 1 TABLET EVERY DAY 11/21/22  Yes  "Romeo Guerrero MD   SHINGRIX, PF, 50 mcg/0.5 mL injection  4/27/18  Yes Historical Provider   spironolactone (ALDACTONE) 25 MG tablet TAKE 1/2 TABLET DAILY ON MONDAY, WEDNESDAY AND FRIDAY 2/1/23  Yes Romeo Guerrero MD   gabapentin (NEURONTIN) 300 MG capsule Take 1 capsule (300 mg total) by mouth 2 (two) times daily. 1/30/23 5/18/23 Yes GUNNAR Jones MD   tamsulosin (FLOMAX) 0.4 mg Cap Take 1 capsule (0.4 mg total) by mouth once daily. 4/19/23 5/18/23 Yes Quynh Alves MD   gabapentin (NEURONTIN) 300 MG capsule Take 1 capsule (300 mg total) by mouth 2 (two) times daily. 5/18/23   Quynh Alves MD   tamsulosin (FLOMAX) 0.4 mg Cap Take 1 capsule (0.4 mg total) by mouth once daily. 5/18/23 5/17/24  Quynh Alves MD       Review of Systems:  Review of Systems          PHYSICAL EXAM     BP (!) 130/96 (BP Location: Right arm, Patient Position: Sitting, BP Method: Medium (Manual))   Pulse (!) 50   Temp 98.4 °F (36.9 °C) (Oral)   Resp 18   Ht 5' 9.5" (1.765 m)   Wt 91.4 kg (201 lb 8 oz)   SpO2 97%   BMI 29.33 kg/m²     GEN - A+OX4, NAD     LABS       ASSESSMENT/PLAN     Ankit Ruff is a 82 y.o. male with  1. Essential hypertension  Overview:  BP well controlled. Continue current medications  Limit salt intake       2. Osteoarthritis of both knees, unspecified osteoarthritis type  -     gabapentin (NEURONTIN) 300 MG capsule; Take 1 capsule (300 mg total) by mouth 2 (two) times daily.  Dispense: 180 capsule; Refill: 1    3. PAF (paroxysmal atrial fibrillation)  Overview:  Successful dccv  3/2013  On eliquis. Rate controlled. Wants to discuss the watchman device with cardiology.   Asymptomatic  Continue       Other orders  -     tamsulosin (FLOMAX) 0.4 mg Cap; Take 1 capsule (0.4 mg total) by mouth once daily.  Dispense: 90 capsule; Refill: 1    RTC as scheduled     Quynh Alves MD  Board Certified Internist/Geriatrician  [facility]  "

## 2023-05-25 ENCOUNTER — DOCUMENTATION ONLY (OUTPATIENT)
Dept: CARDIOLOGY | Facility: HOSPITAL | Age: 83
End: 2023-05-25
Payer: MEDICARE

## 2023-05-25 ENCOUNTER — OFFICE VISIT (OUTPATIENT)
Dept: CARDIOLOGY | Facility: CLINIC | Age: 83
End: 2023-05-25
Payer: MEDICARE

## 2023-05-25 ENCOUNTER — CLINICAL SUPPORT (OUTPATIENT)
Dept: CARDIOLOGY | Facility: HOSPITAL | Age: 83
End: 2023-05-25
Attending: INTERNAL MEDICINE
Payer: MEDICARE

## 2023-05-25 VITALS
HEIGHT: 70 IN | HEART RATE: 49 BPM | DIASTOLIC BLOOD PRESSURE: 62 MMHG | SYSTOLIC BLOOD PRESSURE: 123 MMHG | BODY MASS INDEX: 29.63 KG/M2 | WEIGHT: 207 LBS

## 2023-05-25 DIAGNOSIS — I25.5 ISCHEMIC CARDIOMYOPATHY: Primary | Chronic | ICD-10-CM

## 2023-05-25 DIAGNOSIS — I10 ESSENTIAL HYPERTENSION: ICD-10-CM

## 2023-05-25 DIAGNOSIS — Z95.810 ICD (IMPLANTABLE CARDIOVERTER-DEFIBRILLATOR) IN PLACE: Primary | ICD-10-CM

## 2023-05-25 DIAGNOSIS — Z95.810 ICD (IMPLANTABLE CARDIOVERTER-DEFIBRILLATOR) IN PLACE: ICD-10-CM

## 2023-05-25 DIAGNOSIS — I25.5 ISCHEMIC CARDIOMYOPATHY: ICD-10-CM

## 2023-05-25 DIAGNOSIS — I48.0 PAF (PAROXYSMAL ATRIAL FIBRILLATION): ICD-10-CM

## 2023-05-25 DIAGNOSIS — Z95.1 S/P CABG X 4: ICD-10-CM

## 2023-05-25 DIAGNOSIS — Z45.02 IMPLANTABLE CARDIOVERTER-DEFIBRILLATOR (ICD) GENERATOR END OF LIFE: ICD-10-CM

## 2023-05-25 PROCEDURE — 3074F PR MOST RECENT SYSTOLIC BLOOD PRESSURE < 130 MM HG: ICD-10-PCS | Mod: CPTII,S$GLB,, | Performed by: INTERNAL MEDICINE

## 2023-05-25 PROCEDURE — 1159F PR MEDICATION LIST DOCUMENTED IN MEDICAL RECORD: ICD-10-PCS | Mod: CPTII,S$GLB,, | Performed by: INTERNAL MEDICINE

## 2023-05-25 PROCEDURE — 99214 OFFICE O/P EST MOD 30 MIN: CPT | Mod: S$GLB,,, | Performed by: INTERNAL MEDICINE

## 2023-05-25 PROCEDURE — 99214 PR OFFICE/OUTPT VISIT, EST, LEVL IV, 30-39 MIN: ICD-10-PCS | Mod: S$GLB,,, | Performed by: INTERNAL MEDICINE

## 2023-05-25 PROCEDURE — 99999 PR PBB SHADOW E&M-EST. PATIENT-LVL III: CPT | Mod: PBBFAC,,, | Performed by: INTERNAL MEDICINE

## 2023-05-25 PROCEDURE — 3078F PR MOST RECENT DIASTOLIC BLOOD PRESSURE < 80 MM HG: ICD-10-PCS | Mod: CPTII,S$GLB,, | Performed by: INTERNAL MEDICINE

## 2023-05-25 PROCEDURE — 1126F AMNT PAIN NOTED NONE PRSNT: CPT | Mod: CPTII,S$GLB,, | Performed by: INTERNAL MEDICINE

## 2023-05-25 PROCEDURE — 1160F PR REVIEW ALL MEDS BY PRESCRIBER/CLIN PHARMACIST DOCUMENTED: ICD-10-PCS | Mod: CPTII,S$GLB,, | Performed by: INTERNAL MEDICINE

## 2023-05-25 PROCEDURE — 93284 CARDIAC DEVICE CHECK - IN CLINIC & HOSPITAL: ICD-10-PCS | Mod: 26,HCNC,, | Performed by: INTERNAL MEDICINE

## 2023-05-25 PROCEDURE — 3078F DIAST BP <80 MM HG: CPT | Mod: CPTII,S$GLB,, | Performed by: INTERNAL MEDICINE

## 2023-05-25 PROCEDURE — 99999 PR PBB SHADOW E&M-EST. PATIENT-LVL III: ICD-10-PCS | Mod: PBBFAC,,, | Performed by: INTERNAL MEDICINE

## 2023-05-25 PROCEDURE — 93284 PRGRMG EVAL IMPLANTABLE DFB: CPT | Mod: 26,HCNC,, | Performed by: INTERNAL MEDICINE

## 2023-05-25 PROCEDURE — 1160F RVW MEDS BY RX/DR IN RCRD: CPT | Mod: CPTII,S$GLB,, | Performed by: INTERNAL MEDICINE

## 2023-05-25 PROCEDURE — 3074F SYST BP LT 130 MM HG: CPT | Mod: CPTII,S$GLB,, | Performed by: INTERNAL MEDICINE

## 2023-05-25 PROCEDURE — 1126F PR PAIN SEVERITY QUANTIFIED, NO PAIN PRESENT: ICD-10-PCS | Mod: CPTII,S$GLB,, | Performed by: INTERNAL MEDICINE

## 2023-05-25 PROCEDURE — 1159F MED LIST DOCD IN RCRD: CPT | Mod: CPTII,S$GLB,, | Performed by: INTERNAL MEDICINE

## 2023-05-25 RX ORDER — CARVEDILOL 6.25 MG/1
6.25 TABLET ORAL 2 TIMES DAILY
Qty: 180 TABLET | Refills: 3 | Status: SHIPPED | OUTPATIENT
Start: 2023-05-25 | End: 2023-07-31

## 2023-05-25 NOTE — PROGRESS NOTES
Pt. Seen in clinic for device interrogation while seeing Dr. Guerrero. Device [employer] increased potential for reduced energy or no energy delivered during high voltage therapy when programmed AX > B. Per Dr. Guerrero shock pathways reprogrammed to B > AX

## 2023-05-25 NOTE — PROGRESS NOTES
Subjective:    Patient ID:  Ankit Ruff is a 82 y.o. male who presents for follow-up of PAF      HPI  He comes for follow up with lightheadedness and dizziness as well as slow heart rate.  No palpitations.  Leg swelling unchanged.  Also complaining of swollen and painful breasts    Functional class 2    Review of Systems   Constitutional: Negative for decreased appetite, malaise/fatigue, weight gain and weight loss.   Cardiovascular:  Negative for chest pain, dyspnea on exertion, leg swelling, palpitations and syncope.   Respiratory:  Negative for cough and shortness of breath.    Gastrointestinal: Negative.    Neurological:  Negative for weakness.   All other systems reviewed and are negative.     Objective:      Physical Exam  Vitals and nursing note reviewed.   Constitutional:       Appearance: Normal appearance. He is well-developed.   HENT:      Head: Normocephalic.   Eyes:      Pupils: Pupils are equal, round, and reactive to light.   Neck:      Thyroid: No thyromegaly.      Vascular: No carotid bruit or JVD.   Cardiovascular:      Rate and Rhythm: Normal rate and regular rhythm.      Chest Wall: PMI is not displaced.      Pulses: Normal pulses and intact distal pulses.      Heart sounds: Normal heart sounds. No murmur heard.    No gallop.   Pulmonary:      Effort: Pulmonary effort is normal.      Breath sounds: Normal breath sounds.   Abdominal:      Palpations: Abdomen is soft. There is no mass.      Tenderness: There is no abdominal tenderness.   Musculoskeletal:         General: Normal range of motion.      Cervical back: Normal range of motion and neck supple.   Skin:     General: Skin is warm.   Neurological:      Mental Status: He is alert and oriented to person, place, and time.      Sensory: No sensory deficit.      Deep Tendon Reflexes: Reflexes are normal and symmetric.       AICD interrogation reveals no atrial fibrillation.    Assessment:       1. Ischemic cardiomyopathy    2. Implantable  cardioverter-defibrillator (ICD) generator end of life    3. PAF (paroxysmal atrial fibrillation)    4. S/P CABG x 4    5. Essential hypertension         Plan:   Stop Aldactone, decrease Coreg to 6.5 mg p.o. b.i.d.  Continue all cardiac medications  Regular exercise program  Weight loss  Will refer to Dr. Reich for consideration of Watchman    We discussed the rationale, risks, benefits, and alternatives for seeking left atrial appendage closure with the Watchman device.  Shared decision making using the ACC/HRS algorithms was used to help guide the discussion.  The benefits of AJ closure include risk reduction for ischemic stroke roughly similar to that of chronic OAC without chronic OAC.  The risks include a small risk of death, tamponade, need for emergency heart surgery, device embolization, stroke, bleeding, vascular injury, device related thrombus.  We addressed the alternatives of oral anticoagulation or no OAC with the high risk of stroke.  They would like to proceed with left atrial appendage closure.

## 2023-05-31 ENCOUNTER — OFFICE VISIT (OUTPATIENT)
Dept: CARDIOLOGY | Facility: CLINIC | Age: 83
End: 2023-05-31
Payer: MEDICARE

## 2023-05-31 VITALS
WEIGHT: 206.13 LBS | DIASTOLIC BLOOD PRESSURE: 75 MMHG | HEART RATE: 54 BPM | HEIGHT: 70 IN | BODY MASS INDEX: 29.51 KG/M2 | SYSTOLIC BLOOD PRESSURE: 124 MMHG

## 2023-05-31 DIAGNOSIS — I50.22 CHRONIC HFREF (HEART FAILURE WITH REDUCED EJECTION FRACTION): ICD-10-CM

## 2023-05-31 DIAGNOSIS — I48.0 PAF (PAROXYSMAL ATRIAL FIBRILLATION): Primary | ICD-10-CM

## 2023-05-31 DIAGNOSIS — Z91.89 AT HIGH RISK FOR BLEEDING: ICD-10-CM

## 2023-05-31 DIAGNOSIS — Z95.810 PRESENCE OF AUTOMATIC (IMPLANTABLE) CARDIAC DEFIBRILLATOR: ICD-10-CM

## 2023-05-31 DIAGNOSIS — D69.6 THROMBOCYTOPENIA: ICD-10-CM

## 2023-05-31 DIAGNOSIS — Z01.810 PREOPERATIVE CARDIOVASCULAR EXAMINATION: ICD-10-CM

## 2023-05-31 DIAGNOSIS — I25.5 ISCHEMIC CARDIOMYOPATHY: ICD-10-CM

## 2023-05-31 DIAGNOSIS — Z95.1 S/P CABG X 4: ICD-10-CM

## 2023-05-31 PROCEDURE — 3074F PR MOST RECENT SYSTOLIC BLOOD PRESSURE < 130 MM HG: ICD-10-PCS | Mod: HCNC,CPTII,S$GLB, | Performed by: INTERNAL MEDICINE

## 2023-05-31 PROCEDURE — 99999 PR PBB SHADOW E&M-EST. PATIENT-LVL III: ICD-10-PCS | Mod: PBBFAC,,, | Performed by: INTERNAL MEDICINE

## 2023-05-31 PROCEDURE — 1126F PR PAIN SEVERITY QUANTIFIED, NO PAIN PRESENT: ICD-10-PCS | Mod: HCNC,CPTII,S$GLB, | Performed by: INTERNAL MEDICINE

## 2023-05-31 PROCEDURE — 1159F PR MEDICATION LIST DOCUMENTED IN MEDICAL RECORD: ICD-10-PCS | Mod: HCNC,CPTII,S$GLB, | Performed by: INTERNAL MEDICINE

## 2023-05-31 PROCEDURE — 99205 OFFICE O/P NEW HI 60 MIN: CPT | Mod: HCNC,25,S$GLB, | Performed by: INTERNAL MEDICINE

## 2023-05-31 PROCEDURE — 93010 ELECTROCARDIOGRAM REPORT: CPT | Mod: HCNC,S$GLB,, | Performed by: INTERNAL MEDICINE

## 2023-05-31 PROCEDURE — 3074F SYST BP LT 130 MM HG: CPT | Mod: HCNC,CPTII,S$GLB, | Performed by: INTERNAL MEDICINE

## 2023-05-31 PROCEDURE — 3078F DIAST BP <80 MM HG: CPT | Mod: HCNC,CPTII,S$GLB, | Performed by: INTERNAL MEDICINE

## 2023-05-31 PROCEDURE — 1159F MED LIST DOCD IN RCRD: CPT | Mod: HCNC,CPTII,S$GLB, | Performed by: INTERNAL MEDICINE

## 2023-05-31 PROCEDURE — 99499 RISK ADDL DX/OHS AUDIT: ICD-10-PCS | Mod: HCNC,S$GLB,, | Performed by: INTERNAL MEDICINE

## 2023-05-31 PROCEDURE — 3078F PR MOST RECENT DIASTOLIC BLOOD PRESSURE < 80 MM HG: ICD-10-PCS | Mod: HCNC,CPTII,S$GLB, | Performed by: INTERNAL MEDICINE

## 2023-05-31 PROCEDURE — 99205 PR OFFICE/OUTPT VISIT, NEW, LEVL V, 60-74 MIN: ICD-10-PCS | Mod: HCNC,25,S$GLB, | Performed by: INTERNAL MEDICINE

## 2023-05-31 PROCEDURE — 99999 PR PBB SHADOW E&M-EST. PATIENT-LVL III: CPT | Mod: PBBFAC,,, | Performed by: INTERNAL MEDICINE

## 2023-05-31 PROCEDURE — 93010 EKG 12-LEAD: ICD-10-PCS | Mod: HCNC,S$GLB,, | Performed by: INTERNAL MEDICINE

## 2023-05-31 PROCEDURE — 99499 UNLISTED E&M SERVICE: CPT | Mod: HCNC,S$GLB,, | Performed by: INTERNAL MEDICINE

## 2023-05-31 PROCEDURE — 93005 ELECTROCARDIOGRAM TRACING: CPT | Mod: PO

## 2023-05-31 PROCEDURE — 1126F AMNT PAIN NOTED NONE PRSNT: CPT | Mod: HCNC,CPTII,S$GLB, | Performed by: INTERNAL MEDICINE

## 2023-05-31 RX ORDER — SODIUM CHLORIDE 0.9 % (FLUSH) 0.9 %
10 SYRINGE (ML) INJECTION
Status: DISCONTINUED | OUTPATIENT
Start: 2023-05-31 | End: 2023-06-22 | Stop reason: CLARIF

## 2023-05-31 RX ORDER — SODIUM CHLORIDE 9 MG/ML
INJECTION, SOLUTION INTRAVENOUS ONCE
Status: CANCELLED | OUTPATIENT
Start: 2023-05-31 | End: 2023-05-31

## 2023-05-31 NOTE — PROGRESS NOTES
Structural Cardiology Clinic Note      Patient: Ankit Ruff, 1940, [id number]  Primary Care Provider: Quynh Alves MD     Chief Complaint/Reason for Referral: GENET hernández     Subjective:       Ankit Ruff is a 82 y.o. male who presents for second opinion. They are unaccompanied. He was referred by my colleague Dr. Romeo Guerrero     [place]  He has easy bruising and bleeding with minimal trauma.  He almost passed out when he tried Entresto.  He has not had any hematuria or hematochezia or melena.  He does not have any GERD or peptic ulcer disease to his knowledge.    Focused Past History includes:  PAF  Ischemic cardiomyopathy with HFrEF  TTE 7/2022 by report:  Moderately enlarged LV with EF 35%, normal RV, severe left atrial enlargement mild aortic stenosis  CAD status post CABG  Carotid stenosis  ICD  Last check 2/2023: AF 1.8%, longest 14h.  0.3%  Thrombocytopenia (Plt 50-60k)  Anemia Hb 11-12  Normal renal afunction for age    Review of Systems  Constitutional: negative for fevers, night sweats, and weight loss  Eyes: negative for visual disturbance, diplopia  Respiratory: negative for cough, hemoptysis, sputum, and wheezing  Cardiovascular: see HPI  Gastrointestinal: negative for abdominal pain, bright red blood per rectum, change in bowel habits, dysphagia, melena, and reflux symptoms  Genitourinary:negative for dysuria, frequency, and hematuria  Hematologic/lymphatic: negative for bleeding, easy bruising, and lymphadenopathy  Musculoskeletal:negative for arthralgias, back pain, and myalgias  Neurological: negative for gait problems, paresthesia, speech problems, vertigo, and weakness  Behavioral/Psych: negative for excessive alcohol consumption, illegal drug usage, and sleep disturbance    ----------------------------  Anxiety  Arthritis  Atrial fibrillation  CAD (coronary artery disease)  Cataract      Comment:  OU  CHF (congestive heart  failure)  Defibrillator discharge  Hearing loss  Heart failure  Hyperlipidemia  ICD (implantable cardiac defibrillator) in place  Ischemic cardiomyopathy  Myocardial infarction  Sciatica      Comment:  had seen Dr. Amy Canela in the past  Squamous cell carcinoma      Comment:  Left cheek    ----------------------------  Appendectomy  Cardiac surgery      Comment:  cabg  Defibulater  Fracture surgery  Hernia repair  Injection of anesthetic agent around nerve      Comment:  Procedure: diagnsotic block to the genicular nerve                branches to the right knee;  Surgeon: Sj Gonzales MD;  Location: [facility] OR;  Service: Orthopedics;               Laterality: Right;  Internal neurolysis using operating microscope      Comment:  Procedure: cooled radiofrequency ablation to the                genicular nerve branches of the right knee;  Surgeon:                Sj Gonzales MD;  Location: [facility] OR;  Service:                Orthopedics;  Laterality: Right;  Knee arthroscopy  Skin biopsy  Tonsillectomy  Trigger finger release      Comment:  Procedure: RELEASE, TRIGGER FINGER;  Surgeon: Bay Romo MD;  Location: [facility] OR;  Service: Orthopedics;               Laterality: Left;  Procedure:  Left ring finger trigger                release    Position:  Supine    Anesthesia:  Anesthesia                must be involved due to the presence of pacemaker                defibrillator.  The patient will undergo local anesthesia               only for this procedure    Equipment:  Basic handset     No family history on file.  Social History     Tobacco Use    Smoking status: Former     Types: Cigarettes     Quit date: 1977     Years since quittin.8    Smokeless tobacco: Never   Substance Use Topics    Alcohol use: Yes     Comment: socially    Drug use: No       Current Outpatient Medications   Medication Sig Dispense Refill    apixaban (ELIQUIS) 5 mg Tab Take one  "tablet (5mg) by mouth two times daily 60 tablet 11    aspirin (ECOTRIN) 81 MG EC tablet Take 81 mg by mouth every evening.      carvediloL (COREG) 6.25 MG tablet Take 1 tablet (6.25 mg total) by mouth 2 (two) times daily. 180 tablet 3    furosemide (LASIX) 20 MG tablet Take 1 tablet (20 mg total) by mouth daily as needed (TAKE i TABLET DAILY AS NEEDED). 45 tablet 3    gabapentin (NEURONTIN) 300 MG capsule Take 1 capsule (300 mg total) by mouth 2 (two) times daily. 180 capsule 1    meloxicam (MOBIC) 15 MG tablet Take 1 tablet (15 mg total) by mouth daily as needed for Pain. 90 tablet 1    pantoprazole (PROTONIX) 40 MG tablet TAKE 1 TABLET EVERY DAY 90 tablet 3    rosuvastatin (CRESTOR) 10 MG tablet TAKE 1 TABLET EVERY DAY 90 tablet 3    SHINGRIX, PF, 50 mcg/0.5 mL injection       tamsulosin (FLOMAX) 0.4 mg Cap Take 1 capsule (0.4 mg total) by mouth once daily. 90 capsule 1     No current facility-administered medications for this visit.        Objective:      Physical Exam  /75 (BP Location: Right arm, Patient Position: Sitting, BP Method: Medium (Automatic))   Pulse (!) 54   Ht 5' 9.5" (1.765 m)   Wt 93.5 kg (206 lb 2.1 oz)   BMI 30.00 kg/m²   Body surface area is 2.14 meters squared.  Body mass index is 30 kg/m².    General appearance: alert, appears stated age, cooperative, and no distress  Head: Normocephalic, without obvious abnormality, atraumatic  Neck: no carotid bruit, no JVD, and supple, symmetrical, trachea midline  Lungs: clear to auscultation bilaterally  Heart: regular rate and rhythm; S1, S2 normal, no murmur, click, rub or gallop  Abdomen: soft, non-tender, no distended  Extremities: extremities atraumatic, no pitting edema  Skin: warm, no cyanosis, no pathologic ecchymosis in exposed portions  Neurologic: Grossly normal. A&O x3      Lab Review   Lab Results   Component Value Date    WBC 3.83 (L) 10/31/2022    HGB 12.1 (L) 10/31/2022    HCT 35.7 (L) 10/31/2022    MCV 96 10/31/2022    PLT 56 " (L) 10/31/2022         BMP  Lab Results   Component Value Date     10/31/2022    K 4.2 10/31/2022     10/31/2022    CO2 23 10/31/2022    BUN 16 10/31/2022    CREATININE 0.76 10/31/2022    CALCIUM 8.2 (L) 10/31/2022    ANIONGAP 9 10/31/2022    ESTGFRAFRICA >60.0 07/25/2022    EGFRNONAA >60.0 07/25/2022       Lab Results   Component Value Date    LABPROT 13.0 (H) 10/17/2016    ALBUMIN 3.6 10/31/2022       Lab Results   Component Value Date    ALT 33 10/31/2022    AST 45 10/31/2022    ALKPHOS 61 10/31/2022    BILITOT 1.3 10/31/2022       Lab Results   Component Value Date    TSH 1.650 07/25/2022       Lab Results   Component Value Date    CHOL 125 07/25/2022    CHOL 127 07/23/2021    CHOL 135 03/01/2021     Lab Results   Component Value Date    HDL 49 07/25/2022    HDL 55 07/23/2021    HDL 59 03/01/2021     Lab Results   Component Value Date    LDLCALC 63.2 07/25/2022    LDLCALC 58.0 (L) 07/23/2021    LDLCALC 63.6 03/01/2021     Lab Results   Component Value Date    TRIG 64 07/25/2022    TRIG 70 07/23/2021    TRIG 62 03/01/2021     Lab Results   Component Value Date    CHOLHDL 39.2 07/25/2022    CHOLHDL 43.3 07/23/2021    CHOLHDL 43.7 03/01/2021     [id number] score   Sex: 0  Female (1 point)   Male (0 points)    Age: 2   ?64 years old (0 points)   65 to 74 years old (1 point)   ?75 years old (2 points)    Comorbidities: 1+0+0+0+1   Heart failure (1 point)   Hypertension (1 point)   Diabetes mellitus (1 point)   History of stroke, TIA, or thromboembolism (2 points)   Vascular disease (history of MI, PAD, or aortic atherosclerosis) (1 point)    Total points: 4    Unadjusted stroke rate: [Radha L, Aly M, Carlos GY. Evaluation of risk stratification schemes for ischaemic stroke and bleeding in [id number] patients with atrial fibrillation: the [place] Atrial Fibrillation cohort study. Eur Heart J 2012; 33:1500.]  0 points: 0.2% per year  1 point:  0.6% per year  2 points: 2.2% per year  3 points: 3.2% per  year  4 points: 4.8% per year  5 points: 7.2% per year  6 points: 9.7% per year  7 points: 11.1% per year  8 points: 11% per year  9 points: 12.2% per year    HAS-BLED score:  Hypertension (1 point) : 0  Abnormal liver function (cirrhosis, bilirubin >2xULN or ALT>3xULN) (1 point): 0  Abnormal renal function (dialysis, renal transplant or Cr>2.26) (1 point): 0  Stroke (1 point): 0  Bleeding tendency or predisposition (1 point): 1  Labile INRs in patients taking warfarin (<60%TTR or high/labile INR) (1 point): 0  Elderly: age greater than 65 years (1 point): 1  Drugs: concomitant antiplatelet agents (eg, aspirin, clopidogrel, ticlopidine, nonsteroidal antiinflammatory agents) (1 point): 1  Drugs: concomitant excess alcohol use (1 point): 0    Total score: 3    0 points: 1.13 bleeds per 100 patient-years  1 point:  1.02 bleeds per 100 patient-years  2 points: 1.88 bleeds per 100 patient-years  3 points: 3.74 bleeds per 100 patient-years  4 points: 8.70 bleeds per 100 patient-years  5 to 9 points: Insufficient data    [Lip GY. Implications of the LYN(2)DS(2)-VASc and HAS-BLED Scores for thromboprophylaxis in atrial fibrillation. Am J Med 2011; 124:111.]    EKG today:  NSR, LVH, IVCD 122   Assessment & Plan:      This is a 82 y.o. very pleasant  male with PAF, compensated systolic heart failure and high bleeding risk.       We discussed the rationale, risks, benefits, and alternatives for seeking left atrial appendage closure with the Watchman device.  Shared decision making using the ACC/HRS algorithms was used to help guide the discussion.  The benefits of AJ closure include risk reduction for ischemic stroke roughly similar to that of chronic OAC without chronic OAC.  The risks include a small risk of death, tamponade, need for emergency heart surgery, device embolization, stroke, bleeding, vascular injury, device related thrombus.  We addressed the alternatives of oral anticoagulation or no OAC with the  high risk of stroke.  They would like to proceed with left atrial appendage closure.  Shared decision making performed by Dr. Guerrero.    1. PAF (paroxysmal atrial fibrillation)  IN OFFICE EKG 12-LEAD (to Muse)      2. Presence of automatic (implantable) cardiac defibrillator        3. S/P CABG x 4        4. Ischemic cardiomyopathy        5. Chronic HFrEF (heart failure with reduced ejection fraction)        6. Thrombocytopenia        7. At high risk for bleeding             MERCED for AJ closure planning  Then proceed with AJ closure using Watchman FLX  Plan for DAPT immediately after procedure with post procedural checks at 6 weeks by MERCED and 6 months by CT  Emphasized the importance of modifying lifestyle related risk factors including smoking cessation, limiting alcohol intake, exercise, diet most resembling a Mediterranean diet.    I appreciate the opportunity to participate in Ankit Ruff 's care today.  Please follow up with me in 10 weeks.      Ngoc Ndiaye MD, [facility]  Interventional Cardiology/Structural Heart Disease  [facility]  Office: [phone]     Parts of this note were completed using voice recognition software. Please excuse any misspellings or syntax errors and reach out to me with questions.

## 2023-05-31 NOTE — PATIENT INSTRUCTIONS
MERCED (ultrasound down the throat) to plan he Watchman procedure  Then proceed to Watchman  Return in 10 weeks        MERCED    Arrive for your procedure at:  [facility] TUES. 6/27/23 @ 11 AM.  YOU MAY ENTER THROUGH THE MAIN ENTRANCE.  LET THEM KNOW YOU ARE THERE FOR AN OUTPATIENT PROCEDURE.  THE PROCEDURE WILL START AT 12 NOON WITH DR. PERFECTO NASH.      FASTING:  You MAY NOT have anything to eat or drink AFTER MIDNIGHT.  If your procedure is scheduled in the afternoon, you may have a LIGHT BREAKFAST BEFORE 6:00 A.M.  For example: Two slices of toast; black coffee or black tea.    MEDICATIONS:  You may take your regular morning medications with a small sip of water.     Hold or adjust the following:  Fluid pills.  Diabetes medications.    Continue: Coumadin, Plavix, Effient, Aspirin, Anti-coagulants, Blood thinners    Please refer to pre-op instructions received from [facility].

## 2023-06-21 DIAGNOSIS — Z91.89 AT RISK FOR BLEEDING: ICD-10-CM

## 2023-06-21 DIAGNOSIS — I48.0 PAF (PAROXYSMAL ATRIAL FIBRILLATION): Primary | ICD-10-CM

## 2023-06-21 RX ORDER — MUPIROCIN 20 MG/G
OINTMENT TOPICAL 2 TIMES DAILY
Status: CANCELLED | OUTPATIENT
Start: 2023-06-21 | End: 2023-06-26

## 2023-06-21 RX ORDER — CHLORHEXIDINE GLUCONATE ORAL RINSE 1.2 MG/ML
15 SOLUTION DENTAL 2 TIMES DAILY
Qty: 150 ML | Refills: 0 | Status: SHIPPED | OUTPATIENT
Start: 2023-06-21 | End: 2023-06-26

## 2023-06-21 RX ORDER — NAPROXEN SODIUM 220 MG/1
324 TABLET, FILM COATED ORAL
Status: CANCELLED | OUTPATIENT
Start: 2023-06-21 | End: 2023-06-21

## 2023-06-21 RX ORDER — SODIUM CHLORIDE 9 MG/ML
INJECTION, SOLUTION INTRAVENOUS CONTINUOUS
Status: CANCELLED | OUTPATIENT
Start: 2023-06-21

## 2023-06-21 RX ORDER — CHLORHEXIDINE GLUCONATE ORAL RINSE 1.2 MG/ML
15 SOLUTION DENTAL 2 TIMES DAILY
Status: CANCELLED | OUTPATIENT
Start: 2023-06-21 | End: 2023-06-26

## 2023-06-21 RX ORDER — MUPIROCIN 20 MG/G
OINTMENT TOPICAL 2 TIMES DAILY
Qty: 1 G | Refills: 0 | Status: SHIPPED | OUTPATIENT
Start: 2023-06-21 | End: 2023-06-26

## 2023-06-28 ENCOUNTER — TELEPHONE (OUTPATIENT)
Dept: PRIMARY CARE CLINIC | Facility: CLINIC | Age: 83
End: 2023-06-28
Payer: MEDICARE

## 2023-06-28 NOTE — TELEPHONE ENCOUNTER
CHIEF COMPLAINT    Chief Complaint   Patient presents with   • Finger Injury   • Laceration       HPI      Herber Castanon is a 17 year old male presenting to the emergency room department for evaluation of left index finger laceration.     W/C: [employer]    Patient states that he accidentally cut his left 2nd finger with a knife at work today. Injury occurred around 12:50 PM. Minimal bleeding PTA.   Full ROM of digit.   No suspected FB.  No tingling, numbness  Last tetanus 2010.     Allergies    ALLERGIES:  No Known Allergies    Current Medications     No current facility-administered medications for this encounter.      Current Outpatient Medications   Medication Sig   • IBUPROFEN PO        Past Medical History    History reviewed. No pertinent past medical history.    Surgical History    History reviewed. No pertinent surgical history.    Social History    Social History     Socioeconomic History   • Marital status: Single     Spouse name: None   • Number of children: None   • Years of education: None   • Highest education level: None   Social Needs   • Financial resource strain: None   • Food insecurity - worry: None   • Food insecurity - inability: None   • Transportation needs - medical: None   • Transportation needs - non-medical: None   Occupational History   • None   Tobacco Use   • Smoking status: Never Smoker   • Smokeless tobacco: Never Used   Substance and Sexual Activity   • Alcohol use: No     Frequency: Never   • Drug use: No   • Sexual activity: None   Other Topics Concern   • None   Social History Narrative   • None       Family History    No family history on file.    REVIEW OF SYSTEMS      ALL 13 SYSTEMS REVIEWED AND NEGATIVE OR NONCONTRIBUTORY UNLESS OTHERWISE NOTED IN HPI      PHYSICAL EXAM     ED Triage Vitals   ED Triage Vitals Group      Temp 12/11/18 1322 98.6 °F (37 °C)      Heart Rate 12/11/18 1323 94      Resp 12/11/18 1323 16      BP 12/11/18 1323 (!) 158/91      SpO2 12/11/18 1323  Patient arrived unannounced at clinic and asked to speak with a nurse. Patient stated his LLE swelled up Monday night 6/26/23. On Tuesday 6/27/23, patient had a MERCED in preparation for a Watchman procedure planned for Friday 6/30/23. The swelling was not visible upon assessment. Patient was encouraged to elevate his LE whenever possible and to wear compression stockings. Patient was urged to notify his cardiologist about his LE edema. Patient became angry and stated he only wanted the nurse to inform Dr. Alves that his leg had swelled Monday night. I offered the pt an appointment with Dr. Alves on 2/29/23. Patient declined to schedule to be seen. Nursing supervisor Renuka Baum RN notified of the situation.     99 %      EtCO2 mmHg --       Height 12/11/18 1322 5' 11\" (1.803 m)      Weight 12/11/18 1322 175 lb 14.8 oz (79.8 kg)      Weight Scale Used 12/11/18 1322 ED Actual       Gen:   AAOx3 in NAD  Ext:  No clubbing, cyanosis, or significant edema.  Equal UE/LE pulses. No joint swelling or erythema  Skin:  + laceration to left 2nd digit, near MCP joint. 2.5 cm Minimal bleeding. No obvious FB. Full ROM of joint. Well perfused, no significant rashes. No jaundice  Neuro: No focal Neuro deficits with equal UE/LE strength and sensation.  Psych:Alert and interactive with normal affect and interaction.      Laceration Repair  Date/Time: 12/11/2018 3:07 PM  Performed by: Lanny Raza PA-C  Authorized by: Lanny Raza PA-C     Consent:     Consent obtained:  Verbal    Consent given by:  Patient    Risks discussed:  Infection, pain, poor cosmetic result, poor wound healing and tendon damage  Anesthesia (see MAR for exact dosages):     Anesthesia method:  Local infiltration    Local anesthetic:  Lidocaine 1% w/o epi  Laceration details:     Location:  Finger    Finger location:  L index finger    Length (cm):  2.5  Repair type:     Repair type:  Simple  Pre-procedure details:     Preparation:  Patient was prepped and draped in usual sterile fashion  Exploration:     Wound exploration: wound explored through full range of motion      Wound extent: no foreign bodies/material noted and no tendon damage noted      Contaminated: no    Treatment:     Wound cleansed with: dermal .    Amount of cleaning:  Standard  Skin repair:     Repair method:  Sutures    Suture size:  5-0    Suture material:  Prolene    Suture technique:  Simple interrupted    Number of sutures:  5  Approximation:     Approximation:  Close  Post-procedure details:     Dressing:  Antibiotic ointment and non-adherent dressing    Patient tolerance of procedure:  Tolerated well, no immediate complications      MDM    Labs  No results found for this visit  on 12/11/18.      Radiology  No orders to display       Medications   lidocaine HCl (XYLOCAINE) 1 % injection 100 mg ( Injection Given 12/11/18 1352)   diphtheria-pertussis (acellular)-tetanus (BOOSTRIX) vaccine 0.5 mL (0.5 mLs Intramuscular Given 12/11/18 1359)       Diagnosis:  ED Diagnosis        Final diagnosis    Laceration of left index finger without foreign body without damage to nail, initial encounter              Rest. Keep involved area dry and clean for the next 24 hours. After 24 hours you may remove dressing and carefully wash the involved area with soap and water, rinse well, pat dry. No soaking. Can apply a thin layer of an antibiotic ointment several times a day for the next 2-3 days. Sutures need to be removed in 7-10 days. Follow up sooner if fever, chills, increasing redness, warmth or foul drainage is noted.        Summary of your Discharge Medications      You have not been prescribed any medications.           Follow Up:  Junaid Bella DO  [address]  [facility] [address] [phone]      As needed, If symptoms worsen    [facility]  [address] [facility] [address]  [phone]    7-10 days, For suture removal     Patient was instructed to return to the ED immediately if symptoms worsen or any new unusual symptoms arise.         Recheck on patient. Discussed with patient ED findings and plan for discharge. Patient was given ED warnings, discharge instructions, and follow up information to go home with. Patient understands and agrees with plan for discharge. Any questions have been answered.      Closure:  The patient understands that this is a provisional diagnosis. Provisional diagnosis can and do change. The diagnosis that you are discharged with today is based on the symptoms with which you presented today. If any new symptoms occur or worsen, you should seek immediate attention for re-evaluation.  Any symptoms that persist or fail to completely  resolve require further evaluation by your other healthcare provider(s).       Lanny Raza PA-C  12/11/18 [id number]

## 2023-06-30 PROBLEM — Z95.818 PRESENCE OF WATCHMAN LEFT ATRIAL APPENDAGE CLOSURE DEVICE: Status: ACTIVE | Noted: 2023-06-30

## 2023-07-03 ENCOUNTER — TELEPHONE (OUTPATIENT)
Dept: CARDIOLOGY | Facility: CLINIC | Age: 83
End: 2023-07-03
Payer: MEDICARE

## 2023-07-03 NOTE — TELEPHONE ENCOUNTER
Post Watchman procedure follow up call. Patient states feeling well. States groin site without any signs or symptoms of infection.Denies fever, shortness of breath or pain. Patient verbalizes having stopped Eliquis and taking aspirin and Plavix as instructed. Will keep follow up appointment with Dr. Ndiaye at the end of this month. Advised to call for any questions or concerns. Verbalized understanding.

## 2023-07-14 ENCOUNTER — OFFICE VISIT (OUTPATIENT)
Dept: PRIMARY CARE CLINIC | Facility: CLINIC | Age: 83
End: 2023-07-14
Payer: MEDICARE

## 2023-07-14 VITALS
HEART RATE: 58 BPM | OXYGEN SATURATION: 97 % | BODY MASS INDEX: 29.01 KG/M2 | DIASTOLIC BLOOD PRESSURE: 58 MMHG | HEIGHT: 70 IN | SYSTOLIC BLOOD PRESSURE: 124 MMHG | WEIGHT: 202.63 LBS

## 2023-07-14 DIAGNOSIS — Z09 HOSPITAL DISCHARGE FOLLOW-UP: Primary | ICD-10-CM

## 2023-07-14 DIAGNOSIS — D61.818 PANCYTOPENIA: ICD-10-CM

## 2023-07-14 DIAGNOSIS — D69.6 THROMBOCYTOPENIA: ICD-10-CM

## 2023-07-14 DIAGNOSIS — Z95.818 PRESENCE OF WATCHMAN LEFT ATRIAL APPENDAGE CLOSURE DEVICE: ICD-10-CM

## 2023-07-14 PROCEDURE — 99212 OFFICE O/P EST SF 10 MIN: CPT | Mod: HCNC,25,S$GLB, | Performed by: INTERNAL MEDICINE

## 2023-07-14 PROCEDURE — 1111F DSCHRG MED/CURRENT MED MERGE: CPT | Mod: HCNC,CPTII,S$GLB, | Performed by: INTERNAL MEDICINE

## 2023-07-14 PROCEDURE — 3288F FALL RISK ASSESSMENT DOCD: CPT | Mod: HCNC,CPTII,S$GLB, | Performed by: INTERNAL MEDICINE

## 2023-07-14 PROCEDURE — 3074F PR MOST RECENT SYSTOLIC BLOOD PRESSURE < 130 MM HG: ICD-10-PCS | Mod: HCNC,CPTII,S$GLB, | Performed by: INTERNAL MEDICINE

## 2023-07-14 PROCEDURE — 90471 IMMUNIZATION ADMIN: CPT | Mod: HCNC,S$GLB,, | Performed by: INTERNAL MEDICINE

## 2023-07-14 PROCEDURE — 1101F PT FALLS ASSESS-DOCD LE1/YR: CPT | Mod: HCNC,CPTII,S$GLB, | Performed by: INTERNAL MEDICINE

## 2023-07-14 PROCEDURE — 1126F PR PAIN SEVERITY QUANTIFIED, NO PAIN PRESENT: ICD-10-PCS | Mod: HCNC,CPTII,S$GLB, | Performed by: INTERNAL MEDICINE

## 2023-07-14 PROCEDURE — 99999 PR PBB SHADOW E&M-EST. PATIENT-LVL V: ICD-10-PCS | Mod: PBBFAC,HCNC,, | Performed by: INTERNAL MEDICINE

## 2023-07-14 PROCEDURE — 1159F PR MEDICATION LIST DOCUMENTED IN MEDICAL RECORD: ICD-10-PCS | Mod: HCNC,CPTII,S$GLB, | Performed by: INTERNAL MEDICINE

## 2023-07-14 PROCEDURE — 90471 TDAP VACCINE GREATER THAN OR EQUAL TO 7YO IM: ICD-10-PCS | Mod: HCNC,S$GLB,, | Performed by: INTERNAL MEDICINE

## 2023-07-14 PROCEDURE — 3074F SYST BP LT 130 MM HG: CPT | Mod: HCNC,CPTII,S$GLB, | Performed by: INTERNAL MEDICINE

## 2023-07-14 PROCEDURE — 1160F PR REVIEW ALL MEDS BY PRESCRIBER/CLIN PHARMACIST DOCUMENTED: ICD-10-PCS | Mod: HCNC,CPTII,S$GLB, | Performed by: INTERNAL MEDICINE

## 2023-07-14 PROCEDURE — 99999 PR PBB SHADOW E&M-EST. PATIENT-LVL V: CPT | Mod: PBBFAC,HCNC,, | Performed by: INTERNAL MEDICINE

## 2023-07-14 PROCEDURE — 1101F PR PT FALLS ASSESS DOC 0-1 FALLS W/OUT INJ PAST YR: ICD-10-PCS | Mod: HCNC,CPTII,S$GLB, | Performed by: INTERNAL MEDICINE

## 2023-07-14 PROCEDURE — 3078F PR MOST RECENT DIASTOLIC BLOOD PRESSURE < 80 MM HG: ICD-10-PCS | Mod: HCNC,CPTII,S$GLB, | Performed by: INTERNAL MEDICINE

## 2023-07-14 PROCEDURE — 90715 TDAP VACCINE GREATER THAN OR EQUAL TO 7YO IM: ICD-10-PCS | Mod: HCNC,S$GLB,, | Performed by: INTERNAL MEDICINE

## 2023-07-14 PROCEDURE — 90715 TDAP VACCINE 7 YRS/> IM: CPT | Mod: HCNC,S$GLB,, | Performed by: INTERNAL MEDICINE

## 2023-07-14 PROCEDURE — 1158F PR ADVANCE CARE PLANNING DISCUSS DOCUMENTED IN MEDICAL RECORD: ICD-10-PCS | Mod: HCNC,CPTII,S$GLB, | Performed by: INTERNAL MEDICINE

## 2023-07-14 PROCEDURE — 3078F DIAST BP <80 MM HG: CPT | Mod: HCNC,CPTII,S$GLB, | Performed by: INTERNAL MEDICINE

## 2023-07-14 PROCEDURE — 1159F MED LIST DOCD IN RCRD: CPT | Mod: HCNC,CPTII,S$GLB, | Performed by: INTERNAL MEDICINE

## 2023-07-14 PROCEDURE — 99212 PR OFFICE/OUTPT VISIT, EST, LEVL II, 10-19 MIN: ICD-10-PCS | Mod: HCNC,25,S$GLB, | Performed by: INTERNAL MEDICINE

## 2023-07-14 PROCEDURE — 1160F RVW MEDS BY RX/DR IN RCRD: CPT | Mod: HCNC,CPTII,S$GLB, | Performed by: INTERNAL MEDICINE

## 2023-07-14 PROCEDURE — 3288F PR FALLS RISK ASSESSMENT DOCUMENTED: ICD-10-PCS | Mod: HCNC,CPTII,S$GLB, | Performed by: INTERNAL MEDICINE

## 2023-07-14 PROCEDURE — 1158F ADVNC CARE PLAN TLK DOCD: CPT | Mod: HCNC,CPTII,S$GLB, | Performed by: INTERNAL MEDICINE

## 2023-07-14 PROCEDURE — 1111F PR DISCHARGE MEDS RECONCILED W/ CURRENT OUTPATIENT MED LIST: ICD-10-PCS | Mod: HCNC,CPTII,S$GLB, | Performed by: INTERNAL MEDICINE

## 2023-07-14 PROCEDURE — 1126F AMNT PAIN NOTED NONE PRSNT: CPT | Mod: HCNC,CPTII,S$GLB, | Performed by: INTERNAL MEDICINE

## 2023-07-14 RX ORDER — MELATONIN 5 MG
1 CAPSULE ORAL NIGHTLY
Qty: 90 CAPSULE | Refills: 3 | Status: SHIPPED | OUTPATIENT
Start: 2023-07-14 | End: 2023-07-14

## 2023-07-14 NOTE — PROGRESS NOTES
INTERNAL MEDICINE PROGRESS/URGENT CARE NOTE    CHIEF COMPLAINT     Chief Complaint   Patient presents with    Hospital Follow Up     Patient is here s/p Watchman procedure on 6/30/23 by Dr. Ndiaye.     Hand Injury     Patient has a cut to the dorsal side of his left hand which occurred yesterday 7/13/23.       HPI     Ankit Ruff is a 83 y.o.  male who presents for an urgent/follow up visit today.  Here today for hospital follow up.   On 6/30/23, he was seen in the hospital for insertion of a watchman device.   Today, he reports    Changes to his medicatiosn etc. : eliquis replaced with plavix from his cardiologist   Has a follow up with cardiology on 7/31 and 8/31     Past Medical History:  Past Medical History:   Diagnosis Date    Anticoagulant long-term use     Anxiety     Arthritis     At risk for bleeding 06/2023    Atrial fibrillation     CAD (coronary artery disease)     Cataract     OU    CHF (congestive heart failure)     Defibrillator discharge     Hearing loss     Heart failure     Hyperlipidemia     ICD (implantable cardiac defibrillator) in place     Ischemic cardiomyopathy     Myocardial infarction     Sciatica     had seen Dr. 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 Authorizing Provider   aspirin (ECOTRIN) 81 MG EC tablet Take 81 mg by mouth every evening.    Historical Provider   carvediloL (COREG) 6.25 MG tablet Take 1 tablet (6.25 mg total) by mouth 2 (two) times daily. 5/25/23   Romeo Guerrero MD   clopidogreL (PLAVIX) 75 mg tablet Take 1 tablet (75 mg total) by mouth once daily. 6/30/23   Ngoc Ndiaye MD   furosemide (LASIX) 20 MG tablet Take 1 tablet (20 mg total) by mouth daily as needed (TAKE i TABLET DAILY AS NEEDED). 2/7/23   Romeo Guerrero MD   gabapentin (NEURONTIN) 300 MG capsule Take 1 capsule (300 mg total) by mouth 2 (two) times daily.  "5/18/23   Quynh Alves MD   meloxicam (MOBIC) 15 MG tablet Take 1 tablet (15 mg total) by mouth daily as needed for Pain. 11/4/22   GUNNAR Jones MD   pantoprazole (PROTONIX) 40 MG tablet TAKE 1 TABLET EVERY DAY 2/1/23   Romeo Guerrero MD   rosuvastatin (CRESTOR) 10 MG tablet TAKE 1 TABLET EVERY DAY  Patient taking differently: Take 10 mg by mouth every evening. 11/21/22   Romeo Guerrero MD   tamsulosin (FLOMAX) 0.4 mg Cap Take 1 capsule (0.4 mg total) by mouth once daily. 5/18/23 5/17/24  Quynh Alves MD       Review of Systems:  Review of Systems      Advance Care Planning     Date: 07/14/2023    Power of   I initiated the process of voluntary advance care planning today and explained the importance of this process to the patient.  I introduced the concept of advance directives to the patient, as well. Then the patient received detailed information about the importance of designating a Health Care Power of  (HCPOA). He was also instructed to communicate with this person about their wishes for future healthcare, should he become sick and lose decision-making capacity. The patient has previously appointed a HCPOA            .AWV last done this year.      PHYSICAL EXAM     BP (!) 124/58 (BP Location: Right arm, Patient Position: Sitting, BP Method: Medium (Manual))   Pulse (!) 58   Ht 5' 9.5" (1.765 m)   Wt 91.9 kg (202 lb 9.6 oz)   SpO2 97%   BMI 29.49 kg/m²     GEN - A+OX4, NAD   HEENT - PERRL, EOMI, OP clear  Neck - No thyromegaly or cervical LAD. No thyroid masses felt.  CV - RRR, no m/r   Chest - CTAB, no wheezing or rhonchi  Abd - S/NT/ND/+BS.   Ext - 2+BDP and radial pulses. No C/C/E.  Skin - No rash.    LABS   Reviewed. Noted pancytopenia     ASSESSMENT/PLAN     Ankit Ruff is a 83 y.o. male with  1. Hospital discharge follow-up  S/p watchman device. Im very happy about this as hes always getting bitten by his horses, rided lawnmoves and farm " "equipemnt. Comes in today with a large cut on hier left hand says "my knives are very sharp" . So he was at very high risk of harm being on eliquis so very happy him and cardiology agreed to the watchman.   Also with thrombosytopenia so being off eliquis is good for many reasnos.     2. Presence of Watchman left atrial appendage closure device  Doing well. Has scheduled follow up with cardiology    3. Pancytopenia  Referral to hematology  -     Ambulatory referral/consult to Hematology / Oncology; Future; Expected date: 07/21/2023    4. Thrombocytopenia  Overview:  Pancytopenia. Referral to hematology today        WORRY SCORE 3    RTC in 3 months, sooner if needed and depending on labs.    Quynh Alves MD  Board Certified Internist/Geriatrician  [facility] ([place])      "

## 2023-07-19 ENCOUNTER — CLINICAL SUPPORT (OUTPATIENT)
Dept: CARDIOLOGY | Facility: HOSPITAL | Age: 83
End: 2023-07-19
Payer: MEDICARE

## 2023-07-19 DIAGNOSIS — Z95.810 PRESENCE OF AUTOMATIC (IMPLANTABLE) CARDIAC DEFIBRILLATOR: ICD-10-CM

## 2023-07-19 PROCEDURE — 93295 DEV INTERROG REMOTE 1/2/MLT: CPT | Mod: HCNC,,, | Performed by: INTERNAL MEDICINE

## 2023-07-19 PROCEDURE — 93296 REM INTERROG EVL PM/IDS: CPT | Mod: HCNC,PO | Performed by: INTERNAL MEDICINE

## 2023-07-19 PROCEDURE — 93295 CARDIAC DEVICE CHECK - REMOTE: ICD-10-PCS | Mod: HCNC,,, | Performed by: INTERNAL MEDICINE

## 2023-07-20 ENCOUNTER — LAB VISIT (OUTPATIENT)
Dept: LAB | Facility: HOSPITAL | Age: 83
End: 2023-07-20
Attending: FAMILY MEDICINE
Payer: MEDICARE

## 2023-07-20 DIAGNOSIS — D69.6 THROMBOCYTOPENIA: ICD-10-CM

## 2023-07-20 DIAGNOSIS — E78.5 HYPERLIPIDEMIA, UNSPECIFIED HYPERLIPIDEMIA TYPE: ICD-10-CM

## 2023-07-20 DIAGNOSIS — I10 ESSENTIAL HYPERTENSION: ICD-10-CM

## 2023-07-20 LAB
ALBUMIN SERPL BCP-MCNC: 3.5 G/DL (ref 3.5–5.2)
ALP SERPL-CCNC: 76 U/L (ref 55–135)
ALT SERPL W/O P-5'-P-CCNC: 22 U/L (ref 10–44)
ANION GAP SERPL CALC-SCNC: 9 MMOL/L (ref 8–16)
AST SERPL-CCNC: 36 U/L (ref 10–40)
BILIRUB SERPL-MCNC: 1.1 MG/DL (ref 0.1–1)
BUN SERPL-MCNC: 20 MG/DL (ref 8–23)
CALCIUM SERPL-MCNC: 8.8 MG/DL (ref 8.7–10.5)
CHLORIDE SERPL-SCNC: 108 MMOL/L (ref 95–110)
CHOLEST SERPL-MCNC: 127 MG/DL (ref 120–199)
CHOLEST/HDLC SERPL: 2.4 {RATIO} (ref 2–5)
CO2 SERPL-SCNC: 25 MMOL/L (ref 23–29)
CREAT SERPL-MCNC: 0.9 MG/DL (ref 0.5–1.4)
ERYTHROCYTE [DISTWIDTH] IN BLOOD BY AUTOMATED COUNT: 12.5 % (ref 11.5–14.5)
EST. GFR  (NO RACE VARIABLE): >60 ML/MIN/1.73 M^2
GLUCOSE SERPL-MCNC: 105 MG/DL (ref 70–110)
HCT VFR BLD AUTO: 32.4 % (ref 40–54)
HDLC SERPL-MCNC: 52 MG/DL (ref 40–75)
HDLC SERPL: 40.9 % (ref 20–50)
HGB BLD-MCNC: 10.9 G/DL (ref 14–18)
LDLC SERPL CALC-MCNC: 63.6 MG/DL (ref 63–159)
MCH RBC QN AUTO: 33.5 PG (ref 27–31)
MCHC RBC AUTO-ENTMCNC: 33.6 G/DL (ref 32–36)
MCV RBC AUTO: 100 FL (ref 82–98)
NONHDLC SERPL-MCNC: 75 MG/DL
PLATELET # BLD AUTO: 56 K/UL (ref 150–450)
PMV BLD AUTO: 11.2 FL (ref 9.2–12.9)
POTASSIUM SERPL-SCNC: 4.5 MMOL/L (ref 3.5–5.1)
PROT SERPL-MCNC: 6.2 G/DL (ref 6–8.4)
RBC # BLD AUTO: 3.25 M/UL (ref 4.6–6.2)
SODIUM SERPL-SCNC: 142 MMOL/L (ref 136–145)
TRIGL SERPL-MCNC: 57 MG/DL (ref 30–150)
TSH SERPL DL<=0.005 MIU/L-ACNC: 3.05 UIU/ML (ref 0.4–4)
WBC # BLD AUTO: 3.07 K/UL (ref 3.9–12.7)

## 2023-07-20 PROCEDURE — 84443 ASSAY THYROID STIM HORMONE: CPT | Mod: HCNC | Performed by: FAMILY MEDICINE

## 2023-07-20 PROCEDURE — 36415 COLL VENOUS BLD VENIPUNCTURE: CPT | Mod: HCNC,PO | Performed by: FAMILY MEDICINE

## 2023-07-20 PROCEDURE — 80061 LIPID PANEL: CPT | Mod: HCNC | Performed by: FAMILY MEDICINE

## 2023-07-20 PROCEDURE — 85027 COMPLETE CBC AUTOMATED: CPT | Mod: HCNC | Performed by: FAMILY MEDICINE

## 2023-07-20 PROCEDURE — 80053 COMPREHEN METABOLIC PANEL: CPT | Mod: HCNC | Performed by: FAMILY MEDICINE

## 2023-07-26 ENCOUNTER — LAB VISIT (OUTPATIENT)
Dept: LAB | Facility: HOSPITAL | Age: 83
End: 2023-07-26
Attending: INTERNAL MEDICINE
Payer: MEDICARE

## 2023-07-26 ENCOUNTER — OFFICE VISIT (OUTPATIENT)
Dept: HEMATOLOGY/ONCOLOGY | Facility: CLINIC | Age: 83
End: 2023-07-26
Payer: MEDICARE

## 2023-07-26 VITALS
RESPIRATION RATE: 16 BRPM | WEIGHT: 200.38 LBS | HEART RATE: 78 BPM | OXYGEN SATURATION: 97 % | BODY MASS INDEX: 28.69 KG/M2 | HEIGHT: 70 IN | DIASTOLIC BLOOD PRESSURE: 60 MMHG | SYSTOLIC BLOOD PRESSURE: 104 MMHG | TEMPERATURE: 98 F

## 2023-07-26 DIAGNOSIS — Z86.2 HISTORY OF PANCYTOPENIA: ICD-10-CM

## 2023-07-26 DIAGNOSIS — Z95.818 PRESENCE OF WATCHMAN LEFT ATRIAL APPENDAGE CLOSURE DEVICE: ICD-10-CM

## 2023-07-26 DIAGNOSIS — D61.818 PANCYTOPENIA: ICD-10-CM

## 2023-07-26 DIAGNOSIS — D46.1 MYELODYSPLASTIC OR MYELOPROLIFERATIVE NEOPLASM WITH RING SIDEROBLASTS AND THROMBOCYTOSIS: ICD-10-CM

## 2023-07-26 DIAGNOSIS — M47.26 OSTEOARTHRITIS OF SPINE WITH RADICULOPATHY, LUMBAR REGION: ICD-10-CM

## 2023-07-26 DIAGNOSIS — D75.839 MYELODYSPLASTIC OR MYELOPROLIFERATIVE NEOPLASM WITH RING SIDEROBLASTS AND THROMBOCYTOSIS: ICD-10-CM

## 2023-07-26 DIAGNOSIS — I25.5 ISCHEMIC CARDIOMYOPATHY: Chronic | ICD-10-CM

## 2023-07-26 DIAGNOSIS — D53.9 SIMPLE CHRONIC ANEMIA: ICD-10-CM

## 2023-07-26 DIAGNOSIS — D61.818 PANCYTOPENIA: Primary | ICD-10-CM

## 2023-07-26 LAB
ALBUMIN SERPL BCP-MCNC: 3.8 G/DL (ref 3.5–5.2)
ALP SERPL-CCNC: 85 U/L (ref 55–135)
ALT SERPL W/O P-5'-P-CCNC: 24 U/L (ref 10–44)
ANION GAP SERPL CALC-SCNC: 10 MMOL/L (ref 8–16)
AST SERPL-CCNC: 39 U/L (ref 10–40)
BASOPHILS # BLD AUTO: 0.02 K/UL (ref 0–0.2)
BASOPHILS # BLD AUTO: 0.02 K/UL (ref 0–0.2)
BASOPHILS NFR BLD: 0.5 % (ref 0–1.9)
BASOPHILS NFR BLD: 0.5 % (ref 0–1.9)
BILIRUB SERPL-MCNC: 1.5 MG/DL (ref 0.1–1)
BUN SERPL-MCNC: 23 MG/DL (ref 8–23)
CALCIUM SERPL-MCNC: 9.1 MG/DL (ref 8.7–10.5)
CHLORIDE SERPL-SCNC: 108 MMOL/L (ref 95–110)
CO2 SERPL-SCNC: 24 MMOL/L (ref 23–29)
CREAT SERPL-MCNC: 0.9 MG/DL (ref 0.5–1.4)
DIFFERENTIAL METHOD: ABNORMAL
DIFFERENTIAL METHOD: ABNORMAL
EOSINOPHIL # BLD AUTO: 0.1 K/UL (ref 0–0.5)
EOSINOPHIL # BLD AUTO: 0.1 K/UL (ref 0–0.5)
EOSINOPHIL NFR BLD: 3.3 % (ref 0–8)
EOSINOPHIL NFR BLD: 3.3 % (ref 0–8)
ERYTHROCYTE [DISTWIDTH] IN BLOOD BY AUTOMATED COUNT: 12.6 % (ref 11.5–14.5)
ERYTHROCYTE [DISTWIDTH] IN BLOOD BY AUTOMATED COUNT: 12.6 % (ref 11.5–14.5)
EST. GFR  (NO RACE VARIABLE): >60 ML/MIN/1.73 M^2
FERRITIN SERPL-MCNC: 168 NG/ML (ref 20–300)
FOLATE SERPL-MCNC: 13.2 NG/ML (ref 4–24)
GLUCOSE SERPL-MCNC: 104 MG/DL (ref 70–110)
HAPTOGLOB SERPL-MCNC: 20 MG/DL (ref 30–250)
HCT VFR BLD AUTO: 33.8 % (ref 40–54)
HCT VFR BLD AUTO: 33.8 % (ref 40–54)
HGB BLD-MCNC: 11.9 G/DL (ref 14–18)
HGB BLD-MCNC: 11.9 G/DL (ref 14–18)
IMM GRANULOCYTES # BLD AUTO: 0.01 K/UL (ref 0–0.04)
IMM GRANULOCYTES # BLD AUTO: 0.01 K/UL (ref 0–0.04)
IMM GRANULOCYTES NFR BLD AUTO: 0.3 % (ref 0–0.5)
IMM GRANULOCYTES NFR BLD AUTO: 0.3 % (ref 0–0.5)
IRON SERPL-MCNC: 128 UG/DL (ref 45–160)
LDH SERPL L TO P-CCNC: 174 U/L (ref 110–260)
LYMPHOCYTES # BLD AUTO: 1.2 K/UL (ref 1–4.8)
LYMPHOCYTES # BLD AUTO: 1.2 K/UL (ref 1–4.8)
LYMPHOCYTES NFR BLD: 29 % (ref 18–48)
LYMPHOCYTES NFR BLD: 29 % (ref 18–48)
MCH RBC QN AUTO: 34 PG (ref 27–31)
MCH RBC QN AUTO: 34 PG (ref 27–31)
MCHC RBC AUTO-ENTMCNC: 35.2 G/DL (ref 32–36)
MCHC RBC AUTO-ENTMCNC: 35.2 G/DL (ref 32–36)
MCV RBC AUTO: 97 FL (ref 82–98)
MCV RBC AUTO: 97 FL (ref 82–98)
MONOCYTES # BLD AUTO: 0.3 K/UL (ref 0.3–1)
MONOCYTES # BLD AUTO: 0.3 K/UL (ref 0.3–1)
MONOCYTES NFR BLD: 7.5 % (ref 4–15)
MONOCYTES NFR BLD: 7.5 % (ref 4–15)
MPV, BLUE TOP: 10.1 FL (ref 9.2–12.9)
NEUTROPHILS # BLD AUTO: 2.4 K/UL (ref 1.8–7.7)
NEUTROPHILS # BLD AUTO: 2.4 K/UL (ref 1.8–7.7)
NEUTROPHILS NFR BLD: 59.4 % (ref 38–73)
NEUTROPHILS NFR BLD: 59.4 % (ref 38–73)
NRBC BLD-RTO: 0 /100 WBC
NRBC BLD-RTO: 0 /100 WBC
PATH REV BLD -IMP: NORMAL
PLATELET # BLD AUTO: 61 K/UL (ref 150–450)
PLATELET # BLD AUTO: 61 K/UL (ref 150–450)
PLATELET, BLUE TOP: 51 K/UL (ref 150–450)
PMV BLD AUTO: 10.1 FL (ref 9.2–12.9)
PMV BLD AUTO: 10.1 FL (ref 9.2–12.9)
POTASSIUM SERPL-SCNC: 4.1 MMOL/L (ref 3.5–5.1)
PROT SERPL-MCNC: 6.7 G/DL (ref 6–8.4)
RBC # BLD AUTO: 3.5 M/UL (ref 4.6–6.2)
RBC # BLD AUTO: 3.5 M/UL (ref 4.6–6.2)
RETICS/RBC NFR AUTO: 1.6 % (ref 0.4–2)
SATURATED IRON: 33 % (ref 20–50)
SODIUM SERPL-SCNC: 142 MMOL/L (ref 136–145)
TOTAL IRON BINDING CAPACITY: 386 UG/DL (ref 250–450)
TRANSFERRIN SERPL-MCNC: 261 MG/DL (ref 200–375)
TSH SERPL DL<=0.005 MIU/L-ACNC: 1.55 UIU/ML (ref 0.4–4)
VIT B12 SERPL-MCNC: 637 PG/ML (ref 210–950)
WBC # BLD AUTO: 4 K/UL (ref 3.9–12.7)
WBC # BLD AUTO: 4 K/UL (ref 3.9–12.7)

## 2023-07-26 PROCEDURE — 82232 ASSAY OF BETA-2 PROTEIN: CPT | Mod: HCNC | Performed by: INTERNAL MEDICINE

## 2023-07-26 PROCEDURE — 1111F DSCHRG MED/CURRENT MED MERGE: CPT | Mod: HCNC,CPTII,S$GLB, | Performed by: INTERNAL MEDICINE

## 2023-07-26 PROCEDURE — 1101F PR PT FALLS ASSESS DOC 0-1 FALLS W/OUT INJ PAST YR: ICD-10-PCS | Mod: HCNC,CPTII,S$GLB, | Performed by: INTERNAL MEDICINE

## 2023-07-26 PROCEDURE — 84630 ASSAY OF ZINC: CPT | Mod: HCNC | Performed by: INTERNAL MEDICINE

## 2023-07-26 PROCEDURE — 3078F DIAST BP <80 MM HG: CPT | Mod: HCNC,CPTII,S$GLB, | Performed by: INTERNAL MEDICINE

## 2023-07-26 PROCEDURE — 85060 PATHOLOGIST REVIEW: ICD-10-PCS | Mod: HCNC,,, | Performed by: PATHOLOGY

## 2023-07-26 PROCEDURE — 84165 PROTEIN E-PHORESIS SERUM: CPT | Mod: HCNC | Performed by: INTERNAL MEDICINE

## 2023-07-26 PROCEDURE — 99999 PR PBB SHADOW E&M-EST. PATIENT-LVL IV: ICD-10-PCS | Mod: PBBFAC,HCNC,, | Performed by: INTERNAL MEDICINE

## 2023-07-26 PROCEDURE — 99205 OFFICE O/P NEW HI 60 MIN: CPT | Mod: HCNC,S$GLB,, | Performed by: INTERNAL MEDICINE

## 2023-07-26 PROCEDURE — 3288F FALL RISK ASSESSMENT DOCD: CPT | Mod: HCNC,CPTII,S$GLB, | Performed by: INTERNAL MEDICINE

## 2023-07-26 PROCEDURE — 83010 ASSAY OF HAPTOGLOBIN QUANT: CPT | Mod: HCNC | Performed by: INTERNAL MEDICINE

## 2023-07-26 PROCEDURE — 1159F MED LIST DOCD IN RCRD: CPT | Mod: HCNC,CPTII,S$GLB, | Performed by: INTERNAL MEDICINE

## 2023-07-26 PROCEDURE — 83615 LACTATE (LD) (LDH) ENZYME: CPT | Mod: HCNC,PN | Performed by: INTERNAL MEDICINE

## 2023-07-26 PROCEDURE — 85045 AUTOMATED RETICULOCYTE COUNT: CPT | Mod: HCNC,PN | Performed by: INTERNAL MEDICINE

## 2023-07-26 PROCEDURE — 80053 COMPREHEN METABOLIC PANEL: CPT | Mod: HCNC,PN | Performed by: INTERNAL MEDICINE

## 2023-07-26 PROCEDURE — 83521 IG LIGHT CHAINS FREE EACH: CPT | Mod: HCNC | Performed by: INTERNAL MEDICINE

## 2023-07-26 PROCEDURE — 99205 PR OFFICE/OUTPT VISIT, NEW, LEVL V, 60-74 MIN: ICD-10-PCS | Mod: HCNC,S$GLB,, | Performed by: INTERNAL MEDICINE

## 2023-07-26 PROCEDURE — 1101F PT FALLS ASSESS-DOCD LE1/YR: CPT | Mod: HCNC,CPTII,S$GLB, | Performed by: INTERNAL MEDICINE

## 2023-07-26 PROCEDURE — 82525 ASSAY OF COPPER: CPT | Mod: HCNC | Performed by: INTERNAL MEDICINE

## 2023-07-26 PROCEDURE — 3078F PR MOST RECENT DIASTOLIC BLOOD PRESSURE < 80 MM HG: ICD-10-PCS | Mod: HCNC,CPTII,S$GLB, | Performed by: INTERNAL MEDICINE

## 2023-07-26 PROCEDURE — 84466 ASSAY OF TRANSFERRIN: CPT | Mod: HCNC | Performed by: INTERNAL MEDICINE

## 2023-07-26 PROCEDURE — 3288F PR FALLS RISK ASSESSMENT DOCUMENTED: ICD-10-PCS | Mod: HCNC,CPTII,S$GLB, | Performed by: INTERNAL MEDICINE

## 2023-07-26 PROCEDURE — 3074F SYST BP LT 130 MM HG: CPT | Mod: HCNC,CPTII,S$GLB, | Performed by: INTERNAL MEDICINE

## 2023-07-26 PROCEDURE — 82746 ASSAY OF FOLIC ACID SERUM: CPT | Mod: HCNC | Performed by: INTERNAL MEDICINE

## 2023-07-26 PROCEDURE — 36415 COLL VENOUS BLD VENIPUNCTURE: CPT | Mod: HCNC,PN | Performed by: INTERNAL MEDICINE

## 2023-07-26 PROCEDURE — 84165 PATHOLOGIST INTERPRETATION SPE: ICD-10-PCS | Mod: 26,HCNC,, | Performed by: PATHOLOGY

## 2023-07-26 PROCEDURE — 3074F PR MOST RECENT SYSTOLIC BLOOD PRESSURE < 130 MM HG: ICD-10-PCS | Mod: HCNC,CPTII,S$GLB, | Performed by: INTERNAL MEDICINE

## 2023-07-26 PROCEDURE — 85060 BLOOD SMEAR INTERPRETATION: CPT | Mod: HCNC,,, | Performed by: PATHOLOGY

## 2023-07-26 PROCEDURE — 84165 PROTEIN E-PHORESIS SERUM: CPT | Mod: 26,HCNC,, | Performed by: PATHOLOGY

## 2023-07-26 PROCEDURE — 84238 ASSAY NONENDOCRINE RECEPTOR: CPT | Mod: HCNC | Performed by: INTERNAL MEDICINE

## 2023-07-26 PROCEDURE — 82728 ASSAY OF FERRITIN: CPT | Mod: HCNC | Performed by: INTERNAL MEDICINE

## 2023-07-26 PROCEDURE — 99999 PR PBB SHADOW E&M-EST. PATIENT-LVL IV: CPT | Mod: PBBFAC,HCNC,, | Performed by: INTERNAL MEDICINE

## 2023-07-26 PROCEDURE — 82607 VITAMIN B-12: CPT | Mod: HCNC | Performed by: INTERNAL MEDICINE

## 2023-07-26 PROCEDURE — 1159F PR MEDICATION LIST DOCUMENTED IN MEDICAL RECORD: ICD-10-PCS | Mod: HCNC,CPTII,S$GLB, | Performed by: INTERNAL MEDICINE

## 2023-07-26 PROCEDURE — 85049 AUTOMATED PLATELET COUNT: CPT | Mod: HCNC,PN | Performed by: INTERNAL MEDICINE

## 2023-07-26 PROCEDURE — 1111F PR DISCHARGE MEDS RECONCILED W/ CURRENT OUTPATIENT MED LIST: ICD-10-PCS | Mod: HCNC,CPTII,S$GLB, | Performed by: INTERNAL MEDICINE

## 2023-07-26 PROCEDURE — 1126F PR PAIN SEVERITY QUANTIFIED, NO PAIN PRESENT: ICD-10-PCS | Mod: HCNC,CPTII,S$GLB, | Performed by: INTERNAL MEDICINE

## 2023-07-26 PROCEDURE — 84443 ASSAY THYROID STIM HORMONE: CPT | Mod: HCNC | Performed by: INTERNAL MEDICINE

## 2023-07-26 PROCEDURE — 85025 COMPLETE CBC W/AUTO DIFF WBC: CPT | Mod: HCNC,PN | Performed by: INTERNAL MEDICINE

## 2023-07-26 PROCEDURE — 1126F AMNT PAIN NOTED NONE PRSNT: CPT | Mod: HCNC,CPTII,S$GLB, | Performed by: INTERNAL MEDICINE

## 2023-07-26 NOTE — PROGRESS NOTES
Name: Ankit Ruff  MRN:  [id number]  :  1940 Age 83 y.o.  Date of Service: 2023    Reason for visit:  Ankit Ruff is a 83 y.o. male here regarding pancytopenia.    Hematology History/History of Present Illness:   Mr. Ruff is an 83-year-old male with medical problems listed below.  He presents to Hematology Clinic for evaluation of pancytopenia.    Regarding recent infections he reports he hasn't had one in recent memory, no recurrent pna or sinus infections.  Regarding bleeding events he denies melena, epstaxis, hematochezia or any other bleeding issues.  Regarding new diagnoses and medications he reports no new medications or diagnosis.     No weight loss in the last 6 months.     Overall he is completely asymptomatic.     Former smoker. Rare ETOH.  [place].  Former  and horse contract work. Has children who all live out of [facility].  Son (Deyvi Ordoñez) is an ER doctor in [place], [place].  Past Medical History:   Diagnosis Date    Anticoagulant long-term use     Anxiety     Arthritis     At risk for bleeding 2023    Atrial fibrillation     CAD (coronary artery disease)     Cataract     OU    CHF (congestive heart failure)     Defibrillator discharge     Hearing loss     Heart failure     Hyperlipidemia     ICD (implantable cardiac defibrillator) in place     Ischemic cardiomyopathy     Myocardial infarction     Sciatica     had seen Dr. Amy Canela in the past    Squamous cell carcinoma     Left cheek -        Past Surgical History:   Procedure Laterality Date    APPENDECTOMY      CARDIAC SURGERY      cabg    CLOSURE OF LEFT ATRIAL APPENDAGE USING DEVICE Right 2023    Procedure: Watchman;  Surgeon: Ngoc Ndiaye MD;  Location: [facility];  Service: Cardiology;  Laterality: Right;    Defibulater      ECHOCARDIOGRAM,TRANSESOPHAGEAL N/A 2023    Procedure: Transesophageal echo (MERCED) intra-procedure log documentation;  Surgeon: Tamir Ritter,  "MD;  [facility];  Service: Cardiology;  Laterality: N/A;    FRACTURE SURGERY      HERNIA REPAIR      INJECTION OF ANESTHETIC AGENT AROUND NERVE Right 2018    Procedure: diagnsotic block to the genicular nerve branches to the right knee;  Surgeon: Sj Gonzales MD;  Location: [facility] [place] OR;  Service: Orthopedics;  Laterality: Right;    INTERNAL NEUROLYSIS USING OPERATING MICROSCOPE Right 2019    Procedure: cooled radiofrequency ablation to the genicular nerve branches of the right knee;  Surgeon: Sj Gonzales MD;  Location: [facility] [place] OR;  Service: Orthopedics;  Laterality: Right;    KNEE ARTHROSCOPY      SKIN BIOPSY      TONSILLECTOMY      TRANSESOPHAGEAL ECHOCARDIOGRAPHY N/A 2023    Procedure: ECHOCARDIOGRAM, TRANSESOPHAGEAL;  Surgeon: Tamir Ritter MD;  Location: [facility];  Service: Cardiology;  Laterality: N/A;    TRIGGER FINGER RELEASE Left 2/3/2022    Procedure: RELEASE, TRIGGER FINGER;  Surgeon: Bay Romo MD;  Location: [facility] OR;  Service: Orthopedics;  Laterality: Left;  Procedure:  Left ring finger trigger release    Position:  Supine    Anesthesia:  Anesthesia must be involved due to the presence of pacemaker defibrillator.  The patient will undergo local anesthesia only for this procedure    Equipment:  Basic handset       Allergies as of 2023 - Reviewed 2023   Allergen Reaction Noted    Penicillins Rash 2012    Entresto [sacubitril-valsartan]  2023       No family history on file.    Social History     Tobacco Use    Smoking status: Former     Types: Cigarettes     Quit date: 1977     Years since quittin.9    Smokeless tobacco: Never   Substance Use Topics    Alcohol use: Not Currently     Comment: socially    Drug use: No         PHYSICAL EXAMINATION:  /60 (BP Location: Right arm, Patient Position: Sitting, BP Method: Medium (Manual))   Pulse 78   Temp 97.9 °F (36.6 °C) (Temporal)   Resp 16   Ht 5' 9.5" (1.765 m)   " Wt 90.9 kg (200 lb 6.4 oz)   SpO2 97%   BMI 29.17 kg/m²   Wt Readings from Last 3 Encounters:   07/26/23 90.9 kg (200 lb 6.4 oz)   07/14/23 91.9 kg (202 lb 9.6 oz)   06/30/23 90.7 kg (200 lb)     ECOG PERFORMANCE STATUS: 1  Physical Exam   General:  Well-appearing, nontoxic  Eyes:  Equal and round pupils, EOMI, no scleral icterus  Mouth:  No lesions, moist  Cardiovascular:  Warm, well-perfused, no peripheral edema  Lungs:  Unlabored on room air, no wheezing  Neurologic:  Awake, alert and oriented, participating in the exam  Psych:  Appropriate mood and affect  Skin:  Normal pallor, No rashes  Heme:  No petechiae, no purpura      LABORATORY:  CBC  Lab Results   Component Value Date    WBC 4.00 07/26/2023    WBC 4.00 07/26/2023    HGB 11.9 (L) 07/26/2023    HGB 11.9 (L) 07/26/2023    HCT 33.8 (L) 07/26/2023    HCT 33.8 (L) 07/26/2023    MCV 97 07/26/2023    MCV 97 07/26/2023    PLT 61 (L) 07/26/2023    PLT 61 (L) 07/26/2023         BMP  Lab Results   Component Value Date     07/26/2023    K 4.1 07/26/2023     07/26/2023    CO2 24 07/26/2023    BUN 23 07/26/2023    CREATININE 0.9 07/26/2023    CALCIUM 9.1 07/26/2023    ANIONGAP 10 07/26/2023    ESTGFRAFRICA >60.0 07/25/2022    EGFRNONAA >60.0 07/25/2022         PATHOLOGY:      RADIOLOGY:      ASSESSMENT AND PLAN:  Ankit Ruff is a 83 y.o. male with...    # pancytopenia  -review of blood counts show normal white blood cell count up until January of 2022 and has since had a progressive decline.  Review of hemoglobin shows last normal value was in March of 2019 with a hemoglobin of 14 and has had a slow decline down to a kelle of 10.8.  Platelets have been chronically depressed since 2019 around 80-90k   but also have had a progressive decline and currently now around 50,000.  -my biggest concern is for myelodysplastic syndrome versus myelofibrosis and the best way to get an answer regarding the etiology of this is a bone marrow biopsy, screening for  "multiple myeloma; we will also check for typical vitamin and iron studies for completeness  -offered BMB and he agreed  -other etiology would be liver disease and sequestration- getting a abd us     #Atrial Fib- did not tolerate anticoagulation or entresto.  Recently had "watchman" procedure completed     #Ischemic Cardiomyopathy: on ASA and beta blocker   #Back pain secondary to osteoarthritis of the spine- on meloxican periodically- needs to stop this medication as the risk for bleeding is increased with high dose NSAIDS and thrombocytopenia, will  at next visit.       Alexandria Walsh M.D.  Hematology/Oncology     Route Chart for Scheduling    Med Onc Chart Routing      Follow up with physician 3 weeks.   Follow up with JI    Infusion scheduling note    Injection scheduling note    Labs   Scheduling:  Preferred lab:  Lab interval:  Collect all labs today   Imaging Other   US Abd   Pharmacy appointment    Other referrals     Additional referrals needed  IR referral bone marrow biopsy            "

## 2023-07-27 ENCOUNTER — TELEPHONE (OUTPATIENT)
Dept: CARDIOLOGY | Facility: CLINIC | Age: 83
End: 2023-07-27
Payer: MEDICARE

## 2023-07-27 DIAGNOSIS — D61.818 PANCYTOPENIA: Primary | ICD-10-CM

## 2023-07-27 LAB
ALBUMIN SERPL ELPH-MCNC: 3.83 G/DL (ref 3.35–5.55)
ALPHA1 GLOB SERPL ELPH-MCNC: 0.28 G/DL (ref 0.17–0.41)
ALPHA2 GLOB SERPL ELPH-MCNC: 0.62 G/DL (ref 0.43–0.99)
B-GLOBULIN SERPL ELPH-MCNC: 0.68 G/DL (ref 0.5–1.1)
B2 MICROGLOB SERPL-MCNC: 2.5 UG/ML (ref 0–2.5)
GAMMA GLOB SERPL ELPH-MCNC: 1.1 G/DL (ref 0.67–1.58)
KAPPA LC SER QL IA: 3.14 MG/DL (ref 0.33–1.94)
KAPPA LC/LAMBDA SER IA: 1.59 (ref 0.26–1.65)
LAMBDA LC SER QL IA: 1.97 MG/DL (ref 0.57–2.63)
PATH REV BLD -IMP: NORMAL
PATHOLOGIST INTERPRETATION SPE: NORMAL
PROT SERPL-MCNC: 6.5 G/DL (ref 6–8.4)

## 2023-07-28 LAB — STFR SERPL-MCNC: 3 MG/L (ref 1.8–4.6)

## 2023-07-31 ENCOUNTER — TELEPHONE (OUTPATIENT)
Dept: CARDIOLOGY | Facility: CLINIC | Age: 83
End: 2023-07-31

## 2023-07-31 ENCOUNTER — OFFICE VISIT (OUTPATIENT)
Dept: CARDIOLOGY | Facility: CLINIC | Age: 83
End: 2023-07-31
Payer: MEDICARE

## 2023-07-31 ENCOUNTER — PATIENT MESSAGE (OUTPATIENT)
Dept: CARDIOLOGY | Facility: CLINIC | Age: 83
End: 2023-07-31

## 2023-07-31 VITALS
HEART RATE: 62 BPM | SYSTOLIC BLOOD PRESSURE: 103 MMHG | HEIGHT: 69 IN | BODY MASS INDEX: 30.5 KG/M2 | WEIGHT: 205.94 LBS | DIASTOLIC BLOOD PRESSURE: 54 MMHG

## 2023-07-31 DIAGNOSIS — I50.42 CHRONIC COMBINED SYSTOLIC AND DIASTOLIC HEART FAILURE: ICD-10-CM

## 2023-07-31 DIAGNOSIS — I48.0 PAF (PAROXYSMAL ATRIAL FIBRILLATION): ICD-10-CM

## 2023-07-31 DIAGNOSIS — I48.91 ATRIAL FIBRILLATION: ICD-10-CM

## 2023-07-31 DIAGNOSIS — I10 ESSENTIAL HYPERTENSION: ICD-10-CM

## 2023-07-31 DIAGNOSIS — Z95.818 PRESENCE OF WATCHMAN LEFT ATRIAL APPENDAGE CLOSURE DEVICE: ICD-10-CM

## 2023-07-31 DIAGNOSIS — E78.5 HYPERLIPIDEMIA, UNSPECIFIED HYPERLIPIDEMIA TYPE: ICD-10-CM

## 2023-07-31 DIAGNOSIS — I48.0 PAF (PAROXYSMAL ATRIAL FIBRILLATION): Primary | ICD-10-CM

## 2023-07-31 DIAGNOSIS — Z95.810 IMPLANTABLE CARDIOVERTER-DEFIBRILLATOR (ICD) IN SITU: ICD-10-CM

## 2023-07-31 DIAGNOSIS — I25.810 CORONARY ARTERY DISEASE INVOLVING AUTOLOGOUS VEIN CORONARY BYPASS GRAFT WITHOUT ANGINA PECTORIS: ICD-10-CM

## 2023-07-31 LAB — ZINC SERPL-MCNC: 40 UG/DL (ref 60–130)

## 2023-07-31 PROCEDURE — 3074F PR MOST RECENT SYSTOLIC BLOOD PRESSURE < 130 MM HG: ICD-10-PCS | Mod: CPTII,S$GLB,,

## 2023-07-31 PROCEDURE — 1101F PT FALLS ASSESS-DOCD LE1/YR: CPT | Mod: CPTII,S$GLB,,

## 2023-07-31 PROCEDURE — 3288F FALL RISK ASSESSMENT DOCD: CPT | Mod: CPTII,S$GLB,,

## 2023-07-31 PROCEDURE — 1101F PR PT FALLS ASSESS DOC 0-1 FALLS W/OUT INJ PAST YR: ICD-10-PCS | Mod: CPTII,S$GLB,,

## 2023-07-31 PROCEDURE — 1159F MED LIST DOCD IN RCRD: CPT | Mod: CPTII,S$GLB,,

## 2023-07-31 PROCEDURE — 1159F PR MEDICATION LIST DOCUMENTED IN MEDICAL RECORD: ICD-10-PCS | Mod: CPTII,S$GLB,,

## 2023-07-31 PROCEDURE — 99999 PR PBB SHADOW E&M-EST. PATIENT-LVL III: ICD-10-PCS | Mod: PBBFAC,,,

## 2023-07-31 PROCEDURE — 99215 OFFICE O/P EST HI 40 MIN: CPT | Mod: S$GLB,,,

## 2023-07-31 PROCEDURE — 3074F SYST BP LT 130 MM HG: CPT | Mod: CPTII,S$GLB,,

## 2023-07-31 PROCEDURE — 99999 PR PBB SHADOW E&M-EST. PATIENT-LVL III: CPT | Mod: PBBFAC,,,

## 2023-07-31 PROCEDURE — 1126F AMNT PAIN NOTED NONE PRSNT: CPT | Mod: CPTII,S$GLB,,

## 2023-07-31 PROCEDURE — 3078F PR MOST RECENT DIASTOLIC BLOOD PRESSURE < 80 MM HG: ICD-10-PCS | Mod: CPTII,S$GLB,,

## 2023-07-31 PROCEDURE — 99215 PR OFFICE/OUTPT VISIT, EST, LEVL V, 40-54 MIN: ICD-10-PCS | Mod: S$GLB,,,

## 2023-07-31 PROCEDURE — 3288F PR FALLS RISK ASSESSMENT DOCUMENTED: ICD-10-PCS | Mod: CPTII,S$GLB,,

## 2023-07-31 PROCEDURE — 3078F DIAST BP <80 MM HG: CPT | Mod: CPTII,S$GLB,,

## 2023-07-31 PROCEDURE — 1126F PR PAIN SEVERITY QUANTIFIED, NO PAIN PRESENT: ICD-10-PCS | Mod: CPTII,S$GLB,,

## 2023-07-31 RX ORDER — CARVEDILOL 3.12 MG/1
3.12 TABLET ORAL 2 TIMES DAILY
Qty: 180 TABLET | Refills: 3 | Status: SHIPPED | OUTPATIENT
Start: 2023-07-31 | End: 2023-08-31

## 2023-07-31 RX ORDER — TAMSULOSIN HYDROCHLORIDE 0.4 MG/1
0.4 CAPSULE ORAL NIGHTLY
Qty: 90 CAPSULE | Refills: 1 | Status: SHIPPED | OUTPATIENT
Start: 2023-07-31 | End: 2023-10-18

## 2023-07-31 NOTE — ASSESSMENT & PLAN NOTE
Controlled on coreg 6.25 hypotension with some dizziness/fatigue   Will reduce coreg to 3.125 mg BID   S/p Watchman will set up 6 week post watchman MERCED

## 2023-07-31 NOTE — PROGRESS NOTES
Subjective:    Patient ID:  Ankit Ruff is a 83 y.o. male patient here for evaluation Hospital Follow Up    History of Present Illness:     Mr. Ruff is a very pleasant 83 year old M who follows with Dr. Guerrero here today for a watchman follow up. He has been doing well, femoral access healed. He complains on continue dizziness and vertigo, with lying down with bending over with moving too fast. He is scheduled for a bone marrow biopsy in a few days to further explore his pancytopenia. He is doing better off Eliquis, some residual easy bruising with the Plavix.           Most Recent Echocardiogram Results  Results for orders placed during the hospital encounter of 06/30/23    Echo    Interpretation Summary  · The left ventricle is mildly enlarged with mildly decreased systolic function.  · The estimated ejection fraction is 40%.  · No pericardial effusion.  · Limited 2D study post AJ closure      Most Recent Cardiovascular Angiogram results  Results for orders placed during the hospital encounter of 06/30/23    Intra-Procedure Documentation    Conclusion  Intraoperative MERCED performed for pre evaluation, intraprocedural guidance, and post evaluation during Watchman procedure.  No thrombus was appreciated within left atrial appendage or left atrium.  No pericardial effusion was appreciated preprocedure.  Left atrial appendage was measured in four views and measurements were recorded.  Guidance was given through bicaval and short axis views for transseptal puncture to ensure inferoposterior transseptal puncture site.  Catheter was observed crossing the septum and guided into good position into the left atrial appendage.  Device was observed being deployed in good position in the left atrial appendage.  Tug test was observed and deemed to be without significant motion.  Compression measurements were then obtained in four views and recorded.  Color-flow Doppler was used for leak evaluation in four views and no  significant leak was appreciated.  The device was then released.  No pericardial effusion was appreciated postprocedure.      Other Most Recent Cardiology Results  Results for orders placed in visit on 07/19/23    Cardiac device check - Remote    Narrative  Battery and Leads (BL)  Normal parameters noted on battery and lead(s)    Presenting Rhythm (HI)  Ventricular Sensing (VS)  Ventricular Pacing ()    Arrhythmic events (AE)  Paroxysmal atrial fibrillation and/or flutter  Premature Ventricular Contraction(s)    Cardiovascular Physiologic Parameters (CPP)  No abnormalities in physiologic parameters identified    Transmission Information (TI)  Device Summary Report    Follow Up (FU)  Continue remote monitoring with quarterly reporting    Additional Comments  ICD Report  Monitoring period: 5/25/23-5/26/23    Battery/Lead Status: 9.4yrs    : 0.8%    Device Defined Counters:  Atrial Fibrillation/Flutter:  3  EGMs illustrate AF/AFL, longest available 5 hours 4 minutes  in duration.  AF [place]: 16.7%      REVIEW OF SYSTEMS: As noted in HPI   CARDIOVASCULAR: No recent chest pain, palpitations, arm/neck/jaw pain, or edema.  RESPIRATORY: No recent fever, cough, SOB.  : No blood in the urine  GI: No reflux, nausea, vomiting, or blood in stool.   MUSCULOSKELETAL: No falls.   NEURO: +dizziness. No headaches, syncope.  EYES: No sudden changes in vision.     Past Medical History:   Diagnosis Date    Anticoagulant long-term use     Anxiety     Arthritis     At risk for bleeding 06/2023    Atrial fibrillation     CAD (coronary artery disease)     Cataract     OU    CHF (congestive heart failure)     Defibrillator discharge     Hearing loss     Heart failure     Hyperlipidemia     ICD (implantable cardiac defibrillator) in place     Ischemic cardiomyopathy     Myocardial infarction     Sciatica     had seen Dr. 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      Past Surgical History:   Procedure  Laterality Date    APPENDECTOMY      CARDIAC SURGERY      cabg    CLOSURE OF LEFT ATRIAL APPENDAGE USING DEVICE Right 2023    Procedure: Watchman;  Surgeon: Ngoc Ndiaye MD;  Location: [facility] CATH;  Service: Cardiology;  Laterality: Right;    Defibulater      ECHOCARDIOGRAM,TRANSESOPHAGEAL N/A 2023    Procedure: Transesophageal echo (MERCED) intra-procedure log documentation;  Surgeon: Tamir Ritter MD;  Location: [facility] CATH;  Service: Cardiology;  Laterality: N/A;    FRACTURE SURGERY      HERNIA REPAIR      INJECTION OF ANESTHETIC AGENT AROUND NERVE Right 2018    Procedure: diagnsotic block to the genicular nerve branches to the right knee;  Surgeon: Sj Gonzales MD;  Location: [facility] OR;  Service: Orthopedics;  Laterality: Right;    INTERNAL NEUROLYSIS USING OPERATING MICROSCOPE Right 2019    Procedure: cooled radiofrequency ablation to the genicular nerve branches of the right knee;  Surgeon: Sj Gonzales MD;  Location: [facility] OR;  Service: Orthopedics;  Laterality: Right;    KNEE ARTHROSCOPY      SKIN BIOPSY      TONSILLECTOMY      TRANSESOPHAGEAL ECHOCARDIOGRAPHY N/A 2023    Procedure: ECHOCARDIOGRAM, TRANSESOPHAGEAL;  Surgeon: Tamir Ritter MD;  Location: [facility];  Service: Cardiology;  Laterality: N/A;    TRIGGER FINGER RELEASE Left 2/3/2022    Procedure: RELEASE, TRIGGER FINGER;  Surgeon: Bay Romo MD;  Location: [facility] OR;  Service: Orthopedics;  Laterality: Left;  Procedure:  Left ring finger trigger release    Position:  Supine    Anesthesia:  Anesthesia must be involved due to the presence of pacemaker defibrillator.  The patient will undergo local anesthesia only for this procedure    Equipment:  Basic handset     Social History     Tobacco Use    Smoking status: Former     Current packs/day: 0.00     Types: Cigarettes     Quit date: 1977     Years since quittin.0    Smokeless tobacco: Never   Substance Use Topics    Alcohol use: Not  Currently     Comment: socially    Drug use: No         Objective      Vitals:    07/31/23 1337   BP: (!) 103/54   Pulse: 62       LAST EKG  Results for orders placed or performed during the hospital encounter of 06/30/23   EKG 12-lead    Collection Time: 06/30/23 10:33 AM    Narrative    Test Reason : Z01.812,    Vent. Rate : 051 BPM     Atrial Rate : 000 BPM     P-R Int : 000 ms          QRS Dur : 124 ms      QT Int : 524 ms       P-R-T Axes : 000 031 119 degrees     QTc Int : 482 ms    Sinus bradycardia  Intraventricular conduction disturbance, nonspecific type  Abnormal ECG      Confirmed by Marcie Santoyo MD ([id number]) on 7/5/2023 2:41:35 PM    Referred By: Ngoc Ndiaye MD           Confirmed By:Marcie Santoyo MD     LIPIDS - LAST 2   Lab Results   Component Value Date    CHOL 127 07/20/2023    CHOL 125 07/25/2022    HDL 52 07/20/2023    HDL 49 07/25/2022    LDLCALC 63.6 07/20/2023    LDLCALC 63.2 07/25/2022    TRIG 57 07/20/2023    TRIG 64 07/25/2022    CHOLHDL 40.9 07/20/2023    CHOLHDL 39.2 07/25/2022     CARDIAC PROFILE - LAST 2  Lab Results   Component Value Date     (H) 07/25/2022     (H) 03/01/2021     07/26/2023    TROPONINI <0.012 10/31/2022      CBC - LAST 2  Lab Results   Component Value Date    WBC 4.00 07/26/2023    WBC 4.00 07/26/2023    RBC 3.50 (L) 07/26/2023    RBC 3.50 (L) 07/26/2023    HGB 11.9 (L) 07/26/2023    HGB 11.9 (L) 07/26/2023    HCT 33.8 (L) 07/26/2023    HCT 33.8 (L) 07/26/2023    PLT 61 (L) 07/26/2023    PLT 61 (L) 07/26/2023     Lab Results   Component Value Date    LABPT 19.5 (H) 06/26/2023    LABPT 15.6 (H) 08/16/2019    INR 1.6 06/26/2023    INR 1.3 08/16/2019    APTT 35.7 06/26/2023    APTT 30.4 08/16/2019     CHEMISTRY - LAST 2  Lab Results   Component Value Date     07/26/2023     07/20/2023    K 4.1 07/26/2023    K 4.5 07/20/2023     07/26/2023     07/20/2023    CO2 24 07/26/2023    CO2 25 07/20/2023    ANIONGAP 10 07/26/2023     ANIONGAP 9 07/20/2023    BUN 23 07/26/2023    BUN 20 07/20/2023    CREATININE 0.9 07/26/2023    CREATININE 0.9 07/20/2023     07/26/2023     07/20/2023    CALCIUM 9.1 07/26/2023    CALCIUM 8.8 07/20/2023    MG 2.1 10/31/2022    ALBUMIN 3.8 07/26/2023    ALBUMIN 3.5 07/20/2023    PROT 6.7 07/26/2023    PROT 6.2 07/20/2023    ALKPHOS 85 07/26/2023    ALKPHOS 76 07/20/2023    ALT 24 07/26/2023    ALT 22 07/20/2023    AST 39 07/26/2023    AST 36 07/20/2023    BILITOT 1.5 (H) 07/26/2023    BILITOT 1.1 (H) 07/20/2023      ENDOCRINE - LAST 2  Lab Results   Component Value Date    TSH 1.551 07/26/2023    TSH 3.049 07/20/2023        PHYSICAL EXAM  CONSTITUTIONAL: Well built, well nourished in no apparent distress  NECK: no carotid bruit, no JVD  LUNGS: CTA  CHEST WALL: no tenderness  HEART: regular rate and rhythm, S1, S2 normal, no murmur, click, rub or gallop   ABDOMEN: soft, non-tender; bowel sounds normal; no masses,  no organomegaly  EXTREMITIES: Extremities normal, no edema, no calf tenderness noted  NEURO: AAO X 3    I HAVE REVIEWED :    The vital signs, most recent cardiac testing, and most recent pertinent non-cardiology provider notes.    Current Outpatient Medications   Medication Instructions    aspirin (ECOTRIN) 81 mg, Oral, Nightly,      carvediloL (COREG) 3.125 mg, Oral, 2 times daily    clopidogreL (PLAVIX) 75 mg, Oral, Daily    furosemide (LASIX) 20 mg, Oral, Daily PRN    gabapentin (NEURONTIN) 300 mg, Oral, 2 times daily    meloxicam (MOBIC) 15 mg, Oral, Daily PRN    pantoprazole (PROTONIX) 40 MG tablet TAKE 1 TABLET EVERY DAY    rosuvastatin (CRESTOR) 10 MG tablet TAKE 1 TABLET EVERY DAY    tamsulosin (FLOMAX) 0.4 mg, Oral, Nightly      Assessment & Plan     PAF (paroxysmal atrial fibrillation)  Controlled on coreg 6.25 hypotension with some dizziness/fatigue   Will reduce coreg to 3.125 mg BID   S/p Watchman will set up 6 week post watchman MERCED     Presence of Watchman left atrial appendage  closure device  6 weeks will be 8/11/2023 -- MERCED and stop Plavix   Doing well but dizzy-- has bone marrow biopsy soon to investigate     Implantable cardioverter-defibrillator (ICD) in situ  NO shocks   Normal device function     Essential hypertension  Hypotension   Daily lasix for volume control   Reduce coreg to 3.125 mg BID     Hyperlipidemia  Continue crestor 10 mg daily     Coronary artery disease involving autologous vein coronary bypass graft without angina pectoris  No angina   Continue ASA and statin     Chronic combined systolic and diastolic heart failure  Reduce coreg   Continue lasix 20 mg daily     Switch tamsulosin to PM.       Follow up in about 3 months (around 10/31/2023).     Naomi Peters, PA-C [facility]   Office: [phone]

## 2023-07-31 NOTE — TELEPHONE ENCOUNTER
Message left regarding MERCED scheduled on 8/8/23 at [facility] with Dr. Caleb Guerrero. Portal message sent as well.

## 2023-08-01 ENCOUNTER — TELEPHONE (OUTPATIENT)
Dept: HEMATOLOGY/ONCOLOGY | Facility: CLINIC | Age: 83
End: 2023-08-01
Payer: MEDICARE

## 2023-08-01 LAB — COPPER SERPL-MCNC: 1016 UG/L (ref 665–1480)

## 2023-08-01 NOTE — TELEPHONE ENCOUNTER
----- Message from Cierra Newman RN sent at 8/1/2023  9:27 AM CDT -----  Good morning.     We have a mutual patient. Mr. Pham is scheduled for a MERCED on 8/8/23 post WATCHMAN. He will have light sedation.   He mentioned to me he will have a procedure done with Dr. Walsh on 8/9/23. Can we leave MERCED scheduled for 8/8/23?    Thank you,  Cierra Newman RN

## 2023-08-14 ENCOUNTER — PATIENT MESSAGE (OUTPATIENT)
Dept: CARDIOLOGY | Facility: CLINIC | Age: 83
End: 2023-08-14
Payer: MEDICARE

## 2023-08-14 DIAGNOSIS — Z95.818 PRESENCE OF WATCHMAN LEFT ATRIAL APPENDAGE CLOSURE DEVICE: Primary | ICD-10-CM

## 2023-08-15 RX ORDER — CLOPIDOGREL BISULFATE 75 MG/1
75 TABLET ORAL DAILY
Qty: 90 TABLET | Refills: 3 | Status: SHIPPED | OUTPATIENT
Start: 2023-08-15 | End: 2023-08-16 | Stop reason: SDUPTHER

## 2023-08-16 ENCOUNTER — HOSPITAL ENCOUNTER (OUTPATIENT)
Dept: RADIOLOGY | Facility: HOSPITAL | Age: 83
Discharge: HOME | End: 2023-08-16
Attending: INTERNAL MEDICINE
Payer: MEDICARE

## 2023-08-16 DIAGNOSIS — D61.818 PANCYTOPENIA: ICD-10-CM

## 2023-08-16 PROCEDURE — 76700 US EXAM ABDOM COMPLETE: CPT | Mod: 26,HCNC,, | Performed by: RADIOLOGY

## 2023-08-16 PROCEDURE — 76700 US ABDOMEN COMPLETE: ICD-10-PCS | Mod: 26,HCNC,, | Performed by: RADIOLOGY

## 2023-08-16 PROCEDURE — 76700 US EXAM ABDOM COMPLETE: CPT | Mod: TC,HCNC,PO

## 2023-08-16 RX ORDER — CLOPIDOGREL BISULFATE 75 MG/1
75 TABLET ORAL DAILY
Qty: 20 TABLET | Refills: 0 | Status: SHIPPED | OUTPATIENT
Start: 2023-08-16 | End: 2023-08-24 | Stop reason: SDUPTHER

## 2023-08-18 ENCOUNTER — TELEPHONE (OUTPATIENT)
Dept: CARDIOLOGY | Facility: CLINIC | Age: 83
End: 2023-08-18
Payer: MEDICARE

## 2023-08-18 NOTE — TELEPHONE ENCOUNTER
Advised patient to stop Aspirin and continue Plavix at this time. Will schedule MERCED in January 2024. Patient verbalized understanding.

## 2023-08-21 ENCOUNTER — TELEPHONE (OUTPATIENT)
Dept: HEPATOLOGY | Facility: CLINIC | Age: 83
End: 2023-08-21
Payer: MEDICARE

## 2023-08-21 ENCOUNTER — OFFICE VISIT (OUTPATIENT)
Dept: HEMATOLOGY/ONCOLOGY | Facility: CLINIC | Age: 83
End: 2023-08-21
Payer: MEDICARE

## 2023-08-21 VITALS
HEART RATE: 62 BPM | DIASTOLIC BLOOD PRESSURE: 68 MMHG | HEIGHT: 70 IN | RESPIRATION RATE: 18 BRPM | TEMPERATURE: 97 F | BODY MASS INDEX: 29.22 KG/M2 | SYSTOLIC BLOOD PRESSURE: 105 MMHG | WEIGHT: 204.13 LBS | OXYGEN SATURATION: 97 %

## 2023-08-21 DIAGNOSIS — I25.5 ISCHEMIC CARDIOMYOPATHY: Chronic | ICD-10-CM

## 2023-08-21 DIAGNOSIS — K76.9 LIVER DISEASE, UNSPECIFIED: ICD-10-CM

## 2023-08-21 DIAGNOSIS — R16.2 HEPATOSPLENOMEGALY: ICD-10-CM

## 2023-08-21 DIAGNOSIS — Z95.818 PRESENCE OF WATCHMAN LEFT ATRIAL APPENDAGE CLOSURE DEVICE: ICD-10-CM

## 2023-08-21 DIAGNOSIS — I48.0 PAF (PAROXYSMAL ATRIAL FIBRILLATION): ICD-10-CM

## 2023-08-21 DIAGNOSIS — D61.818 PANCYTOPENIA: Primary | ICD-10-CM

## 2023-08-21 PROCEDURE — 3074F PR MOST RECENT SYSTOLIC BLOOD PRESSURE < 130 MM HG: ICD-10-PCS | Mod: HCNC,CPTII,S$GLB, | Performed by: INTERNAL MEDICINE

## 2023-08-21 PROCEDURE — 99999 PR PBB SHADOW E&M-EST. PATIENT-LVL IV: CPT | Mod: PBBFAC,HCNC,, | Performed by: INTERNAL MEDICINE

## 2023-08-21 PROCEDURE — 1159F PR MEDICATION LIST DOCUMENTED IN MEDICAL RECORD: ICD-10-PCS | Mod: HCNC,CPTII,S$GLB, | Performed by: INTERNAL MEDICINE

## 2023-08-21 PROCEDURE — 3078F PR MOST RECENT DIASTOLIC BLOOD PRESSURE < 80 MM HG: ICD-10-PCS | Mod: HCNC,CPTII,S$GLB, | Performed by: INTERNAL MEDICINE

## 2023-08-21 PROCEDURE — 1101F PR PT FALLS ASSESS DOC 0-1 FALLS W/OUT INJ PAST YR: ICD-10-PCS | Mod: HCNC,CPTII,S$GLB, | Performed by: INTERNAL MEDICINE

## 2023-08-21 PROCEDURE — 3074F SYST BP LT 130 MM HG: CPT | Mod: HCNC,CPTII,S$GLB, | Performed by: INTERNAL MEDICINE

## 2023-08-21 PROCEDURE — 1101F PT FALLS ASSESS-DOCD LE1/YR: CPT | Mod: HCNC,CPTII,S$GLB, | Performed by: INTERNAL MEDICINE

## 2023-08-21 PROCEDURE — 3078F DIAST BP <80 MM HG: CPT | Mod: HCNC,CPTII,S$GLB, | Performed by: INTERNAL MEDICINE

## 2023-08-21 PROCEDURE — 3288F FALL RISK ASSESSMENT DOCD: CPT | Mod: HCNC,CPTII,S$GLB, | Performed by: INTERNAL MEDICINE

## 2023-08-21 PROCEDURE — 99215 PR OFFICE/OUTPT VISIT, EST, LEVL V, 40-54 MIN: ICD-10-PCS | Mod: HCNC,S$GLB,, | Performed by: INTERNAL MEDICINE

## 2023-08-21 PROCEDURE — 3288F PR FALLS RISK ASSESSMENT DOCUMENTED: ICD-10-PCS | Mod: HCNC,CPTII,S$GLB, | Performed by: INTERNAL MEDICINE

## 2023-08-21 PROCEDURE — 1126F PR PAIN SEVERITY QUANTIFIED, NO PAIN PRESENT: ICD-10-PCS | Mod: HCNC,CPTII,S$GLB, | Performed by: INTERNAL MEDICINE

## 2023-08-21 PROCEDURE — 99215 OFFICE O/P EST HI 40 MIN: CPT | Mod: HCNC,S$GLB,, | Performed by: INTERNAL MEDICINE

## 2023-08-21 PROCEDURE — 1159F MED LIST DOCD IN RCRD: CPT | Mod: HCNC,CPTII,S$GLB, | Performed by: INTERNAL MEDICINE

## 2023-08-21 PROCEDURE — 99999 PR PBB SHADOW E&M-EST. PATIENT-LVL IV: ICD-10-PCS | Mod: PBBFAC,HCNC,, | Performed by: INTERNAL MEDICINE

## 2023-08-21 PROCEDURE — 1126F AMNT PAIN NOTED NONE PRSNT: CPT | Mod: HCNC,CPTII,S$GLB, | Performed by: INTERNAL MEDICINE

## 2023-08-21 NOTE — PROGRESS NOTES
Name: Ankit Ruff  MRN:  [id number]  :  1940 Age 83 y.o.  Date of Service: 2023    Reason for visit:  Ankit Ruff is a 83 y.o. male here regarding pancytopenia.    Hematology History/History of Present Illness:   Mr. Ruff is an 83-year-old male with medical problems listed below.  He presents to Hematology Clinic for evaluation of pancytopenia.    Regarding recent infections he reports he hasn't had one in recent memory, no recurrent pna or sinus infections.  Regarding bleeding events he denies melena, epstaxis, hematochezia or any other bleeding issues.  Regarding new diagnoses and medications he reports no new medications or diagnosis.     No weight loss in the last 6 months.     Overall he is completely asymptomatic.     Former smoker. Rare ETOH.  [place]  Former  and horse contract work. Has children who all live out of [facility].  Son (Deyvi Ordoñez) is an ER doctor in [place], [place].    Presents to hematology to discuss pancytopenia  Past Medical History:   Diagnosis Date    Anticoagulant long-term use     Anxiety     Arthritis     At risk for bleeding 2023    Atrial fibrillation     CAD (coronary artery disease)     Cataract     OU    CHF (congestive heart failure)     Defibrillator discharge     Hearing loss     Heart failure     Hyperlipidemia     ICD (implantable cardiac defibrillator) in place     Ischemic cardiomyopathy     Myocardial infarction     Sciatica     had seen Dr. Amy Canela in the past    Squamous cell carcinoma     Left cheek -        Past Surgical History:   Procedure Laterality Date    APPENDECTOMY      CARDIAC SURGERY      cabg    CLOSURE OF LEFT ATRIAL APPENDAGE USING DEVICE Right 2023    Procedure: Watchman;  Surgeon: Ngoc Ndiaye MD;  Location: [facility];  Service: Cardiology;  Laterality: Right;    Defibulater      ECHOCARDIOGRAM,TRANSESOPHAGEAL N/A 2023    Procedure: Transesophageal echo (MERCED) intra-procedure  log documentation;  Surgeon: Tamir Ritter MD;  Location: [facility];  Service: Cardiology;  Laterality: N/A;    FRACTURE SURGERY      HERNIA REPAIR      INJECTION OF ANESTHETIC AGENT AROUND NERVE Right 2018    Procedure: diagnsotic block to the genicular nerve branches to the right knee;  Surgeon: Sj Gonzales MD;  Location: [facility];  Service: Orthopedics;  Laterality: Right;    INTERNAL NEUROLYSIS USING OPERATING MICROSCOPE Right 2019    Procedure: cooled radiofrequency ablation to the genicular nerve branches of the right knee;  Surgeon: Sj Gonzales MD;  Location: [facility];  Service: Orthopedics;  Laterality: Right;    KNEE ARTHROSCOPY      SKIN BIOPSY      TONSILLECTOMY      TRANSESOPHAGEAL ECHOCARDIOGRAPHY N/A 2023    Procedure: ECHOCARDIOGRAM, TRANSESOPHAGEAL;  Surgeon: Tamir Ritter MD;  Location: [facility];  Service: Cardiology;  Laterality: N/A;    TRANSESOPHAGEAL ECHOCARDIOGRAPHY N/A 2023    Procedure: ECHOCARDIOGRAM, TRANSESOPHAGEAL;  Surgeon: Romeo Guerrero MD;  Location: [facility];  Service: Cardiology;  Laterality: N/A;    TRIGGER FINGER RELEASE Left 2/3/2022    Procedure: RELEASE, TRIGGER FINGER;  Surgeon: Bay Romo MD;  Location: [facility];  Service: Orthopedics;  Laterality: Left;  Procedure:  Left ring finger trigger release    Position:  Supine    Anesthesia:  Anesthesia must be involved due to the presence of pacemaker defibrillator.  The patient will undergo local anesthesia only for this procedure    Equipment:  Basic handset       Allergies as of 2023 - Reviewed 2023   Allergen Reaction Noted    Penicillins Rash 2012    Entresto [sacubitril-valsartan] Other (See Comments) 2023       No family history on file.    Social History     Tobacco Use    Smoking status: Former     Current packs/day: 0.00     Types: Cigarettes     Quit date: 1977     Years since quittin.0    Smokeless tobacco: Never    Substance Use Topics    Alcohol use: Not Currently     Comment: socially    Drug use: No         PHYSICAL EXAMINATION:  There were no vitals taken for this visit.  Wt Readings from Last 3 Encounters:   08/09/23 90.7 kg (200 lb)   08/04/23 90.7 kg (200 lb)   07/31/23 93.4 kg (205 lb 14.6 oz)     ECOG PERFORMANCE STATUS: 1  Physical Exam   General:  Well-appearing, nontoxic  Eyes:  Equal and round pupils, EOMI, no scleral icterus  Mouth:  No lesions, moist  Cardiovascular:  Warm, well-perfused, no peripheral edema  Lungs:  Unlabored on room air, no wheezing  Neurologic:  Awake, alert and oriented, participating in the exam  Psych:  Appropriate mood and affect  Skin:  Normal pallor, No rashes  Heme:  No petechiae, no purpura      LABORATORY:  CBC  Lab Results   Component Value Date    WBC 2.86 (L) 08/09/2023    HGB 11.0 (L) 08/09/2023    HCT 32.3 (L) 08/09/2023    MCV 97 08/09/2023    PLT 50 (L) 08/09/2023         BMP  Lab Results   Component Value Date     07/26/2023    K 4.1 07/26/2023     07/26/2023    CO2 24 07/26/2023    BUN 23 07/26/2023    CREATININE 0.9 07/26/2023    CALCIUM 9.1 07/26/2023    ANIONGAP 10 07/26/2023    ESTGFRAFRICA >60.0 07/25/2022    EGFRNONAA >60.0 07/25/2022         PATHOLOGY:  BONE MARROW, FLOW CYTOMETRIC EVALUATION:   --NO IMMUNOPHENOTYPICALLY ABNORMAL CELL POPULATIONS IDENTIFIED.    RADIOLOGY:      ASSESSMENT AND PLAN:  Ankit Ruff is a 83 y.o. male with...    # pancytopenia  -review of blood counts show normal white blood cell count up until January of 2022 and has since had a progressive decline.  Review of hemoglobin shows last normal value was in March of 2019 with a hemoglobin of 14 and has had a slow decline down to a kelle of 10.8.  Platelets have been chronically depressed since 2019 around 80-90k   but also have had a progressive decline and currently now around 50,000.  -MDS panel on bone marrow showed normal study; chromosomal analysis was normal,bone marrow  "flow normal  -Got a liver US to see evaluate for liver disease which showed hepatomegaly (17cm) and a hypoechoic focus; spleen was also enlarged to 17cm thus I favor HSM as the etiology to his blood counts  -we have not been able to find a intrinsic blood or bone marrow disorder.  Therefore I would favor the blood counts as a bystander effect of the liver and spleen issues    #Hepatosplenomegaly  -etiology unknown, could secondary to increased portal pressure due to profound heart issues (EF30%) as noted below; could also be intrinsic liver disease; referral to hepatology and ordered MRI liver to evaluate hypoechoic 9mm focus  -causing bystander issues with blood counts    #Atrial Fib- did not tolerate anticoagulation or entresto.  Recently had "watchman" procedure completed     #Ischemic Cardiomyopathy: on ASA and beta blocker; EF 30%    #Back pain secondary to osteoarthritis of the spine- on meloxican periodically- needs to stop this medication as the risk for bleeding is increased with high dose NSAIDS and thrombocytopenia, will  at next visit.       Alexandria Walsh M.D.  Hematology/Oncology     Route Chart for Scheduling    Med Onc Chart Routing      Follow up with physician    Follow up with JI 3 months.   Infusion scheduling note    Injection scheduling note    Labs CBC and CMP   Scheduling:  Preferred lab:  Lab interval:     Imaging MRI   MRI liver   Pharmacy appointment    Other referrals     Additional referrals needed  hepatology referral                "

## 2023-08-21 NOTE — TELEPHONE ENCOUNTER
Dr. Alexandria Walsh ordered that patient be scheduled for a hepatology consult visit for abnormal liver imaging.  Patient scheduled to see PA Scheuermann in error on 8/22/23.  Attempt made to reach him to let him know that appt would have to be rescheduled to a later date with a different provider.  Info left on his voicemail.  Appt with PA Scheuermann cancelled.  Unsure if patient needs MD or JI appt with hepatology.  Please advise.

## 2023-08-22 ENCOUNTER — LAB VISIT (OUTPATIENT)
Dept: LAB | Facility: HOSPITAL | Age: 83
End: 2023-08-22
Attending: PHYSICIAN ASSISTANT
Payer: MEDICARE

## 2023-08-22 ENCOUNTER — OFFICE VISIT (OUTPATIENT)
Dept: HEPATOLOGY | Facility: CLINIC | Age: 83
End: 2023-08-22
Payer: MEDICARE

## 2023-08-22 VITALS — WEIGHT: 202.63 LBS | HEIGHT: 70 IN | BODY MASS INDEX: 29.01 KG/M2

## 2023-08-22 DIAGNOSIS — R16.2 HEPATOSPLENOMEGALY: ICD-10-CM

## 2023-08-22 DIAGNOSIS — R93.2 ABNORMAL FINDINGS ON DIAGNOSTIC IMAGING OF LIVER AND BILIARY TRACT: ICD-10-CM

## 2023-08-22 DIAGNOSIS — K76.9 LIVER DISEASE, UNSPECIFIED: ICD-10-CM

## 2023-08-22 DIAGNOSIS — K76.9 LIVER LESION: ICD-10-CM

## 2023-08-22 DIAGNOSIS — K76.9 LIVER LESION: Primary | ICD-10-CM

## 2023-08-22 LAB
AFP SERPL-MCNC: 3.5 NG/ML (ref 0–8.4)
HCV AB SERPL QL IA: NORMAL

## 2023-08-22 PROCEDURE — 82105 ALPHA-FETOPROTEIN SERUM: CPT | Mod: HCNC | Performed by: PHYSICIAN ASSISTANT

## 2023-08-22 PROCEDURE — 99999 PR PBB SHADOW E&M-EST. PATIENT-LVL III: ICD-10-PCS | Mod: PBBFAC,HCNC,, | Performed by: PHYSICIAN ASSISTANT

## 2023-08-22 PROCEDURE — 99214 OFFICE O/P EST MOD 30 MIN: CPT | Mod: HCNC,S$GLB,, | Performed by: PHYSICIAN ASSISTANT

## 2023-08-22 PROCEDURE — 3288F PR FALLS RISK ASSESSMENT DOCUMENTED: ICD-10-PCS | Mod: HCNC,CPTII,S$GLB, | Performed by: PHYSICIAN ASSISTANT

## 2023-08-22 PROCEDURE — 3288F FALL RISK ASSESSMENT DOCD: CPT | Mod: HCNC,CPTII,S$GLB, | Performed by: PHYSICIAN ASSISTANT

## 2023-08-22 PROCEDURE — 1101F PT FALLS ASSESS-DOCD LE1/YR: CPT | Mod: HCNC,CPTII,S$GLB, | Performed by: PHYSICIAN ASSISTANT

## 2023-08-22 PROCEDURE — 1101F PR PT FALLS ASSESS DOC 0-1 FALLS W/OUT INJ PAST YR: ICD-10-PCS | Mod: HCNC,CPTII,S$GLB, | Performed by: PHYSICIAN ASSISTANT

## 2023-08-22 PROCEDURE — 36415 COLL VENOUS BLD VENIPUNCTURE: CPT | Mod: HCNC,PO | Performed by: PHYSICIAN ASSISTANT

## 2023-08-22 PROCEDURE — 86704 HEP B CORE ANTIBODY TOTAL: CPT | Mod: HCNC | Performed by: PHYSICIAN ASSISTANT

## 2023-08-22 PROCEDURE — 86706 HEP B SURFACE ANTIBODY: CPT | Mod: 91,HCNC | Performed by: PHYSICIAN ASSISTANT

## 2023-08-22 PROCEDURE — 86803 HEPATITIS C AB TEST: CPT | Mod: HCNC | Performed by: PHYSICIAN ASSISTANT

## 2023-08-22 PROCEDURE — 99999 PR PBB SHADOW E&M-EST. PATIENT-LVL III: CPT | Mod: PBBFAC,HCNC,, | Performed by: PHYSICIAN ASSISTANT

## 2023-08-22 PROCEDURE — 1160F RVW MEDS BY RX/DR IN RCRD: CPT | Mod: HCNC,CPTII,S$GLB, | Performed by: PHYSICIAN ASSISTANT

## 2023-08-22 PROCEDURE — 87340 HEPATITIS B SURFACE AG IA: CPT | Mod: HCNC | Performed by: PHYSICIAN ASSISTANT

## 2023-08-22 PROCEDURE — 1159F MED LIST DOCD IN RCRD: CPT | Mod: HCNC,CPTII,S$GLB, | Performed by: PHYSICIAN ASSISTANT

## 2023-08-22 PROCEDURE — 1160F PR REVIEW ALL MEDS BY PRESCRIBER/CLIN PHARMACIST DOCUMENTED: ICD-10-PCS | Mod: HCNC,CPTII,S$GLB, | Performed by: PHYSICIAN ASSISTANT

## 2023-08-22 PROCEDURE — 99214 PR OFFICE/OUTPT VISIT, EST, LEVL IV, 30-39 MIN: ICD-10-PCS | Mod: HCNC,S$GLB,, | Performed by: PHYSICIAN ASSISTANT

## 2023-08-22 PROCEDURE — 1159F PR MEDICATION LIST DOCUMENTED IN MEDICAL RECORD: ICD-10-PCS | Mod: HCNC,CPTII,S$GLB, | Performed by: PHYSICIAN ASSISTANT

## 2023-08-22 NOTE — PROGRESS NOTES
"HEPATOLOGY CLINIC VISIT NOTE  REFERRING PROVIDER: Alexandria Walsh MD  CHIEF COMPLAINT: abnormal imaging, HSM, liver lesion    HISTORY       This is a 83 y.o. White male w/ PMH of ischemic cardiomyopathy (EF 30-40%) & AFib w/ implanted ICD who recently saw Hematology for eval of pancytopenia. W/u included BM bx, but ultimately revealed HSM, which is felt to be the cause. Has been referred here for eval of this as well as a liver lesion seen on U/S     Pertinent diagnostic studies:  U/S abdomen 8/16/23 -   HM 17cm, coarse heterogenous echotexture.   9mm hypoechoic focus in liver  SM 17cm  Portal vein w/ normal flow  No ascites    Labs:  Long hx of intermittent mild unconjugated hyperbilirubinemia (TBili up to 1.5)  Transaminases typically 20s-40s  Overall downward trend in plt: 140s (2010) --> 70s (2020) --> 50s (2023)    Eval for chronic liver disease:  Normal Fe studies  No other labs done    Pt feels well overall  Main complaints are trouble sleeping, anxiety  Also has bilat RAMO, on lasix + KCl    Denies jaundice, dark urine, abdominal distention, hematemesis, melena, slowed mentation.       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   NECK: Supple  LUNGS: Normal respiratory effort.   ABDOMEN: Flat, soft, nontender. Liver edge palpable, smooth and firm. Spleen palpable. No masses. No ascites or hernias.   SKIN: Warm and dry. No jaundice, No obvious rashes.   EXTREMITIES: 1+ pitting pretibial edema to knees bilat  NEURO/PSYCH: Normal gate. Memory intact. Thought and speech pattern appropriate. Behavior normal. No depression or anxiety " noted.    PERTINENT DIAGNOSTIC RESULTS      Lab Results   Component Value Date    WBC 2.86 (L) 08/09/2023    HGB 11.0 (L) 08/09/2023    PLT 50 (L) 08/09/2023     Lab Results   Component Value Date    INR 1.3 08/09/2023     Lab Results   Component Value Date    AST 39 07/26/2023    ALT 24 07/26/2023    BILITOT 1.5 (H) 07/26/2023    ALBUMIN 3.8 07/26/2023    ALKPHOS 85 07/26/2023    CREATININE 0.9 07/26/2023    BUN 23 07/26/2023     07/26/2023    K 4.1 07/26/2023     Results for orders placed during the hospital encounter of 08/16/23  US Abdomen Complete  CLINICAL HISTORY:pancytopenia, looking for hepatosplenomegaly;.Other pancytopenia  TECHNIQUE:Complete ultrasound evaluation of the abdomen (including the pancreas, aorta, IVC, liver gallbladder, common bile duct,kidneys, and spleen) was performed utilizing grayscale and color flow imaging.  COMPARISON:None.    FINDINGS:  Pancreas: The pancreas is normal in echogenicity without focal mass or definite pseudocyst where visualized.    Aorta: The visualized abdominal aorta is normal is caliber without evidence of aneurysm.  There is atherosclerotic calcification present within the abdominal aorta.    Liver: The liver is enlarged in size measuring 17 cm in sagittal dimension.  The liver demonstrates a coarse heterogeneous parenchymal echotexture (most prominently affecting the left lobe) which is nonspecific but could relate to intrinsic liver disease.  There is a 6 mm left hepatic lobe cyst.  There is a 9 x 5 x 9 mm hypoechoic focus present within the left hepatic lobe which is difficult to characterize due to its small size.  Additional characterization could be achieved with MRI of the abdomen without and with contrast as deemed clinically appropriate.  There are 2 calcified granulomas in the right hepatic lobe..  The portal vein is patent and shows normal directional flow.  Peak systolic velocities within the main portal vein measure 24 cm/s.  There is an 11%  caliber change in the size of the main portal vein between quiet respiration and deep inspiration.  The IVC is patent where visualized.    Biliary system: There are multiple mobile shadowing gallstones present within the gallbladder lumen.  No sonographic evidence gallbladder inflammatory change.  No sonographic Tao sign.  The common bile duct is not dilated, measuring 4 mm. No intrahepatic biliary ductal dilatation.    Kidneys: The kidneys are normal in size measuring 12.2 cm on the right and 13.7 cm on the left.No hydronephrosis, stones, or renal mass.  There is a 14 x 12 x 11 mm simple cyst present within the lower pole of the right kidney.  There is focal cortical scarring present in the left renal midpole.    Spleen: The spleen is enlarged in size measuring 17 x 9.2 x 16.4 cm and shows a normal homogenous parenchymal echotexture without solid mass.    Miscellaneous: No ascites.    Impression  1. Hepatosplenomegaly and coarse heterogeneous hepatic parenchymal echotexture with 9 mm solid hypoechoic focus observed in the lateral segment of the left hepatic lobe.  Consider additional evaluation with contrast enhanced MRI of the liver.  2. 14 mm simple cortical cyst in the lower pole of the right kidney.  3. Cholelithiasis      ASSESSMENT        83 y.o. White male with:  1. HEPATOSPLENOMEGALY, PANCYTOPENIA - appears c/w portal HTN  -- No evidence of liver dysfunction   -- No known hx of primary liver disease, but needs eval despite normal transaminases  -- could be due to cardiac disease (see below)    2. LIVER LESION on u/s  -- needs contrast imaging for further characterization    3. ISCHEMIC CARDIOMYOPATHY, AFIB (EF 30-40%    4. IMPLANTED DEFIBRILLATOR    PLAN      Labs today  Cancel MRI due to defibrillator. Schedule tpCT abdomen soon  F/u visit to review results    Orders Placed This Encounter   Procedures    CT Abdomen With Without Contrast    Hepatitis C Antibody    Hepatitis B Surface Antigen    Hepatitis  B Surface Ab, Qualitative    Hepatitis B Core Antibody, Total    AFP Tumor Marker     __________________________________________________________________    Duration of encounter: 57 min  This includes face-to-face time and non face-to-face time preparing to see the patient (eg, review of tests), obtaining and/or reviewing separately obtained history, documenting clinical information in the electronic or other health record, independently interpreting resultsand communicating results to the patient/family/caregiver, or care coordination.

## 2023-08-22 NOTE — TELEPHONE ENCOUNTER
PA Scheuermann states that she will see patient today.  LVM asking that he still come for 1 pm appointment with her.

## 2023-08-23 ENCOUNTER — PATIENT MESSAGE (OUTPATIENT)
Dept: CARDIOLOGY | Facility: CLINIC | Age: 83
End: 2023-08-23
Payer: MEDICARE

## 2023-08-23 DIAGNOSIS — Z95.818 PRESENCE OF WATCHMAN LEFT ATRIAL APPENDAGE CLOSURE DEVICE: ICD-10-CM

## 2023-08-23 LAB
HBV CORE AB SERPL QL IA: NORMAL
HBV SURFACE AB SER-ACNC: <3 MIU/ML
HBV SURFACE AB SER-ACNC: NORMAL M[IU]/ML
HBV SURFACE AG SERPL QL IA: NORMAL

## 2023-08-23 NOTE — TELEPHONE ENCOUNTER
Correct, I did change the plan to Plavix alone (rather than ASA alone). This has to do with how the device is sitting in the appendage and other procedural factors of little significance on the patient end. If it turns out he doesn't tolerate Plavix alone then we will revert to aspirin alone.

## 2023-08-24 ENCOUNTER — PATIENT MESSAGE (OUTPATIENT)
Dept: CARDIOLOGY | Facility: CLINIC | Age: 83
End: 2023-08-24
Payer: MEDICARE

## 2023-08-25 DIAGNOSIS — K76.9 LIVER DISEASE: Primary | ICD-10-CM

## 2023-08-26 RX ORDER — CLOPIDOGREL BISULFATE 75 MG/1
75 TABLET ORAL DAILY
Qty: 90 TABLET | Refills: 3 | Status: SHIPPED | OUTPATIENT
Start: 2023-08-26

## 2023-08-28 ENCOUNTER — HOSPITAL ENCOUNTER (OUTPATIENT)
Dept: RADIOLOGY | Facility: HOSPITAL | Age: 83
Discharge: HOME | End: 2023-08-28
Attending: PHYSICIAN ASSISTANT
Payer: MEDICARE

## 2023-08-28 DIAGNOSIS — R16.2 HEPATOSPLENOMEGALY: ICD-10-CM

## 2023-08-28 DIAGNOSIS — K76.9 LIVER LESION: ICD-10-CM

## 2023-08-28 DIAGNOSIS — K76.9 LIVER DISEASE, UNSPECIFIED: ICD-10-CM

## 2023-08-28 PROCEDURE — 74170 CT ABD WO CNTRST FLWD CNTRST: CPT | Mod: 26,HCNC,, | Performed by: RADIOLOGY

## 2023-08-28 PROCEDURE — 74170 CT ABDOMEN W WO CONTRAST: ICD-10-PCS | Mod: 26,HCNC,, | Performed by: RADIOLOGY

## 2023-08-28 PROCEDURE — 25500020 PHARM REV CODE 255: Mod: HCNC,PO | Performed by: PHYSICIAN ASSISTANT

## 2023-08-28 PROCEDURE — 74170 CT ABD WO CNTRST FLWD CNTRST: CPT | Mod: TC,HCNC,PO

## 2023-08-28 RX ADMIN — IOHEXOL 100 ML: 350 INJECTION, SOLUTION INTRAVENOUS at 03:08

## 2023-08-31 ENCOUNTER — OFFICE VISIT (OUTPATIENT)
Dept: CARDIOLOGY | Facility: CLINIC | Age: 83
End: 2023-08-31
Payer: MEDICARE

## 2023-08-31 VITALS
DIASTOLIC BLOOD PRESSURE: 71 MMHG | BODY MASS INDEX: 28.69 KG/M2 | HEART RATE: 63 BPM | HEIGHT: 70 IN | SYSTOLIC BLOOD PRESSURE: 130 MMHG | WEIGHT: 200.38 LBS

## 2023-08-31 DIAGNOSIS — I48.0 PAF (PAROXYSMAL ATRIAL FIBRILLATION): Primary | ICD-10-CM

## 2023-08-31 DIAGNOSIS — I25.5 ISCHEMIC CARDIOMYOPATHY: Chronic | ICD-10-CM

## 2023-08-31 DIAGNOSIS — Z95.1 S/P CABG X 4: ICD-10-CM

## 2023-08-31 PROCEDURE — 1101F PT FALLS ASSESS-DOCD LE1/YR: CPT | Mod: HCNC,CPTII,S$GLB, | Performed by: INTERNAL MEDICINE

## 2023-08-31 PROCEDURE — 99999 PR PBB SHADOW E&M-EST. PATIENT-LVL II: CPT | Mod: PBBFAC,HCNC,, | Performed by: INTERNAL MEDICINE

## 2023-08-31 PROCEDURE — 1126F PR PAIN SEVERITY QUANTIFIED, NO PAIN PRESENT: ICD-10-PCS | Mod: HCNC,CPTII,S$GLB, | Performed by: INTERNAL MEDICINE

## 2023-08-31 PROCEDURE — 1159F PR MEDICATION LIST DOCUMENTED IN MEDICAL RECORD: ICD-10-PCS | Mod: HCNC,CPTII,S$GLB, | Performed by: INTERNAL MEDICINE

## 2023-08-31 PROCEDURE — 1160F PR REVIEW ALL MEDS BY PRESCRIBER/CLIN PHARMACIST DOCUMENTED: ICD-10-PCS | Mod: HCNC,CPTII,S$GLB, | Performed by: INTERNAL MEDICINE

## 2023-08-31 PROCEDURE — 3075F SYST BP GE 130 - 139MM HG: CPT | Mod: HCNC,CPTII,S$GLB, | Performed by: INTERNAL MEDICINE

## 2023-08-31 PROCEDURE — 1160F RVW MEDS BY RX/DR IN RCRD: CPT | Mod: HCNC,CPTII,S$GLB, | Performed by: INTERNAL MEDICINE

## 2023-08-31 PROCEDURE — 99999 PR PBB SHADOW E&M-EST. PATIENT-LVL II: ICD-10-PCS | Mod: PBBFAC,HCNC,, | Performed by: INTERNAL MEDICINE

## 2023-08-31 PROCEDURE — 99214 OFFICE O/P EST MOD 30 MIN: CPT | Mod: HCNC,S$GLB,, | Performed by: INTERNAL MEDICINE

## 2023-08-31 PROCEDURE — 3078F DIAST BP <80 MM HG: CPT | Mod: HCNC,CPTII,S$GLB, | Performed by: INTERNAL MEDICINE

## 2023-08-31 PROCEDURE — 1159F MED LIST DOCD IN RCRD: CPT | Mod: HCNC,CPTII,S$GLB, | Performed by: INTERNAL MEDICINE

## 2023-08-31 PROCEDURE — 99214 PR OFFICE/OUTPT VISIT, EST, LEVL IV, 30-39 MIN: ICD-10-PCS | Mod: HCNC,S$GLB,, | Performed by: INTERNAL MEDICINE

## 2023-08-31 PROCEDURE — 1126F AMNT PAIN NOTED NONE PRSNT: CPT | Mod: HCNC,CPTII,S$GLB, | Performed by: INTERNAL MEDICINE

## 2023-08-31 PROCEDURE — 3288F FALL RISK ASSESSMENT DOCD: CPT | Mod: HCNC,CPTII,S$GLB, | Performed by: INTERNAL MEDICINE

## 2023-08-31 PROCEDURE — 1101F PR PT FALLS ASSESS DOC 0-1 FALLS W/OUT INJ PAST YR: ICD-10-PCS | Mod: HCNC,CPTII,S$GLB, | Performed by: INTERNAL MEDICINE

## 2023-08-31 PROCEDURE — 3078F PR MOST RECENT DIASTOLIC BLOOD PRESSURE < 80 MM HG: ICD-10-PCS | Mod: HCNC,CPTII,S$GLB, | Performed by: INTERNAL MEDICINE

## 2023-08-31 PROCEDURE — 3288F PR FALLS RISK ASSESSMENT DOCUMENTED: ICD-10-PCS | Mod: HCNC,CPTII,S$GLB, | Performed by: INTERNAL MEDICINE

## 2023-08-31 PROCEDURE — 3075F PR MOST RECENT SYSTOLIC BLOOD PRESS GE 130-139MM HG: ICD-10-PCS | Mod: HCNC,CPTII,S$GLB, | Performed by: INTERNAL MEDICINE

## 2023-08-31 RX ORDER — CARVEDILOL 6.25 MG/1
12.5 TABLET ORAL 2 TIMES DAILY
Qty: 181 TABLET | Refills: 3 | Status: SHIPPED | OUTPATIENT
Start: 2023-08-31 | End: 2023-09-12 | Stop reason: SDUPTHER

## 2023-09-12 ENCOUNTER — OFFICE VISIT (OUTPATIENT)
Dept: HEPATOLOGY | Facility: CLINIC | Age: 83
End: 2023-09-12
Payer: MEDICARE

## 2023-09-12 VITALS — WEIGHT: 203.25 LBS | BODY MASS INDEX: 30.1 KG/M2 | HEIGHT: 69 IN

## 2023-09-12 DIAGNOSIS — K74.60 HEPATIC CIRRHOSIS, UNSPECIFIED HEPATIC CIRRHOSIS TYPE, UNSPECIFIED WHETHER ASCITES PRESENT: Primary | ICD-10-CM

## 2023-09-12 DIAGNOSIS — I50.42 CHRONIC COMBINED SYSTOLIC AND DIASTOLIC HEART FAILURE: ICD-10-CM

## 2023-09-12 DIAGNOSIS — R93.5 ABNORMAL CT OF THE ABDOMEN: ICD-10-CM

## 2023-09-12 DIAGNOSIS — I50.42 CHRONIC COMBINED SYSTOLIC AND DIASTOLIC HEART FAILURE: Primary | ICD-10-CM

## 2023-09-12 DIAGNOSIS — K76.9 LIVER LESION: ICD-10-CM

## 2023-09-12 PROCEDURE — 1160F RVW MEDS BY RX/DR IN RCRD: CPT | Mod: HCNC,CPTII,S$GLB, | Performed by: PHYSICIAN ASSISTANT

## 2023-09-12 PROCEDURE — 1101F PR PT FALLS ASSESS DOC 0-1 FALLS W/OUT INJ PAST YR: ICD-10-PCS | Mod: HCNC,CPTII,S$GLB, | Performed by: PHYSICIAN ASSISTANT

## 2023-09-12 PROCEDURE — 3288F PR FALLS RISK ASSESSMENT DOCUMENTED: ICD-10-PCS | Mod: HCNC,CPTII,S$GLB, | Performed by: PHYSICIAN ASSISTANT

## 2023-09-12 PROCEDURE — 99215 PR OFFICE/OUTPT VISIT, EST, LEVL V, 40-54 MIN: ICD-10-PCS | Mod: HCNC,S$GLB,, | Performed by: PHYSICIAN ASSISTANT

## 2023-09-12 PROCEDURE — 1160F PR REVIEW ALL MEDS BY PRESCRIBER/CLIN PHARMACIST DOCUMENTED: ICD-10-PCS | Mod: HCNC,CPTII,S$GLB, | Performed by: PHYSICIAN ASSISTANT

## 2023-09-12 PROCEDURE — 1159F PR MEDICATION LIST DOCUMENTED IN MEDICAL RECORD: ICD-10-PCS | Mod: HCNC,CPTII,S$GLB, | Performed by: PHYSICIAN ASSISTANT

## 2023-09-12 PROCEDURE — 1101F PT FALLS ASSESS-DOCD LE1/YR: CPT | Mod: HCNC,CPTII,S$GLB, | Performed by: PHYSICIAN ASSISTANT

## 2023-09-12 PROCEDURE — 99999 PR PBB SHADOW E&M-EST. PATIENT-LVL III: ICD-10-PCS | Mod: PBBFAC,HCNC,, | Performed by: PHYSICIAN ASSISTANT

## 2023-09-12 PROCEDURE — 99999 PR PBB SHADOW E&M-EST. PATIENT-LVL III: CPT | Mod: PBBFAC,HCNC,, | Performed by: PHYSICIAN ASSISTANT

## 2023-09-12 PROCEDURE — 99215 OFFICE O/P EST HI 40 MIN: CPT | Mod: HCNC,S$GLB,, | Performed by: PHYSICIAN ASSISTANT

## 2023-09-12 PROCEDURE — 1159F MED LIST DOCD IN RCRD: CPT | Mod: HCNC,CPTII,S$GLB, | Performed by: PHYSICIAN ASSISTANT

## 2023-09-12 PROCEDURE — 3288F FALL RISK ASSESSMENT DOCD: CPT | Mod: HCNC,CPTII,S$GLB, | Performed by: PHYSICIAN ASSISTANT

## 2023-09-12 RX ORDER — FUROSEMIDE 20 MG/1
20 TABLET ORAL DAILY PRN
Qty: 45 TABLET | Refills: 3 | Status: SHIPPED | OUTPATIENT
Start: 2023-09-12

## 2023-09-12 RX ORDER — CARVEDILOL 6.25 MG/1
6.25 TABLET ORAL 2 TIMES DAILY
Qty: 181 TABLET | Refills: 3 | Status: SHIPPED | OUTPATIENT
Start: 2023-09-12

## 2023-09-12 NOTE — TELEPHONE ENCOUNTER
Pt came to clinic today to discuss symptoms. Pt states that he is having LINDER, lightheadedness, fatigue, dizzy and weakness. Pt reports that BP meds were recently changed.     Spoke with Dr Ellis who ordered BMP, BNP, CBC, mag for Monday morning.  Decrease coreg to 6.25 mg BID and double up on lasix for 2 days. Appt with Dr Ellis scheduled for next week as well.    Gave pt new instructions. Pt IVONNE.

## 2023-09-12 NOTE — PATIENT INSTRUCTIONS
Labs and ultrasound in late Nov or early Dec to check spot on liver. Follow up visit after to review results.  Wait for GI nurse to call you to set up EGD (upper scope) to check inside of stomach

## 2023-09-12 NOTE — PROGRESS NOTES
"HEPATOLOGY CLINIC VISIT NOTE  CHIEF COMPLAINT: abnormal imaging, HSM, liver lesion    HISTORY       This is a 83 y.o. White male w/ PMH of ischemic cardiomyopathy (EF <30% - 8/2023) & AFib w/ implanted ICD, pancytopenia (s/p Heme eval w/ BM bx revealing only HSM as cause). I first saw him a few weeks ago for eval of liver disease (due to HSM) and 9mm liver lesion on u/s. Here for f/u w/ add'l labs / imaging.      Eval for underlying chronic liver disease:   Hep B / C neg  Prior Fe studies unyielding  (+) fly hx liver dz (brother s/p liver transplant "due to hepatitis")  Add'l serology not done due to normal AST/ALT  Denies any hx of heavy alcohol use    Eval of liver lesion seen on u/s:  - AFP 8/2023 normal  - tpCT 8/2023 - no liver lesions noted. SM w/ multiple collaterals and splenorenal shunting.   Stomach wall thickening - ?etiology (not well distended, varices, inflamm, infiltrative process??)    In clinic today pt is frustrated:  "Feeling poorly for a year" Light-headed, easily fatigued, even w/ walking short distances  Frustrated b/c of recent med changes:  7/2023 Cards NP lowered Coreg -> 8/2023 Cards MD raised it  Difficult to ascertain whether symptoms have worsened since he increased Coreg.    Also now c/o early satiety for several weeks and abd fullness  No n/v, hematemesis, melena, abd pain  Wt stable compared to last visit  No ascites on recent scan    Current symptoms of hepatic decompensation:  Ascites - none on imaging  RAMO - yes  Diuretic use - yes, lasix   TBili elevation - yes, up to 1.5  HE / confusion / memory problems - no  EV bleed / hematemesis / melena - no    Liver history:  Liver disease well compensated  (+) Portal HTN: SM + collaterals, low plt 50s-60s    MELD 11    Cirrhosis maintenance:  HCC screening - liver lesion on u/s only  8/2023 u/s: 9mm liver lesion   8/2023 tpCT: no lesions  8/2023 AFP: normal  Varices screening - EGD needed  HAV immunity - Unknown   HBV status - " "Lacking      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   NECK: Supple  LUNGS: Normal respiratory effort.   ABDOMEN: Soft, nontender, nondistended.   SKIN: Warm and dry. No jaundice, No obvious rashes.   NEURO/PSYCH: Normal gate. Memory intact. Thought and speech pattern appropriate. Behavior normal. No depression or anxiety noted.    PERTINENT DIAGNOSTIC RESULTS      Lab Results   Component Value Date    WBC 2.86 (L) 08/09/2023    HGB 11.0 (L) 08/09/2023    PLT 50 (L) 08/09/2023     Lab Results   Component Value Date    INR 1.3 08/09/2023     Lab Results   Component Value Date    AST 39 07/26/2023    ALT 24 07/26/2023    BILITOT 1.5 (H) 07/26/2023    ALBUMIN 3.8 07/26/2023    ALKPHOS 85 07/26/2023    CREATININE 0.9 08/28/2023    BUN 23 07/26/2023     07/26/2023    K 4.1 07/26/2023    AFP 3.5 08/22/2023       CT ABDOMEN W WO CONTRAST - 8/28/23     CLINICAL HISTORY:  Liver lesion, < 1cm, chronic liver disease;liver lesion on u/s; Liver disease, unspecified     TECHNIQUE:  Low dose axial images, sagittal and coronal reformations were obtained from the lung bases to the iliac crest following the IV administration of 75 mL of Omnipaque 350 and and the oral administration of 32 oz of water.  Non contrast, arterial, portal venous and delayed phases were acquired over the abdomen.     COMPARISON:  08/16/2023     FINDINGS:  Bilateral gynecomastia is noted.     A prominent left atrium is noted, please correlate for elevated heart pressures.     A partially calcified plaque is noted at the left lung base.     Sternotomy wires are noted partially " visualized.     Extensive coronary calcifications are noted increasing the patient's coronary risk.  A small fat containing diaphragmatic hernia posteriorly on right appears to be present     Decreased liver density is noted as can be seen with fatty infiltration of the liver     No obvious arterial enhancing liver lesion is identified.     Splenomegaly is noted, please correlate for portal hypertension with the spleen measuring 16.7 cm axially. Multiple collateral vessels are noted along the spleen a splenorenal shunting suspected the liver     Cholelithiasis is noted with a cluster of stones in the gallbladder spanning near 2 cm.     A hypodensity is noted at the lower pole of the right kidney without obvious evidence of enhancement of 10 mm suggestive a cyst.     Atherosclerotic calcifications are noted at the origin of the the right renal artery greater than left as can be seen with stenosis, the extent is difficult to determine.     Fusion of the SI joints is noted as can be seen with sacroiliitis or degenerative change.     Multiple colonic diverticula are noted in the partially visualized colon.     A small fat containing umbilical defect is noted.     A     Multiple remote healed left-sided rib fractures appear to be present     Multiple colonic diverticula are noted     The stomach is not well distended or evaluated.  Apparent wall thickening is noted that may relate lack of distension as well as to varices.  Please clinically correlate.  Evaluation for inflammatory and infiltrative processes would be significantly hindered given the apparent wall thickening     Impression:     1. No worrisome enhancing liver mass lesions are noted on this exam.  Ultrasound follow-up of the ultrasound lesion of concern is suggested.  MRI of the liver could be performed if desired  2. Evidence of liver disease and portal hypertension is suspected with splenomegaly and evidence of multiple collaterals in left upper quadrant  of the abdomen.  3. Cholelithiasis  4. Multiple colonic diverticulum  5. Fusion of the SI joints as can be seen with sacroiliitis or degenerative change.  6. Prominent left atrium, please correlate for elevated left-sided heart pressures.  7. Apparent wall thickening of the stomach most likely gastric varix and collaterals.  Lack of distension could contribute to this appearance.  Inflammatory and infiltrative processes of the stomach would be difficult to exclude given background wall thickening  8. Significant coronary artery disease increasing the patient's coronary risk  9. Gynecomastia  10. Atherosclerotic calcifications at the origin of the renal arteries with some narrowing suspected a  ASSESSMENT        83 y.o. White male with:  1. PORTAL HYPERTENSION  -- HSM, collaterals + varices on imaging, low plt 50s-60s  -- No evidence of real liver dysfunction   -- No obvious etiology of primary liver disease & AST/ALT normal  -- Due to cardiac disease?? Due to undiagnosed liver disease??    2. LIVER LESION on u/s  -- not evident on tpCT & AFP normal    3. EARLY SATIETY + GASTRIC WALL THICKENING ON CT  -- r/o gastric pathology (but could be due to stomach not distended / varices as noted above)    4. ISCHEMIC CARDIOMYOPATHY, AFIB (EF 30%)  5. IMPLANTED DEFIBRILLATOR  6. LIGHT-HEADEDNESS, FATIGUE - suspect due to heart disease / side effect of cardiac meds    PLAN     Discussed w/ pt that he may have cirrhosis, but etiology not clear. At his age, I'm not sure that risks of liver biopsy to further clarify exact diagnosis and etiology are really warranted. Rather a more conservative approach w/ monitoring could be pursued w/o biopsy. Will discuss w/ staff MD to confirm this plan.    Discussed w/ pt that surveillance of liver lesion / routine liver imaging would be recommended due to risk of HCC (if cirrhosis truly present). Discussed that some people choose to forgo this at his age. For now, he would like to do f/u  imaging.      EGD  to eval stomach wall thickening & early satiety  -- can also assess for varices but already on coreg which would serve for primary prophylaxis if present  U/S abdomen, CBC, CMP, INR, AFP in 3 months w/ f/u visit  To ER if hematemesis, melena  F/u w/ cardiology for symptoms of lightheadedness, fatigue    __________________________________________________________________    Duration of encounter: 51 min  This includes face-to-face time and non face-to-face time preparing to see the patient (eg, review of tests), obtaining and/or reviewing separately obtained history, documenting clinical information in the electronic or other health record, independently interpreting resultsand communicating results to the patient/family/caregiver, or care coordination.

## 2023-09-15 ENCOUNTER — TELEPHONE (OUTPATIENT)
Dept: GASTROENTEROLOGY | Facility: CLINIC | Age: 83
End: 2023-09-15
Payer: MEDICARE

## 2023-09-15 NOTE — TELEPHONE ENCOUNTER
I call Mr. Ruff to schedule a colonoscopy/EGD as ordered by primary care physician. Patient doesn't answer. I leave patient a brief voicemail to call back. [employer] message will be sent if active.

## 2023-09-18 ENCOUNTER — LAB VISIT (OUTPATIENT)
Dept: LAB | Facility: HOSPITAL | Age: 83
End: 2023-09-18
Attending: INTERNAL MEDICINE
Payer: MEDICARE

## 2023-09-18 DIAGNOSIS — I50.42 CHRONIC COMBINED SYSTOLIC AND DIASTOLIC HEART FAILURE: ICD-10-CM

## 2023-09-18 LAB
ANION GAP SERPL CALC-SCNC: 6 MMOL/L (ref 8–16)
BASOPHILS # BLD AUTO: 0.02 K/UL (ref 0–0.2)
BASOPHILS NFR BLD: 0.7 % (ref 0–1.9)
BNP SERPL-MCNC: 231 PG/ML (ref 0–99)
BUN SERPL-MCNC: 21 MG/DL (ref 8–23)
CALCIUM SERPL-MCNC: 8.7 MG/DL (ref 8.7–10.5)
CHLORIDE SERPL-SCNC: 104 MMOL/L (ref 95–110)
CO2 SERPL-SCNC: 25 MMOL/L (ref 23–29)
CREAT SERPL-MCNC: 0.9 MG/DL (ref 0.5–1.4)
DIFFERENTIAL METHOD: ABNORMAL
EOSINOPHIL # BLD AUTO: 0.1 K/UL (ref 0–0.5)
EOSINOPHIL NFR BLD: 3.5 % (ref 0–8)
ERYTHROCYTE [DISTWIDTH] IN BLOOD BY AUTOMATED COUNT: 12.8 % (ref 11.5–14.5)
EST. GFR  (NO RACE VARIABLE): >60 ML/MIN/1.73 M^2
GLUCOSE SERPL-MCNC: 103 MG/DL (ref 70–110)
HCT VFR BLD AUTO: 31.2 % (ref 40–54)
HGB BLD-MCNC: 10.6 G/DL (ref 14–18)
IMM GRANULOCYTES # BLD AUTO: 0 K/UL (ref 0–0.04)
IMM GRANULOCYTES NFR BLD AUTO: 0 % (ref 0–0.5)
LYMPHOCYTES # BLD AUTO: 0.9 K/UL (ref 1–4.8)
LYMPHOCYTES NFR BLD: 31.9 % (ref 18–48)
MAGNESIUM SERPL-MCNC: 2.2 MG/DL (ref 1.6–2.6)
MCH RBC QN AUTO: 33.3 PG (ref 27–31)
MCHC RBC AUTO-ENTMCNC: 34 G/DL (ref 32–36)
MCV RBC AUTO: 98 FL (ref 82–98)
MONOCYTES # BLD AUTO: 0.3 K/UL (ref 0.3–1)
MONOCYTES NFR BLD: 9.4 % (ref 4–15)
NEUTROPHILS # BLD AUTO: 1.6 K/UL (ref 1.8–7.7)
NEUTROPHILS NFR BLD: 54.5 % (ref 38–73)
NRBC BLD-RTO: 0 /100 WBC
PLATELET # BLD AUTO: 54 K/UL (ref 150–450)
PMV BLD AUTO: 11 FL (ref 9.2–12.9)
POTASSIUM SERPL-SCNC: 4.8 MMOL/L (ref 3.5–5.1)
RBC # BLD AUTO: 3.18 M/UL (ref 4.6–6.2)
SODIUM SERPL-SCNC: 135 MMOL/L (ref 136–145)
WBC # BLD AUTO: 2.88 K/UL (ref 3.9–12.7)

## 2023-09-18 PROCEDURE — 83735 ASSAY OF MAGNESIUM: CPT | Mod: HCNC | Performed by: INTERNAL MEDICINE

## 2023-09-18 PROCEDURE — 85025 COMPLETE CBC W/AUTO DIFF WBC: CPT | Mod: HCNC | Performed by: INTERNAL MEDICINE

## 2023-09-18 PROCEDURE — 36415 COLL VENOUS BLD VENIPUNCTURE: CPT | Mod: HCNC,PO | Performed by: INTERNAL MEDICINE

## 2023-09-18 PROCEDURE — 83880 ASSAY OF NATRIURETIC PEPTIDE: CPT | Mod: HCNC | Performed by: INTERNAL MEDICINE

## 2023-09-18 PROCEDURE — 80048 BASIC METABOLIC PNL TOTAL CA: CPT | Mod: HCNC | Performed by: INTERNAL MEDICINE

## 2023-09-19 ENCOUNTER — PATIENT MESSAGE (OUTPATIENT)
Dept: PRIMARY CARE CLINIC | Facility: CLINIC | Age: 83
End: 2023-09-19
Payer: MEDICARE

## 2023-09-20 ENCOUNTER — OFFICE VISIT (OUTPATIENT)
Dept: CARDIOLOGY | Facility: CLINIC | Age: 83
End: 2023-09-20
Payer: MEDICARE

## 2023-09-20 VITALS
DIASTOLIC BLOOD PRESSURE: 59 MMHG | WEIGHT: 200.81 LBS | BODY MASS INDEX: 29.66 KG/M2 | HEART RATE: 49 BPM | SYSTOLIC BLOOD PRESSURE: 120 MMHG

## 2023-09-20 DIAGNOSIS — Z95.818 PRESENCE OF WATCHMAN LEFT ATRIAL APPENDAGE CLOSURE DEVICE: ICD-10-CM

## 2023-09-20 DIAGNOSIS — Z95.1 S/P CABG X 4: Primary | ICD-10-CM

## 2023-09-20 DIAGNOSIS — I25.5 ISCHEMIC CARDIOMYOPATHY: Chronic | ICD-10-CM

## 2023-09-20 DIAGNOSIS — Z95.810 IMPLANTABLE CARDIOVERTER-DEFIBRILLATOR (ICD) IN SITU: ICD-10-CM

## 2023-09-20 DIAGNOSIS — I48.0 PAF (PAROXYSMAL ATRIAL FIBRILLATION): ICD-10-CM

## 2023-09-20 DIAGNOSIS — I10 ESSENTIAL HYPERTENSION: ICD-10-CM

## 2023-09-20 PROCEDURE — 99999 PR PBB SHADOW E&M-EST. PATIENT-LVL III: CPT | Mod: PBBFAC,HCNC,, | Performed by: INTERNAL MEDICINE

## 2023-09-20 PROCEDURE — 1101F PT FALLS ASSESS-DOCD LE1/YR: CPT | Mod: HCNC,CPTII,S$GLB, | Performed by: INTERNAL MEDICINE

## 2023-09-20 PROCEDURE — 1101F PR PT FALLS ASSESS DOC 0-1 FALLS W/OUT INJ PAST YR: ICD-10-PCS | Mod: HCNC,CPTII,S$GLB, | Performed by: INTERNAL MEDICINE

## 2023-09-20 PROCEDURE — 3288F PR FALLS RISK ASSESSMENT DOCUMENTED: ICD-10-PCS | Mod: HCNC,CPTII,S$GLB, | Performed by: INTERNAL MEDICINE

## 2023-09-20 PROCEDURE — 1159F PR MEDICATION LIST DOCUMENTED IN MEDICAL RECORD: ICD-10-PCS | Mod: HCNC,CPTII,S$GLB, | Performed by: INTERNAL MEDICINE

## 2023-09-20 PROCEDURE — 1160F RVW MEDS BY RX/DR IN RCRD: CPT | Mod: HCNC,CPTII,S$GLB, | Performed by: INTERNAL MEDICINE

## 2023-09-20 PROCEDURE — 1125F AMNT PAIN NOTED PAIN PRSNT: CPT | Mod: HCNC,CPTII,S$GLB, | Performed by: INTERNAL MEDICINE

## 2023-09-20 PROCEDURE — 1160F PR REVIEW ALL MEDS BY PRESCRIBER/CLIN PHARMACIST DOCUMENTED: ICD-10-PCS | Mod: HCNC,CPTII,S$GLB, | Performed by: INTERNAL MEDICINE

## 2023-09-20 PROCEDURE — 1159F MED LIST DOCD IN RCRD: CPT | Mod: HCNC,CPTII,S$GLB, | Performed by: INTERNAL MEDICINE

## 2023-09-20 PROCEDURE — 99999 PR PBB SHADOW E&M-EST. PATIENT-LVL III: ICD-10-PCS | Mod: PBBFAC,HCNC,, | Performed by: INTERNAL MEDICINE

## 2023-09-20 PROCEDURE — 1125F PR PAIN SEVERITY QUANTIFIED, PAIN PRESENT: ICD-10-PCS | Mod: HCNC,CPTII,S$GLB, | Performed by: INTERNAL MEDICINE

## 2023-09-20 PROCEDURE — 3078F DIAST BP <80 MM HG: CPT | Mod: HCNC,CPTII,S$GLB, | Performed by: INTERNAL MEDICINE

## 2023-09-20 PROCEDURE — 99214 OFFICE O/P EST MOD 30 MIN: CPT | Mod: HCNC,S$GLB,, | Performed by: INTERNAL MEDICINE

## 2023-09-20 PROCEDURE — 3288F FALL RISK ASSESSMENT DOCD: CPT | Mod: HCNC,CPTII,S$GLB, | Performed by: INTERNAL MEDICINE

## 2023-09-20 PROCEDURE — 3074F PR MOST RECENT SYSTOLIC BLOOD PRESSURE < 130 MM HG: ICD-10-PCS | Mod: HCNC,CPTII,S$GLB, | Performed by: INTERNAL MEDICINE

## 2023-09-20 PROCEDURE — 3074F SYST BP LT 130 MM HG: CPT | Mod: HCNC,CPTII,S$GLB, | Performed by: INTERNAL MEDICINE

## 2023-09-20 PROCEDURE — 3078F PR MOST RECENT DIASTOLIC BLOOD PRESSURE < 80 MM HG: ICD-10-PCS | Mod: HCNC,CPTII,S$GLB, | Performed by: INTERNAL MEDICINE

## 2023-09-20 PROCEDURE — 99214 PR OFFICE/OUTPT VISIT, EST, LEVL IV, 30-39 MIN: ICD-10-PCS | Mod: HCNC,S$GLB,, | Performed by: INTERNAL MEDICINE

## 2023-09-20 NOTE — PROGRESS NOTES
Subjective:    Patient ID:  Ankit Ruff is a 83 y.o. male who presents for follow-up of ICM    HPI  He comes for follow up with no major problems, no chest pain, no shortness of breath.  FC II  No active cardiac issues  BP ok at home    Review of Systems   Constitutional: Negative for decreased appetite, malaise/fatigue, weight gain and weight loss.   Cardiovascular:  Negative for chest pain, dyspnea on exertion, leg swelling, palpitations and syncope.   Respiratory:  Negative for cough and shortness of breath.    Gastrointestinal: Negative.    Neurological:  Negative for weakness.   All other systems reviewed and are negative.     Objective:      Physical Exam  Vitals and nursing note reviewed.   Constitutional:       Appearance: Normal appearance. He is well-developed.   HENT:      Head: Normocephalic.   Eyes:      Pupils: Pupils are equal, round, and reactive to light.   Neck:      Thyroid: No thyromegaly.      Vascular: No carotid bruit or JVD.   Cardiovascular:      Rate and Rhythm: Normal rate and regular rhythm.      Chest Wall: PMI is not displaced.      Pulses: Normal pulses and intact distal pulses.      Heart sounds: Normal heart sounds. No murmur heard.     No gallop.   Pulmonary:      Effort: Pulmonary effort is normal.      Breath sounds: Normal breath sounds.   Abdominal:      Palpations: Abdomen is soft. There is no mass.      Tenderness: There is no abdominal tenderness.   Musculoskeletal:         General: Normal range of motion.      Cervical back: Normal range of motion and neck supple.   Skin:     General: Skin is warm.   Neurological:      Mental Status: He is alert and oriented to person, place, and time.      Sensory: No sensory deficit.      Deep Tendon Reflexes: Reflexes are normal and symmetric.         Most Recent EKG Results  Results for orders placed or performed during the hospital encounter of 06/30/23   EKG 12-lead    Collection Time: 06/30/23 10:33 AM    Narrative    Test Reason  : Z01.812,    Vent. Rate : 051 BPM     Atrial Rate : 000 BPM     P-R Int : 000 ms          QRS Dur : 124 ms      QT Int : 524 ms       P-R-T Axes : 000 031 119 degrees     QTc Int : 482 ms    Sinus bradycardia  Intraventricular conduction disturbance, nonspecific type  Abnormal ECG      Confirmed by Marcie Santoyo MD ([id number]) on 7/5/2023 2:41:35 PM    Referred By: Ngoc Ndiaye MD           Confirmed By:Marcie Santoyo MD       Most Recent Echocardiogram Results  Results for orders placed during the hospital encounter of 08/07/23    Transesophageal echo (MERCED) with possible cardioversion    Interpretation Summary    Left Ventricle: The left ventricle is moderately dilated. There is mild eccentric hypertrophy. There is severely reduced systolic function with a visually estimated ejection fraction of less than 30%.    Left Atrium: There is a closure device within the appendage. The device is a Watchman. There is partial occlusion with a small residual leak measuring 2 mm.    Right Ventricle: Normal right ventricular cavity size. Systolic function is normal.    Aortic Valve: There is moderate aortic valve sclerosis. Mildly restricted motion.    Mitral Valve: There is mild bileaflet sclerosis. There is mild regurgitation.    Tricuspid Valve: The tricuspid valve is structurally normal. There is mild regurgitation.    Pulmonic Valve: The pulmonic valve is structurally normal.      Most Recent Nuclear Stress Test Results  No results found for this or any previous visit.      Most Recent Cardiac PET Stress Test Results  No results found for this or any previous visit.      Most Recent Cardiovascular Angiogram results  Results for orders placed during the hospital encounter of 06/30/23    Intra-Procedure Documentation    Conclusion  Intraoperative MERCED performed for pre evaluation, intraprocedural guidance, and post evaluation during Watchman procedure.  No thrombus was appreciated within left atrial appendage or left atrium.  No  pericardial effusion was appreciated preprocedure.  Left atrial appendage was measured in four views and measurements were recorded.  Guidance was given through bicaval and short axis views for transseptal puncture to ensure inferoposterior transseptal puncture site.  Catheter was observed crossing the septum and guided into good position into the left atrial appendage.  Device was observed being deployed in good position in the left atrial appendage.  Tug test was observed and deemed to be without significant motion.  Compression measurements were then obtained in four views and recorded.  Color-flow Doppler was used for leak evaluation in four views and no significant leak was appreciated.  The device was then released.  No pericardial effusion was appreciated postprocedure.      Other Most Recent Cardiology Results  Results for orders placed in visit on 10/17/23    Cardiac device check - Remote    Narrative  Battery and Leads (BL)  Normal parameters noted on battery and lead(s)    Presenting Rhythm (ND)  Ventricular Sensing (VS)    Arrhythmic events (AE)  Paroxysmal atrial fibrillation and/or flutter  Device-identified arrhythmic events without corresponding EGMs and/or details noted  Rhythm interpretation limited due to single chamber device  Premature Ventricular Contraction(s)  Persistent atrial fibrillation and/or flutter    Cardiovascular Physiologic Parameters (CPP)  No abnormalities in physiologic parameters identified    Transmission Information (TI)  Device Summary Report    Follow Up (FU)  Continue remote monitoring with quarterly reporting  Practitioner contacted with findings based on Escalation Criteria    Additional Comments  ICD Report    Monitoring period: 5/26/23 - 8/22/23    Battery/Lead Status: 9.2 years    : 0.9%    Device Defined Counters:    Atrial Fibrillation/Flutter: 31  Available EGMs illustrate atrial fib/flutter, longest per trend was persistent approximately 9 days starting in  July.Rhythm interpretation limited due to single chamber device.  AF [place]: 12.4%      Atrial Fibrillation/ Flutter >48h in duration, considered informative escalated to follow up tab on August 25th, 2023, 11:41 am EST.      Labs reviewed    Assessment:       1. S/P CABG x 4    2. PAF (paroxysmal atrial fibrillation)    3. Presence of Watchman left atrial appendage closure device    4. Implantable cardioverter-defibrillator (ICD) in situ    5. Ischemic cardiomyopathy    6. Essential hypertension         Plan:     Continue:  Antiplatelet, Beta blocker, Diuretic, and Statin  Regular exercise program  Weight loss  Low cholesterol diet  6 m f/u

## 2023-10-03 NOTE — PROGRESS NOTES
CC: 76 y.o.male patient is here for suture removal.     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  Patient reports no problems.    WOUND PE:  Sutures intact.  Wound healing well.  Good approximation of skin edges. No undue erythema to surrounding skin or signs or symptoms of infection.    IMPRESSION:  Healing well post Mohs' micrographic surgery and repair    PLAN:  Site cleaned with peroxide, sutures removed  Dressed with Aquaphor ointment   Reviewed further care  Followup 6 weeks; call prn sooner     No

## 2023-10-16 PROBLEM — Z09 HOSPITAL DISCHARGE FOLLOW-UP: Status: RESOLVED | Noted: 2023-07-14 | Resolved: 2023-10-16

## 2023-10-17 ENCOUNTER — CLINICAL SUPPORT (OUTPATIENT)
Dept: CARDIOLOGY | Facility: HOSPITAL | Age: 83
End: 2023-10-17
Payer: MEDICARE

## 2023-10-17 DIAGNOSIS — Z95.810 PRESENCE OF AUTOMATIC (IMPLANTABLE) CARDIAC DEFIBRILLATOR: ICD-10-CM

## 2023-10-17 PROCEDURE — 93296 REM INTERROG EVL PM/IDS: CPT | Mod: HCNC,PO | Performed by: INTERNAL MEDICINE

## 2023-10-18 RX ORDER — TAMSULOSIN HYDROCHLORIDE 0.4 MG/1
1 CAPSULE ORAL NIGHTLY
Qty: 90 CAPSULE | Refills: 10 | Status: SHIPPED | OUTPATIENT
Start: 2023-10-18

## 2023-10-23 ENCOUNTER — TELEPHONE (OUTPATIENT)
Dept: PRIMARY CARE CLINIC | Facility: CLINIC | Age: 83
End: 2023-10-23
Payer: MEDICARE

## 2023-10-23 NOTE — TELEPHONE ENCOUNTER
" Flomax works better in the am as it works better within 6 HOURS of use ie if youre tring to urinate in the am, then makes sense for it to have its highest concentration in the body then. So if you want to urinate more in the daytime, makes sense to take the medication in the am when its at its highest concentration in the body.   As I previously stated, "best to take it in the morning sulaiman since this worked better for him"   No need to see me to repeat what tran already said. "

## 2023-10-23 NOTE — TELEPHONE ENCOUNTER
Patient states he had been taking his Flomax in the morning. His cardiologist instructed him to take it at night. When he takes it at bedtime he has to get up frequently throughout the night to urinate. He wants to know if it is ok for him to go back to taking it in the mornings.

## 2023-10-23 NOTE — TELEPHONE ENCOUNTER
----- Message from Bruce Goodman sent at 10/21/2023 10:28 AM CDT -----  Contact: Self  Type: Sooner Appointment Request        Caller is requesting a sooner appointment. Caller declined first available appointment listed below. Caller will not accept being placed on the waitlist and is requesting a message be sent to doctor.        Name of Caller: Patient   Best Call Back Number: [phone]  Additional Information: Pt states he was told by his cardiologist SAL Lezama to take Flomax at night. Pt wants to know should he go back to taking it in the AM like Dr. Alves instructed him to do the pt saying he is waking up too much to urinate by taking it at night . Plz call to advise. Thanks

## 2023-10-30 ENCOUNTER — TELEPHONE (OUTPATIENT)
Dept: GASTROENTEROLOGY | Facility: CLINIC | Age: 83
End: 2023-10-30
Payer: MEDICARE

## 2023-10-30 NOTE — TELEPHONE ENCOUNTER
Pt will check with Dr. Alves regarding scheduling the EGD since all of his test are normal and he is off most of his meds. Pt states we have never reached him to schedule EGD.

## 2023-11-17 ENCOUNTER — OFFICE VISIT (OUTPATIENT)
Dept: PRIMARY CARE CLINIC | Facility: CLINIC | Age: 83
End: 2023-11-17
Payer: MEDICARE

## 2023-11-17 VITALS
HEART RATE: 57 BPM | SYSTOLIC BLOOD PRESSURE: 124 MMHG | OXYGEN SATURATION: 98 % | TEMPERATURE: 98 F | WEIGHT: 196.75 LBS | RESPIRATION RATE: 16 BRPM | BODY MASS INDEX: 29.14 KG/M2 | DIASTOLIC BLOOD PRESSURE: 82 MMHG | HEIGHT: 69 IN

## 2023-11-17 DIAGNOSIS — I50.42 CHRONIC COMBINED SYSTOLIC AND DIASTOLIC HEART FAILURE: ICD-10-CM

## 2023-11-17 DIAGNOSIS — I25.2 HISTORY OF MYOCARDIAL INFARCTION: ICD-10-CM

## 2023-11-17 DIAGNOSIS — E78.2 MIXED HYPERLIPIDEMIA: ICD-10-CM

## 2023-11-17 DIAGNOSIS — Z79.899 POLYPHARMACY: ICD-10-CM

## 2023-11-17 DIAGNOSIS — I10 ESSENTIAL HYPERTENSION: ICD-10-CM

## 2023-11-17 DIAGNOSIS — D69.6 THROMBOCYTOPENIA: Primary | ICD-10-CM

## 2023-11-17 DIAGNOSIS — D69.2 SENILE PURPURA: ICD-10-CM

## 2023-11-17 DIAGNOSIS — I25.5 ISCHEMIC CARDIOMYOPATHY: ICD-10-CM

## 2023-11-17 DIAGNOSIS — I48.0 PAF (PAROXYSMAL ATRIAL FIBRILLATION): ICD-10-CM

## 2023-11-17 DIAGNOSIS — I70.0 AORTIC ATHEROSCLEROSIS: ICD-10-CM

## 2023-11-17 DIAGNOSIS — Z95.1 S/P CABG X 4: ICD-10-CM

## 2023-11-17 DIAGNOSIS — I25.810 CORONARY ARTERY DISEASE INVOLVING AUTOLOGOUS VEIN CORONARY BYPASS GRAFT WITHOUT ANGINA PECTORIS: ICD-10-CM

## 2023-11-17 DIAGNOSIS — Z86.59 HISTORY OF ANXIETY: ICD-10-CM

## 2023-11-17 DIAGNOSIS — M17.0 OSTEOARTHRITIS OF BOTH KNEES, UNSPECIFIED OSTEOARTHRITIS TYPE: ICD-10-CM

## 2023-11-17 PROCEDURE — 1126F PR PAIN SEVERITY QUANTIFIED, NO PAIN PRESENT: ICD-10-PCS | Mod: HCNC,CPTII,S$GLB, | Performed by: INTERNAL MEDICINE

## 2023-11-17 PROCEDURE — 3079F PR MOST RECENT DIASTOLIC BLOOD PRESSURE 80-89 MM HG: ICD-10-PCS | Mod: HCNC,CPTII,S$GLB, | Performed by: INTERNAL MEDICINE

## 2023-11-17 PROCEDURE — 99214 PR OFFICE/OUTPT VISIT, EST, LEVL IV, 30-39 MIN: ICD-10-PCS | Mod: HCNC,S$GLB,, | Performed by: INTERNAL MEDICINE

## 2023-11-17 PROCEDURE — 1159F PR MEDICATION LIST DOCUMENTED IN MEDICAL RECORD: ICD-10-PCS | Mod: HCNC,CPTII,S$GLB, | Performed by: INTERNAL MEDICINE

## 2023-11-17 PROCEDURE — 1158F ADVNC CARE PLAN TLK DOCD: CPT | Mod: HCNC,CPTII,S$GLB, | Performed by: INTERNAL MEDICINE

## 2023-11-17 PROCEDURE — 3074F SYST BP LT 130 MM HG: CPT | Mod: HCNC,CPTII,S$GLB, | Performed by: INTERNAL MEDICINE

## 2023-11-17 PROCEDURE — 1160F PR REVIEW ALL MEDS BY PRESCRIBER/CLIN PHARMACIST DOCUMENTED: ICD-10-PCS | Mod: HCNC,CPTII,S$GLB, | Performed by: INTERNAL MEDICINE

## 2023-11-17 PROCEDURE — 3288F FALL RISK ASSESSMENT DOCD: CPT | Mod: HCNC,CPTII,S$GLB, | Performed by: INTERNAL MEDICINE

## 2023-11-17 PROCEDURE — 1160F RVW MEDS BY RX/DR IN RCRD: CPT | Mod: HCNC,CPTII,S$GLB, | Performed by: INTERNAL MEDICINE

## 2023-11-17 PROCEDURE — 99214 OFFICE O/P EST MOD 30 MIN: CPT | Mod: HCNC,S$GLB,, | Performed by: INTERNAL MEDICINE

## 2023-11-17 PROCEDURE — 1159F MED LIST DOCD IN RCRD: CPT | Mod: HCNC,CPTII,S$GLB, | Performed by: INTERNAL MEDICINE

## 2023-11-17 PROCEDURE — 3079F DIAST BP 80-89 MM HG: CPT | Mod: HCNC,CPTII,S$GLB, | Performed by: INTERNAL MEDICINE

## 2023-11-17 PROCEDURE — 99999 PR PBB SHADOW E&M-EST. PATIENT-LVL III: ICD-10-PCS | Mod: PBBFAC,HCNC,, | Performed by: INTERNAL MEDICINE

## 2023-11-17 PROCEDURE — 1126F AMNT PAIN NOTED NONE PRSNT: CPT | Mod: HCNC,CPTII,S$GLB, | Performed by: INTERNAL MEDICINE

## 2023-11-17 PROCEDURE — 1158F PR ADVANCE CARE PLANNING DISCUSS DOCUMENTED IN MEDICAL RECORD: ICD-10-PCS | Mod: HCNC,CPTII,S$GLB, | Performed by: INTERNAL MEDICINE

## 2023-11-17 PROCEDURE — 1101F PR PT FALLS ASSESS DOC 0-1 FALLS W/OUT INJ PAST YR: ICD-10-PCS | Mod: HCNC,CPTII,S$GLB, | Performed by: INTERNAL MEDICINE

## 2023-11-17 PROCEDURE — 3074F PR MOST RECENT SYSTOLIC BLOOD PRESSURE < 130 MM HG: ICD-10-PCS | Mod: HCNC,CPTII,S$GLB, | Performed by: INTERNAL MEDICINE

## 2023-11-17 PROCEDURE — 1101F PT FALLS ASSESS-DOCD LE1/YR: CPT | Mod: HCNC,CPTII,S$GLB, | Performed by: INTERNAL MEDICINE

## 2023-11-17 PROCEDURE — 99999 PR PBB SHADOW E&M-EST. PATIENT-LVL III: CPT | Mod: PBBFAC,HCNC,, | Performed by: INTERNAL MEDICINE

## 2023-11-17 PROCEDURE — 3288F PR FALLS RISK ASSESSMENT DOCUMENTED: ICD-10-PCS | Mod: HCNC,CPTII,S$GLB, | Performed by: INTERNAL MEDICINE

## 2023-11-17 NOTE — PROGRESS NOTES
"INTERNAL MEDICINE PROGRESS/URGENT CARE NOTE    CHIEF COMPLAINT     Chief Complaint   Patient presents with    Follow-up       HPI     Ankit Ruff is a 83 y.o.  male who presents with a PMHx of  anxiety, arthritis, CAD, CHF, HLD, HTN, history of, MI, anemia, and ischemic cardiomyopathy, Afib, hearing loss, ICD in place, who presents today for routine follow up visit.      His main complaints and concerns are: none. He is diong very very well.   Legs are less swollen today. He is very wlel groomed. Very happy and happy to report his puppy had 11 pups.     His recent issues since [facility] care were:   Had a neuroma on his right leg. Had a steroid injection, which he says worked well. Says they wanted to try that before considering surgery.      Had sent in for a refill of his zanaflex which he uses for sleep. Told him cannot prescribe that for insomnia.         CAD, history of MI, ischemic CDM: follows with cardiology. Current medications include aldactone, lasix 20mg , crestor.   Reviewed his ECHO with his. Given his EF of 35% counseled him on heart failure management including limitin salt, weighing regularly and how to manage acute weight ghain. Ect. Told him I will not take him off any of his cardia cmedicatio      History of anxiety: GAD7 today is 2. Patient reports feeling "fine"   LELA 7 2/21 mild and controlled. Says his wife has moved to another state. 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 Hit his left chest and now with some pain.      Afib: no a/c. Rate controlled and asymptomatic.   Stopped taking eliquis which he was on for Afib. He stopped taking it because he had extensive bleeding. At his initial appointment, I Told him id like him to discuss watchman device if he does not plan to take the eliquis for stroke prevention. Now takes ASA.   Has hermann had the watchman device placed.      Wants me to take him off aldactone. Will have him see his cardiologist.      Polypharmacy: on tramadol, mobic, " "neurontin. Quite confused.  Tried to explain he concerns with tramadol. He says he doesn't take it. Says someone gave him zanaflex for sleep. Explained to patient that I will not prescribe this for insomnia. No muslce spasm.     Pancytopenia/Anemia/leukopenia: follows with hematology. Recently had a visit 3 months ago.   Per their note " pancytopenia  -review of blood counts show normal white blood cell count up until January of 2022 and has since had a progressive decline.  Review of hemoglobin shows last normal value was in March of 2019 with a hemoglobin of 14 and has had a slow decline down to a kelle of 10.8.  Platelets have been chronically depressed since 2019 around 80-90k   but also have had a progressive decline and currently now around 50,000.  -MDS panel on bone marrow showed normal study; chromosomal analysis was normal,bone marrow flow normal  -Got a liver US to see evaluate for liver disease which showed hepatomegaly (17cm) and a hypoechoic focus; spleen was also enlarged to 17cm thus I favor HSM as the etiology to his blood counts  -we have not been able to find a intrinsic blood or bone marrow disorder.  Therefore I would favor the blood counts as a bystander effect of the liver and spleen issues"            Home Medications:  Prior to Admission medications    Medication Sig Start Date End Date Taking? Authorizing Provider   carvediloL (COREG) 6.25 MG tablet Take 1 tablet (6.25 mg total) by mouth 2 (two) times daily. 9/12/23   Romeo Guerrero MD   clopidogreL (PLAVIX) 75 mg tablet Take 1 tablet (75 mg total) by mouth once daily. 8/26/23   Ngoc Ndiaye MD   furosemide (LASIX) 20 MG tablet Take 1 tablet (20 mg total) by mouth daily as needed (TAKE i TABLET DAILY AS NEEDED). 9/12/23   Romeo Guerrero MD   gabapentin (NEURONTIN) 300 MG capsule Take 1 capsule (300 mg total) by mouth 2 (two) times daily. 5/18/23   Quynh Alves MD   magnesium 250 mg Tab Take by mouth once daily.    " "Provider, Historical   meloxicam (MOBIC) 15 MG tablet Take 1 tablet (15 mg total) by mouth daily as needed for Pain. 11/4/22   GUNNAR Jones MD   multivitamin capsule Take 1 capsule by mouth once daily.    Provider, Historical   pantoprazole (PROTONIX) 40 MG tablet TAKE 1 TABLET EVERY DAY 2/1/23   Romeo Guerrero MD   potassium citrate 99 mg Cap Take by mouth once daily.    Provider, Historical   rosuvastatin (CRESTOR) 10 MG tablet TAKE 1 TABLET EVERY DAY  Patient taking differently: Take 10 mg by mouth every evening. 11/21/22   Romeo Guerrero MD   tamsulosin (FLOMAX) 0.4 mg Cap TAKE 1 CAPSULE EVERY EVENING 10/18/23   Romeo Guerrero MD       Review of Systems:  Review of Systems      Advance Care Planning     Date: 11/17/2023    Power of   I initiated the process of voluntary advance care planning today and explained the importance of this process to the patient.  I introduced the concept of advance directives to the patient, as well. Then the patient received detailed information about the importance of designating a Health Care Power of  (HCPOA). He was also instructed to communicate with this person about their wishes for future healthcare, should he become sick and lose decision-making capacity. The patient has previously appointed a HCPOA. After our discussion, the patient has decided to complete a HCPOA and has appointed his daughter, health care agent:  on file  & health care agent number:  on file  I spent a total time of 10 minutes discussing this issue with the patient.              PHYSICAL EXAM     /82 (BP Location: Left arm, Patient Position: Sitting, BP Method: Medium (Manual))   Pulse (!) 57   Temp 97.9 °F (36.6 °C) (Oral)   Resp 16   Ht 5' 9" (1.753 m)   Wt 89.3 kg (196 lb 12.2 oz)   SpO2 98%   BMI 29.06 kg/m²     GEN - A+OX4, NAD   HEENT - PERRL, EOMI, OP clear  Neck - No thyromegaly or cervical LAD. No thyroid masses felt.  CV - RRR, no " m/r   Chest - CTAB, no wheezing or rhonchi  Abd - S/NT/ND/+BS.   Ext - 2+BDP and radial pulses. No C/C/E.  Skin - No rash.    LABS       ASSESSMENT/PLAN     Ankit Ruff is a 83 y.o. male with  1. Thrombocytopenia  Overview:  Resolved. Continue to monitor     Orders:  -     CBC Auto Differential; Future; Expected date: 11/17/2023    2. Polypharmacy  Reviewed the meloxicm again. No xanaflex for sleep. He's doing quite well with sleep and looks like it. Ie he looks rested    3. PAF (paroxysmal atrial fibrillation)  S/p watchman device  Overview:  Successful dccv  3/2013  On eliquis. Rate controlled  Asymptomatic  Continue       4. Essential hypertension  Overview:  BP well controlled. Continue current medications  Limit salt intake     Orders:  -     Comprehensive Metabolic Panel; Future; Expected date: 11/17/2023  -     TSH; Future; Expected date: 11/17/2023    5. Osteoarthritis of both knees, unspecified osteoarthritis type  Pain well controlled    6. Chronic combined systolic and diastolic heart failure  Very stable  He's very careful with his salt intake   Overview:  On aldactone.   Asymptomatic.   Continue with current medications      7. History of anxiety  Overview:  Stable and controlled. Continue with current managament  Denies SI/HI      8. Coronary artery disease involving autologous vein coronary bypass graft without angina pectoris  Overview:  On ASA and statin.   LDL at goal. Continue to monitor  Asymptomatic       9. S/P CABG x 4  Overview:  On ASA and statin.   Asymptomatic.      10. History of myocardial infarction    11. Ischemic cardiomyopathy  Continue with proper control of heart failure   Control risk factors.     12. Senile purpura  Overview:    -benign skin condition. Often caused by the thin skin associated with aging.   -counseled patient on proper skin protection and moisturization and avoidance of skin trauma         13. Aortic atherosclerosis  Continue with statin and plavix     14.  Mixed hyperlipidemia  Overview:  On statin. ASA  Encouraged proper diet and exercise     Orders:  -     Lipid Panel; Future; Expected date: 11/17/2023           WORRY SCORE 2    RTC in 6 months, sooner if needed and depending on labs.    Quynh Alves MD  Board Certified Internist/Geriatrician  [facility] ([place])

## 2023-11-21 ENCOUNTER — CLINICAL SUPPORT (OUTPATIENT)
Dept: CARDIOLOGY | Facility: HOSPITAL | Age: 83
End: 2023-11-21
Payer: MEDICARE

## 2023-11-21 ENCOUNTER — CLINICAL SUPPORT (OUTPATIENT)
Dept: CARDIOLOGY | Facility: HOSPITAL | Age: 83
End: 2023-11-21
Attending: INTERNAL MEDICINE
Payer: MEDICARE

## 2023-11-21 DIAGNOSIS — Z95.810 PRESENCE OF AUTOMATIC (IMPLANTABLE) CARDIAC DEFIBRILLATOR: ICD-10-CM

## 2023-11-21 PROCEDURE — 93295 CARDIAC DEVICE CHECK CHECK - REMOTE ALERT: ICD-10-PCS | Mod: HCNC,,, | Performed by: INTERNAL MEDICINE

## 2023-11-21 PROCEDURE — 93296 REM INTERROG EVL PM/IDS: CPT | Mod: HCNC,PO | Performed by: INTERNAL MEDICINE

## 2023-11-21 PROCEDURE — 93295 DEV INTERROG REMOTE 1/2/MLT: CPT | Mod: HCNC,,, | Performed by: INTERNAL MEDICINE

## 2023-11-22 LAB
OHS CV AF BURDEN PERCENT: 4.1
OHS CV DC REMOTE DEVICE TYPE: NORMAL
OHS CV ICD SHOCK: NO
OHS CV RV PACING PERCENT: 0.75 %

## 2023-11-27 DIAGNOSIS — E78.00 PURE HYPERCHOLESTEROLEMIA: ICD-10-CM

## 2023-11-28 RX ORDER — ROSUVASTATIN CALCIUM 10 MG/1
10 TABLET, COATED ORAL DAILY
Qty: 90 TABLET | Refills: 3 | Status: SHIPPED | OUTPATIENT
Start: 2023-11-28

## 2023-12-08 ENCOUNTER — HOSPITAL ENCOUNTER (OUTPATIENT)
Dept: RADIOLOGY | Facility: HOSPITAL | Age: 83
Discharge: HOME | End: 2023-12-08
Attending: PHYSICIAN ASSISTANT
Payer: MEDICARE

## 2023-12-08 DIAGNOSIS — K76.9 LIVER LESION: ICD-10-CM

## 2023-12-08 DIAGNOSIS — K74.60 HEPATIC CIRRHOSIS, UNSPECIFIED HEPATIC CIRRHOSIS TYPE, UNSPECIFIED WHETHER ASCITES PRESENT: ICD-10-CM

## 2023-12-08 PROCEDURE — 76705 ECHO EXAM OF ABDOMEN: CPT | Mod: TC,HCNC,PO

## 2023-12-08 PROCEDURE — 76705 ECHO EXAM OF ABDOMEN: CPT | Mod: 26,HCNC,, | Performed by: RADIOLOGY

## 2023-12-08 PROCEDURE — 76705 US ABDOMEN LIMITED: ICD-10-PCS | Mod: 26,HCNC,, | Performed by: RADIOLOGY

## 2023-12-12 ENCOUNTER — OFFICE VISIT (OUTPATIENT)
Dept: HEPATOLOGY | Facility: CLINIC | Age: 83
End: 2023-12-12
Payer: MEDICARE

## 2023-12-12 VITALS — WEIGHT: 197.31 LBS | HEIGHT: 69 IN | BODY MASS INDEX: 29.22 KG/M2

## 2023-12-12 DIAGNOSIS — D69.6 THROMBOCYTOPENIA: ICD-10-CM

## 2023-12-12 DIAGNOSIS — K74.60 HEPATIC CIRRHOSIS, UNSPECIFIED HEPATIC CIRRHOSIS TYPE, UNSPECIFIED WHETHER ASCITES PRESENT: Primary | ICD-10-CM

## 2023-12-12 DIAGNOSIS — K76.6 PORTAL VENOUS HYPERTENSION: ICD-10-CM

## 2023-12-12 DIAGNOSIS — K76.9 LIVER LESION: ICD-10-CM

## 2023-12-12 PROCEDURE — 1159F PR MEDICATION LIST DOCUMENTED IN MEDICAL RECORD: ICD-10-PCS | Mod: HCNC,CPTII,S$GLB, | Performed by: PHYSICIAN ASSISTANT

## 2023-12-12 PROCEDURE — 1160F PR REVIEW ALL MEDS BY PRESCRIBER/CLIN PHARMACIST DOCUMENTED: ICD-10-PCS | Mod: HCNC,CPTII,S$GLB, | Performed by: PHYSICIAN ASSISTANT

## 2023-12-12 PROCEDURE — 3288F FALL RISK ASSESSMENT DOCD: CPT | Mod: HCNC,CPTII,S$GLB, | Performed by: PHYSICIAN ASSISTANT

## 2023-12-12 PROCEDURE — 1160F RVW MEDS BY RX/DR IN RCRD: CPT | Mod: HCNC,CPTII,S$GLB, | Performed by: PHYSICIAN ASSISTANT

## 2023-12-12 PROCEDURE — 99999 PR PBB SHADOW E&M-EST. PATIENT-LVL III: CPT | Mod: PBBFAC,HCNC,, | Performed by: PHYSICIAN ASSISTANT

## 2023-12-12 PROCEDURE — 99213 PR OFFICE/OUTPT VISIT, EST, LEVL III, 20-29 MIN: ICD-10-PCS | Mod: HCNC,S$GLB,, | Performed by: PHYSICIAN ASSISTANT

## 2023-12-12 PROCEDURE — 1101F PT FALLS ASSESS-DOCD LE1/YR: CPT | Mod: HCNC,CPTII,S$GLB, | Performed by: PHYSICIAN ASSISTANT

## 2023-12-12 PROCEDURE — 1159F MED LIST DOCD IN RCRD: CPT | Mod: HCNC,CPTII,S$GLB, | Performed by: PHYSICIAN ASSISTANT

## 2023-12-12 PROCEDURE — 99999 PR PBB SHADOW E&M-EST. PATIENT-LVL III: ICD-10-PCS | Mod: PBBFAC,HCNC,, | Performed by: PHYSICIAN ASSISTANT

## 2023-12-12 PROCEDURE — 1101F PR PT FALLS ASSESS DOC 0-1 FALLS W/OUT INJ PAST YR: ICD-10-PCS | Mod: HCNC,CPTII,S$GLB, | Performed by: PHYSICIAN ASSISTANT

## 2023-12-12 PROCEDURE — 99213 OFFICE O/P EST LOW 20 MIN: CPT | Mod: HCNC,S$GLB,, | Performed by: PHYSICIAN ASSISTANT

## 2023-12-12 PROCEDURE — 3288F PR FALLS RISK ASSESSMENT DOCUMENTED: ICD-10-PCS | Mod: HCNC,CPTII,S$GLB, | Performed by: PHYSICIAN ASSISTANT

## 2023-12-12 NOTE — PROGRESS NOTES
HEPATOLOGY CLINIC VISIT NOTE  CHIEF COMPLAINT: presumed cirrhosis    HISTORY       This is a 83 y.o. White male found to have portal HTN earlier this year following a hematology eval for pancytopenia. Ultimately he is felt to have cirrhosis, although the exact etiology is unclear as his serologic eval has been unyielding and he denies any hx of heavy alcohol use. Given his age, normal transaminases, and the fact that results would not likely alter his mngmt, liver biopsy is not being considered.  PMH complicated by ischemic cardiomyopathy w/ EF <30% - 8/2023. Diuretic mngmt is deferred to cardiology. Returns today w/ updated labs / u/s.    MELD 3.0: 8 at 12/8/2023  9:16 AM  MELD-Na: 9 at 12/8/2023  9:16 AM  Calculated from:  Serum Creatinine: 0.9 mg/dL (Using min of 1 mg/dL) at 12/8/2023  9:16 AM  Serum Sodium: 141 mmol/L (Using max of 137 mmol/L) at 12/8/2023  9:16 AM  Total Bilirubin: 1.1 mg/dL at 12/8/2023  9:16 AM  Serum Albumin: 3.7 g/dL (Using max of 3.5 g/dL) at 12/8/2023  9:16 AM  INR(ratio): 1.2 at 12/8/2023  9:16 AM  Age at listing (hypothetical): 83 years  Sex: Male at 12/8/2023  9:16 AM      Liver disease is well compensated  (+) Portal HTN: SM + collaterals, splenorenal shunting, low plt 50s-60s      Was found to have a 9mm hypoechoic liver lesion on 8/2023 U/S, not evident on f/u tpCT.  AFP has remained normal  Current U/S 12/8/23 reveals lesion is unchanged     EGD ordered at last visit due to eval stomach wall thickening noted on tpCT  Appears scheduling nurse reached out to pt but he declined scheduling procedure  Tells me today he is feeling well, much better than at last visit. Prior early satiety has resolved and he's gained weight. He does not wish to continue having so many MD appointments and tests    Current symptoms of hepatic decompensation:  Ascites - none on imaging  RAMO - yes  Diuretic use - yes, lasix (prescribed by cardiology)  TBili elevation - very stable, 1.1  HE / confusion /  "memory problems - no  EV bleed / hematemesis / melena - no      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   NECK: Supple  LUNGS: Normal respiratory effort.   ABDOMEN: Soft, nontender, nondistended.   SKIN: Warm and dry. No jaundice, No obvious rashes.  EXTREMITIES: trace bilat RAMO   NEURO/PSYCH: Normal gate. Memory intact. Thought and speech pattern appropriate. Behavior normal. No depression or anxiety noted.    PERTINENT DIAGNOSTIC RESULTS      Lab Results   Component Value Date    WBC 2.60 (L) 12/08/2023    HGB 12.1 (L) 12/08/2023    PLT 49 (L) 12/08/2023     Lab Results   Component Value Date    INR 1.2 12/08/2023     Lab Results   Component Value Date    AST 32 12/08/2023    ALT 19 12/08/2023    BILITOT 1.1 (H) 12/08/2023    ALBUMIN 3.7 12/08/2023    ALKPHOS 70 12/08/2023    CREATININE 0.9 12/08/2023    BUN 18 12/08/2023     12/08/2023    K 4.5 12/08/2023    AFP 3.3 12/08/2023     Results for orders placed during the hospital encounter of 12/08/23  US Abdomen Limited  CLINICAL HISTORY:please include spleen for portal HTN assessment;  Unspecified cirrhosis of liver    COMPARISON:08/16/2023    FINDINGS:  The liver is normal in size and has decreased in size since the prior study, now measuring 13.6 cm in sagittal dimension compared to 17.0 cm previously.  The liver has a heterogeneous appearance consistent with the history of cirrhosis.  A hypoechoic focus in the left hepatic lobe is again identified.  Allowing for differences in measurement technique this hypoechoic focus is unchanged in size and measures 1.0 x 0.8 x 0.6 cm.  At least 2 " granulomas are again noted in the right hepatic lobe.  There is only minimal dilatation of the main portal vein with deep inspiration which is consistent with portal hypertension.  The spleen remains enlarged, related to portal hypertension, and measures 18.8 x 8.2 cm compared to 17.0 x 9.2 cm previously.  There are perisplenic varices also related to portal hypertension.  The gallbladder contains multiple mobile gallstones and there are no signs of cholecystitis.  The bile ducts are not dilated.  Common bile duct diameter is 3 mm.  Doppler of the main portal vein confirms a normal waveform and direction of flow.  The pancreas is normal in size and appearance.  No ascites is present.    Impression  1. Heterogeneous hepatic parenchyma consistent with cirrhosis.  The liver has decreased in size since the prior study.  2. Persistent hypoechoic focus within the left hepatic lobe which is essentially unchanged in size and appearance.  3. Findings of portal hypertension which include splenomegaly, perisplenic varices, and only minimal dilatation of the main portal vein with deep inspiration.    ASSESSMENT        83 y.o. White male with:  1. (PRESUMED) CIRRHOSIS W/ PORTAL HYPERTENSION  -- HSM, collaterals + varices on imaging, low plt 50s-60s  -- No evidence of liver dysfunction   -- No obvious etiology of primary liver disease & AST/ALT normal  -- Due to cardiac disease?? Due to undiagnosed liver disease?? Liver bx not being pursued (see above)  -- Has declined EGD but is on Coreg which would offer primary prophylaxis for EV bleed (if EV are present)    2. LIVER LESION on u/s  -- not evident on tpCT & AFP normal  -- stable on current u/s      PLAN     Today pt and I discussed his desire to minimize his number of doctor appts and testing. He has a regular PCP who is certainly able to monitor liver function and can refer back to hepatology if his status changes, but at present his liver disease is very well  compensated.    We discussed that cirrhosis carries a 4% risk for HCC and normally screening w/ 6 month U/S and AFP is recommended. Pt states he would not want to pursue any sort of HCC treatment if one was found and elects to forego this screening / continued surveillance of his liver lesion. Given his age of 83, I think this is certainly appropriate.    Cc: Quynh Alves MD    __________________________________________________________________    Duration of encounter: 27 min  This includes face-to-face time and non face-to-face time preparing to see the patient (eg, review of tests), obtaining and/or reviewing separately obtained history, documenting clinical information in the electronic or other health record, independently interpreting resultsand communicating results to the patient/family/caregiver, or care coordination.

## 2023-12-15 ENCOUNTER — PATIENT MESSAGE (OUTPATIENT)
Dept: PRIMARY CARE CLINIC | Facility: CLINIC | Age: 83
End: 2023-12-15
Payer: MEDICARE

## 2023-12-15 DIAGNOSIS — M17.0 OSTEOARTHRITIS OF BOTH KNEES, UNSPECIFIED OSTEOARTHRITIS TYPE: ICD-10-CM

## 2023-12-15 RX ORDER — MELOXICAM 15 MG/1
15 TABLET ORAL DAILY PRN
Qty: 90 TABLET | Refills: 0 | Status: SHIPPED | OUTPATIENT
Start: 2023-12-15 | End: 2024-02-26

## 2023-12-15 NOTE — TELEPHONE ENCOUNTER
Please advise patient that I want him to reduce the use of all NSAIDs including this medication. Risk of GI bleed and renal dysfunction.

## 2023-12-27 ENCOUNTER — TELEPHONE (OUTPATIENT)
Dept: CARDIOLOGY | Facility: HOSPITAL | Age: 83
End: 2023-12-27
Payer: MEDICARE

## 2023-12-27 DIAGNOSIS — Z95.818 PRESENCE OF WATCHMAN LEFT ATRIAL APPENDAGE CLOSURE DEVICE: ICD-10-CM

## 2023-12-27 DIAGNOSIS — I48.0 PAF (PAROXYSMAL ATRIAL FIBRILLATION): Primary | ICD-10-CM

## 2023-12-27 DIAGNOSIS — I48.0 PAROXYSMAL ATRIAL FIBRILLATION: ICD-10-CM

## 2023-12-27 NOTE — TELEPHONE ENCOUNTER
Informed patient of [facility] on 1/17/24 at [facility]. Patient verbalized understanding.

## 2024-01-05 ENCOUNTER — TELEPHONE (OUTPATIENT)
Dept: CARDIOLOGY | Facility: CLINIC | Age: 84
End: 2024-01-05
Payer: MEDICARE

## 2024-01-29 ENCOUNTER — DOCUMENTATION ONLY (OUTPATIENT)
Dept: CARDIOLOGY | Facility: CLINIC | Age: 84
End: 2024-01-29
Payer: MEDICARE

## 2024-01-29 NOTE — PROGRESS NOTES
No device related thrombus. Diminutive fran-device leak. No need for further routine surveillance post LAAO. Continue Single antiplatelet therapy (either aspirin 81 or clopidogrel 75 QD) at Dr. Guerrero's discretion).       Ngoc Ndiaye MD, [facility]  Interventional Cardiology/Structural Heart Disease  [facility]  Office: [phone]

## 2024-02-01 ENCOUNTER — TELEPHONE (OUTPATIENT)
Dept: CARDIOLOGY | Facility: CLINIC | Age: 84
End: 2024-02-01
Payer: MEDICARE

## 2024-02-20 ENCOUNTER — CLINICAL SUPPORT (OUTPATIENT)
Dept: CARDIOLOGY | Facility: HOSPITAL | Age: 84
End: 2024-02-20

## 2024-02-20 ENCOUNTER — HOSPITAL ENCOUNTER (OUTPATIENT)
Dept: CARDIOLOGY | Facility: HOSPITAL | Age: 84
Discharge: HOME | End: 2024-02-20
Attending: INTERNAL MEDICINE

## 2024-02-20 DIAGNOSIS — Z95.810 PRESENCE OF AUTOMATIC (IMPLANTABLE) CARDIAC DEFIBRILLATOR: ICD-10-CM

## 2024-02-20 PROCEDURE — 93296 REM INTERROG EVL PM/IDS: CPT | Mod: PO | Performed by: INTERNAL MEDICINE

## 2024-02-20 PROCEDURE — 93295 DEV INTERROG REMOTE 1/2/MLT: CPT | Mod: ,,, | Performed by: INTERNAL MEDICINE

## 2024-02-26 DIAGNOSIS — M17.0 OSTEOARTHRITIS OF BOTH KNEES, UNSPECIFIED OSTEOARTHRITIS TYPE: ICD-10-CM

## 2024-02-26 RX ORDER — MELOXICAM 15 MG/1
15 TABLET ORAL DAILY PRN
Qty: 90 TABLET | Refills: 3 | Status: SHIPPED | OUTPATIENT
Start: 2024-02-26

## 2024-03-18 DIAGNOSIS — I25.810 CORONARY ARTERY DISEASE INVOLVING AUTOLOGOUS VEIN CORONARY BYPASS GRAFT WITHOUT ANGINA PECTORIS: ICD-10-CM

## 2024-03-18 RX ORDER — PANTOPRAZOLE SODIUM 40 MG/1
40 TABLET, DELAYED RELEASE ORAL DAILY
Qty: 90 TABLET | Refills: 3 | Status: SHIPPED | OUTPATIENT
Start: 2024-03-18

## 2024-04-10 DIAGNOSIS — I50.42 CHRONIC COMBINED SYSTOLIC AND DIASTOLIC HEART FAILURE: ICD-10-CM

## 2024-04-10 RX ORDER — FUROSEMIDE 20 MG/1
20 TABLET ORAL
Qty: 45 TABLET | Refills: 3 | Status: SHIPPED | OUTPATIENT
Start: 2024-04-10

## 2024-04-30 PROBLEM — R42 VERTIGO: Status: ACTIVE | Noted: 2024-04-30

## 2024-04-30 PROBLEM — Z71.89 ACP (ADVANCE CARE PLANNING): Status: ACTIVE | Noted: 2024-04-30

## 2024-05-02 ENCOUNTER — TELEPHONE (OUTPATIENT)
Dept: CARDIOLOGY | Facility: CLINIC | Age: 84
End: 2024-05-02
Payer: MEDICARE

## 2024-05-02 ENCOUNTER — TELEPHONE (OUTPATIENT)
Dept: PRIMARY CARE CLINIC | Facility: CLINIC | Age: 84
End: 2024-05-02
Payer: MEDICARE

## 2024-05-02 LAB
OHS CV AF BURDEN PERCENT: 12.2
OHS CV DC REMOTE DEVICE TYPE: NORMAL
OHS CV ICD SHOCK: NO
OHS CV RV PACING PERCENT: 0.75 %

## 2024-05-02 NOTE — TELEPHONE ENCOUNTER
----- Message from Debra Jo sent at 5/2/2024  3:41 PM CDT -----  Contact: self  Type:  Sooner Appointment Request    Caller is requesting a sooner appointment.  Caller declined first available appointment listed below.  Caller will not accept being placed on the waitlist and is requesting a message be sent to doctor.    Name of Caller:  pt  When is the first available appointment?  N/a  Symptoms:  hospital follow up/needs 1 week  Would the patient rather a call back or a response via [facility]? call  Best Call Back Number:  [phone]   Additional Information:  please call

## 2024-05-02 NOTE — TELEPHONE ENCOUNTER
Spoke with pt and he is going to follow up with his primary care first and will call us if he still needs to be seen by cardiology.

## 2024-05-02 NOTE — TELEPHONE ENCOUNTER
Patient reports he got very dizzy and nauseated after turning his head so he went to the ER. They kept him over night to run tests and for observation. Patient to see Dr. Alves for hospital follow up 5/10/24.

## 2024-05-10 ENCOUNTER — OFFICE VISIT (OUTPATIENT)
Dept: PRIMARY CARE CLINIC | Facility: CLINIC | Age: 84
End: 2024-05-10
Payer: MEDICARE

## 2024-05-10 VITALS
BODY MASS INDEX: 28.58 KG/M2 | HEART RATE: 64 BPM | HEIGHT: 69 IN | WEIGHT: 193 LBS | DIASTOLIC BLOOD PRESSURE: 60 MMHG | OXYGEN SATURATION: 97 % | SYSTOLIC BLOOD PRESSURE: 118 MMHG

## 2024-05-10 DIAGNOSIS — D69.2 SENILE PURPURA: ICD-10-CM

## 2024-05-10 DIAGNOSIS — I25.5 ISCHEMIC CARDIOMYOPATHY: Chronic | ICD-10-CM

## 2024-05-10 DIAGNOSIS — M17.0 OSTEOARTHRITIS OF BOTH KNEES, UNSPECIFIED OSTEOARTHRITIS TYPE: ICD-10-CM

## 2024-05-10 DIAGNOSIS — I70.0 AORTIC ATHEROSCLEROSIS: ICD-10-CM

## 2024-05-10 DIAGNOSIS — H91.93 BILATERAL HEARING LOSS, UNSPECIFIED HEARING LOSS TYPE: ICD-10-CM

## 2024-05-10 DIAGNOSIS — H81.10 BENIGN PAROXYSMAL POSITIONAL VERTIGO, UNSPECIFIED LATERALITY: ICD-10-CM

## 2024-05-10 DIAGNOSIS — G31.9 CEREBRAL ATROPHY: ICD-10-CM

## 2024-05-10 DIAGNOSIS — M46.96 INFLAMMATORY SPONDYLOPATHY OF LUMBAR REGION: ICD-10-CM

## 2024-05-10 DIAGNOSIS — Z86.59 HISTORY OF ANXIETY: ICD-10-CM

## 2024-05-10 DIAGNOSIS — Z85.89 HISTORY OF SQUAMOUS CELL CARCINOMA: ICD-10-CM

## 2024-05-10 DIAGNOSIS — I25.810 CORONARY ARTERY DISEASE INVOLVING AUTOLOGOUS VEIN CORONARY BYPASS GRAFT WITHOUT ANGINA PECTORIS: ICD-10-CM

## 2024-05-10 DIAGNOSIS — Z95.1 S/P CABG X 4: ICD-10-CM

## 2024-05-10 DIAGNOSIS — I48.0 PAF (PAROXYSMAL ATRIAL FIBRILLATION): ICD-10-CM

## 2024-05-10 DIAGNOSIS — M47.26 OSTEOARTHRITIS OF SPINE WITH RADICULOPATHY, LUMBAR REGION: ICD-10-CM

## 2024-05-10 DIAGNOSIS — I10 ESSENTIAL HYPERTENSION: ICD-10-CM

## 2024-05-10 DIAGNOSIS — K76.6 PORTAL VENOUS HYPERTENSION: Primary | ICD-10-CM

## 2024-05-10 DIAGNOSIS — I50.43 ACUTE ON CHRONIC COMBINED SYSTOLIC AND DIASTOLIC HEART FAILURE: ICD-10-CM

## 2024-05-10 DIAGNOSIS — I25.2 HISTORY OF MYOCARDIAL INFARCTION: ICD-10-CM

## 2024-05-10 DIAGNOSIS — D69.6 THROMBOCYTOPENIA: ICD-10-CM

## 2024-05-10 DIAGNOSIS — E78.2 MIXED HYPERLIPIDEMIA: ICD-10-CM

## 2024-05-10 PROCEDURE — 3078F DIAST BP <80 MM HG: CPT | Mod: HCNC,CPTII,S$GLB, | Performed by: INTERNAL MEDICINE

## 2024-05-10 PROCEDURE — 1101F PT FALLS ASSESS-DOCD LE1/YR: CPT | Mod: HCNC,CPTII,S$GLB, | Performed by: INTERNAL MEDICINE

## 2024-05-10 PROCEDURE — 1160F RVW MEDS BY RX/DR IN RCRD: CPT | Mod: HCNC,CPTII,S$GLB, | Performed by: INTERNAL MEDICINE

## 2024-05-10 PROCEDURE — 99999 PR PBB SHADOW E&M-EST. PATIENT-LVL IV: CPT | Mod: PBBFAC,HCNC,, | Performed by: INTERNAL MEDICINE

## 2024-05-10 PROCEDURE — 1126F AMNT PAIN NOTED NONE PRSNT: CPT | Mod: HCNC,CPTII,S$GLB, | Performed by: INTERNAL MEDICINE

## 2024-05-10 PROCEDURE — 99214 OFFICE O/P EST MOD 30 MIN: CPT | Mod: HCNC,S$GLB,, | Performed by: INTERNAL MEDICINE

## 2024-05-10 PROCEDURE — 1159F MED LIST DOCD IN RCRD: CPT | Mod: HCNC,CPTII,S$GLB, | Performed by: INTERNAL MEDICINE

## 2024-05-10 PROCEDURE — 3288F FALL RISK ASSESSMENT DOCD: CPT | Mod: HCNC,CPTII,S$GLB, | Performed by: INTERNAL MEDICINE

## 2024-05-10 PROCEDURE — 3074F SYST BP LT 130 MM HG: CPT | Mod: HCNC,CPTII,S$GLB, | Performed by: INTERNAL MEDICINE

## 2024-05-10 NOTE — PROGRESS NOTES
INTERNAL MEDICINE PROGRESS/URGENT CARE NOTE    CHIEF COMPLAINT     Chief Complaint   Patient presents with    Hospital Follow Up    Dizziness     Patient states when he lay down sometimes he feels dizzy a lot thought he was having a stroke and his neck pops a lot when moving it a certain way -- patient states he was Afib in the hospital and also out the hospital -- has a lump or bump on the back of his head states it feels like an ant is crawling on left side of his neck        HPI   Ankit Ruff is a 83 y.o.  male who presents with a PMHx of  anxiety, arthritis, CAD, CHF, HLD, HTN, history of, MI, anemia, and ischemic cardiomyopathy, Afib, hearing loss, ICD in place, who presents today for routine follow up visit.      His main complaints and concerns are:   Not been feeling well. Fatigue, says he's back in afib again and now not sure what they will do since he's had ablation, conversion.   His dizziness is worst when he lays down and turns his head. Says if he goes under his car to change his oil, he has severe dizziness.        His recent issues since establish care were:   Had a neuroma on his right leg. Had a steroid injection, which he says worked well. Says they wanted to try that before considering surgery.      Had sent in for a refill of his zanaflex which he uses for sleep. Told him cannot prescribe that for insomnia.     POLYPHARMACY: we have discussed the use of meloxicam with is heart failure. Of note,patient was prescribedthis by cesia PCP and gets such relief that he is very hesitant to discontinue its use despite recurrent discussions re its side effects.   We have discussed using muscle relaxants solely for sleep and instead discussed sleep hygiene.         CAD, history of MI, ischemic CDM: follows with cardiology. Current medications include aldactone, lasix 20mg , crestor.   Reviewed his ECHO with his. Given his EF of 35% counseled him on heart failure management including limitin salt,  "weighing regularly and how to manage acute weight ghain. Ect. Told him I will not take him off any of his cardia cmedicatio      History of anxiety: GAD7 today is 2. Patient reports feeling "fine"   LELA 7 2/21 mild and controlled. Says his wife has moved to another state. 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 Hit his left chest and now with some pain.      Afib: no a/c. Rate controlled and asymptomatic.   Stopped taking eliquis which he was on for Afib. He stopped taking it because he had extensive bleeding. At his initial appointment, I Told him id like him to discuss watchman device if he does not plan to take the eliquis for stroke prevention. Now takes ASA.   Has hermann had the watchman device placed.      Wants me to take him off aldactone. Will have him see his cardiologist.      Polypharmacy: on tramadol, mobic, neurontin. Quite confused.  Tried to explain he concerns with tramadol. He says he doesn't take it. Says someone gave him zanaflex for sleep. Explained to patient that I will not prescribe this for insomnia. No muslce spasm.      Pancytopenia/Anemia/leukopenia: follows with hematology. Recently had a visit 3 months ago.   Per their note " pancytopenia  -review of blood counts show normal white blood cell count up until January of 2022 and has since had a progressive decline.  Review of hemoglobin shows last normal value was in March of 2019 with a hemoglobin of 14 and has had a slow decline down to a kelle of 10.8.  Platelets have been chronically depressed since 2019 around 80-90k   but also have had a progressive decline and currently now around 50,000.  -MDS panel on bone marrow showed normal study; chromosomal analysis was normal,bone marrow flow normal  -Got a liver US to see evaluate for liver disease which showed hepatomegaly (17cm) and a hypoechoic focus; spleen was also enlarged to 17cm thus I favor HSM as the etiology to his blood counts  -we have not been able to find a intrinsic " "blood or bone marrow disorder.  Therefore I would favor the blood counts as a bystander effect of the liver and spleen issues"         Home Medications:  Prior to Admission medications    Medication Sig Start Date End Date Taking? Authorizing Provider   carvediloL (COREG) 6.25 MG tablet Take 1 tablet (6.25 mg total) by mouth 2 (two) times daily.  Patient taking differently: Take 12.5 mg by mouth 2 (two) times daily. 9/12/23   Romeo Guerrero MD   clopidogreL (PLAVIX) 75 mg tablet Take 1 tablet (75 mg total) by mouth once daily. 8/26/23   Ngoc Ndiaye MD   furosemide (LASIX) 20 MG tablet TAKE 1 TABLET EVERY DAY AS NEEDED  Patient taking differently: Take 20 mg by mouth as needed (swelling). 4/10/24   Romeo Guerrero MD   gabapentin (NEURONTIN) 300 MG capsule Take 1 capsule (300 mg total) by mouth 2 (two) times daily. 5/18/23   Quynh Alves MD   magnesium 250 mg Tab Take 250 mg by mouth once daily.    Provider, Historical   meloxicam (MOBIC) 15 MG tablet TAKE 1 TABLET EVERY DAY AS NEEDED FOR PAIN  Patient taking differently: Take 15 mg by mouth daily as needed for Pain. 2/26/24   Quynh Alves MD   multivitamin capsule Take 1 capsule by mouth once daily.    Provider, Historical   pantoprazole (PROTONIX) 40 MG tablet Take 1 tablet (40 mg total) by mouth once daily. 3/18/24   Romeo Guerrero MD   potassium citrate 99 mg Cap Take 99 mg by mouth once daily.    Provider, Historical   rosuvastatin (CRESTOR) 10 MG tablet Take 1 tablet (10 mg total) by mouth once daily. 11/28/23   Romeo Guerrero MD   tamsulosin (FLOMAX) 0.4 mg Cap TAKE 1 CAPSULE EVERY EVENING  Patient taking differently: Take 0.4 mg by mouth every evening. 10/18/23   Romeo Guerrero MD       Review of Systems:  Review of Systems          PHYSICAL EXAM     /60 (BP Location: Left arm, Patient Position: Sitting, BP Method: Small (Manual))   Pulse 64   Ht 5' 9" (1.753 m)   Wt 87.5 kg (193 lb 0.2 " oz)   SpO2 97%   BMI 28.50 kg/m²     GEN - A+OX4, NAD   HEENT - PERRL, EOMI, OP clear  Neck - No thyromegaly or cervical LAD. No thyroid masses felt.  CV - RRR, no m/r   Chest - CTAB, no wheezing or rhonchi  Abd - S/NT/ND/+BS.   Ext - 2+BDP and radial pulses. No C/C/E.  Skin - No rash.    LABS         ASSESSMENT/PLAN     Ankit Ruff is a 83 y.o. male with  1. Portal venous hypertension  Continue to monitie    2. Inflammatory spondylopathy of lumbar region  Stable   Continue current management     3. Aortic atherosclerosis  On statin therapy. Continue  Encouraged to continue with current exercise     4. Osteoarthritis of spine with radiculopathy, lumbar region  Advised to continue with exercise     5. History of anxiety  Overview:  Stable and controlled. Continue with current managament  Denies SI/HI      6. Ischemic cardiomyopathy    7. Bilateral hearing loss, unspecified hearing loss type  Overview:  Wears hearing aids.       8. PAF (paroxysmal atrial fibrillation)  Overview:  Successful dccv  3/2013  On eliquis. Rate controlled  Asymptomatic  Continue       9. Coronary artery disease involving autologous vein coronary bypass graft without angina pectoris  Overview:  On ASA and statin.   LDL at goal. Continue to monitor  Asymptomatic       10. Acute on chronic combined systolic and diastolic heart failure  Overview:  On aldactone.   Asymptomatic.   Continue with current medications      11. History of myocardial infarction    12. Mixed hyperlipidemia  Overview:  On statin. ASA  Encouraged proper diet and exercise       13. S/P CABG x 4  Overview:  On ASA and statin.   Asymptomatic.      14. Essential hypertension  Overview:  BP well controlled. Continue current medications  Limit salt intake       15. Thrombocytopenia  Overview:  Resolved. Continue to monitor       16. Senile purpura  Overview:    -benign skin condition. Often caused by the thin skin associated with aging.   -counseled patient on proper skin  protection and moisturization and avoidance of skin trauma         17. History of squamous cell carcinoma  Overview:  Follows with dermatology       18. Osteoarthritis of both knees, unspecified osteoarthritis type  Pain is well controlled     19. Cerebral atrophy  On mri 4/30/24           WORRY SCORE 3    RTC in 1 months, sooner if needed and depending on labs.    Quynh Alves MD  Board Certified Internist/Geriatrician  [facility] ([facility]

## 2024-05-14 ENCOUNTER — HOSPITAL ENCOUNTER (OUTPATIENT)
Dept: CARDIOLOGY | Facility: HOSPITAL | Age: 84
Discharge: HOME | End: 2024-05-14
Attending: INTERNAL MEDICINE
Payer: MEDICARE

## 2024-05-14 ENCOUNTER — OFFICE VISIT (OUTPATIENT)
Dept: FAMILY MEDICINE | Facility: CLINIC | Age: 84
End: 2024-05-14
Payer: MEDICARE

## 2024-05-14 ENCOUNTER — PATIENT MESSAGE (OUTPATIENT)
Dept: CARDIOLOGY | Facility: HOSPITAL | Age: 84
End: 2024-05-14

## 2024-05-14 ENCOUNTER — TELEPHONE (OUTPATIENT)
Dept: CARDIOLOGY | Facility: HOSPITAL | Age: 84
End: 2024-05-14
Payer: MEDICARE

## 2024-05-14 ENCOUNTER — TELEPHONE (OUTPATIENT)
Dept: PRIMARY CARE CLINIC | Facility: CLINIC | Age: 84
End: 2024-05-14
Payer: MEDICARE

## 2024-05-14 VITALS
HEART RATE: 65 BPM | OXYGEN SATURATION: 97 % | HEIGHT: 69 IN | BODY MASS INDEX: 28.44 KG/M2 | WEIGHT: 192 LBS | SYSTOLIC BLOOD PRESSURE: 126 MMHG | DIASTOLIC BLOOD PRESSURE: 74 MMHG

## 2024-05-14 DIAGNOSIS — I70.0 AORTIC ATHEROSCLEROSIS: ICD-10-CM

## 2024-05-14 DIAGNOSIS — M46.96 INFLAMMATORY SPONDYLOPATHY OF LUMBAR REGION: ICD-10-CM

## 2024-05-14 DIAGNOSIS — E78.2 MIXED HYPERLIPIDEMIA: ICD-10-CM

## 2024-05-14 DIAGNOSIS — K76.6 PORTAL VENOUS HYPERTENSION: ICD-10-CM

## 2024-05-14 DIAGNOSIS — G31.9 CEREBRAL ATROPHY: ICD-10-CM

## 2024-05-14 DIAGNOSIS — D46.1 MYELODYSPLASTIC OR MYELOPROLIFERATIVE NEOPLASM WITH RING SIDEROBLASTS AND THROMBOCYTOSIS: ICD-10-CM

## 2024-05-14 DIAGNOSIS — I25.810 CORONARY ARTERY DISEASE INVOLVING AUTOLOGOUS VEIN CORONARY BYPASS GRAFT WITHOUT ANGINA PECTORIS: ICD-10-CM

## 2024-05-14 DIAGNOSIS — I65.23 BILATERAL CAROTID ARTERY STENOSIS: ICD-10-CM

## 2024-05-14 DIAGNOSIS — I27.9 PULMONARY HEART DISEASE: ICD-10-CM

## 2024-05-14 DIAGNOSIS — Z95.810 ICD (IMPLANTABLE CARDIOVERTER-DEFIBRILLATOR) IN PLACE: ICD-10-CM

## 2024-05-14 DIAGNOSIS — Z95.810 ICD (IMPLANTABLE CARDIOVERTER-DEFIBRILLATOR) IN PLACE: Chronic | ICD-10-CM

## 2024-05-14 DIAGNOSIS — D69.6 THROMBOCYTOPENIA: ICD-10-CM

## 2024-05-14 DIAGNOSIS — I10 ESSENTIAL HYPERTENSION: ICD-10-CM

## 2024-05-14 DIAGNOSIS — D61.818 PANCYTOPENIA: ICD-10-CM

## 2024-05-14 DIAGNOSIS — D69.2 SENILE PURPURA: ICD-10-CM

## 2024-05-14 DIAGNOSIS — Z00.00 ENCOUNTER FOR PREVENTIVE HEALTH EXAMINATION: Primary | ICD-10-CM

## 2024-05-14 DIAGNOSIS — D75.839 MYELODYSPLASTIC OR MYELOPROLIFERATIVE NEOPLASM WITH RING SIDEROBLASTS AND THROMBOCYTOSIS: ICD-10-CM

## 2024-05-14 DIAGNOSIS — I25.5 ISCHEMIC CARDIOMYOPATHY: Chronic | ICD-10-CM

## 2024-05-14 DIAGNOSIS — I50.43 ACUTE ON CHRONIC COMBINED SYSTOLIC AND DIASTOLIC HEART FAILURE: ICD-10-CM

## 2024-05-14 DIAGNOSIS — I25.5 ISCHEMIC CARDIOMYOPATHY: ICD-10-CM

## 2024-05-14 DIAGNOSIS — I48.0 PAF (PAROXYSMAL ATRIAL FIBRILLATION): ICD-10-CM

## 2024-05-14 DIAGNOSIS — Z95.818 PRESENCE OF WATCHMAN LEFT ATRIAL APPENDAGE CLOSURE DEVICE: ICD-10-CM

## 2024-05-14 DIAGNOSIS — K74.60 HEPATIC CIRRHOSIS, UNSPECIFIED HEPATIC CIRRHOSIS TYPE, UNSPECIFIED WHETHER ASCITES PRESENT: ICD-10-CM

## 2024-05-14 PROCEDURE — 1159F MED LIST DOCD IN RCRD: CPT | Mod: HCNC,CPTII,S$GLB, | Performed by: NURSE PRACTITIONER

## 2024-05-14 PROCEDURE — 3078F DIAST BP <80 MM HG: CPT | Mod: HCNC,CPTII,S$GLB, | Performed by: NURSE PRACTITIONER

## 2024-05-14 PROCEDURE — 1160F RVW MEDS BY RX/DR IN RCRD: CPT | Mod: HCNC,CPTII,S$GLB, | Performed by: NURSE PRACTITIONER

## 2024-05-14 PROCEDURE — G0439 PPPS, SUBSEQ VISIT: HCPCS | Mod: HCNC,S$GLB,, | Performed by: NURSE PRACTITIONER

## 2024-05-14 PROCEDURE — 1158F ADVNC CARE PLAN TLK DOCD: CPT | Mod: HCNC,CPTII,S$GLB, | Performed by: NURSE PRACTITIONER

## 2024-05-14 PROCEDURE — 93282 PRGRMG EVAL IMPLANTABLE DFB: CPT | Mod: 26,,, | Performed by: INTERNAL MEDICINE

## 2024-05-14 PROCEDURE — 99999 PR PBB SHADOW E&M-EST. PATIENT-LVL IV: CPT | Mod: PBBFAC,HCNC,, | Performed by: NURSE PRACTITIONER

## 2024-05-14 PROCEDURE — 1101F PT FALLS ASSESS-DOCD LE1/YR: CPT | Mod: HCNC,CPTII,S$GLB, | Performed by: NURSE PRACTITIONER

## 2024-05-14 PROCEDURE — 1170F FXNL STATUS ASSESSED: CPT | Mod: HCNC,CPTII,S$GLB, | Performed by: NURSE PRACTITIONER

## 2024-05-14 PROCEDURE — 93282 PRGRMG EVAL IMPLANTABLE DFB: CPT | Mod: HCNC,PO

## 2024-05-14 PROCEDURE — 1126F AMNT PAIN NOTED NONE PRSNT: CPT | Mod: HCNC,CPTII,S$GLB, | Performed by: NURSE PRACTITIONER

## 2024-05-14 PROCEDURE — 3074F SYST BP LT 130 MM HG: CPT | Mod: HCNC,CPTII,S$GLB, | Performed by: NURSE PRACTITIONER

## 2024-05-14 PROCEDURE — 3288F FALL RISK ASSESSMENT DOCD: CPT | Mod: HCNC,CPTII,S$GLB, | Performed by: NURSE PRACTITIONER

## 2024-05-14 NOTE — PATIENT INSTRUCTIONS
Counseling and Referral of Other Preventative  (Italic type indicates deductible and co-insurance are waived)    Patient Name: Ankit Ruff  Today's Date: 5/14/2024    Health Maintenance       Date Due Completion Date    RSV Vaccine (Age 60+ and Pregnant patients) (1 - 1-dose 60+ series) Never done ---    COVID-19 Vaccine (2 - Pfizer risk series) 09/07/2021 8/17/2021    Lipid Panel 04/30/2029 4/30/2024    TETANUS VACCINE 07/14/2033 7/14/2023        No orders of the defined types were placed in this encounter.      The following information is provided to all patients.  This information is to help you find resources for any of the problems found today that may be affecting your health:                  Living healthy guide: www.[facility].gov      Understanding Diabetes: www.diabetes.org      Eating healthy: www.cdc.gov/healthyweight      [facility] home safety checklist: www.cdc.gov/steadi/patient.html      Agency on Aging: www.goea.[place].gov      Alcoholics anonymous (AA): www.aa.org      Physical Activity: www.karrie.nih.gov/os0zlzb      Tobacco use: www.quitwithusla.org

## 2024-05-14 NOTE — TELEPHONE ENCOUNTER
----- Message from Quynh Alves MD sent at 5/14/2024  2:19 PM CDT -----  Labs show:   Low blood counts as previously seen and stable. Please remind him to make a routine follow up appointment with hematology. Non-urgent but so they can continue to help monitor.   Cholesterol is very well controlled.   Normal electrolytes. Normal kidney and liver function.

## 2024-05-14 NOTE — TELEPHONE ENCOUNTER
Pt. Seen in clinic today for device interrogation. Pt. In AF and reports he feels anxious when in AF. Reviewed with Dr. Guerrero. Per Dr. Guerrero schedule MD f/u in 2 weeks to discuss. Left message requesting a return call to discuss appt. With pt.

## 2024-05-15 ENCOUNTER — TELEPHONE (OUTPATIENT)
Dept: HEMATOLOGY/ONCOLOGY | Facility: CLINIC | Age: 84
End: 2024-05-15
Payer: MEDICARE

## 2024-05-15 ENCOUNTER — TELEPHONE (OUTPATIENT)
Dept: PRIMARY CARE CLINIC | Facility: CLINIC | Age: 84
End: 2024-05-15
Payer: MEDICARE

## 2024-05-15 NOTE — TELEPHONE ENCOUNTER
----- Message from Alayna Siegel RN sent at 5/14/2024  5:04 PM CDT -----    ----- Message -----  From: Quynh Alves MD  Sent: 5/14/2024   2:19 PM CDT  To: [facility] Staff    Labs show:   Low blood counts as previously seen and stable. Please remind him to make a routine follow up appointment with hematology. Non-urgent but so they can continue to help monitor.   Cholesterol is very well controlled.   Normal electrolytes. Normal kidney and liver function.

## 2024-05-15 NOTE — TELEPHONE ENCOUNTER
Left message to call the office to schedule/reschedule appt. Recall number provided.  ----- Message from Neeta Ordoñez sent at 5/15/2024 11:47 AM CDT -----  Type: Needs Medical Advice  Who Called: Patient   Symptoms (please be specific):    How long has patient had these symptoms:    Pharmacy name and phone #:    Best Call Back Number: [phone]  Additional Information: Patient is requesting a call back to schedule an appt with Dr. Walsh per his visit with Dr. Alves.

## 2024-05-15 NOTE — TELEPHONE ENCOUNTER
Patient was informed of his lab results and Dr. Alves's recs. He verbalized understanding. The soonest appt available for Dr. Alexandria Walsh, hem/onc, was mid-late August. Patient states he will call them to ask if he can get a sooner appt.

## 2024-05-20 ENCOUNTER — CLINICAL SUPPORT (OUTPATIENT)
Dept: CARDIOLOGY | Facility: HOSPITAL | Age: 84
End: 2024-05-20
Payer: MEDICARE

## 2024-05-20 DIAGNOSIS — Z95.810 PRESENCE OF AUTOMATIC (IMPLANTABLE) CARDIAC DEFIBRILLATOR: ICD-10-CM

## 2024-05-21 ENCOUNTER — LAB VISIT (OUTPATIENT)
Dept: LAB | Facility: HOSPITAL | Age: 84
End: 2024-05-21
Attending: INTERNAL MEDICINE
Payer: MEDICARE

## 2024-05-21 ENCOUNTER — HOSPITAL ENCOUNTER (OUTPATIENT)
Dept: CARDIOLOGY | Facility: HOSPITAL | Age: 84
Discharge: HOME | End: 2024-05-21
Attending: INTERNAL MEDICINE

## 2024-05-21 DIAGNOSIS — D61.818 PANCYTOPENIA: Primary | ICD-10-CM

## 2024-05-21 DIAGNOSIS — K76.9 LIVER DISEASE, UNSPECIFIED: ICD-10-CM

## 2024-05-21 LAB
ALBUMIN SERPL BCP-MCNC: 3.5 G/DL (ref 3.5–5.2)
ALP SERPL-CCNC: 77 U/L (ref 55–135)
ALT SERPL W/O P-5'-P-CCNC: 18 U/L (ref 10–44)
ANION GAP SERPL CALC-SCNC: 7 MMOL/L (ref 8–16)
AST SERPL-CCNC: 30 U/L (ref 10–40)
BASOPHILS # BLD AUTO: 0.01 K/UL (ref 0–0.2)
BASOPHILS NFR BLD: 0.3 % (ref 0–1.9)
BILIRUB SERPL-MCNC: 1 MG/DL (ref 0.1–1)
BUN SERPL-MCNC: 22 MG/DL (ref 8–23)
CALCIUM SERPL-MCNC: 8.8 MG/DL (ref 8.7–10.5)
CHLORIDE SERPL-SCNC: 108 MMOL/L (ref 95–110)
CO2 SERPL-SCNC: 26 MMOL/L (ref 23–29)
CREAT SERPL-MCNC: 0.9 MG/DL (ref 0.5–1.4)
DIFFERENTIAL METHOD BLD: ABNORMAL
EOSINOPHIL # BLD AUTO: 0.1 K/UL (ref 0–0.5)
EOSINOPHIL NFR BLD: 3.5 % (ref 0–8)
ERYTHROCYTE [DISTWIDTH] IN BLOOD BY AUTOMATED COUNT: 13.2 % (ref 11.5–14.5)
EST. GFR  (NO RACE VARIABLE): >60 ML/MIN/1.73 M^2
GLUCOSE SERPL-MCNC: 110 MG/DL (ref 70–110)
HCT VFR BLD AUTO: 32.1 % (ref 40–54)
HGB BLD-MCNC: 11.2 G/DL (ref 14–18)
IMM GRANULOCYTES # BLD AUTO: 0.01 K/UL (ref 0–0.04)
IMM GRANULOCYTES NFR BLD AUTO: 0.3 % (ref 0–0.5)
LYMPHOCYTES # BLD AUTO: 0.8 K/UL (ref 1–4.8)
LYMPHOCYTES NFR BLD: 27.4 % (ref 18–48)
MCH RBC QN AUTO: 33.9 PG (ref 27–31)
MCHC RBC AUTO-ENTMCNC: 34.9 G/DL (ref 32–36)
MCV RBC AUTO: 97 FL (ref 82–98)
MONOCYTES # BLD AUTO: 0.2 K/UL (ref 0.3–1)
MONOCYTES NFR BLD: 8.3 % (ref 4–15)
NEUTROPHILS # BLD AUTO: 1.7 K/UL (ref 1.8–7.7)
NEUTROPHILS NFR BLD: 60.2 % (ref 38–73)
NRBC BLD-RTO: 0 /100 WBC
PLATELET # BLD AUTO: 49 K/UL (ref 150–450)
PMV BLD AUTO: 10.3 FL (ref 9.2–12.9)
POTASSIUM SERPL-SCNC: 4 MMOL/L (ref 3.5–5.1)
PROT SERPL-MCNC: 6.1 G/DL (ref 6–8.4)
RBC # BLD AUTO: 3.3 M/UL (ref 4.6–6.2)
SODIUM SERPL-SCNC: 141 MMOL/L (ref 136–145)
WBC # BLD AUTO: 2.88 K/UL (ref 3.9–12.7)

## 2024-05-21 PROCEDURE — 85025 COMPLETE CBC W/AUTO DIFF WBC: CPT | Mod: HCNC,PN | Performed by: INTERNAL MEDICINE

## 2024-05-21 PROCEDURE — 36415 COLL VENOUS BLD VENIPUNCTURE: CPT | Mod: HCNC,PN | Performed by: INTERNAL MEDICINE

## 2024-05-21 PROCEDURE — 93295 DEV INTERROG REMOTE 1/2/MLT: CPT | Mod: ,,, | Performed by: INTERNAL MEDICINE

## 2024-05-21 PROCEDURE — 93296 REM INTERROG EVL PM/IDS: CPT | Mod: PO | Performed by: INTERNAL MEDICINE

## 2024-05-21 PROCEDURE — 80053 COMPREHEN METABOLIC PANEL: CPT | Mod: HCNC,PN | Performed by: INTERNAL MEDICINE

## 2024-05-22 DIAGNOSIS — K76.9 LIVER DISEASE, UNSPECIFIED: Primary | ICD-10-CM

## 2024-05-27 LAB
OHS CV AF BURDEN PERCENT: 52.8
OHS CV AF BURDEN PERCENT: 96.7
OHS CV DC REMOTE DEVICE TYPE: NORMAL
OHS CV DC REMOTE DEVICE TYPE: NORMAL
OHS CV ICD SHOCK: NO
OHS CV RV PACING PERCENT: 6.1 %
OHS CV RV PACING PERCENT: 6.62 %

## 2024-05-28 ENCOUNTER — CLINICAL SUPPORT (OUTPATIENT)
Dept: REHABILITATION | Facility: HOSPITAL | Age: 84
End: 2024-05-28
Payer: MEDICARE

## 2024-05-28 DIAGNOSIS — R42 VERTIGO: Primary | ICD-10-CM

## 2024-05-28 DIAGNOSIS — H81.10 BENIGN PAROXYSMAL POSITIONAL VERTIGO, UNSPECIFIED LATERALITY: ICD-10-CM

## 2024-05-28 PROCEDURE — 97161 PT EVAL LOW COMPLEX 20 MIN: CPT | Mod: PN | Performed by: PHYSICAL THERAPIST

## 2024-05-30 ENCOUNTER — OFFICE VISIT (OUTPATIENT)
Dept: CARDIOLOGY | Facility: CLINIC | Age: 84
End: 2024-05-30
Payer: MEDICARE

## 2024-05-30 VITALS
DIASTOLIC BLOOD PRESSURE: 72 MMHG | SYSTOLIC BLOOD PRESSURE: 123 MMHG | BODY MASS INDEX: 27.62 KG/M2 | HEIGHT: 69 IN | HEART RATE: 67 BPM | WEIGHT: 186.5 LBS

## 2024-05-30 DIAGNOSIS — E78.2 MIXED HYPERLIPIDEMIA: ICD-10-CM

## 2024-05-30 DIAGNOSIS — Z95.810 ICD (IMPLANTABLE CARDIOVERTER-DEFIBRILLATOR) IN PLACE: Chronic | ICD-10-CM

## 2024-05-30 DIAGNOSIS — I50.43 ACUTE ON CHRONIC COMBINED SYSTOLIC AND DIASTOLIC HEART FAILURE: ICD-10-CM

## 2024-05-30 DIAGNOSIS — I25.5 ISCHEMIC CARDIOMYOPATHY: Primary | Chronic | ICD-10-CM

## 2024-05-30 DIAGNOSIS — I10 ESSENTIAL HYPERTENSION: ICD-10-CM

## 2024-05-30 DIAGNOSIS — Z95.1 S/P CABG X 4: ICD-10-CM

## 2024-05-30 PROBLEM — R42 VERTIGO: Status: ACTIVE | Noted: 2024-05-30

## 2024-05-30 PROCEDURE — 1160F RVW MEDS BY RX/DR IN RCRD: CPT | Mod: CPTII,S$GLB,, | Performed by: INTERNAL MEDICINE

## 2024-05-30 PROCEDURE — 1159F MED LIST DOCD IN RCRD: CPT | Mod: CPTII,S$GLB,, | Performed by: INTERNAL MEDICINE

## 2024-05-30 PROCEDURE — 3078F DIAST BP <80 MM HG: CPT | Mod: CPTII,S$GLB,, | Performed by: INTERNAL MEDICINE

## 2024-05-30 PROCEDURE — 3074F SYST BP LT 130 MM HG: CPT | Mod: CPTII,S$GLB,, | Performed by: INTERNAL MEDICINE

## 2024-05-30 PROCEDURE — 99999 PR PBB SHADOW E&M-EST. PATIENT-LVL IV: CPT | Mod: PBBFAC,,, | Performed by: INTERNAL MEDICINE

## 2024-05-30 PROCEDURE — 1101F PT FALLS ASSESS-DOCD LE1/YR: CPT | Mod: CPTII,S$GLB,, | Performed by: INTERNAL MEDICINE

## 2024-05-30 PROCEDURE — 99214 OFFICE O/P EST MOD 30 MIN: CPT | Mod: S$GLB,,, | Performed by: INTERNAL MEDICINE

## 2024-05-30 PROCEDURE — 1126F AMNT PAIN NOTED NONE PRSNT: CPT | Mod: CPTII,S$GLB,, | Performed by: INTERNAL MEDICINE

## 2024-05-30 PROCEDURE — 3288F FALL RISK ASSESSMENT DOCD: CPT | Mod: CPTII,S$GLB,, | Performed by: INTERNAL MEDICINE

## 2024-05-30 NOTE — PLAN OF CARE
[facility]   Physical Therapy Initial Evaluation        Date: 5/28/2024   Name: Ankit Ruff  [facility] Number: [id number]    Therapy Diagnosis:   Encounter Diagnoses   Name Primary?    Benign paroxysmal positional vertigo, unspecified laterality     Vertigo Yes     Physician: Quynh Alves MD    Physician Orders: PT Eval and Treat   Medical Diagnosis from Referral: Benign paroxysmal positional vertigo  Evaluation Date: 5/28/2024  Authorization Period Expiration: 5/10/25  Plan of Care Expiration: 8/28/24  Progress Note Due: 6/28/24  Visit # / Visits authorized: 1/ 1   FOTO: 1/3    Precautions: Standard     Time In: 10:00AM  Time Out: 11:00AM  Total Appointment Time (timed & untimed codes): 60 minutes      SUBJECTIVE     Date of onset: 4/30/24    History of current condition - Ankit reports: he went to ER for sudden onset of nausea and dizziness on 4/30/24, after rotating his head around and feeling a pop in his neck. He had a thorough work up. Symptoms lasted less than an hour and resolved before leaving hospital. He has not had symptoms again and feels he does not need physical therapy. 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 He has had some issues with blood thinner medication . He is now on Plavix. He has a watchma, but states it is not working properly.    Current Activity Level: active, shoes horses    Falls: no    Imaging, CT head 4/30/24:  No acute intracranial abnormality.:    MRI brain 4/30/24:  There is diffuse parenchymal atrophy.  There is chronic white matter microischemic change.  No acute intracranial hemorrhage, extra-axial fluid collection, hydrocephalus, mass effect, or midline shift is identified.  The visualized vascular flow voids appear intact.  The visualized paranasal sinuses are clear.  The visualized mastoid air cells are clear.       Prior Therapy: no  Social History:   lives alone  Occupation: shoes horses  Prior  Level of Function: no nausea or dizziness  Current Level of Function: resolved nausea and dizziness    Pain:  Current none   Location: head    Description: nausea and dizziness  Aggravating Factors: popped his neck  Easing Factors:  sx have resolved         Medical History:   Past Medical History:   Diagnosis Date    Anticoagulant long-term use     Anxiety     Arthritis     At risk for bleeding 06/2023    Atrial fibrillation     CAD (coronary artery disease)     Cataract     OU    CHF (congestive heart failure)     Defibrillator discharge     Hearing loss     Heart failure     Hyperlipidemia     ICD (implantable cardiac defibrillator) in place     Ischemic cardiomyopathy     Myocardial infarction     Sciatica     had seen Dr. 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        Surgical History:   Ankit Ruff  has a past surgical history that includes Appendectomy; Fracture surgery; Hernia repair; Skin biopsy; Defibulater (2022); Cardiac surgery; Tonsillectomy; Injection of anesthetic agent around nerve (Right, 12/21/2018); Knee arthroscopy; Internal neurolysis using operating microscope (Right, 02/01/2019); Trigger finger release (Left, 02/03/2022); Transesophageal echocardiography (N/A, 06/27/2023); Closure of left atrial appendage using device (Right, 06/30/2023); echocardiogram,transesophageal (N/A, 06/30/2023); Transesophageal echocardiography (N/A, 08/07/2023); and Transesophageal echocardiography (N/A, 1/16/2024).    Medications:   Ankit has a current medication list which includes the following prescription(s): carvedilol, clopidogrel, furosemide, gabapentin, magnesium, meloxicam, multivitamin, pantoprazole, potassium citrate, rosuvastatin, and tamsulosin.    Allergies:   Review of patient's allergies indicates:   Allergen Reactions    Penicillins Rash    Entresto [sacubitril-valsartan] Other (See Comments)     Hypotension          OBJECTIVE       SYSTEMS SCREEN    - Follows  commands:  100% of time   - Speech: no deficits      Mental status: alert, oriented to person, place, and time  Appearance: Casually dressed  Behavior:  calm and cooperative  Attention Span and Concentration:  Normal    Posture Alignment: forward head, anterior shoulders posturing    Sensation: Light Touch: Intact          Proprioception: Intact         Coordination:   - UE coordination:  Rapid alternating movement Intact; Finger to nose Intact    UPPER EXTREMITY--AROM/PROM  (R) UE: WNLs  (L) UE: WNLs         Upper Extremity Strength: WNL    Lower Extremity Strength: WNL       ROM:   CERVICAL SPINE  Flexion: WNL   Extension:  15 degrees    L side bend:  10 degrees   R side bend:  10 degrees   L rotation:  65 degrees   R rotation:  65 degrees   Are concurrent symptoms present with any of these movements: As noted above none      Modified VAS (Vertebral Artery Screen), in sitting (rotation, then extension):  R: Negative  L: Negative    VESTIBULAR EXAMINATION    Oculomotor Screen in room light (fixation present):   Known eye dysfunction: None   Ocular ROM: WNL    Tracking/Smooth Pursuits: Intact  Saccades: Intact  Convergence: WNL     Spontaneous Nystagmus: Absent   Gaze Holding Nystagmus: Absent   Head Thrust Test: Negative        POSITIONAL CANAL TESTING  Looking for nystagmus (slow phase followed by quick phase to the affected side for BPPV)    [place] Hallpike (posterior / CL anterior)   Right : Negative nystagmus, Negative dizziness   Left: Negative nystagmus, Negative dizziness        TREATMENT     Total Treatment time (time-based codes) separate from Evaluation: 00 minutes      Ankit received the treatments listed below:        PATIENT EDUCATION AND HOME EXERCISES     Education provided: Pt was instructed to call if sx return    Written Home Exercises Provided: none    ASSESSMENT     Ankit is a 83 y.o. male referred to outpatient Physical Therapy with a medical diagnosis of Benign paroxysmal positional vertigo.  The patient's sx have resolved at this time. They were not reproducible with mobility testing of his neck. Vestibular ocular reflex is normal, no nystagmus and negative tawanna Hallpike tests at this time. Pt does not feel he needs physical therapy at this time. Instructed pt to call if symptoms return and we will address at that time.      Patient prognosis is Excellent.   Patient will benefit from skilled outpatient Physical Therapy to address the deficits stated above and in the chart below, provide patient /family education, and to maximize patientt's level of independence.     Plan of care discussed with patient: Yes  Patient's spiritual, cultural and educational needs considered and patient is agreeable to the plan of care and goals as stated below:     Anticipated Barriers for therapy: none    Medical Necessity is demonstrated by the following   History  Co-morbidities and personal factors that may impact the plan of care [x] LOW: no personal factors / co-morbidities  [] MODERATE: 1-2 personal factors / co-morbidities  [] HIGH: 3+ personal factors / co-morbidities    Moderate / High Support Documentation:   Past Medical History:   Diagnosis Date    Anticoagulant long-term use     Anxiety     Arthritis     At risk for bleeding 06/2023    Atrial fibrillation     CAD (coronary artery disease)     Cataract     OU    CHF (congestive heart failure)     Defibrillator discharge     Hearing loss     Heart failure     Hyperlipidemia     ICD (implantable cardiac defibrillator) in place     Ischemic cardiomyopathy     Myocardial infarction     Sciatica     had seen Dr. 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          Examination  Body Structures and Functions, activity limitations and participation restrictions that may impact the plan of care [x] LOW: addressing 1-2 elements  [] MODERATE: 2+ elements  [] HIGH: 3+ elements (please support below)    Moderate / High Support Documentation: see  evaluation/objective measurements above.     Clinical Presentation [x] LOW: stable  [] MODERATE: Evolving  [] HIGH: Unstable     Decision Making/ Complexity Score: low         Goals:  Pt will be independent with self management of his/her condition with home exercise program, posture, positioning and improved body mechanics.  2.   Pt will safely transition to and participate in wellness/fitness program.      PLAN   Plan of care Certification: 5/28/2024 to 8/28/24.    Pt does not require physical therapy at this time, but will call if sx warrant.    Cyndie Funez, PT

## 2024-05-30 NOTE — PROGRESS NOTES
Subjective:    Patient ID:  Ankit Ruff is a 83 y.o. male who presents for follow-up of  ischemic cardiomyopathy, CHF, atrial     HPI  Admitted to [facility] earlier this month with dizziness and lightheadedness.  Cardiac workup was negative.  Currently off of Neurontin.  Wondering about the Plavix as for his Watchman that shows minimal or very small leak in the last MERCED back in January  He comes for follow up with no major problems, no chest pain, no shortness of breath.  Main complaint at this time with a he gets episodes of what he describes as atrial fibrillation with palpitations that are accompanied with some shortness of breath and just not feeling well.  He had a PVI by Dr. Alejandre several years ago    Review of Systems   Constitutional: Negative for decreased appetite, malaise/fatigue, weight gain and weight loss.   Cardiovascular:  Negative for chest pain, dyspnea on exertion, leg swelling, palpitations and syncope.   Respiratory:  Negative for cough and shortness of breath.    Gastrointestinal: Negative.    Neurological:  Negative for weakness.   All other systems reviewed and are negative.     Objective:      Physical Exam  Vitals and nursing note reviewed.   Constitutional:       Appearance: Normal appearance. He is well-developed.   HENT:      Head: Normocephalic.   Eyes:      Pupils: Pupils are equal, round, and reactive to light.   Neck:      Thyroid: No thyromegaly.      Vascular: No carotid bruit or JVD.   Cardiovascular:      Rate and Rhythm: Normal rate and regular rhythm.      Chest Wall: PMI is not displaced.      Pulses: Normal pulses and intact distal pulses.      Heart sounds: Normal heart sounds. No murmur heard.     No gallop.   Pulmonary:      Effort: Pulmonary effort is normal.      Breath sounds: Normal breath sounds.   Abdominal:      Palpations: Abdomen is soft. There is no mass.      Tenderness: There is no abdominal tenderness.   Musculoskeletal:         General: Normal range  of motion.      Cervical back: Normal range of motion and neck supple.   Skin:     General: Skin is warm.   Neurological:      Mental Status: He is alert and oriented to person, place, and time.      Sensory: No sensory deficit.      Deep Tendon Reflexes: Reflexes are normal and symmetric.         Most Recent EKG Results  Results for orders placed or performed during the hospital encounter of 04/30/24   EKG 12-lead    Collection Time: 04/30/24  4:35 PM   Result Value Ref Range    QRS Duration 136 ms    OHS QTC Calculation 511 ms    Narrative    Test Reason : R42,    Vent. Rate : 066 BPM     Atrial Rate : 000 BPM     P-R Int : 000 ms          QRS Dur : 136 ms      QT Int : 488 ms       P-R-T Axes : 000 032 223 degrees     QTc Int : 511 ms    Atrial fibrillation  Nonspecific intraventricular block  Minimal voltage criteria for LVH, may be normal variant ( [place] [employer] product )  Cannot rule out Inferior infarct (cited on or before 30-APR-2024)  Abnormal ECG  When compared with ECG of 30-JUN-2023 10:33,  Atrial fibrillation has replaced Sinus rhythm  Nonspecific T wave abnormality now evident in Inferior leads  Confirmed by Carlos Jones MD ([id number]) on 5/1/2024 9:59:58 PM    Referred By: [email or web address]   SELF           Confirmed By:Carlos Jones MD       Most Recent Echocardiogram Results  Results for orders placed during the hospital encounter of 01/16/24    Transesophageal echo (MERCED) with possible cardioversion    Interpretation Summary    Left Ventricle: There is moderately reduced systolic function with a visually estimated ejection fraction of 30 - 40%.    Right Ventricle: Normal right ventricular cavity size. Right ventricle wall motion  is normal. Pacemaker lead present in the ventricle.    Left Atrium: Left atrium is moderately dilated. There is a closure device within the appendage. The device is a Watchman. There is partial occlusion with a very small residual leak.    Right Atrium: Right atrium is mildly dilated.  Lead present in the right atrium.    Aortic Valve: There is moderate aortic valve sclerosis. Mildly restricted motion.    Mitral Valve: There is mild bileaflet sclerosis. There is normal leaflet mobility. There is mild regurgitation.    Tricuspid Valve: There is mild regurgitation.    Pulmonic Valve: There is mild regurgitation.      Most Recent Nuclear Stress Test Results  No results found for this or any previous visit.      Most Recent Cardiac PET Stress Test Results  No results found for this or any previous visit.      Most Recent Cardiovascular Angiogram results  Results for orders placed during the hospital encounter of 06/30/23    Intra-Procedure Documentation    Conclusion  Intraoperative MERCED performed for pre evaluation, intraprocedural guidance, and post evaluation during Watchman procedure.  No thrombus was appreciated within left atrial appendage or left atrium.  No pericardial effusion was appreciated preprocedure.  Left atrial appendage was measured in four views and measurements were recorded.  Guidance was given through bicaval and short axis views for transseptal puncture to ensure inferoposterior transseptal puncture site.  Catheter was observed crossing the septum and guided into good position into the left atrial appendage.  Device was observed being deployed in good position in the left atrial appendage.  Tug test was observed and deemed to be without significant motion.  Compression measurements were then obtained in four views and recorded.  Color-flow Doppler was used for leak evaluation in four views and no significant leak was appreciated.  The device was then released.  No pericardial effusion was appreciated postprocedure.      Other Most Recent Cardiology Results  Results for orders placed in visit on 05/20/24    Interrogation of the AICD reveals he has been having episodes of atrial fibrillation on and off for the last couple of weeks up to 2 hours, symptomatic      Labs  reviewed    Assessment:       1. Ischemic cardiomyopathy    2. ICD (implantable cardioverter-defibrillator) in place    3. Acute on chronic combined systolic and diastolic heart failure    4. Mixed hyperlipidemia    5. S/P CABG x 4    6. Essential hypertension         Plan:   Stop Plavix, switch to baby aspirin daily.    Will refer back to Dr. Alejandre for reassessment of his atrial fibrillation (paroxysmal)  Continue:  Antiplatelet, Beta blocker, Diuretic, and Statin  Regular exercise program      Follow-up in six-month

## 2024-06-11 ENCOUNTER — LAB VISIT (OUTPATIENT)
Dept: LAB | Facility: HOSPITAL | Age: 84
End: 2024-06-11
Attending: INTERNAL MEDICINE
Payer: MEDICARE

## 2024-06-11 DIAGNOSIS — D61.818 PANCYTOPENIA: ICD-10-CM

## 2024-06-11 LAB
ALBUMIN SERPL BCP-MCNC: 3.4 G/DL (ref 3.5–5.2)
ALP SERPL-CCNC: 67 U/L (ref 55–135)
ALT SERPL W/O P-5'-P-CCNC: 23 U/L (ref 10–44)
ANION GAP SERPL CALC-SCNC: 9 MMOL/L (ref 8–16)
AST SERPL-CCNC: 34 U/L (ref 10–40)
BASOPHILS # BLD AUTO: 0.01 K/UL (ref 0–0.2)
BASOPHILS NFR BLD: 0.3 % (ref 0–1.9)
BILIRUB SERPL-MCNC: 1.3 MG/DL (ref 0.1–1)
BUN SERPL-MCNC: 15 MG/DL (ref 8–23)
CALCIUM SERPL-MCNC: 8.7 MG/DL (ref 8.7–10.5)
CHLORIDE SERPL-SCNC: 108 MMOL/L (ref 95–110)
CO2 SERPL-SCNC: 24 MMOL/L (ref 23–29)
CREAT SERPL-MCNC: 0.8 MG/DL (ref 0.5–1.4)
DIFFERENTIAL METHOD BLD: ABNORMAL
EOSINOPHIL # BLD AUTO: 0.1 K/UL (ref 0–0.5)
EOSINOPHIL NFR BLD: 2.9 % (ref 0–8)
ERYTHROCYTE [DISTWIDTH] IN BLOOD BY AUTOMATED COUNT: 12.9 % (ref 11.5–14.5)
EST. GFR  (NO RACE VARIABLE): >60 ML/MIN/1.73 M^2
GLUCOSE SERPL-MCNC: 162 MG/DL (ref 70–110)
HCT VFR BLD AUTO: 32.1 % (ref 40–54)
HGB BLD-MCNC: 11.3 G/DL (ref 14–18)
IMM GRANULOCYTES # BLD AUTO: 0 K/UL (ref 0–0.04)
IMM GRANULOCYTES NFR BLD AUTO: 0 % (ref 0–0.5)
LYMPHOCYTES # BLD AUTO: 0.9 K/UL (ref 1–4.8)
LYMPHOCYTES NFR BLD: 29.5 % (ref 18–48)
MCH RBC QN AUTO: 33.5 PG (ref 27–31)
MCHC RBC AUTO-ENTMCNC: 35.2 G/DL (ref 32–36)
MCV RBC AUTO: 95 FL (ref 82–98)
MONOCYTES # BLD AUTO: 0.2 K/UL (ref 0.3–1)
MONOCYTES NFR BLD: 7.1 % (ref 4–15)
NEUTROPHILS # BLD AUTO: 1.9 K/UL (ref 1.8–7.7)
NEUTROPHILS NFR BLD: 60.2 % (ref 38–73)
NRBC BLD-RTO: 0 /100 WBC
PLATELET # BLD AUTO: 52 K/UL (ref 150–450)
PMV BLD AUTO: 10.7 FL (ref 9.2–12.9)
POTASSIUM SERPL-SCNC: 4 MMOL/L (ref 3.5–5.1)
PROT SERPL-MCNC: 5.9 G/DL (ref 6–8.4)
RBC # BLD AUTO: 3.37 M/UL (ref 4.6–6.2)
SODIUM SERPL-SCNC: 141 MMOL/L (ref 136–145)
WBC # BLD AUTO: 3.08 K/UL (ref 3.9–12.7)

## 2024-06-11 PROCEDURE — 80053 COMPREHEN METABOLIC PANEL: CPT | Mod: HCNC,PO | Performed by: INTERNAL MEDICINE

## 2024-06-11 PROCEDURE — 36415 COLL VENOUS BLD VENIPUNCTURE: CPT | Mod: HCNC,PO | Performed by: INTERNAL MEDICINE

## 2024-06-11 PROCEDURE — 85025 COMPLETE CBC W/AUTO DIFF WBC: CPT | Mod: HCNC,PO | Performed by: INTERNAL MEDICINE

## 2024-06-12 ENCOUNTER — OFFICE VISIT (OUTPATIENT)
Dept: HEMATOLOGY/ONCOLOGY | Facility: CLINIC | Age: 84
End: 2024-06-12
Payer: MEDICARE

## 2024-06-12 VITALS
SYSTOLIC BLOOD PRESSURE: 110 MMHG | TEMPERATURE: 97 F | DIASTOLIC BLOOD PRESSURE: 61 MMHG | OXYGEN SATURATION: 99 % | WEIGHT: 188.06 LBS | HEIGHT: 69 IN | RESPIRATION RATE: 20 BRPM | BODY MASS INDEX: 27.85 KG/M2 | HEART RATE: 80 BPM

## 2024-06-12 DIAGNOSIS — R16.2 HEPATOSPLENOMEGALY: Primary | ICD-10-CM

## 2024-06-12 DIAGNOSIS — K76.6 PORTAL VENOUS HYPERTENSION: ICD-10-CM

## 2024-06-12 PROBLEM — D75.839: Status: RESOLVED | Noted: 2024-05-14 | Resolved: 2024-06-12

## 2024-06-12 PROBLEM — D46.1: Status: RESOLVED | Noted: 2024-05-14 | Resolved: 2024-06-12

## 2024-06-12 PROBLEM — D72.819 LEUKOPENIA: Status: RESOLVED | Noted: 2023-03-17 | Resolved: 2024-06-12

## 2024-06-12 PROBLEM — D72.829 LEUKOCYTOSIS: Status: RESOLVED | Noted: 2023-03-17 | Resolved: 2024-06-12

## 2024-06-12 PROBLEM — D64.9 ANEMIA: Status: RESOLVED | Noted: 2023-03-17 | Resolved: 2024-06-12

## 2024-06-12 PROCEDURE — 3288F FALL RISK ASSESSMENT DOCD: CPT | Mod: HCNC,CPTII,S$GLB, | Performed by: INTERNAL MEDICINE

## 2024-06-12 PROCEDURE — 99213 OFFICE O/P EST LOW 20 MIN: CPT | Mod: HCNC,S$GLB,, | Performed by: INTERNAL MEDICINE

## 2024-06-12 PROCEDURE — 3078F DIAST BP <80 MM HG: CPT | Mod: HCNC,CPTII,S$GLB, | Performed by: INTERNAL MEDICINE

## 2024-06-12 PROCEDURE — 1126F AMNT PAIN NOTED NONE PRSNT: CPT | Mod: HCNC,CPTII,S$GLB, | Performed by: INTERNAL MEDICINE

## 2024-06-12 PROCEDURE — 3074F SYST BP LT 130 MM HG: CPT | Mod: HCNC,CPTII,S$GLB, | Performed by: INTERNAL MEDICINE

## 2024-06-12 PROCEDURE — 1159F MED LIST DOCD IN RCRD: CPT | Mod: HCNC,CPTII,S$GLB, | Performed by: INTERNAL MEDICINE

## 2024-06-12 PROCEDURE — 99999 PR PBB SHADOW E&M-EST. PATIENT-LVL III: CPT | Mod: PBBFAC,HCNC,, | Performed by: INTERNAL MEDICINE

## 2024-06-12 PROCEDURE — 1101F PT FALLS ASSESS-DOCD LE1/YR: CPT | Mod: HCNC,CPTII,S$GLB, | Performed by: INTERNAL MEDICINE

## 2024-06-12 NOTE — PROGRESS NOTES
Name: Ankit Ruff  MRN:  [id number]  :  1940 Age 83 y.o.  Date of Service: 2024    Reason for visit:  Ankit Ruff is a 83 y.o. male here regarding pancytopenia.    Hematology History/History of Present Illness:   Mr. Ruff is an 83-year-old male with medical problems listed below.  He presents to Hematology Clinic for evaluation of pancytopenia.    Regarding recent infections he reports he hasn't had one in recent memory, no recurrent pna or sinus infections.  Regarding bleeding events he denies melena, epstaxis, hematochezia or any other bleeding issues.  Regarding new diagnoses and medications he reports no new medications or diagnosis.     No weight loss in the last 6 months.     Overall he is completely asymptomatic.     Former smoker. Rare ETOH.  [place]  Former  and horse contract work. Has children who all live out of [facility].  Son (Deyvi Ordoñez) is an ER doctor in [place], [place].    Presents to hematology to discuss pancytopenia    PHYSICAL EXAMINATION:  There were no vitals taken for this visit.  Wt Readings from Last 3 Encounters:   24 84.6 kg (186 lb 8.2 oz)   24 87.1 kg (192 lb 0.3 oz)   05/10/24 87.5 kg (193 lb 0.2 oz)     ECOG PERFORMANCE STATUS: 1  Physical Exam   General:  Well-appearing, nontoxic  Eyes:  Equal and round pupils, EOMI, no scleral icterus  Mouth:  No lesions, moist  Cardiovascular:  Warm, well-perfused, no peripheral edema  Lungs:  Unlabored on room air, no wheezing  Neurologic:  Awake, alert and oriented, participating in the exam  Psych:  Appropriate mood and affect  Skin:  Normal pallor, No rashes  Heme:  No petechiae, no purpura      LABORATORY:  CBC  Lab Results   Component Value Date    WBC 3.08 (L) 2024    HGB 11.3 (L) 2024    HCT 32.1 (L) 2024    MCV 95 2024    PLT 52 (L) 2024         BMP  Lab Results   Component Value Date     2024    K 4.0 2024      06/11/2024    CO2 24 06/11/2024    BUN 15 06/11/2024    CREATININE 0.8 06/11/2024    CALCIUM 8.7 06/11/2024    ANIONGAP 9 06/11/2024    ESTGFRAFRICA >60.0 07/25/2022    EGFRNONAA >60.0 07/25/2022         PATHOLOGY:  BONE MARROW, FLOW CYTOMETRIC EVALUATION:   --NO IMMUNOPHENOTYPICALLY ABNORMAL CELL POPULATIONS IDENTIFIED.  BONE MARROW, LEFT ILIAC CREST, ASPIRATE, CLOT SECTION, AND CORE BIOPSY:   --NORMOCELLULAR MARROW (APPROXIMATELY 20% TO 25%) WITH TRILINEAGE    HEMATOPOIETIC ELEMENTS AND NON-SPECIFIC MINIMAL-TO-MILD    DYSHEMATOPOIESIS (SEE COMMENT #1).   --MARKEDLY JQGQUANNZ-SE-OCSMOF STAINABLE IRON.   --PERIPHERAL BLOOD WITH THROMBOCYTOPENIA (50,000/MICROLITER); ANEMIA    (HEMOGLOBIN 11.0 GRAM/DECILITER); AND LEUKOPENIA (2,860/MICROLITER),    WITH MOSTLY UNREMARKABLE DIFFERENTIAL COUNT AND MORPHOLOGY     RADIOLOGY:  US abd 12/23/24  Impression:  1. Heterogeneous hepatic parenchyma consistent with cirrhosis.  The liver has decreased in size since the prior study.  2. Persistent hypoechoic focus within the left hepatic lobe which is essentially unchanged in size and appearance.  3. Findings of portal hypertension which include splenomegaly, perisplenic varices, and only minimal dilatation of the main portal vein with deep inspiratio    ASSESSMENT AND PLAN:  Ankit Ruff is a 83 y.o. male with...    # pancytopenia  -review of blood counts show normal white blood cell count up until January of 2022 and has since had a progressive decline.  Review of hemoglobin shows last normal value was in March of 2019 with a hemoglobin of 14 and has had a slow decline down to a kelle of 10.8.  Platelets have been chronically depressed since 2019 around 80-90k   but also have had a progressive decline and currently now around 50,000.  -MDS panel on bone marrow showed normal study; chromosomal analysis was normal,bone marrow flow normal  -Got a liver US to see evaluate for liver disease which showed hepatomegaly (17cm) and a  "hypoechoic focus; spleen was also enlarged to 17cm thus I favor HSM as the etiology to his blood counts  -we have not been able to find a intrinsic blood or bone marrow disorder, BMB normocellular for age and trilineage hematopoiesis.  Therefore I would favor the blood counts as a bystander effect of the liver and spleen issues  -no further work up required in hematology, discharge from hematology clinic   -recommend avoiding blood thinners and antiplatelets agents due to thrombocytopenia    #Hepatosplenomegaly  -etiology unknown, could secondary to increased portal pressure due to profound heart issues (EF30%) as noted below; could also be intrinsic liver disease; following with hepatology  -causing bystander issues with blood counts    #Atrial Fib- did not tolerate anticoagulation or entresto.  had "watchman" procedure completed.     #Ischemic Cardiomyopathy: on beta blocker; EF 30%    #Back pain secondary to osteoarthritis of the spine- on meloxican periodically- needs to stop this medication as the risk for bleeding is increased with high dose NSAIDS and thrombocytopenia,  to avoid excessive NSAIDs      Alexandria Walsh M.D.  Hematology/Oncology     Route Chart for Scheduling    Med Onc Chart Routing      Follow up with physician No follow up needed. discharge from hematology clinic   Follow up with JI    Infusion scheduling note    Injection scheduling note    Labs    Imaging    Pharmacy appointment    Other referrals                     "

## 2024-06-26 ENCOUNTER — OFFICE VISIT (OUTPATIENT)
Dept: ELECTROPHYSIOLOGY | Facility: CLINIC | Age: 84
End: 2024-06-26
Payer: MEDICARE

## 2024-06-26 ENCOUNTER — CLINICAL SUPPORT (OUTPATIENT)
Dept: CARDIOLOGY | Facility: HOSPITAL | Age: 84
End: 2024-06-26
Attending: INTERNAL MEDICINE
Payer: MEDICARE

## 2024-06-26 VITALS
SYSTOLIC BLOOD PRESSURE: 104 MMHG | BODY MASS INDEX: 27.62 KG/M2 | WEIGHT: 186.5 LBS | DIASTOLIC BLOOD PRESSURE: 52 MMHG | HEART RATE: 58 BPM | HEIGHT: 69 IN

## 2024-06-26 DIAGNOSIS — I25.2 HISTORY OF MYOCARDIAL INFARCTION: ICD-10-CM

## 2024-06-26 DIAGNOSIS — I25.5 ISCHEMIC CARDIOMYOPATHY: Chronic | ICD-10-CM

## 2024-06-26 DIAGNOSIS — D69.6 THROMBOCYTOPENIA: ICD-10-CM

## 2024-06-26 DIAGNOSIS — Z95.1 S/P CABG X 4: ICD-10-CM

## 2024-06-26 DIAGNOSIS — Z45.02 IMPLANTABLE CARDIOVERTER-DEFIBRILLATOR (ICD) GENERATOR END OF LIFE: Primary | ICD-10-CM

## 2024-06-26 DIAGNOSIS — Z95.810 ICD (IMPLANTABLE CARDIOVERTER-DEFIBRILLATOR) IN PLACE: Chronic | ICD-10-CM

## 2024-06-26 DIAGNOSIS — I50.43 ACUTE ON CHRONIC COMBINED SYSTOLIC AND DIASTOLIC HEART FAILURE: ICD-10-CM

## 2024-06-26 DIAGNOSIS — I25.810 CORONARY ARTERY DISEASE INVOLVING AUTOLOGOUS VEIN CORONARY BYPASS GRAFT WITHOUT ANGINA PECTORIS: ICD-10-CM

## 2024-06-26 DIAGNOSIS — I48.0 PAF (PAROXYSMAL ATRIAL FIBRILLATION): Primary | ICD-10-CM

## 2024-06-26 DIAGNOSIS — Z45.02 IMPLANTABLE CARDIOVERTER-DEFIBRILLATOR (ICD) GENERATOR END OF LIFE: ICD-10-CM

## 2024-06-26 DIAGNOSIS — Z95.818 PRESENCE OF WATCHMAN LEFT ATRIAL APPENDAGE CLOSURE DEVICE: ICD-10-CM

## 2024-06-26 DIAGNOSIS — I10 ESSENTIAL HYPERTENSION: ICD-10-CM

## 2024-06-26 DIAGNOSIS — K74.60 HEPATIC CIRRHOSIS, UNSPECIFIED HEPATIC CIRRHOSIS TYPE, UNSPECIFIED WHETHER ASCITES PRESENT: ICD-10-CM

## 2024-06-26 LAB
OHS CV AF BURDEN PERCENT: 24
OHS CV DC REMOTE DEVICE TYPE: NORMAL
OHS CV RV PACING PERCENT: 2.2 %
OHS QRS DURATION: 112 MS
OHS QTC CALCULATION: 455 MS

## 2024-06-26 PROCEDURE — 1159F MED LIST DOCD IN RCRD: CPT | Mod: CPTII,S$GLB,, | Performed by: INTERNAL MEDICINE

## 2024-06-26 PROCEDURE — 93010 ELECTROCARDIOGRAM REPORT: CPT | Mod: S$GLB,,, | Performed by: INTERNAL MEDICINE

## 2024-06-26 PROCEDURE — 99205 OFFICE O/P NEW HI 60 MIN: CPT | Mod: S$GLB,,, | Performed by: INTERNAL MEDICINE

## 2024-06-26 PROCEDURE — 3288F FALL RISK ASSESSMENT DOCD: CPT | Mod: CPTII,S$GLB,, | Performed by: INTERNAL MEDICINE

## 2024-06-26 PROCEDURE — 1126F AMNT PAIN NOTED NONE PRSNT: CPT | Mod: CPTII,S$GLB,, | Performed by: INTERNAL MEDICINE

## 2024-06-26 PROCEDURE — 93005 ELECTROCARDIOGRAM TRACING: CPT | Mod: S$GLB,,, | Performed by: INTERNAL MEDICINE

## 2024-06-26 PROCEDURE — 99999 PR PBB SHADOW E&M-EST. PATIENT-LVL III: CPT | Mod: PBBFAC,HCNC,, | Performed by: INTERNAL MEDICINE

## 2024-06-26 PROCEDURE — 3078F DIAST BP <80 MM HG: CPT | Mod: CPTII,S$GLB,, | Performed by: INTERNAL MEDICINE

## 2024-06-26 PROCEDURE — 3074F SYST BP LT 130 MM HG: CPT | Mod: CPTII,S$GLB,, | Performed by: INTERNAL MEDICINE

## 2024-06-26 PROCEDURE — 1101F PT FALLS ASSESS-DOCD LE1/YR: CPT | Mod: CPTII,S$GLB,, | Performed by: INTERNAL MEDICINE

## 2024-06-26 RX ORDER — NAPROXEN SODIUM 220 MG/1
81 TABLET, FILM COATED ORAL DAILY
COMMUNITY

## 2024-06-26 NOTE — PROGRESS NOTES
Subjective   Patient ID:  Ankit Ruff is a 83 y.o. male who presents for evaluation of Atrial Fibrillation      HPI 84 yo male with atrial fibrillation, atrial flutter, CAD, ischemic cardiomyopathy, PCI, CHF, ICD, NSVT.    Background:    Developed atrial arrhythmias in 2013. Notes indicate atrial fibrillation, ECG available for review c/w atrial flutter. Underwent DCCV 3/13, and did well for extended period. More recently developed recurrence. ECG c/w isthmus-dependent atrial flutter.  Echo 7/8/16 EF 25% PASP 50 mm Hg  Single chamber ICD implanted 10/27/10, rt sided. Reports abnormal lt sided venous anatomy (? Persistent lt SVC).  RFA 10/24/16 Isthmus dependent atrial flutter confirmed, RFA performed.    Update:  Has developed paroxysmal AF. Noted excessive bleeding on anticoagulation. Had low blood pressure on Entresto.  AJ occlusion 6/23 (Dr. Ndiaye)  MERCED 1/16/24  Left Atrium: Left atrium is moderately dilated. There is a closure device within the appendage. The device is a Watchman. There is partial occlusion with a very small residual leak.   Switched from Plavix to aspirin.  Is symptomatic with the atrial fibrillation.    ICD reveals stable function of the lead, RV pacing 2.2% suspected AF burden 24% since 5/14/24 maximum 57 hours, none since 6/10/24    ECG today reviewed by me, reveals sinus bradycardia at 58 bpm AR interval 136 msec.    Review of Systems   Constitutional: Negative. Negative for fever and malaise/fatigue.   HENT:  Negative for congestion and sore throat.    Cardiovascular:  Negative for chest pain, dyspnea on exertion, irregular heartbeat, leg swelling, near-syncope, orthopnea, palpitations, paroxysmal nocturnal dyspnea and syncope.   Respiratory:  Positive for shortness of breath. Negative for cough.    Gastrointestinal:  Negative for abdominal pain, constipation and diarrhea.   Neurological:  Negative for dizziness, light-headedness and weakness.   Psychiatric/Behavioral:  Negative for  depression. The patient is not nervous/anxious.    All other systems reviewed and are negative.         Objective     Physical Exam  Constitutional:       Appearance: He is well-developed.   Eyes:      General: No scleral icterus.     Conjunctiva/sclera: Conjunctivae normal.   Neck:      Vascular: No JVD.      Trachea: No tracheal deviation.   Cardiovascular:      Rate and Rhythm: Normal rate and regular rhythm.      Chest Wall: PMI is not displaced.      Heart sounds: Normal heart sounds.   Pulmonary:      Effort: Pulmonary effort is normal. No respiratory distress.      Breath sounds: Normal breath sounds.   Abdominal:      Palpations: Abdomen is soft.      Tenderness: There is no abdominal tenderness.   Musculoskeletal:         General: No tenderness.   Skin:     General: Skin is warm and dry.      Findings: No rash.   Neurological:      Mental Status: He is alert and oriented to person, place, and time.   Psychiatric:         Behavior: Behavior normal.            Assessment and Plan     1. PAF (paroxysmal atrial fibrillation)    2. Ischemic cardiomyopathy    3. ICD (implantable cardioverter-defibrillator) in place    4. Acute on chronic combined systolic and diastolic heart failure    5. History of myocardial infarction    6. Coronary artery disease involving autologous vein coronary bypass graft without angina pectoris    7. S/P CABG x 4    8. Essential hypertension    9. Presence of Watchman left atrial appendage closure device    10. Thrombocytopenia    11. Hepatic cirrhosis, unspecified hepatic cirrhosis type, unspecified whether ascites present        Plan:     Paroxysmal symptomatic atrial fibrillation.  Not a candidate for anticoagulation.  Multaq 400 mg BID.  ECG in 2 weeks.  Can f/u with me or Dr. Wynne in 3 months.     done

## 2024-07-05 ENCOUNTER — PATIENT MESSAGE (OUTPATIENT)
Dept: ELECTROPHYSIOLOGY | Facility: CLINIC | Age: 84
End: 2024-07-05
Payer: MEDICARE

## 2024-07-10 ENCOUNTER — TELEPHONE (OUTPATIENT)
Dept: ELECTROPHYSIOLOGY | Facility: CLINIC | Age: 84
End: 2024-07-10
Payer: MEDICARE

## 2024-07-11 ENCOUNTER — OFFICE VISIT (OUTPATIENT)
Dept: CARDIOLOGY | Facility: CLINIC | Age: 84
End: 2024-07-11
Payer: MEDICARE

## 2024-07-11 VITALS
HEIGHT: 69 IN | WEIGHT: 184.5 LBS | DIASTOLIC BLOOD PRESSURE: 56 MMHG | SYSTOLIC BLOOD PRESSURE: 102 MMHG | HEART RATE: 74 BPM | BODY MASS INDEX: 27.33 KG/M2

## 2024-07-11 DIAGNOSIS — I48.0 PAF (PAROXYSMAL ATRIAL FIBRILLATION): ICD-10-CM

## 2024-07-11 DIAGNOSIS — I25.810 CORONARY ARTERY DISEASE INVOLVING AUTOLOGOUS VEIN CORONARY BYPASS GRAFT WITHOUT ANGINA PECTORIS: ICD-10-CM

## 2024-07-11 DIAGNOSIS — Z95.818 PRESENCE OF WATCHMAN LEFT ATRIAL APPENDAGE CLOSURE DEVICE: ICD-10-CM

## 2024-07-11 DIAGNOSIS — I25.5 ISCHEMIC CARDIOMYOPATHY: Primary | Chronic | ICD-10-CM

## 2024-07-11 DIAGNOSIS — Z95.1 S/P CABG X 4: ICD-10-CM

## 2024-07-11 DIAGNOSIS — I65.23 BILATERAL CAROTID ARTERY STENOSIS: ICD-10-CM

## 2024-07-11 DIAGNOSIS — I70.0 AORTIC ATHEROSCLEROSIS: ICD-10-CM

## 2024-07-11 DIAGNOSIS — I10 ESSENTIAL HYPERTENSION: ICD-10-CM

## 2024-07-11 DIAGNOSIS — E78.2 MIXED HYPERLIPIDEMIA: ICD-10-CM

## 2024-07-11 DIAGNOSIS — Z95.810 ICD (IMPLANTABLE CARDIOVERTER-DEFIBRILLATOR) IN PLACE: Chronic | ICD-10-CM

## 2024-07-11 PROCEDURE — 1101F PT FALLS ASSESS-DOCD LE1/YR: CPT | Mod: CPTII,S$GLB,,

## 2024-07-11 PROCEDURE — 99999 PR PBB SHADOW E&M-EST. PATIENT-LVL III: CPT | Mod: PBBFAC,,,

## 2024-07-11 PROCEDURE — 99215 OFFICE O/P EST HI 40 MIN: CPT | Mod: S$GLB,,,

## 2024-07-11 PROCEDURE — 3288F FALL RISK ASSESSMENT DOCD: CPT | Mod: CPTII,S$GLB,,

## 2024-07-11 PROCEDURE — 1159F MED LIST DOCD IN RCRD: CPT | Mod: CPTII,S$GLB,,

## 2024-07-11 PROCEDURE — 1126F AMNT PAIN NOTED NONE PRSNT: CPT | Mod: CPTII,S$GLB,,

## 2024-07-11 NOTE — PROGRESS NOTES
Subjective:    Patient ID:  Ankit Ruff is a 84 y.o. male patient here for evaluation Follow-up    History of Present Illness:     Mr. Ruff is a 84 year old M who follows with Dr. Guerrero, Dr. Ndiaye (structural-- has been discharged), and eventually Dr. Wynne (EP) here today for his 1 year post Watchman follow up. 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 At the end of April, he had an ER stay for dizziness. This was thought to be due to an old cervical spine injury from when a bail of hay was dropped on top of his head.   He has some epigastric discomfort from time to time but no reese angina, denies GERD. No LINDER. No syncope or falls. Memory in tact.   He does not want any further testing at this time.           Most Recent Echocardiogram Results  Results for orders placed during the hospital encounter of 01/16/24    Transesophageal echo (MERCED) with possible cardioversion    Interpretation Summary    Left Ventricle: There is moderately reduced systolic function with a visually estimated ejection fraction of 30 - 40%.    Right Ventricle: Normal right ventricular cavity size. Right ventricle wall motion  is normal. Pacemaker lead present in the ventricle.    Left Atrium: Left atrium is moderately dilated. There is a closure device within the appendage. The device is a Watchman. There is partial occlusion with a very small residual leak.    Right Atrium: Right atrium is mildly dilated. Lead present in the right atrium.    Aortic Valve: There is moderate aortic valve sclerosis. Mildly restricted motion.    Mitral Valve: There is mild bileaflet sclerosis. There is normal leaflet mobility. There is mild regurgitation.    Tricuspid Valve: There is mild regurgitation.    Pulmonic Valve: There is mild regurgitation.      Most Recent Nuclear Stress Test Results  No results found for this or any previous visit.      Most Recent Cardiac PET Stress Test Results  No results found for this or any previous  visit.      Most Recent Cardiovascular Angiogram results  Results for orders placed during the hospital encounter of 06/30/23    Intra-Procedure Documentation    Conclusion  Intraoperative MERCED performed for pre evaluation, intraprocedural guidance, and post evaluation during Watchman procedure.  No thrombus was appreciated within left atrial appendage or left atrium.  No pericardial effusion was appreciated preprocedure.  Left atrial appendage was measured in four views and measurements were recorded.  Guidance was given through bicaval and short axis views for transseptal puncture to ensure inferoposterior transseptal puncture site.  Catheter was observed crossing the septum and guided into good position into the left atrial appendage.  Device was observed being deployed in good position in the left atrial appendage.  Tug test was observed and deemed to be without significant motion.  Compression measurements were then obtained in four views and recorded.  Color-flow Doppler was used for leak evaluation in four views and no significant leak was appreciated.  The device was then released.  No pericardial effusion was appreciated postprocedure.      Other Most Recent Cardiology Results  Results for orders placed in visit on 06/26/24    [facility]iac [facility] - [facility]      REVIEW OF SYSTEMS: As noted in HPI   CARDIOVASCULAR: No recent chest pain, palpitations, arm/neck/jaw pain, or edema.  RESPIRATORY: No recent fever, cough, SOB.  : No blood in the urine  GI: No reflux, nausea, vomiting, or blood in stool.   MUSCULOSKELETAL: No falls.   NEURO: No headaches, syncope, or dizziness.  EYES: No sudden changes in vision.     Past Medical History:   Diagnosis Date    Anticoagulant long-term use     Anxiety     Arthritis     At risk for bleeding 06/2023    Atrial fibrillation     CAD (coronary artery disease)     Cataract     OU    CHF (congestive heart failure)     Defibrillator discharge     Hearing loss      Heart failure     Hyperlipidemia     ICD (implantable cardiac defibrillator) in place     Ischemic cardiomyopathy     Myocardial infarction     Sciatica     had seen Dr. 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      Past Surgical History:   Procedure Laterality Date    APPENDECTOMY      CARDIAC SURGERY      cabg    CLOSURE OF LEFT ATRIAL APPENDAGE USING DEVICE Right 06/30/2023    Procedure: Watchman;  Surgeon: Ngoc Ndiaye MD;  Location: [facility];  Service: Cardiology;  Laterality: Right;    Defibulater  2022    Right chest    ECHOCARDIOGRAM,TRANSESOPHAGEAL N/A 06/30/2023    Procedure: Transesophageal echo (MERCED) intra-procedure log documentation;  Surgeon: Tamir Ritter MD;  Location: [facility];  Service: Cardiology;  Laterality: N/A;    FRACTURE SURGERY      HERNIA REPAIR      INJECTION OF ANESTHETIC AGENT AROUND NERVE Right 12/21/2018    Procedure: diagnsotic block to the genicular nerve branches to the right knee;  Surgeon: Sj Gonzales MD;  Location: [facility];  Service: Orthopedics;  Laterality: Right;    INTERNAL NEUROLYSIS USING OPERATING MICROSCOPE Right 02/01/2019    Procedure: cooled radiofrequency ablation to the genicular nerve branches of the right knee;  Surgeon: Sj Gonzales MD;  Location: [facility];  Service: Orthopedics;  Laterality: Right;    KNEE ARTHROSCOPY      SKIN BIOPSY      TONSILLECTOMY      TRANSESOPHAGEAL ECHOCARDIOGRAPHY N/A 06/27/2023    Procedure: ECHOCARDIOGRAM, TRANSESOPHAGEAL;  Surgeon: Tamir Ritter MD;  Location: [facility];  Service: Cardiology;  Laterality: N/A;    TRANSESOPHAGEAL ECHOCARDIOGRAPHY N/A 08/07/2023    Procedure: ECHOCARDIOGRAM, TRANSESOPHAGEAL;  Surgeon: Romeo Guerrero MD;  Location: [facility];  Service: Cardiology;  Laterality: N/A;    TRANSESOPHAGEAL ECHOCARDIOGRAPHY N/A 1/16/2024    Procedure: ECHOCARDIOGRAM, TRANSESOPHAGEAL;  Surgeon: Romeo Guerrero MD;  Location: [facility];  Service:  Cardiology;  Laterality: N/A;    TRIGGER FINGER RELEASE Left 2022    Procedure: RELEASE, TRIGGER FINGER;  Surgeon: Bay Romo MD;  Location: [facility];  Service: Orthopedics;  Laterality: Left;  Procedure:  Left ring finger trigger release    Position:  Supine    Anesthesia:  Anesthesia must be involved due to the presence of pacemaker defibrillator.  The patient will undergo local anesthesia only for this procedure    Equipment:  Basic handset     Social History     Tobacco Use    Smoking status: Former     Current packs/day: 0.00     Types: Cigarettes     Quit date: 1977     Years since quittin.9    Smokeless tobacco: Never   Substance Use Topics    Alcohol use: Not Currently     Comment: socially    Drug use: No         Objective      Vitals:    24 0950   BP: (!) 102/56   Pulse: 74       LAST EKG  Results for orders placed or performed in visit on 24   EKG 12-lead    Collection Time: 24 11:26 AM   Result Value Ref Range    QRS Duration 112 ms    [facility] QTC Calculation 455 ms    Narrative    Test Reason : I48.0,    Vent. Rate : 058 BPM     Atrial Rate : 058 BPM     P-R Int : 136 ms          QRS Dur : 112 ms      QT Int : 464 ms       P-R-T Axes : 000 073 -26 degrees     QTc Int : 455 ms    Sinus bradycardia with sinus arrhythmia  Nonspecific T wave abnormality  Abnormal ECG  When compared with ECG of 2024 16:35,  Sinus rhythm has replaced Atrial fibrillation  QRS duration has decreased  QT has shortened  Confirmed by TESSA DOWNING MD ([id number]) on 2024 4:06:51 PM    Referred By: MOE COULTER           Confirmed By:ETSSA DOWNING MD     LIPIDS - LAST 2   Lab Results   Component Value Date    CHOL 128 2024    CHOL 139 2024    HDL 55 2024    HDL 62 2024    LDLCALC 62.4 (L) 2024    LDLCALC 64.8 2024    TRIG 53 2024    TRIG 61 2024    CHOLHDL 43.0 2024    CHOLHDL 44.6 2024     CARDIAC PROFILE - LAST 2  Lab  Results   Component Value Date     (H) 09/18/2023     (H) 07/25/2022     07/26/2023    TROPONINI <0.012 04/30/2024    TROPONINI <0.012 10/31/2022      CBC - LAST 2  Lab Results   Component Value Date    WBC 3.08 (L) 06/11/2024    WBC 2.88 (L) 05/21/2024    HGB 11.3 (L) 06/11/2024    HGB 11.2 (L) 05/21/2024    HCT 32.1 (L) 06/11/2024    HCT 32.1 (L) 05/21/2024    PLT 52 (L) 06/11/2024    PLT 49 (L) 05/21/2024     Lab Results   Component Value Date    LABPT 17.2 (H) 05/01/2024    LABPT 16.1 (H) 08/09/2023    INR 1.4 05/01/2024    INR 1.2 12/08/2023    APTT 35.5 05/01/2024    APTT 31.7 08/09/2023     CHEMISTRY - LAST 2  Lab Results   Component Value Date     06/11/2024     05/21/2024    K 4.0 06/11/2024    K 4.0 05/21/2024    CO2 24 06/11/2024    CO2 26 05/21/2024    BUN 15 06/11/2024    BUN 22 05/21/2024    CREATININE 0.8 06/11/2024    CREATININE 0.9 05/21/2024     (H) 06/11/2024     05/21/2024    CALCIUM 8.7 06/11/2024    CALCIUM 8.8 05/21/2024    MG 2.2 04/30/2024    MG 2.2 09/18/2023    ALBUMIN 3.4 (L) 06/11/2024    ALBUMIN 3.5 05/21/2024    ALT 23 06/11/2024    ALT 18 05/21/2024    AST 34 06/11/2024    AST 30 05/21/2024      ENDOCRINE - LAST 2  Lab Results   Component Value Date    HGBA1C 5.2 04/30/2024    TSH 2.531 05/14/2024    TSH 2.060 04/30/2024        PHYSICAL EXAM  CONSTITUTIONAL: Well built, well nourished in no apparent distress  NECK: no carotid bruit, no JVD  LUNGS: CTA  CHEST WALL: no tenderness  HEART: regular rate and rhythm, S1, S2 normal, no murmur, click, rub or gallop   ABDOMEN: soft, non-tender; bowel sounds normal; no masses,  no organomegaly  EXTREMITIES: Extremities normal, no edema, no calf tenderness noted  NEURO: AAO X 3    I HAVE REVIEWED :    The vital signs, most recent cardiac testing, and most recent pertinent non-cardiology provider notes.    Current Outpatient Medications   Medication Instructions    aspirin 81 mg, Oral, Daily     carvediloL (COREG) 6.25 mg, Oral, 2 times daily    dronedarone (MULTAQ) 400 mg, Oral, 2 times daily with meals    furosemide (LASIX) 20 mg, Oral, As needed (PRN)    gabapentin (NEURONTIN) 300 mg, Oral, 2 times daily    magnesium 250 mg, Oral, Daily    meloxicam (MOBIC) 15 mg, Oral, Daily PRN    multivitamin capsule 1 capsule, Oral, Daily    pantoprazole (PROTONIX) 40 mg, Oral, Daily    potassium citrate 99 mg, Oral, Daily    rosuvastatin (CRESTOR) 10 mg, Oral, Daily    tamsulosin (FLOMAX) 0.4 mg, Oral, Nightly        Assessment & Plan   1. Ischemic cardiomyopathy  No reese angina   Continue ASA & statin   Continue coreg     2. ICD (implantable cardioverter-defibrillator) in place  Normal device function   No shocks reported    3. PAF (paroxysmal atrial fibrillation)  S/p 1 year LAAO   Being started on Multaq as per EP   Continue follow up with Dr. Wynne here in [place]     4. Coronary artery disease involving autologous vein coronary bypass graft without angina pectoris  As above     5. Mixed hyperlipidemia  Continue statin     6. S/P CABG x 4  No angina   Declines any further testing or surveillance     7. Essential hypertension  Hypotensive but asymptomatic today   Continue coreg and PRN lasix     8. Bilateral carotid artery stenosis  Carotid US mild plaque 2021     9. Presence of Watchman left atrial appendage closure device  EMRCED 1/2024 with partial occlusion with a very small residual leak.   Antiplatelet monotherapy as per Dr. Ndiaye -- ASA daily   No further surveillance recommended     10. Aortic atherosclerosis  Cont statin ASA           6 months Dr. Cesar Lezama PA-C   [facility]   Office: [phone]

## 2024-07-15 DIAGNOSIS — E78.00 PURE HYPERCHOLESTEROLEMIA: ICD-10-CM

## 2024-07-16 RX ORDER — ROSUVASTATIN CALCIUM 10 MG/1
10 TABLET, COATED ORAL
Qty: 90 TABLET | Refills: 3 | OUTPATIENT
Start: 2024-07-16

## 2024-08-09 ENCOUNTER — TELEPHONE (OUTPATIENT)
Dept: PAIN MEDICINE | Facility: CLINIC | Age: 84
End: 2024-08-09
Payer: MEDICARE

## 2024-08-09 ENCOUNTER — OFFICE VISIT (OUTPATIENT)
Dept: PRIMARY CARE CLINIC | Facility: CLINIC | Age: 84
End: 2024-08-09
Payer: MEDICARE

## 2024-08-09 VITALS
DIASTOLIC BLOOD PRESSURE: 60 MMHG | BODY MASS INDEX: 28.09 KG/M2 | SYSTOLIC BLOOD PRESSURE: 110 MMHG | HEIGHT: 69 IN | OXYGEN SATURATION: 95 % | HEART RATE: 52 BPM | WEIGHT: 189.69 LBS

## 2024-08-09 DIAGNOSIS — G31.9 CEREBRAL ATROPHY: ICD-10-CM

## 2024-08-09 DIAGNOSIS — I25.5 ISCHEMIC CARDIOMYOPATHY: ICD-10-CM

## 2024-08-09 DIAGNOSIS — I25.2 HISTORY OF MYOCARDIAL INFARCTION: ICD-10-CM

## 2024-08-09 DIAGNOSIS — M17.0 OSTEOARTHRITIS OF BOTH KNEES, UNSPECIFIED OSTEOARTHRITIS TYPE: ICD-10-CM

## 2024-08-09 DIAGNOSIS — D69.6 THROMBOCYTOPENIA: ICD-10-CM

## 2024-08-09 DIAGNOSIS — M47.26 OSTEOARTHRITIS OF SPINE WITH RADICULOPATHY, LUMBAR REGION: ICD-10-CM

## 2024-08-09 DIAGNOSIS — Z79.899 POLYPHARMACY: ICD-10-CM

## 2024-08-09 DIAGNOSIS — I50.42 CHRONIC COMBINED SYSTOLIC AND DIASTOLIC HEART FAILURE: ICD-10-CM

## 2024-08-09 DIAGNOSIS — I50.43 ACUTE ON CHRONIC COMBINED SYSTOLIC AND DIASTOLIC HEART FAILURE: ICD-10-CM

## 2024-08-09 DIAGNOSIS — H81.10 BENIGN PAROXYSMAL POSITIONAL VERTIGO, UNSPECIFIED LATERALITY: ICD-10-CM

## 2024-08-09 DIAGNOSIS — Z95.1 S/P CABG X 4: ICD-10-CM

## 2024-08-09 DIAGNOSIS — H91.93 BILATERAL HEARING LOSS, UNSPECIFIED HEARING LOSS TYPE: ICD-10-CM

## 2024-08-09 DIAGNOSIS — Z85.89 HISTORY OF SQUAMOUS CELL CARCINOMA: ICD-10-CM

## 2024-08-09 DIAGNOSIS — D61.818 PANCYTOPENIA: ICD-10-CM

## 2024-08-09 DIAGNOSIS — D69.2 SENILE PURPURA: ICD-10-CM

## 2024-08-09 DIAGNOSIS — I70.0 AORTIC ATHEROSCLEROSIS: ICD-10-CM

## 2024-08-09 DIAGNOSIS — I48.0 PAF (PAROXYSMAL ATRIAL FIBRILLATION): ICD-10-CM

## 2024-08-09 DIAGNOSIS — I25.810 CORONARY ARTERY DISEASE INVOLVING AUTOLOGOUS VEIN CORONARY BYPASS GRAFT WITHOUT ANGINA PECTORIS: ICD-10-CM

## 2024-08-09 DIAGNOSIS — M46.96 INFLAMMATORY SPONDYLOPATHY OF LUMBAR REGION: Primary | ICD-10-CM

## 2024-08-09 DIAGNOSIS — E78.2 MIXED HYPERLIPIDEMIA: ICD-10-CM

## 2024-08-09 DIAGNOSIS — I10 ESSENTIAL HYPERTENSION: ICD-10-CM

## 2024-08-09 DIAGNOSIS — M54.2 NECK PAIN: ICD-10-CM

## 2024-08-09 DIAGNOSIS — Z86.59 HISTORY OF ANXIETY: ICD-10-CM

## 2024-08-09 PROCEDURE — 99999 PR PBB SHADOW E&M-EST. PATIENT-LVL V: CPT | Mod: PBBFAC,HCNC,, | Performed by: INTERNAL MEDICINE

## 2024-08-12 ENCOUNTER — TELEPHONE (OUTPATIENT)
Dept: CARDIOLOGY | Facility: CLINIC | Age: 84
End: 2024-08-12
Payer: MEDICARE

## 2024-08-12 NOTE — TELEPHONE ENCOUNTER
----- Message from Tamir Ritter MD sent at 8/12/2024  1:44 PM CDT -----  This patient is scheduled to see me on August 19th.  Patient was never seen by me, normally follows with other providers.  Wanted to make sure he was not incorrectly placed on my schedule.  Would be happy to see him if he was placed correctly though.    -Tamir

## 2024-08-15 ENCOUNTER — HOSPITAL ENCOUNTER (OUTPATIENT)
Dept: RADIOLOGY | Facility: HOSPITAL | Age: 84
Discharge: HOME | End: 2024-08-15
Attending: STUDENT IN AN ORGANIZED HEALTH CARE EDUCATION/TRAINING PROGRAM
Payer: MEDICARE

## 2024-08-15 ENCOUNTER — OFFICE VISIT (OUTPATIENT)
Dept: PAIN MEDICINE | Facility: CLINIC | Age: 84
End: 2024-08-15
Payer: MEDICARE

## 2024-08-15 VITALS — BODY MASS INDEX: 27.99 KG/M2 | WEIGHT: 189 LBS | HEIGHT: 69 IN

## 2024-08-15 DIAGNOSIS — M54.2 NECK PAIN: ICD-10-CM

## 2024-08-15 PROCEDURE — 99999 PR PBB SHADOW E&M-EST. PATIENT-LVL IV: CPT | Mod: PBBFAC,HCNC,, | Performed by: STUDENT IN AN ORGANIZED HEALTH CARE EDUCATION/TRAINING PROGRAM

## 2024-08-15 PROCEDURE — 72040 X-RAY EXAM NECK SPINE 2-3 VW: CPT | Mod: TC,HCNC,PO

## 2024-08-15 PROCEDURE — 72040 X-RAY EXAM NECK SPINE 2-3 VW: CPT | Mod: 26,HCNC,, | Performed by: RADIOLOGY

## 2024-08-15 NOTE — PROGRESS NOTES
[facility] - Department    Cici Alves-Amy MCFADDEN MD      First Office Visit: 8/15/2024   Today' Date: 8/15/2024  Last Office Visit: None    Chief complaint: neck pain    HPI: Pt is a very pleasant 84 y.o., who presents for evaluation. Referred by Dr. Alves. Pt complains of neck pain for yrs. States he had a hay bale fall on his head yrs back and has had neck pain ever since. Denies having sharp, shooting pain going down the arms. Pain stays in the neck and is worse on the R side. Endorses a tightness sensation going straight across the neck. Endorses balance issues but states it is not new. Denies headaches, problems dropping objects, or BB changes. Does not want PT but is open to HEP        Pain disability index score: 44  Pain score: 4    Relevant Imaging/ Testing:   CT C-spine 8/19  XR L-spine 10/16    Procedures: None    Date of board of pharmacy review:8/15/2024  Date of opioid risk screening/ pain psych: None  Date of opioid agreement and consent: None  Date of urine drug screen: None  Date of random pill count: None     was reviewed today: reviewed, no concerns     Prescribed medications: None    See EHR for  PMH, PSH, FH, SH, Medications and Allergy    ROS:  Positive for pain  ROS     PE:  There were no vitals filed for this visit.  General: Pleasant, no distress  HEENT: NC/ AT. PERRLA  CV: Radial pulses intact  Pulm: No distress  Ext: No edema    Physical Exam     Neuromusculoskeletal:  Head: NC, AT. PERRLA. No occipital tenderness  Neck: Intact range of motions, extension, flexion, rotation. Bilat Facet loading. Neg Spurling. Min Tenderness.  5/5 Strength, normal tone. Neg Schmitz's  Shoulder: Intact range of motion. Min Bicep groove tenderness. 5/5 Strength  Lumbar: Intact range of motion  Hip: Intact range of motion  SI: Level  Knee: Intact range of motion  Reflexes: normal Bicep  Strength: 5/5 globally   Sensory: Grossly intact   Skin: No bruising, erythema  Gait: Normal      Impression:  Neck pain  (R>L)  Axial neck pain   Relevant History  BMI 27.91  Anxiety  BPPV  ICD in place  PAF  CAD s/p CABG x4        Plan:  Discussed options  Imaging/ relevant records viewed/ reviewed/ discussed  Imaging results viewed and reviewed (noted above)/ reviewed with patient   reviewed  HEP provided  XR C-spine  Re-eval after  MR C-spine if pain persists - pacemaker MRI compatible  Consider B MBB C3-4 and 4-5       Prescribed medications:  1. None    The impression and plan were discussed and explained in detail. All the questions were answered. Education was provided accordingly.       Follow-up:  6 wks or sooner if needed    Preethi Grayson MD

## 2024-08-20 ENCOUNTER — HOSPITAL ENCOUNTER (OUTPATIENT)
Dept: CARDIOLOGY | Facility: HOSPITAL | Age: 84
Discharge: HOME | End: 2024-08-20
Attending: INTERNAL MEDICINE
Payer: MEDICARE

## 2024-08-20 ENCOUNTER — CLINICAL SUPPORT (OUTPATIENT)
Dept: CARDIOLOGY | Facility: HOSPITAL | Age: 84
End: 2024-08-20
Payer: MEDICARE

## 2024-08-20 DIAGNOSIS — Z95.810 PRESENCE OF AUTOMATIC (IMPLANTABLE) CARDIAC DEFIBRILLATOR: ICD-10-CM

## 2024-08-20 PROCEDURE — 93295 DEV INTERROG REMOTE 1/2/MLT: CPT | Mod: HCNC,,, | Performed by: INTERNAL MEDICINE

## 2024-08-20 PROCEDURE — 93296 REM INTERROG EVL PM/IDS: CPT | Mod: HCNC,PO | Performed by: INTERNAL MEDICINE

## 2024-08-30 LAB
OHS CV AF BURDEN PERCENT: 7.3
OHS CV DC REMOTE DEVICE TYPE: NORMAL
OHS CV ICD SHOCK: NO
OHS CV RV PACING PERCENT: 1.02 %

## 2024-09-03 DIAGNOSIS — I70.0 AORTIC ATHEROSCLEROSIS: Primary | ICD-10-CM

## 2024-09-03 DIAGNOSIS — E78.00 PURE HYPERCHOLESTEROLEMIA: ICD-10-CM

## 2024-09-03 DIAGNOSIS — I10 ESSENTIAL HYPERTENSION: ICD-10-CM

## 2024-09-04 RX ORDER — ROSUVASTATIN CALCIUM 10 MG/1
10 TABLET, COATED ORAL
Qty: 90 TABLET | Refills: 3 | Status: SHIPPED | OUTPATIENT
Start: 2024-09-04

## 2024-09-04 RX ORDER — CARVEDILOL 6.25 MG/1
6.25 TABLET ORAL 2 TIMES DAILY
Qty: 180 TABLET | Refills: 3 | Status: SHIPPED | OUTPATIENT
Start: 2024-09-04

## 2024-09-09 RX ORDER — DRONEDARONE 400 MG/1
400 TABLET, FILM COATED ORAL 2 TIMES DAILY
Qty: 180 TABLET | Refills: 3 | Status: SHIPPED | OUTPATIENT
Start: 2024-09-09

## 2024-09-11 ENCOUNTER — PATIENT MESSAGE (OUTPATIENT)
Dept: ELECTROPHYSIOLOGY | Facility: CLINIC | Age: 84
End: 2024-09-11
Payer: MEDICARE

## 2024-09-17 DIAGNOSIS — I25.5 ISCHEMIC CARDIOMYOPATHY: Chronic | ICD-10-CM

## 2024-09-17 DIAGNOSIS — Z95.810 ICD (IMPLANTABLE CARDIOVERTER-DEFIBRILLATOR) IN PLACE: Primary | Chronic | ICD-10-CM

## 2024-09-24 ENCOUNTER — TELEPHONE (OUTPATIENT)
Dept: CARDIOLOGY | Facility: HOSPITAL | Age: 84
End: 2024-09-24
Payer: MEDICARE

## 2024-09-25 NOTE — PROGRESS NOTES
SUBJECTIVE:   Ankit Ruff is a 84 y.o. male who presents for evaluation of Atrial Fibrillation        HPI 84 yo male with atrial fibrillation, atrial flutter, CAD, ischemic cardiomyopathy, PCI, CHF, ICD, NSVT.     Background:  Developed atrial arrhythmias in 2013. Notes indicate atrial fibrillation, ECG available for review c/w atrial flutter. Underwent DCCV 3/13, and did well for extended period. More recently developed recurrence. ECG c/w isthmus-dependent atrial flutter.  Echo 7/8/16 EF 25% PASP 50 mm Hg  Single chamber ICD implanted 10/27/10, rt sided. Reports abnormal lt sided venous anatomy (? Persistent lt SVC).  RFA 10/24/16 Isthmus dependent atrial flutter confirmed, RFA performed.     6/2024  Has developed paroxysmal AF. Noted excessive bleeding on anticoagulation. Had low blood pressure on Entresto.  AJ occlusion 6/23 (Dr. Ndiaye)  MERCED 1/16/24  Left Atrium: Left atrium is moderately dilated. There is a closure device within the appendage. The device is a Watchman. There is partial occlusion with a very small residual leak.   Switched from Plavix to aspirin.  Is symptomatic with the atrial fibrillation.     ICD reveals stable function of the lead, RV pacing 2.2% suspected AF burden 24% since 5/14/24 maximum 57 hours, none since 6/10/24    In clinic today:  Last visit started on Multaq. Quit around 2 weeks ago secondary to edema.   Denies LINDER, orthopnea, PND  Reports a lot of sweating the other night and HR reduced 8 lbs    AF burden 4.5% (longest 6/30 for 26 hours). Suspect burden less as some strips of AF are NSR with ectopy.  1.3%    I personally reviewed the ECG/telemetry strip today. My interpretation is NSR with IVCD 124 ms.    Past Medical History:   Diagnosis Date    Anticoagulant long-term use     Anxiety     Arthritis     At risk for bleeding 06/2023    Atrial fibrillation     CAD (coronary artery disease)     Cataract     OU    CHF (congestive heart failure)     Defibrillator discharge      Hearing loss     Heart failure     Hyperlipidemia     ICD (implantable cardiac defibrillator) in place     Ischemic cardiomyopathy     Myocardial infarction     Sciatica     had seen Dr. 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        Past Surgical History:   Procedure Laterality Date    APPENDECTOMY      CARDIAC SURGERY      cabg    CLOSURE OF LEFT ATRIAL APPENDAGE USING DEVICE Right 06/30/2023    Procedure: Watchman;  Surgeon: Ngoc Ndiaye MD;  Location: [facility];  Service: Cardiology;  Laterality: Right;    Defibulater  2022    Right chest    ECHOCARDIOGRAM,TRANSESOPHAGEAL N/A 06/30/2023    Procedure: Transesophageal echo (MERCED) intra-procedure log documentation;  Surgeon: Tamir Ritter MD;  Location: [facility];  Service: Cardiology;  Laterality: N/A;    FRACTURE SURGERY      HERNIA REPAIR      INJECTION OF ANESTHETIC AGENT AROUND NERVE Right 12/21/2018    Procedure: diagnsotic block to the genicular nerve branches to the right knee;  Surgeon: Sj Gonzales MD;  Location: [facility];  Service: Orthopedics;  Laterality: Right;    INTERNAL NEUROLYSIS USING OPERATING MICROSCOPE Right 02/01/2019    Procedure: cooled radiofrequency ablation to the genicular nerve branches of the right knee;  Surgeon: Sj Gonzales MD;  Location: [facility] OR;  Service: Orthopedics;  Laterality: Right;    KNEE ARTHROSCOPY      SKIN BIOPSY      TONSILLECTOMY      TRANSESOPHAGEAL ECHOCARDIOGRAPHY N/A 06/27/2023    Procedure: ECHOCARDIOGRAM, TRANSESOPHAGEAL;  Surgeon: Tamir Ritter MD;  Location: [facility];  Service: Cardiology;  Laterality: N/A;    TRANSESOPHAGEAL ECHOCARDIOGRAPHY N/A 08/07/2023    Procedure: ECHOCARDIOGRAM, TRANSESOPHAGEAL;  Surgeon: Romeo Guerrero MD;  Location: [facility];  Service: Cardiology;  Laterality: N/A;    TRANSESOPHAGEAL ECHOCARDIOGRAPHY N/A 1/16/2024    Procedure: ECHOCARDIOGRAM, TRANSESOPHAGEAL;  Surgeon: Romeo Guerrero MD;  Location[facility];  Service: Cardiology;  Laterality: N/A;    TRIGGER FINGER RELEASE Left 2022    Procedure: RELEASE, TRIGGER FINGER;  Surgeon: Bay Romo MD;  Location: [facility];  Service: Orthopedics;  Laterality: Left;  Procedure:  Left ring finger trigger release    Position:  Supine    Anesthesia:  Anesthesia must be involved due to the presence of pacemaker defibrillator.  The patient will undergo local anesthesia only for this procedure    Equipment:  Basic handset       No family history on file.    Social History     Socioeconomic History    Marital status:    Tobacco Use    Smoking status: Former     Current packs/day: 0.00     Types: Cigarettes     Quit date: 1977     Years since quittin.1    Smokeless tobacco: Never   Substance and Sexual Activity    Alcohol use: Not Currently     Comment: socially    Drug use: No     Social Determinants of Health     Financial Resource Strain: Low Risk  (2024)    Overall Financial Resource Strain (CARDIA)     Difficulty of Paying Living Expenses: Not hard at all   Food Insecurity: No Food Insecurity (2024)    Hunger Vital Sign     Worried About Running Out of Food in the Last Year: Never true     Ran Out of Food in the Last Year: Never true   Transportation Needs: No Transportation Needs (2024)    PRAPARE - Transportation     Lack of Transportation (Medical): No     Lack of Transportation (Non-Medical): No   Physical Activity: Unknown (2024)    Exercise Vital Sign     Days of Exercise per Week: 7 days   Stress: No Stress Concern Present (2024)    [employer] - Occupational Stress Questionnaire     Feeling of Stress : Only a little   Housing Stability: Low Risk  (2024)    Housing Stability Vital Sign     Unable to Pay for Housing in the Last Year: No     Homeless in the Last Year: No       Review of patient's allergies indicates:   Allergen Reactions    Penicillins Rash    Entresto  [sacubitril-valsartan] Other (See Comments)     Hypotension       Review of Systems   Constitutional: Negative for chills and fever.   HENT:  Negative for congestion and hearing loss.    Eyes:  Negative for blurred vision and double vision.   Cardiovascular:         See HPI   Respiratory:  Negative for cough, hemoptysis and shortness of breath.    Endocrine: Negative for cold intolerance and heat intolerance.   Musculoskeletal:  Negative for joint pain and joint swelling.   Gastrointestinal:  Negative for abdominal pain, nausea and vomiting.   Genitourinary:  Negative for dysuria and hematuria.   Neurological:  Negative for focal weakness and headaches.   Psychiatric/Behavioral:  Negative for altered mental status.    All other systems reviewed and are negative.           OBJECTIVE:   There were no vitals filed for this visit.    BP Readings from Last 5 Encounters:   08/09/24 110/60   07/11/24 (!) 102/56   06/26/24 (!) 104/52   06/12/24 110/61   05/30/24 123/72        Physical Exam  Vitals and nursing note reviewed.   Constitutional:       General: He is not in acute distress.     Appearance: He is well-developed. He is not diaphoretic.   HENT:      Head: Normocephalic and atraumatic.   Eyes:      General: No scleral icterus.        Right eye: No discharge.         Left eye: No discharge.   Cardiovascular:      Rate and Rhythm: Normal rate and regular rhythm. No extrasystoles are present.     Pulses: Normal pulses and intact distal pulses.           Radial pulses are 2+ on the right side and 2+ on the left side.      Heart sounds: Normal heart sounds, S1 normal and S2 normal. Heart sounds not distant. No opening snap. No murmur heard.     No friction rub. No gallop. No S3 or S4 sounds.   Pulmonary:      Effort: Pulmonary effort is normal. No respiratory distress.      Breath sounds: No wheezing or rales.   Abdominal:      General: Bowel sounds are normal. There is no distension.      Palpations: Abdomen is soft.       Tenderness: There is no abdominal tenderness.   Musculoskeletal:         General: No deformity. Normal range of motion.      Cervical back: Normal range of motion and neck supple.   Skin:     General: Skin is warm and dry.      Findings: No erythema or rash.   Neurological:      Mental Status: He is alert.             Current Outpatient Medications   Medication Instructions    aspirin 81 mg, Oral, Daily    carvediloL (COREG) 6.25 mg, Oral, 2 times daily    furosemide (LASIX) 20 mg, Oral, As needed (PRN)    magnesium 250 mg, Oral, Daily    meloxicam (MOBIC) 15 mg, Oral, Daily PRN    MULTAQ 400 mg, Oral, 2 times daily    multivitamin capsule 1 capsule, Oral, Daily    pantoprazole (PROTONIX) 40 mg, Oral, Daily    potassium citrate 99 mg, Oral, Daily    rosuvastatin (CRESTOR) 10 mg, Oral    tamsulosin (FLOMAX) 0.4 mg, Oral, Nightly       Lipid Panel:   Lab Results   Component Value Date    CHOL 128 05/14/2024    HDL 55 05/14/2024    LDLCALC 62.4 (L) 05/14/2024    TRIG 53 05/14/2024    CHOLHDL 43.0 05/14/2024         The ASCVD Risk score (Weare DK, et al., 2019) failed to calculate for the following reasons:    The 2019 ASCVD risk score is only valid for ages 40 to 79    The patient has a prior MI or stroke diagnosis    Most Recent EKG Results  Results for orders placed or performed in visit on 06/26/24   EKG 12-lead    Collection Time: 06/26/24 11:26 AM   Result Value Ref Range    QRS Duration 112 ms    [facility] QTC Calculation 455 ms    Narrative    Test Reason : I48.0,    Vent. Rate : 058 BPM     Atrial Rate : 058 BPM     P-R Int : 136 ms          QRS Dur : 112 ms      QT Int : 464 ms       P-R-T Axes : 000 073 -26 degrees     QTc Int : 455 ms    Sinus bradycardia with sinus arrhythmia  Nonspecific T wave abnormality  Abnormal ECG  When compared with ECG of 30-APR-2024 16:35,  Sinus rhythm has replaced Atrial fibrillation  QRS duration has decreased  QT has shortened  Confirmed by TESSA DOWNING MD ([id number]) on 6/26/2024  4:06:51 PM    Referred By: MOE COULTER           Confirmed By:TESSA DOWNING MD       Most Recent Echocardiogram Results  Results for orders placed during the hospital encounter of 01/16/24    Transesophageal echo (MERCED) with possible cardioversion    Interpretation Summary    Left Ventricle: There is moderately reduced systolic function with a visually estimated ejection fraction of 30 - 40%.    Right Ventricle: Normal right ventricular cavity size. Right ventricle wall motion  is normal. Pacemaker lead present in the ventricle.    Left Atrium: Left atrium is moderately dilated. There is a closure device within the appendage. The device is a Watchman. There is partial occlusion with a very small residual leak.    Right Atrium: Right atrium is mildly dilated. Lead present in the right atrium.    Aortic Valve: There is moderate aortic valve sclerosis. Mildly restricted motion.    Mitral Valve: There is mild bileaflet sclerosis. There is normal leaflet mobility. There is mild regurgitation.    Tricuspid Valve: There is mild regurgitation.    Pulmonic Valve: There is mild regurgitation.      Most Recent Nuclear Stress Test Results  No results found for this or any previous visit.      Most Recent Cardiac PET Stress Test Results  No results found for this or any previous visit.      Most Recent Cardiovascular Angiogram results  Results for orders placed during the hospital encounter of 06/30/23    Intra-Procedure Documentation    Conclusion  Intraoperative MERCED performed for pre evaluation, intraprocedural guidance, and post evaluation during Watchman procedure.  No thrombus was appreciated within left atrial appendage or left atrium.  No pericardial effusion was appreciated preprocedure.  Left atrial appendage was measured in four views and measurements were recorded.  Guidance was given through bicaval and short axis views for transseptal puncture to ensure inferoposterior transseptal puncture site.  Catheter was  observed crossing the septum and guided into good position into the left atrial appendage.  Device was observed being deployed in good position in the left atrial appendage.  Tug test was observed and deemed to be without significant motion.  Compression measurements were then obtained in four views and recorded.  Color-flow Doppler was used for leak evaluation in four views and no significant leak was appreciated.  The device was then released.  No pericardial effusion was appreciated postprocedure.      Other Most Recent Cardiology Results  Results for orders placed in visit on 08/20/24    Cardiac device check - Remote alert        All pertinent data including labs, imaging, EKGs, and studies listed above were reviewed.  All EKG tracing in [facility] were personally interpreted by this provider.    ASSESSMENT:     1. PAF (paroxysmal atrial fibrillation)        2. Coronary artery disease involving autologous vein coronary bypass graft without angina pectoris        3. Mixed hyperlipidemia        4. Ischemic cardiomyopathy        5. ICD (implantable cardioverter-defibrillator) in place        6. S/P CABG x 4        7. Essential hypertension        8. Presence of Watchman left atrial appendage closure device        9. Chronic combined systolic and diastolic heart failure          PLAN FOR TREATMENT OF ABOVE DIAGNOSES:     Restart Multaq. If swelling recurs, he will contact me. If so, would recommend amiodarone given baseline QTc 500 and unable to use class IC agents  Continue ASA 81 mg daily  Continue Coreg 6.25 mg BID  Continue lasix 20 mg daily    F/u in 6 months    Dimitris Wynne MD  Cardiac Electrophysiology

## 2024-09-26 ENCOUNTER — OFFICE VISIT (OUTPATIENT)
Dept: CARDIOLOGY | Facility: CLINIC | Age: 84
End: 2024-09-26
Payer: MEDICARE

## 2024-09-26 ENCOUNTER — HOSPITAL ENCOUNTER (OUTPATIENT)
Dept: CARDIOLOGY | Facility: HOSPITAL | Age: 84
Discharge: HOME | End: 2024-09-26
Attending: INTERNAL MEDICINE
Payer: MEDICARE

## 2024-09-26 VITALS
WEIGHT: 179.25 LBS | HEIGHT: 69 IN | HEART RATE: 61 BPM | BODY MASS INDEX: 26.55 KG/M2 | DIASTOLIC BLOOD PRESSURE: 71 MMHG | SYSTOLIC BLOOD PRESSURE: 124 MMHG

## 2024-09-26 DIAGNOSIS — Z95.810 ICD (IMPLANTABLE CARDIOVERTER-DEFIBRILLATOR) IN PLACE: Chronic | ICD-10-CM

## 2024-09-26 DIAGNOSIS — I25.5 ISCHEMIC CARDIOMYOPATHY: Chronic | ICD-10-CM

## 2024-09-26 DIAGNOSIS — E78.2 MIXED HYPERLIPIDEMIA: ICD-10-CM

## 2024-09-26 DIAGNOSIS — I10 ESSENTIAL HYPERTENSION: ICD-10-CM

## 2024-09-26 DIAGNOSIS — I48.0 PAF (PAROXYSMAL ATRIAL FIBRILLATION): Primary | ICD-10-CM

## 2024-09-26 DIAGNOSIS — I50.42 CHRONIC COMBINED SYSTOLIC AND DIASTOLIC HEART FAILURE: ICD-10-CM

## 2024-09-26 DIAGNOSIS — Z95.1 S/P CABG X 4: ICD-10-CM

## 2024-09-26 DIAGNOSIS — I25.810 CORONARY ARTERY DISEASE INVOLVING AUTOLOGOUS VEIN CORONARY BYPASS GRAFT WITHOUT ANGINA PECTORIS: ICD-10-CM

## 2024-09-26 DIAGNOSIS — Z95.818 PRESENCE OF WATCHMAN LEFT ATRIAL APPENDAGE CLOSURE DEVICE: ICD-10-CM

## 2024-09-26 PROCEDURE — 3288F FALL RISK ASSESSMENT DOCD: CPT | Mod: HCNC,CPTII,S$GLB, | Performed by: INTERNAL MEDICINE

## 2024-09-26 PROCEDURE — 1101F PT FALLS ASSESS-DOCD LE1/YR: CPT | Mod: HCNC,CPTII,S$GLB, | Performed by: INTERNAL MEDICINE

## 2024-09-26 PROCEDURE — 1126F AMNT PAIN NOTED NONE PRSNT: CPT | Mod: HCNC,CPTII,S$GLB, | Performed by: INTERNAL MEDICINE

## 2024-09-26 PROCEDURE — 1159F MED LIST DOCD IN RCRD: CPT | Mod: HCNC,CPTII,S$GLB, | Performed by: INTERNAL MEDICINE

## 2024-09-26 PROCEDURE — 99214 OFFICE O/P EST MOD 30 MIN: CPT | Mod: HCNC,S$GLB,, | Performed by: INTERNAL MEDICINE

## 2024-09-26 PROCEDURE — 1160F RVW MEDS BY RX/DR IN RCRD: CPT | Mod: HCNC,CPTII,S$GLB, | Performed by: INTERNAL MEDICINE

## 2024-09-26 PROCEDURE — 93282 PRGRMG EVAL IMPLANTABLE DFB: CPT | Mod: HCNC,PO

## 2024-09-26 PROCEDURE — 99999 PR PBB SHADOW E&M-EST. PATIENT-LVL III: CPT | Mod: PBBFAC,HCNC,, | Performed by: INTERNAL MEDICINE

## 2024-10-07 PROBLEM — E86.0 DEHYDRATION: Status: ACTIVE | Noted: 2024-10-07

## 2024-10-07 PROBLEM — N39.0 UTI (URINARY TRACT INFECTION): Status: ACTIVE | Noted: 2024-10-07

## 2024-10-07 PROBLEM — R10.31 RIGHT LOWER QUADRANT ABDOMINAL PAIN: Status: ACTIVE | Noted: 2024-10-07

## 2024-10-15 ENCOUNTER — OFFICE VISIT (OUTPATIENT)
Dept: PRIMARY CARE CLINIC | Facility: CLINIC | Age: 84
End: 2024-10-15
Payer: MEDICARE

## 2024-10-15 VITALS
HEIGHT: 70 IN | BODY MASS INDEX: 26.48 KG/M2 | DIASTOLIC BLOOD PRESSURE: 50 MMHG | HEART RATE: 78 BPM | OXYGEN SATURATION: 98 % | WEIGHT: 184.94 LBS | SYSTOLIC BLOOD PRESSURE: 110 MMHG

## 2024-10-15 DIAGNOSIS — K80.80 BILIARY CALCULUS OF OTHER SITE WITHOUT OBSTRUCTION: ICD-10-CM

## 2024-10-15 DIAGNOSIS — Z09 HOSPITAL DISCHARGE FOLLOW-UP: Primary | ICD-10-CM

## 2024-10-15 DIAGNOSIS — K74.60 HEPATIC CIRRHOSIS, UNSPECIFIED HEPATIC CIRRHOSIS TYPE, UNSPECIFIED WHETHER ASCITES PRESENT: ICD-10-CM

## 2024-10-15 PROCEDURE — 99417 PROLNG OP E/M EACH 15 MIN: CPT | Mod: HCNC,S$GLB,, | Performed by: PHYSICIAN ASSISTANT

## 2024-10-15 PROCEDURE — 99215 OFFICE O/P EST HI 40 MIN: CPT | Mod: HCNC,S$GLB,, | Performed by: PHYSICIAN ASSISTANT

## 2024-10-15 PROCEDURE — 1159F MED LIST DOCD IN RCRD: CPT | Mod: HCNC,CPTII,S$GLB, | Performed by: PHYSICIAN ASSISTANT

## 2024-10-15 PROCEDURE — 1126F AMNT PAIN NOTED NONE PRSNT: CPT | Mod: HCNC,CPTII,S$GLB, | Performed by: PHYSICIAN ASSISTANT

## 2024-10-15 PROCEDURE — 99999 PR PBB SHADOW E&M-EST. PATIENT-LVL III: CPT | Mod: PBBFAC,HCNC,, | Performed by: PHYSICIAN ASSISTANT

## 2024-10-15 NOTE — PROGRESS NOTES
"Subjective:      Patient ID: Ankit Ruff is a 84 y.o. male.    Vitals:  height is 5' 10" (1.778 m) and weight is 83.9 kg (184 lb 15.5 oz). His blood pressure is 110/50 (abnormal) and his pulse is 78. His oxygen saturation is 98%.     Chief Complaint: Fatigue (Bp drops )    History of Present Illness    CHIEF COMPLAINT:  The patient presents for hospital follow-up after a recent emergency department visit and overnight observation for right abdominal pain.    HPI:  Ankit, an 84-year-old male, presented to the emergency department on October 8th with right abdominal pain persisting for a few days. He had been using Percocet at home for pain management. Following his daughter's advice to improve his diet, he had minimal food and fluid intake for 2 days prior to the ED visit.    In the emergency department, a CT of the abdomen showed cirrhosis of the liver, cholelithiasis, diverticulosis, and a small amount of free fluid in the pelvis. Labs indicated slight dehydration, and urinalysis confirmed a UTI.    The patient was placed under observation for IV fluids and antibiotics overnight, receiving Rocephin in the hospital. General surgery consultation determined he was not a surgical candidate due to thrombocytopenia.    Upon discharge, the patient was prescribed Bactrim twice daily for 3 days to treat the UTI, along with a limited amount of Norco for pain. He received instructions on dietary modifications to minimize gallbladder symptoms and was advised to manage any abdominal pain through diet after short-term use of Norco.    The patient reports completing the prescribed antibiotic course and discontinuing pain medication after finishing the prescription. He states feeling well currently and engaging in physical activity earlier in the day. He mentions lightheadedness and low energy the previous day, which improved after increasing fluid intake as suggested by his daughter.    The patient also reports intermittent " leg swelling, which tends to improve with nocturnal recumbency and worsen during the day. He notes that the swelling seems to improve with increased activity and perspiration.    The patient denies current fever, chills, nausea, vomiting, abdominal pain, or urinary problems. He also denies any current shortness of breath or cough.      IMAGING:  On October 8th, a CT of the abdomen revealed cirrhosis of the liver, cholelithiasis, diverticul  osis, and a small amount of free fluid in the pelvis. An abdominal ultrasound focused on the gallbladder was also performed on the same day. A previous abdominal ultrasound from December of last year showed the liver measured 13.6 cm by 17 cm.    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    ROS:  Constitutional: -fevers, -chills, +fatigue, +lightheadedness  Respiratory: -shortness of breath, -cough  Gastrointestinal: +abdominal pain, -nausea, -vomiting          Objective:     Physical Exam   Constitutional: He does not appear ill. No distress.   HENT:   Head: Normocephalic and atraumatic.   Ears:   Right Ear: External ear normal.   Left Ear: External ear normal.   Eyes: No scleral icterus.   Cardiovascular: Normal rate, regular rhythm and normal heart sounds.   No murmur heard.  Pulmonary/Chest: Effort normal. He has no wheezes. He has no rhonchi. He has no rales.   Abdominal: Normal appearance. He exhibits no distension. Soft. There is no abdominal tenderness. There is no guarding.   Musculoskeletal:      Right lower leg: Edema present.      Left lower leg: Edema present.   Neurological: no focal deficit. He is alert.   Skin: Skin is warm, dry and not pale. jaundice  Nursing note and vitals reviewed.      Assessment:     1. Hospital discharge follow-up    2. Hepatic cirrhosis, unspecified hepatic cirrhosis type, unspecified whether ascites present    3. Biliary calculus of other site without obstruction        Plan:     Assessment & Plan    Reviewed  "recent ED visit for abdominal pain, revealing cirrhosis, cholelithiasis, and UTI  Assessed current symptoms, finding patient feeling better with no ongoing abdominal pain  Evaluated liver function and size based on recent imaging studies  Considered potential causes of leg swelling  Determined to consult with hepatologist regarding further liver monitoring and potential AFP testing    LEG SWELLING:  - Explained potential causes of leg swelling, including excess salt intake, heart issues, and vascular insufficiency.  - Discussed elevated sodium content in sports drinks and prepared foods, including the "Liquid IV" supplement he has been using.  - Ankit to reduce salt intake as much as possible.  - Ankit to monitor leg swelling, noting if it improves when lying down at night and worsens during the day.  - Continued Lasix as needed for leg swelling.  - Contact the office if leg swelling persists or worsens despite reducing salt intake.    Hospital discharge follow-up    Patient doing well. No abdominal pain. Understands his diagnoses.  Understands diet restrictions regarding gallstones.  Finished his Bactrim.    Hepatic cirrhosis, unspecified hepatic cirrhosis type, unspecified whether ascites present    Reach out to Hepatology. Possible virtual visit?   -changes in bilirubin and albumin  -review recent imaging    Biliary calculus of other site without obstruction    Not a candidate for surgery.  Discussed if abdominal pain returns may contact the office and we will attempt outpatient imaging, but ultimately may be sent back to ED.    This note was generated with the assistance of ambient listening technology. Verbal consent was obtained by the patient and accompanying visitor(s) for the recording of patient appointment to facilitate this note. I attest to having reviewed and edited the generated note for accuracy, though some syntax or spelling errors may persist. Please contact the author of this note for any " clarification.     My total time spent on this encounter was 82 minutes which included  the following activities: preparing to see the patient, performing a medically appropriate and/or evaluation, counseling and educating the patient and family/caregiver, ordering medications, tests, or procedures, referring and communicating with other healthcare providers, documenting clinical information in the electronic or other health record, and independently interpreting results. This time is independent and non-overlapping.

## 2024-11-08 ENCOUNTER — TELEPHONE (OUTPATIENT)
Dept: HEPATOLOGY | Facility: CLINIC | Age: 84
End: 2024-11-08
Payer: MEDICARE

## 2024-11-08 ENCOUNTER — OFFICE VISIT (OUTPATIENT)
Dept: PRIMARY CARE CLINIC | Facility: CLINIC | Age: 84
End: 2024-11-08
Payer: MEDICARE

## 2024-11-08 VITALS
HEART RATE: 55 BPM | HEIGHT: 70 IN | SYSTOLIC BLOOD PRESSURE: 118 MMHG | OXYGEN SATURATION: 96 % | BODY MASS INDEX: 26.51 KG/M2 | WEIGHT: 185.19 LBS | DIASTOLIC BLOOD PRESSURE: 50 MMHG | TEMPERATURE: 98 F

## 2024-11-08 DIAGNOSIS — I10 ESSENTIAL HYPERTENSION: ICD-10-CM

## 2024-11-08 DIAGNOSIS — I50.43 ACUTE ON CHRONIC COMBINED SYSTOLIC AND DIASTOLIC HEART FAILURE: ICD-10-CM

## 2024-11-08 DIAGNOSIS — I25.810 CORONARY ARTERY DISEASE INVOLVING AUTOLOGOUS VEIN CORONARY BYPASS GRAFT WITHOUT ANGINA PECTORIS: ICD-10-CM

## 2024-11-08 DIAGNOSIS — I48.0 PAF (PAROXYSMAL ATRIAL FIBRILLATION): ICD-10-CM

## 2024-11-08 DIAGNOSIS — K74.60 HEPATIC CIRRHOSIS, UNSPECIFIED HEPATIC CIRRHOSIS TYPE, UNSPECIFIED WHETHER ASCITES PRESENT: Primary | ICD-10-CM

## 2024-11-08 PROCEDURE — 99999 PR PBB SHADOW E&M-EST. PATIENT-LVL IV: CPT | Mod: PBBFAC,HCNC,, | Performed by: INTERNAL MEDICINE

## 2024-11-08 NOTE — TELEPHONE ENCOUNTER
I spoke with patient.  Appt with PA Scheuermann scheduled 1/14/25; reminder notice mailed.  Unsure if additional testing needed.

## 2024-11-08 NOTE — PROGRESS NOTES
INTERNAL MEDICINE PROGRESS/URGENT CARE NOTE    CHIEF COMPLAINT     Chief Complaint   Patient presents with    Follow-up     Patient presents for his 3 month follow up appointment.         HPI     Ankit Ruff is a 84 y.o.  male who presents with a PMHx of  anxiety, arthritis, CAD, CHF, HLD, HTN, history of, MI, anemia, and ischemic cardiomyopathy, Afib, hearing loss, ICD in place, who presents today for routine follow up visit.      His main complaints and concerns are:   Recently went to the ER for abdominal pain.   Regarding his cirrhosis: has established and follows with hepatology, saw them less than a year ago and based on age and no symptoms, they agreed to simple follow up.         At his previous visits. We have discussed:   Not been feeling well. Fatigue, says he's back in afib again and now not sure what they will do since he's had ablation, conversion.   His dizziness is worst when he lays down and turns his head. Says if he goes under his car to change his oil, he has severe dizziness.   Had a neuroma on his right leg. Had a steroid injection, which he says worked well. Says they wanted to try that before considering surgery.   Neck pain. Chronic. Has a history of cervical myelopathy and bone spurs. Saw a neurosurgereon years ago who told him hed need surgery but he waited and says now that the pain is worst the surgeon has passed. So he wants a new referral.   Let swelling. Still eats out a lot and sausage at home though weve spoken about salt in his diet and eating out. Here we are.         Had sent in for a refill of his zanaflex which he uses for sleep. Told him cannot prescribe that for insomnia.      POLYPHARMACY: we have discussed the use of meloxicam with is heart failure. Of note,patient was prescribedthis by cesia PCP and gets such relief that he is very hesitant to discontinue its use despite recurrent discussions re its side effects.   We have discussed using muscle relaxants solely for  "sleep and instead discussed sleep hygiene.         CAD, history of MI, ischemic CDM: follows with cardiology. Current medications include aldactone, lasix 20mg , crestor.   Reviewed his ECHO with his. Given his EF of 30-40% on recent ECHO done 1/6/24.   counseled him on heart failure management including limitin salt, weighing regularly and how to manage acute weight ghain. Ect. Told him I will not take him off any of his cardia cmedicatio      History of anxiety: GAD7 today is 2. Patient reports feeling "fine"   LELA 7 2/21 mild and controlled. Says his wife has moved to another state. 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 Hit his left chest and now with some pain.      Afib: no a/c. Rate controlled and asymptomatic.   Stopped taking eliquis which he was on for Afib. He stopped taking it because he had extensive bleeding. At his initial appointment, I Told him id like him to discuss watchman device if he does not plan to take the eliquis for stroke prevention. Now takes ASA.   Has sicne had the watchman device placed.      Wants me to take him off aldactone. Will have him see his cardiologist.      Polypharmacy: on tramadol, mobic, neurontin. Quite confused.  Tried to explain he concerns with tramadol. He says he doesn't take it. Says someone gave him zanaflex for sleep. Explained to patient that I will not prescribe this for insomnia. No muslce spasm.      Pancytopenia/Anemia/leukopenia: follows with hematology. Recently had a visit 3 months ago.   Per their note " pancytopenia  -review of blood counts show normal white blood cell count up until January of 2022 and has since had a progressive decline.  Review of hemoglobin shows last normal value was in March of 2019 with a hemoglobin of 14 and has had a slow decline down to a kelle of 10.8.  Platelets have been chronically depressed since 2019 around 80-90k   but also have had a progressive decline and currently now around 50,000.  -MDS panel on bone " "marrow showed normal study; chromosomal analysis was normal,bone marrow flow normal  -Got a liver US to see evaluate for liver disease which showed hepatomegaly (17cm) and a hypoechoic focus; spleen was also enlarged to 17cm thus I favor HSM as the etiology to his blood counts  -we have not been able to find a intrinsic blood or bone marrow disorder.  Therefore I would favor the blood counts as a bystander effect of the liver and spleen issues"         Home Medications:  Prior to Admission medications    Medication Sig Start Date End Date Taking? Authorizing Provider   aspirin (ECOTRIN) 81 MG EC tablet Take 81 mg by mouth every evening.    Provider, Historical   carvediloL (COREG) 6.25 MG tablet Take 1 tablet (6.25 mg total) by mouth 2 (two) times daily. 9/4/24   Romeo Guerrero MD   dronedarone (MULTAQ) 400 mg Tab Take 1 tablet (400 mg total) by mouth 2 (two) times daily. 9/9/24   Morris Alejandre MD   furosemide (LASIX) 20 MG tablet Take 1-2 tablets (20-40 mg total) by mouth daily as needed (swelling). 10/8/24   Kina Dangelo NP   HYDROcodone-acetaminophen (NORCO) 5-325 mg per tablet Take 1 tablet by mouth every 6 (six) hours as needed for Pain. 10/8/24   Kina Dangelo NP   magnesium 250 mg Tab Take 250 mg by mouth once daily.    Provider, Historical   meloxicam (MOBIC) 15 MG tablet Take 1 tablet (15 mg total) by mouth daily as needed for Pain. 10/8/24   Kina Dangelo NP   multivitamin capsule Take 1 capsule by mouth once daily.    Provider, Historical   pantoprazole (PROTONIX) 40 MG tablet Take 1 tablet (40 mg total) by mouth once daily. 3/18/24   Romeo Guerrero MD   potassium citrate 99 mg Cap Take 99 mg by mouth every evening.    Provider, Historical   rosuvastatin (CRESTOR) 10 MG tablet Take 1 tablet (10 mg total) by mouth every evening. 10/8/24   Kina Dangelo NP   tamsulosin (FLOMAX) 0.4 mg Cap Take 1 capsule (0.4 mg total) by mouth every evening. 10/8/24   " "Kina Dangelo NP       Review of Systems:  Review of Systems      Advance Care Planning     Date: 11/08/2024    Power of   I initiated the process of voluntary advance care planning today and explained the importance of this process to the patient.  I introduced the concept of advance directives to the patient, as well. Then the patient received detailed information about the importance of designating a Health Care Power of  (HCPOA). He was also instructed to communicate with this person about their wishes for future healthcare, should he become sick and lose decision-making capacity. The patient has previously appointed a HCPOA. After our discussion, the patient has decided to complete a HCPOA and has appointed his  family , health care agent:  on file  & health care agent number:  on file . I encouraged him to communicate with this person about their wishes for future healthcare, should he become sick and lose decision-making capacity.      A total of 10 min was spent on advance care planning, goals of care discussion, emotional support, formulating and communicating prognosis and exploring burden/benefit of various approaches of treatment. This discussion occurred on a fully voluntary basis with the verbal consent of the patient and/or family.             PHYSICAL EXAM     BP (!) 118/50 (BP Location: Left arm, Patient Position: Sitting)   Pulse (!) 55   Temp 98 °F (36.7 °C)   Ht 5' 10" (1.778 m)   Wt 84 kg (185 lb 3 oz)   SpO2 96%   BMI 26.57 kg/m²     GEN - A+OX4, NAD   HEENT - PERRL, EOMI, OP clear  Neck - No thyromegaly or cervical LAD. No thyroid masses felt.  CV - RRR, no m/r   Chest - CTAB, no wheezing or rhonchi  Abd - S/NT/ND/+BS.   Ext - 2+BDP and radial pulses. No C/C/E.  Skin - No rash.    LABS         ASSESSMENT/PLAN     Ankit Ruff is a 84 y.o. male with  1. Hepatic cirrhosis, unspecified hepatic cirrhosis type, unspecified whether ascites present  Following with " hepatology  Symptoms include edema. However he also has CHF with EF 35%  Overview:  Noted by ALEX HUGHES  last documented on [id number]    Orders:  -     HEPATIC FUNCTION PANEL; Future; Expected date: 11/08/2024    2. PAF (paroxysmal atrial fibrillation)  Has a watchman in place.   asymptomatic  Overview:  Successful dccv  3/2013  On eliquis. Rate controlled  Asymptomatic  Continue       3. Coronary artery disease involving autologous vein coronary bypass graft without angina pectoris  Overview:  On ASA and statin.   LDL at goal. Continue to monitor  Asymptomatic       4. Acute on chronic combined systolic and diastolic heart failure  Symptomatic ie ankle swelling    5. Essential hypertension  Overview:  BP well controlled. Continue current medications  Limit salt intake     Orders:  -     TSH; Future; Expected date: 11/08/2024           WORRY SCORE 2    RTC in 3 months, sooner if needed and depending on labs.    Quynh Alves MD  Board Certified Internist/Geriatrician  [facility] ([place])

## 2024-11-08 NOTE — TELEPHONE ENCOUNTER
----- Message from Janay sent at 11/8/2024  8:27 AM CST -----  Regarding: Appt  Contact: [phone]  Ankit Ruff calling regarding Patient Advice (message) for # [facility] Dr Alves office is calling to schedule pt an appt soonest avail pls advise     DX : cirrhosis

## 2024-11-19 ENCOUNTER — CLINICAL SUPPORT (OUTPATIENT)
Dept: CARDIOLOGY | Facility: HOSPITAL | Age: 84
End: 2024-11-19
Payer: MEDICARE

## 2024-11-19 ENCOUNTER — HOSPITAL ENCOUNTER (OUTPATIENT)
Dept: CARDIOLOGY | Facility: HOSPITAL | Age: 84
Discharge: HOME | End: 2024-11-19
Attending: INTERNAL MEDICINE
Payer: MEDICARE

## 2024-11-19 DIAGNOSIS — Z95.810 PRESENCE OF AUTOMATIC (IMPLANTABLE) CARDIAC DEFIBRILLATOR: ICD-10-CM

## 2024-11-19 PROCEDURE — 93296 REM INTERROG EVL PM/IDS: CPT | Mod: HCNC,PO | Performed by: INTERNAL MEDICINE

## 2024-11-19 PROCEDURE — 93295 DEV INTERROG REMOTE 1/2/MLT: CPT | Mod: HCNC,,, | Performed by: INTERNAL MEDICINE

## 2024-11-27 LAB
OHS CV AF BURDEN PERCENT: 23.6
OHS CV DC REMOTE DEVICE TYPE: NORMAL
OHS CV ICD SHOCK: NO
OHS CV RV PACING PERCENT: 2.58 %

## 2024-12-25 DIAGNOSIS — R42 VERTIGO: Primary | ICD-10-CM

## 2024-12-26 RX ORDER — TAMSULOSIN HYDROCHLORIDE 0.4 MG/1
1 CAPSULE ORAL NIGHTLY
Qty: 90 CAPSULE | Refills: 3 | Status: SHIPPED | OUTPATIENT
Start: 2024-12-26

## 2025-01-08 DIAGNOSIS — I25.810 CORONARY ARTERY DISEASE INVOLVING AUTOLOGOUS VEIN CORONARY BYPASS GRAFT WITHOUT ANGINA PECTORIS: ICD-10-CM

## 2025-01-09 DIAGNOSIS — I25.810 CORONARY ARTERY DISEASE INVOLVING AUTOLOGOUS VEIN CORONARY BYPASS GRAFT WITHOUT ANGINA PECTORIS: ICD-10-CM

## 2025-01-09 RX ORDER — PANTOPRAZOLE SODIUM 40 MG/1
40 TABLET, DELAYED RELEASE ORAL DAILY
Qty: 90 TABLET | Refills: 3 | Status: SHIPPED | OUTPATIENT
Start: 2025-01-09 | End: 2025-01-09 | Stop reason: SDUPTHER

## 2025-01-10 DIAGNOSIS — I25.810 CORONARY ARTERY DISEASE INVOLVING AUTOLOGOUS VEIN CORONARY BYPASS GRAFT WITHOUT ANGINA PECTORIS: ICD-10-CM

## 2025-01-10 RX ORDER — PANTOPRAZOLE SODIUM 40 MG/1
40 TABLET, DELAYED RELEASE ORAL DAILY
Qty: 90 TABLET | Refills: 3 | Status: SHIPPED | OUTPATIENT
Start: 2025-01-10

## 2025-01-10 NOTE — TELEPHONE ENCOUNTER
----- Message from Sharmaine sent at 1/10/2025  2:33 PM CST -----  Type: Needs Medical Advice  Who Called:  pt wife   Pharmacy name and phone #:        [facility] - [place], [place] - [address]  [address]  [facility] [address]  [facility] [address]  Phone: [phone] Fax: [phone]    Best Call Back Number: [phone]  Additional Information: pt is calling the office for pantoprazole (PROTONIX) 40 MG tablet  It was sent to the incorrect pharmacy, center [facility] will mot accept it because of the insurance change please resend . Thanks

## 2025-01-12 RX ORDER — PANTOPRAZOLE SODIUM 40 MG/1
40 TABLET, DELAYED RELEASE ORAL DAILY
Qty: 90 TABLET | Refills: 3 | Status: SHIPPED | OUTPATIENT
Start: 2025-01-12

## 2025-01-14 ENCOUNTER — OFFICE VISIT (OUTPATIENT)
Facility: CLINIC | Age: 85
End: 2025-01-14
Payer: MEDICARE

## 2025-01-14 VITALS — WEIGHT: 189.13 LBS | BODY MASS INDEX: 27.08 KG/M2 | HEIGHT: 70 IN

## 2025-01-14 DIAGNOSIS — K74.60 HEPATIC CIRRHOSIS, UNSPECIFIED HEPATIC CIRRHOSIS TYPE, UNSPECIFIED WHETHER ASCITES PRESENT: ICD-10-CM

## 2025-01-14 PROCEDURE — 99999 PR PBB SHADOW E&M-EST. PATIENT-LVL III: CPT | Mod: PBBFAC,,, | Performed by: PHYSICIAN ASSISTANT

## 2025-01-14 PROCEDURE — 1101F PT FALLS ASSESS-DOCD LE1/YR: CPT | Mod: CPTII,S$GLB,, | Performed by: PHYSICIAN ASSISTANT

## 2025-01-14 PROCEDURE — 1160F RVW MEDS BY RX/DR IN RCRD: CPT | Mod: CPTII,S$GLB,, | Performed by: PHYSICIAN ASSISTANT

## 2025-01-14 PROCEDURE — 99213 OFFICE O/P EST LOW 20 MIN: CPT | Mod: S$GLB,,, | Performed by: PHYSICIAN ASSISTANT

## 2025-01-14 PROCEDURE — 3288F FALL RISK ASSESSMENT DOCD: CPT | Mod: CPTII,S$GLB,, | Performed by: PHYSICIAN ASSISTANT

## 2025-01-14 PROCEDURE — 1159F MED LIST DOCD IN RCRD: CPT | Mod: CPTII,S$GLB,, | Performed by: PHYSICIAN ASSISTANT

## 2025-01-14 NOTE — PROGRESS NOTES
"HEPATOLOGY CLINIC VISIT NOTE  CHIEF COMPLAINT: Cirrhosis    HISTORY       This is a 84 y.o. White male w/ PMH of ischemic cardiomyopathy w/ last EF 30-40% in 1/2024. He was last seen by me 12/2023 for cirrhosis complicated by portal HTN. At that time he elected for conservative liver monitoring done w/ PCP to achieve his goal of reducing office visit frequency so hepatology f/u was not scheduled. He returns today after being referred back by his PCP    Cirrhosis history:  Well compensated  (+) Portal HTN: SM + collaterals, splenorenal shunting, low plt 50s-60s  Varices: ? No EGD. (Not needed since he's on Coreg 6.25mg BID per cardiology  Variceal bleed: no  Variceal banding: no  MELD 12/2023: 8    HCC screen:  U/S 8/2023: 9mm hypoechoic liver lesion, not evident on f/u tpCT.  AFP normal  U/S 12/8/23: AFP normal. liver lesion is unchanged     Serologic eval for chronic liver disease:   Transaminases typically normal.  Unyielding: no HBV, HCV, HH  No hx of alcohol  Liver bx not recommended given advanced age and that it wouldn't alter mngmt.    Interval history (01/14/2025):  U/S 10/2024: heterogeneous liver. Several focal hypoechoic lesions  CT 10/2024: No liver lesions  Labs 10/2024: stable. MELD 10    (+) RAMO, on diuretics (managed by cardiology)  No jaundice, EV bleed, HE      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   NECK: Supple  LUNGS: Normal respiratory effort.   ABDOMEN: Soft, nontender, nondistended.   SKIN: Warm and dry. No jaundice, No obvious rashes.  NEURO/PSYCH: Normal gate. Memory intact. Thought and speech pattern appropriate. " Behavior normal. No depression or anxiety noted.    PERTINENT DIAGNOSTIC RESULTS      Lab Results   Component Value Date    WBC 3.14 (L) 10/08/2024    HGB 10.0 (L) 10/08/2024    PLT 62 (L) 10/08/2024     Lab Results   Component Value Date    INR 1.3 10/08/2024     Lab Results   Component Value Date    AST 34 10/07/2024    ALT 22 10/07/2024    BILITOT 1.4 (H) 10/07/2024    ALBUMIN 3.3 (L) 10/07/2024    ALKPHOS 97 10/07/2024    CREATININE 0.70 10/08/2024    BUN 19 10/08/2024     10/08/2024    K 3.7 10/08/2024    AFP 3.3 12/08/2023     CT ABDOMEN PELVIS WITH IV CONTRAST - 10/7/24   CLINICAL HISTORY:RUQ pain;   TECHNIQUE:Low dose axial images, sagittal and coronal reformations were obtained from the lung bases to the pubic symphysis following the IV administration of 80 mL of Omnipaque 350 no oral contrast was given.   Automatic exposure control (AEC) was utilized for dose reduction.   Dose: 601 mGycm   COMPARISON:None.     FINDINGS:  There increased markings in the lung bases felt to be chronic.  The liver appears cirrhotic.  The lesions seen on the ultrasound are not demonstrated with CT.  The spleen is enlarged measuring 15 cm.  There is cholelithiasis present without evidence of acute cholecystitis.  Pancreas appears normal.  The adrenals are not enlarged.  Kidneys appear normal.  Aorta shows calcification without an aneurysm present.  The appendix is not identified.  There is a small amount of free fluid in the pelvis.  There are diverticulum in the colon without evidence of acute diverticulitis.     No distal ureteral lithiasis is demonstrated.     Impression:  Cirrhosis of the liver.   Geetha 0 megaly.   The focal lesions seen on ultrasound are not demonstrated with CT.   Cholelithiasis without evidence of acute cholecystitis.   Diverticulosis without evidence of acute diverticulitis.   Small amount of free fluid in the pelvis    ASSESSMENT        84 y.o. White male with:  1. Cirrhosis w/ portal HTN  --  Etiology: No obvious cause of primary liver disease. ?congestive hepatopathy  -- Liver function well preserved  -- HSM, collaterals + varices on imaging, low plt 50s-60s  -- EGD not indicated since he is on Coreg 6.25mg BID    2. Liver lesion on u/s: not visible on CT 8/2023 or 10/2024  -- AFP normal  -- add'l eval / HCC screening not needed, sulaiman in light of advanced age      PLAN     Today pt and I again discussed his desire to minimize the frequency of his doctor's appts.     Given that liver function is well preserved & that he already follows closely w/ PCP, it is certainly reasonable for routine lab monitoring to be done w/ PCP. I am happy to see him back if his status changes.      Cc: Quynh Alves MD    __________________________________________________________________    Duration of encounter: 28 min  This includes face-to-face time and non face-to-face time preparing to see the patient (eg, review of tests), obtaining and/or reviewing separately obtained history, documenting clinical information in the electronic or other health record, independently interpreting resultsand communicating results to the patient/family/caregiver, or care coordination.

## 2025-01-17 ENCOUNTER — LAB VISIT (OUTPATIENT)
Dept: LAB | Facility: HOSPITAL | Age: 85
End: 2025-01-17
Payer: MEDICARE

## 2025-01-17 ENCOUNTER — TELEPHONE (OUTPATIENT)
Dept: CARDIOLOGY | Facility: CLINIC | Age: 85
End: 2025-01-17

## 2025-01-17 ENCOUNTER — OFFICE VISIT (OUTPATIENT)
Dept: CARDIOLOGY | Facility: CLINIC | Age: 85
End: 2025-01-17
Payer: MEDICARE

## 2025-01-17 VITALS
BODY MASS INDEX: 27.3 KG/M2 | DIASTOLIC BLOOD PRESSURE: 52 MMHG | HEART RATE: 44 BPM | WEIGHT: 190.69 LBS | SYSTOLIC BLOOD PRESSURE: 93 MMHG | HEIGHT: 70 IN

## 2025-01-17 DIAGNOSIS — I48.0 PAF (PAROXYSMAL ATRIAL FIBRILLATION): Primary | ICD-10-CM

## 2025-01-17 DIAGNOSIS — I50.42 CHRONIC COMBINED SYSTOLIC AND DIASTOLIC HEART FAILURE: ICD-10-CM

## 2025-01-17 DIAGNOSIS — Z95.810 ICD (IMPLANTABLE CARDIOVERTER-DEFIBRILLATOR) IN PLACE: Chronic | ICD-10-CM

## 2025-01-17 DIAGNOSIS — I10 ESSENTIAL HYPERTENSION: ICD-10-CM

## 2025-01-17 DIAGNOSIS — I25.5 ISCHEMIC CARDIOMYOPATHY: Chronic | ICD-10-CM

## 2025-01-17 DIAGNOSIS — I65.23 BILATERAL CAROTID ARTERY STENOSIS: ICD-10-CM

## 2025-01-17 DIAGNOSIS — I48.0 PAF (PAROXYSMAL ATRIAL FIBRILLATION): ICD-10-CM

## 2025-01-17 DIAGNOSIS — I25.5 ISCHEMIC CARDIOMYOPATHY: Primary | Chronic | ICD-10-CM

## 2025-01-17 DIAGNOSIS — I25.810 CORONARY ARTERY DISEASE INVOLVING AUTOLOGOUS VEIN CORONARY BYPASS GRAFT WITHOUT ANGINA PECTORIS: ICD-10-CM

## 2025-01-17 DIAGNOSIS — E78.2 MIXED HYPERLIPIDEMIA: ICD-10-CM

## 2025-01-17 DIAGNOSIS — Z95.818 PRESENCE OF WATCHMAN LEFT ATRIAL APPENDAGE CLOSURE DEVICE: ICD-10-CM

## 2025-01-17 DIAGNOSIS — Z95.1 S/P CABG X 4: ICD-10-CM

## 2025-01-17 DIAGNOSIS — I70.0 AORTIC ATHEROSCLEROSIS: ICD-10-CM

## 2025-01-17 PROCEDURE — 93010 ELECTROCARDIOGRAM REPORT: CPT | Mod: S$GLB,,, | Performed by: INTERNAL MEDICINE

## 2025-01-17 PROCEDURE — 1101F PT FALLS ASSESS-DOCD LE1/YR: CPT | Mod: CPTII,S$GLB,,

## 2025-01-17 PROCEDURE — 99214 OFFICE O/P EST MOD 30 MIN: CPT | Mod: S$GLB,,,

## 2025-01-17 PROCEDURE — 36415 COLL VENOUS BLD VENIPUNCTURE: CPT | Mod: PO

## 2025-01-17 PROCEDURE — 83880 ASSAY OF NATRIURETIC PEPTIDE: CPT

## 2025-01-17 PROCEDURE — 99999 PR PBB SHADOW E&M-EST. PATIENT-LVL III: CPT | Mod: PBBFAC,,,

## 2025-01-17 PROCEDURE — 3288F FALL RISK ASSESSMENT DOCD: CPT | Mod: CPTII,S$GLB,,

## 2025-01-17 PROCEDURE — 1126F AMNT PAIN NOTED NONE PRSNT: CPT | Mod: CPTII,S$GLB,,

## 2025-01-17 PROCEDURE — 1159F MED LIST DOCD IN RCRD: CPT | Mod: CPTII,S$GLB,,

## 2025-01-17 PROCEDURE — 93005 ELECTROCARDIOGRAM TRACING: CPT | Mod: PO

## 2025-01-17 NOTE — PROGRESS NOTES
Subjective:    Patient ID:  Ankit Ruff is a 84 y.o. male patient here for evaluation No chief complaint on file.    History of Present Illness:     Ankit Ruff is a 84 y.o. male who follows with Dr. Guerrero and Dr. Wynne here today for follow up. Last seen in clinic 9/2024.  He reports progressive BLE swelling but denies CP, SOB/LINDER, orthopnea, palpitations, dizziness, weakness, fatigue.     At last appointment with Dr. Wynne, he had stopped Multaq due to BLE swelling.  Dr. Wynne resumed the Multaq with instructions to contact him if the swelling returns.  He does not remember these instructions.    During interview, he reports progressive BLE swelling despite doubling his Lasix (prescribed 20 mg daily, taking BID) over the past few weeks.  He reports compliance with salt restriction.      EKG today:  AF with IVCD 134 ms.  HR 45.     Focused Past History includes:  Ischemic cardiomyopathy; AICD  TTE 6/2023:  LVEF 40%  Coronary artery disease, remote PCI/CABG   PAF/flutter   RFA for flutter in 2016   S/p Watchman device June 2024  Last device check 11/2024: 24% AF.  Longest episode 1 day 7 hours.    Mild carotid disease   Hypertension  Hyperlipidemia      Most Recent Echocardiogram Results  Results for orders placed during the hospital encounter of 01/16/24    Transesophageal echo (MERCED) with possible cardioversion    Interpretation Summary    Left Ventricle: There is moderately reduced systolic function with a visually estimated ejection fraction of 30 - 40%.    Right Ventricle: Normal right ventricular cavity size. Right ventricle wall motion  is normal. Pacemaker lead present in the ventricle.    Left Atrium: Left atrium is moderately dilated. There is a closure device within the appendage. The device is a Watchman. There is partial occlusion with a very small residual leak.    Right Atrium: Right atrium is mildly dilated. Lead present in the right atrium.    Aortic Valve: There is moderate aortic  valve sclerosis. Mildly restricted motion.    Mitral Valve: There is mild bileaflet sclerosis. There is normal leaflet mobility. There is mild regurgitation.    Tricuspid Valve: There is mild regurgitation.    Pulmonic Valve: There is mild regurgitation.      Most Recent Nuclear Stress Test Results  No results found for this or any previous visit.      Most Recent Cardiac PET Stress Test Results  No results found for this or any previous visit.      Most Recent Cardiovascular Angiogram results  Results for orders placed during the hospital encounter of 06/30/23    Intra-Procedure Documentation    Conclusion  Intraoperative MERCED performed for pre evaluation, intraprocedural guidance, and post evaluation during Watchman procedure.  No thrombus was appreciated within left atrial appendage or left atrium.  No pericardial effusion was appreciated preprocedure.  Left atrial appendage was measured in four views and measurements were recorded.  Guidance was given through bicaval and short axis views for transseptal puncture to ensure inferoposterior transseptal puncture site.  Catheter was observed crossing the septum and guided into good position into the left atrial appendage.  Device was observed being deployed in good position in the left atrial appendage.  Tug test was observed and deemed to be without significant motion.  Compression measurements were then obtained in four views and recorded.  Color-flow Doppler was used for leak evaluation in four views and no significant leak was appreciated.  The device was then released.  No pericardial effusion was appreciated postprocedure.      Other Most Recent Cardiology Results  Results for orders placed in visit on 11/19/24    Cardiac device check - Remote alert      REVIEW OF SYSTEMS: As noted in HPI   CARDIOVASCULAR: BLE edema. No recent chest pain, palpitations, arm/neck/jaw pain.  RESPIRATORY: No recent fever, cough, SOB.  : No blood in the urine  GI: No reflux,  nausea, vomiting, or blood in stool.   MUSCULOSKELETAL: No falls.   NEURO: No headaches, syncope, or dizziness.  EYES: No sudden changes in vision.     Past Medical History:   Diagnosis Date    Anticoagulant long-term use     Anxiety     Arthritis     At risk for bleeding 06/2023    Atrial fibrillation     CAD (coronary artery disease)     Cataract     OU    CHF (congestive heart failure)     Defibrillator discharge     Hearing loss     Heart failure     Hyperlipidemia     ICD (implantable cardiac defibrillator) in place     Ischemic cardiomyopathy     Myocardial infarction     Sciatica     had seen Dr. 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      Past Surgical History:   Procedure Laterality Date    APPENDECTOMY      CARDIAC SURGERY      cabg    CLOSURE OF LEFT ATRIAL APPENDAGE USING DEVICE Right 06/30/2023    Procedure: Watchman;  Surgeon: Ngoc Ndiaye MD;  Location: [facility] CATH;  Service: Cardiology;  Laterality: Right;    Defibulater  2022    Right chest    ECHOCARDIOGRAM,TRANSESOPHAGEAL N/A 06/30/2023    Procedure: Transesophageal echo (MERCED) intra-procedure log documentation;  Surgeon: Tamir Ritter MD;  Location: [facility] CATH;  Service: Cardiology;  Laterality: N/A;    FRACTURE SURGERY      HERNIA REPAIR      INJECTION OF ANESTHETIC AGENT AROUND NERVE Right 12/21/2018    Procedure: diagnsotic block to the genicular nerve branches to the right knee;  Surgeon: Sj Gonzales MD;  Location: [facility] OR;  Service: Orthopedics;  Laterality: Right;    INTERNAL NEUROLYSIS USING OPERATING MICROSCOPE Right 02/01/2019    Procedure: cooled radiofrequency ablation to the genicular nerve branches of the right knee;  Surgeon: Sj Gonzales MD;  Location: [facility] OR;  Service: Orthopedics;  Laterality: Right;    KNEE ARTHROSCOPY      SKIN BIOPSY      TONSILLECTOMY      TRANSESOPHAGEAL ECHOCARDIOGRAPHY N/A 06/27/2023    Procedure: ECHOCARDIOGRAM, TRANSESOPHAGEAL;  Surgeon: Tamir OVIEDO  MD Stone;  Location: [facility];  Service: Cardiology;  Laterality: N/A;    TRANSESOPHAGEAL ECHOCARDIOGRAPHY N/A 2023    Procedure: ECHOCARDIOGRAM, TRANSESOPHAGEAL;  Surgeon: Romeo Guerrero MD;  Location: [facility];  Service: Cardiology;  Laterality: N/A;    TRANSESOPHAGEAL ECHOCARDIOGRAPHY N/A 2024    Procedure: ECHOCARDIOGRAM, TRANSESOPHAGEAL;  Surgeon: Romeo Guerrero MD;  Location: [facility];  Service: Cardiology;  Laterality: N/A;    TRIGGER FINGER RELEASE Left 2022    Procedure: RELEASE, TRIGGER FINGER;  Surgeon: Bay Romo MD;  Location: [facility];  Service: Orthopedics;  Laterality: Left;  Procedure:  Left ring finger trigger release    Position:  Supine    Anesthesia:  Anesthesia must be involved due to the presence of pacemaker defibrillator.  The patient will undergo local anesthesia only for this procedure    Equipment:  Basic handset     Social History     Tobacco Use    Smoking status: Former     Current packs/day: 0.00     Types: Cigarettes     Quit date: 1977     Years since quittin.4    Smokeless tobacco: Never   Substance Use Topics    Alcohol use: Not Currently     Comment: socially    Drug use: No         Objective      Vitals:    25 0835   BP: (!) 93/52   Pulse: (!) 44       The ASCVD Risk score (Garcia PATEL, et al., 2019) failed to calculate for the following reasons:    The 2019 ASCVD risk score is only valid for ages 40 to 79    Risk score cannot be calculated because patient has a medical history suggesting prior/existing ASCVD      LAST EKG  Results for orders placed or performed during the hospital encounter of 10/07/24   EKG 12-lead    Collection Time: 10/08/24  7:31 AM   Result Value Ref Range    QRS Duration 118 ms    [facility] QTC Calculation 507 ms    Narrative    Test Reason : I48.91,    Vent. Rate : 062 BPM     Atrial Rate : 062 BPM     P-R Int : 200 ms          QRS Dur : 118 ms      QT Int : 500 ms       P-R-T Axes : 090 038 085  degrees     QTc Int : 507 ms    Normal sinus rhythm  Incomplete left bundle branch block  Nonspecific T wave abnormality  Prolonged QT  Abnormal ECG  When compared with ECG of 26-SEP-2024 09:19,  Previous ECG has undetermined rhythm, needs review  Nonspecific T wave abnormality no longer evident in Inferior leads  Confirmed by Donovan Rosario ([id number]) on 10/9/2024 11:11:30 AM    Referred By: [email or web address]   SELF           Confirmed By:Donovan Rosario     LIPIDS - LAST 2   Lab Results   Component Value Date    CHOL 128 05/14/2024    CHOL 139 04/30/2024    HDL 55 05/14/2024    HDL 62 04/30/2024    LDLCALC 62.4 (L) 05/14/2024    LDLCALC 64.8 04/30/2024    TRIG 53 05/14/2024    TRIG 61 04/30/2024    CHOLHDL 43.0 05/14/2024    CHOLHDL 44.6 04/30/2024     CARDIAC PROFILE - LAST 2  Lab Results   Component Value Date     (H) 09/18/2023     (H) 07/25/2022     07/26/2023    TROPONINI <0.012 10/07/2024    TROPONINI <0.012 04/30/2024      CBC - LAST 2  Lab Results   Component Value Date    WBC 3.14 (L) 10/08/2024    WBC 3.52 (L) 10/07/2024    HGB 10.0 (L) 10/08/2024    HGB 10.9 (L) 10/07/2024    HCT 29.0 (L) 10/08/2024    HCT 31.9 (L) 10/07/2024    PLT 62 (L) 10/08/2024    PLT 73 (L) 10/07/2024     Lab Results   Component Value Date    LABPT 16.4 (H) 10/08/2024    LABPT 17.2 (H) 05/01/2024    INR 1.3 10/08/2024    INR 1.4 05/01/2024    APTT 35.5 05/01/2024    APTT 31.7 08/09/2023     CHEMISTRY - LAST 2  Lab Results   Component Value Date     10/08/2024     10/07/2024    K 3.7 10/08/2024    K 3.5 10/07/2024    CO2 26 10/08/2024    CO2 26 10/07/2024    BUN 19 10/08/2024    BUN 22 (H) 10/07/2024    CREATININE 0.70 10/08/2024    CREATININE 0.68 10/07/2024    GLU 91 10/08/2024    GLU 98 10/07/2024    CALCIUM 8.0 (L) 10/08/2024    CALCIUM 8.3 (L) 10/07/2024    MG 2.1 10/08/2024    MG 2.2 04/30/2024    ALBUMIN 3.3 (L) 10/07/2024    ALBUMIN 3.4 (L) 06/11/2024    ALT 22 10/07/2024    ALT 23 06/11/2024     AST 34 10/07/2024    AST 34 06/11/2024      ENDOCRINE - LAST 2  Lab Results   Component Value Date    HGBA1C 5.2 04/30/2024    TSH 2.531 05/14/2024    TSH 2.060 04/30/2024        PHYSICAL EXAM  CONSTITUTIONAL: Well built, well nourished in no apparent distress  NECK: no carotid bruit, no JVD  LUNGS: CTA  CHEST WALL: no tenderness  HEART: Bradycardic irregular rhythm, S1, S2 normal, no murmur, click, rub or gallop   ABDOMEN: soft, non-tender; bowel sounds normal; no masses,  no organomegaly  EXTREMITIES: +1 pitting BLE edema up to knee  NEURO: AAO X 3    I HAVE REVIEWED :    The vital signs, most recent cardiac testing, and most recent pertinent non-cardiology provider notes.    Current Outpatient Medications   Medication Instructions    aspirin (ECOTRIN) 81 mg, Nightly    carvediloL (COREG) 6.25 mg, Oral, 2 times daily    furosemide (LASIX) 20-40 mg, Oral, Daily PRN    magnesium 250 mg, Daily    meloxicam (MOBIC) 15 mg, Oral, Daily PRN    MULTAQ 400 mg, Oral, 2 times daily    multivitamin capsule 1 capsule, Daily    pantoprazole (PROTONIX) 40 mg, Oral, Daily    potassium citrate 99 mg, Nightly    rosuvastatin (CRESTOR) 10 mg, Oral, Nightly    tamsulosin (FLOMAX) 0.4 mg, Oral, Nightly        Assessment & Plan   1. Ischemic cardiomyopathy (Primary)      2. Chronic combined systolic and diastolic heart failure      3. Coronary artery disease involving autologous vein coronary bypass graft without angina pectoris      4. S/P CABG x 4      5. ICD (implantable cardioverter-defibrillator) in place      6. PAF (paroxysmal atrial fibrillation)      7. Presence of Watchman left atrial appendage closure device      8. Essential hypertension      9. Mixed hyperlipidemia      10. Bilateral carotid artery stenosis      11. Aortic atherosclerosis         PLAN:     - Stop dronedarone   - NT-proBNP today  - Echo  - MERCED/DCCV   - Start apixaban 5 mg BID, first dose 1 day before MERCED/DCCV. Continue for 30 days then stop.     Continue  aspirin 81 mg daily  Continue rosuvastatin 10 mg daily  Continue carvedilol 6.25 mg BID  Continue furosemide 20 mg daily/PRN    Emphasized the importance of modifying lifestyle related risk factors including tobacco avoidance, limiting alcohol intake, aerobic exercise, weight management and Mediterranean diet.      Follow up after MERCED/DCCV    Corey Darvill, NP [facility]   Office: [phone]

## 2025-01-17 NOTE — PATIENT INSTRUCTIONS
Cardioversion/MERCED    Procedure date: 02/03/25  Procedure time: 7:00 am  Arrival time: 5:30 am     Arrive for your procedure at:  [facility] [place], check in at building number 2, 1st floor       FASTING:  You MAY NOT have anything to eat AFTER MIDNIGHT. YOU MAY continue with CLEAR LIQUIDS ONLY (water, clear juice, sprite/7up, Gatorade)  up to 2 hours before your procedure.  If your procedure is scheduled in the afternoon, you may have a LIGHT BREAKFAST BEFORE 6:00 A.M.  For example: Two slices of toast; black coffee or black tea.    MEDICATIONS:  You may take your regular morning medications with a small sip of water.      Hold or adjust the following:                -Fluid pills. Hold morning of procedure    -Diabetes medications - Hold morning of procedure.     -Medications such as Wegovy, Ozempic, Mounjaro (hold 1 week prior if taking for weight loss and 1 day prior if taking daily for diabetes management)    Continue: Eliquis, Xarelto, Coumadin, Plavix, Effient, Aspirin and other anti-coagulants/blood thinners PLEASE TAKE MORNING OF THE PROCEDURE    Please refer to pre-op instructions received from the surgery center. YOU WILL NEED SOMEONE TO DRIVE YOU HOME.

## 2025-01-21 LAB — NT-PROBNP SERPL IA-MCNC: 1329 PG/ML

## 2025-01-22 ENCOUNTER — PATIENT MESSAGE (OUTPATIENT)
Dept: CARDIOLOGY | Facility: CLINIC | Age: 85
End: 2025-01-22
Payer: MEDICARE

## 2025-01-24 LAB
OHS QRS DURATION: 134 MS
OHS QTC CALCULATION: 598 MS

## 2025-01-30 ENCOUNTER — HOSPITAL ENCOUNTER (OUTPATIENT)
Dept: CARDIOLOGY | Facility: HOSPITAL | Age: 85
Discharge: HOME | End: 2025-01-30
Payer: MEDICARE

## 2025-01-30 VITALS — BODY MASS INDEX: 27.2 KG/M2 | HEIGHT: 70 IN | WEIGHT: 190 LBS

## 2025-01-30 DIAGNOSIS — I25.5 ISCHEMIC CARDIOMYOPATHY: Chronic | ICD-10-CM

## 2025-01-30 DIAGNOSIS — I50.42 CHRONIC COMBINED SYSTOLIC AND DIASTOLIC HEART FAILURE: ICD-10-CM

## 2025-01-30 LAB
ASCENDING AORTA: 3.81 CM
AV INDEX (PROSTH): 0.35
AV MEAN GRADIENT: 10 MMHG
AV PEAK GRADIENT: 18 MMHG
AV VALVE AREA BY VELOCITY RATIO: 1.6 CM²
AV VALVE AREA: 1.4 CM²
AV VELOCITY RATIO: 0.38
BSA FOR ECHO PROCEDURE: 2.06 M2
CV ECHO LV RWT: 0.29 CM
DOP CALC AO PEAK VEL: 2.1 M/S
DOP CALC AO VTI: 57.6 CM
DOP CALC LVOT AREA: 4.2 CM2
DOP CALC LVOT DIAMETER: 2.3 CM
DOP CALC LVOT PEAK VEL: 0.8 M/S
DOP CALC LVOT STROKE VOLUME: 83.1 CM3
DOP CALCLVOT PEAK VEL VTI: 20 CM
E/E' RATIO: 18 M/S
ECHO LV POSTERIOR WALL: 1 CM (ref 0.6–1.1)
FRACTIONAL SHORTENING: 13.2 % (ref 28–44)
INTERVENTRICULAR SEPTUM: 1 CM (ref 0.6–1.1)
IVRT: 101 MSEC
LEFT ATRIUM AREA SYSTOLIC (APICAL 2 CHAMBER): 35.38 CM2
LEFT ATRIUM AREA SYSTOLIC (APICAL 4 CHAMBER): 33.16 CM2
LEFT ATRIUM SIZE: 5.4 CM
LEFT ATRIUM VOLUME INDEX MOD: 63 ML/M2
LEFT ATRIUM VOLUME MOD: 129 ML
LEFT INTERNAL DIMENSION IN SYSTOLE: 5.9 CM (ref 2.1–4)
LEFT VENTRICLE DIASTOLIC VOLUME INDEX: 117 ML/M2
LEFT VENTRICLE DIASTOLIC VOLUME: 238.69 ML
LEFT VENTRICLE END SYSTOLIC VOLUME APICAL 2 CHAMBER: 140.78 ML
LEFT VENTRICLE END SYSTOLIC VOLUME APICAL 4 CHAMBER: 111.19 ML
LEFT VENTRICLE MASS INDEX: 150 G/M2
LEFT VENTRICLE SYSTOLIC VOLUME INDEX: 83.4 ML/M2
LEFT VENTRICLE SYSTOLIC VOLUME: 170.08 ML
LEFT VENTRICULAR INTERNAL DIMENSION IN DIASTOLE: 6.8 CM (ref 3.5–6)
LEFT VENTRICULAR MASS: 306 G
LV LATERAL E/E' RATIO: 14.2 M/S
LV SEPTAL E/E' RATIO: 25.6 M/S
LVED V (TEICH): 238.69 ML
LVES V (TEICH): 170.08 ML
LVOT MG: 1.39 MMHG
LVOT MV: 0.56 CM/S
MV PEAK E VEL: 1.28 M/S
OHS CV RV/LV RATIO: 0.88 CM
PISA MRMAX VEL: 4.73 M/S
PISA TR MAX VEL: 3.1 M/S
RA PRESSURE ESTIMATED: 3 MMHG
RA VOL SYS: 84.45 ML
RIGHT ATRIAL AREA: 24.3 CM2
RIGHT ATRIUM VOLUME AREA LENGTH APICAL 4 CHAMBER: 77.82 ML
RIGHT VENTRICLE DIASTOLIC BASEL DIMENSION: 6 CM
RIGHT VENTRICLE DIASTOLIC LENGTH: 9.1 CM
RIGHT VENTRICLE DIASTOLIC MID DIMENSION: 3.5 CM
RIGHT VENTRICULAR END-DIASTOLIC DIMENSION: 5.97 CM
RIGHT VENTRICULAR LENGTH IN DIASTOLE (APICAL 4-CHAMBER VIEW): 9.07 CM
RV MID DIAMA: 3.51 CM
RV TB RVSP: 6 MMHG
RV TISSUE DOPPLER FREE WALL SYSTOLIC VELOCITY 1 (APICAL 4 CHAMBER VIEW): 13.94 CM/S
SINUS: 4 CM
STJ: 3.54 CM
TDI LATERAL: 0.09 M/S
TDI SEPTAL: 0.05 M/S
TDI: 0.07 M/S
TR MAX PG: 38 MMHG
TRICUSPID ANNULAR PLANE SYSTOLIC EXCURSION: 1.62 CM
TV REST PULMONARY ARTERY PRESSURE: 41 MMHG
Z-SCORE OF LEFT VENTRICULAR DIMENSION IN END DIASTOLE: 1.12
Z-SCORE OF LEFT VENTRICULAR DIMENSION IN END SYSTOLE: 3.63

## 2025-01-30 PROCEDURE — 93306 TTE W/DOPPLER COMPLETE: CPT | Mod: 26,,, | Performed by: INTERNAL MEDICINE

## 2025-01-30 PROCEDURE — 93306 TTE W/DOPPLER COMPLETE: CPT | Mod: PO

## 2025-01-31 ENCOUNTER — ANESTHESIA EVENT (OUTPATIENT)
Dept: ENDOSCOPY | Facility: HOSPITAL | Age: 85
End: 2025-01-31
Payer: MEDICARE

## 2025-02-03 ENCOUNTER — HOSPITAL ENCOUNTER (OUTPATIENT)
Facility: HOSPITAL | Age: 85
Discharge: HOME | End: 2025-02-03
Attending: INTERNAL MEDICINE | Admitting: INTERNAL MEDICINE
Payer: MEDICARE

## 2025-02-03 ENCOUNTER — ANESTHESIA (OUTPATIENT)
Dept: ENDOSCOPY | Facility: HOSPITAL | Age: 85
End: 2025-02-03
Payer: MEDICARE

## 2025-02-03 ENCOUNTER — HOSPITAL ENCOUNTER (OUTPATIENT)
Dept: CARDIOLOGY | Facility: HOSPITAL | Age: 85
Discharge: HOME | End: 2025-02-03
Attending: INTERNAL MEDICINE | Admitting: INTERNAL MEDICINE
Payer: MEDICARE

## 2025-02-03 DIAGNOSIS — I48.0 PAF (PAROXYSMAL ATRIAL FIBRILLATION): ICD-10-CM

## 2025-02-03 DIAGNOSIS — I48.0 PAROXYSMAL ATRIAL FIBRILLATION: ICD-10-CM

## 2025-02-03 LAB
OHS QRS DURATION: 118 MS
OHS QRS DURATION: 120 MS
OHS QTC CALCULATION: 446 MS
OHS QTC CALCULATION: 460 MS

## 2025-02-03 PROCEDURE — D9220A PRA ANESTHESIA: Mod: GZ,ANES,, | Performed by: ANESTHESIOLOGY

## 2025-02-03 PROCEDURE — 93010 ELECTROCARDIOGRAM REPORT: CPT | Mod: ,,, | Performed by: INTERNAL MEDICINE

## 2025-02-03 PROCEDURE — 93320 DOPPLER ECHO COMPLETE: CPT

## 2025-02-03 PROCEDURE — 37000008 HC ANESTHESIA 1ST 15 MINUTES: Mod: PO | Performed by: INTERNAL MEDICINE

## 2025-02-03 PROCEDURE — 63600175 PHARM REV CODE 636 W HCPCS: Mod: PO | Performed by: NURSE ANESTHETIST, CERTIFIED REGISTERED

## 2025-02-03 PROCEDURE — 37000009 HC ANESTHESIA EA ADD 15 MINS: Mod: PO | Performed by: INTERNAL MEDICINE

## 2025-02-03 PROCEDURE — 93005 ELECTROCARDIOGRAM TRACING: CPT | Mod: PO

## 2025-02-03 PROCEDURE — 93312 ECHO TRANSESOPHAGEAL: CPT | Mod: 26,,, | Performed by: INTERNAL MEDICINE

## 2025-02-03 PROCEDURE — 93325 DOPPLER ECHO COLOR FLOW MAPG: CPT

## 2025-02-03 PROCEDURE — 63600175 PHARM REV CODE 636 W HCPCS: Mod: PO | Performed by: ANESTHESIOLOGY

## 2025-02-03 PROCEDURE — 92960 CARDIOVERSION ELECTRIC EXT: CPT | Mod: ,,, | Performed by: INTERNAL MEDICINE

## 2025-02-03 PROCEDURE — C8925 2D TEE W OR W/O FOL W/CON,IN: HCPCS | Mod: PO

## 2025-02-03 PROCEDURE — D9220A PRA ANESTHESIA: Mod: GZ,CRNA,, | Performed by: NURSE ANESTHETIST, CERTIFIED REGISTERED

## 2025-02-03 RX ORDER — HYDROMORPHONE HYDROCHLORIDE 2 MG/ML
0.2 INJECTION, SOLUTION INTRAMUSCULAR; INTRAVENOUS; SUBCUTANEOUS EVERY 5 MIN PRN
Status: DISCONTINUED | OUTPATIENT
Start: 2025-02-03 | End: 2025-02-03 | Stop reason: HOSPADM

## 2025-02-03 RX ORDER — GLUCAGON 1 MG
1 KIT INJECTION
Status: DISCONTINUED | OUTPATIENT
Start: 2025-02-03 | End: 2025-02-03 | Stop reason: HOSPADM

## 2025-02-03 RX ORDER — LIDOCAINE HYDROCHLORIDE 20 MG/ML
INJECTION INTRAVENOUS
Status: DISCONTINUED | OUTPATIENT
Start: 2025-02-03 | End: 2025-02-03

## 2025-02-03 RX ORDER — LIDOCAINE HYDROCHLORIDE 10 MG/ML
1 INJECTION, SOLUTION EPIDURAL; INFILTRATION; INTRACAUDAL; PERINEURAL ONCE
Status: DISCONTINUED | OUTPATIENT
Start: 2025-02-03 | End: 2025-02-03 | Stop reason: HOSPADM

## 2025-02-03 RX ORDER — SODIUM CHLORIDE 0.9 % (FLUSH) 0.9 %
10 SYRINGE (ML) INJECTION ONCE
Status: DISCONTINUED | OUTPATIENT
Start: 2025-02-03 | End: 2025-02-03 | Stop reason: HOSPADM

## 2025-02-03 RX ORDER — OXYCODONE HYDROCHLORIDE 5 MG/1
5 TABLET ORAL
Status: DISCONTINUED | OUTPATIENT
Start: 2025-02-03 | End: 2025-02-03 | Stop reason: HOSPADM

## 2025-02-03 RX ORDER — SODIUM CHLORIDE, SODIUM LACTATE, POTASSIUM CHLORIDE, CALCIUM CHLORIDE 600; 310; 30; 20 MG/100ML; MG/100ML; MG/100ML; MG/100ML
INJECTION, SOLUTION INTRAVENOUS CONTINUOUS
Status: DISCONTINUED | OUTPATIENT
Start: 2025-02-03 | End: 2025-02-03 | Stop reason: HOSPADM

## 2025-02-03 RX ORDER — PROPOFOL 10 MG/ML
VIAL (ML) INTRAVENOUS
Status: DISCONTINUED | OUTPATIENT
Start: 2025-02-03 | End: 2025-02-03

## 2025-02-03 RX ORDER — PROCHLORPERAZINE EDISYLATE 5 MG/ML
5 INJECTION INTRAMUSCULAR; INTRAVENOUS EVERY 4 HOURS PRN
Status: DISCONTINUED | OUTPATIENT
Start: 2025-02-03 | End: 2025-02-03 | Stop reason: HOSPADM

## 2025-02-03 RX ORDER — DIPHENHYDRAMINE HYDROCHLORIDE 50 MG/ML
25 INJECTION INTRAMUSCULAR; INTRAVENOUS EVERY 6 HOURS PRN
Status: DISCONTINUED | OUTPATIENT
Start: 2025-02-03 | End: 2025-02-03 | Stop reason: HOSPADM

## 2025-02-03 RX ORDER — AMIODARONE HYDROCHLORIDE 200 MG/1
200 TABLET ORAL DAILY
Qty: 90 TABLET | Refills: 3 | Status: SHIPPED | OUTPATIENT
Start: 2025-03-03 | End: 2026-03-03

## 2025-02-03 RX ORDER — FENTANYL CITRATE 50 UG/ML
25 INJECTION, SOLUTION INTRAMUSCULAR; INTRAVENOUS EVERY 5 MIN PRN
Status: DISCONTINUED | OUTPATIENT
Start: 2025-02-03 | End: 2025-02-03 | Stop reason: HOSPADM

## 2025-02-03 RX ORDER — AMIODARONE HYDROCHLORIDE 200 MG/1
200 TABLET ORAL 2 TIMES DAILY
Qty: 56 TABLET | Refills: 0 | Status: SHIPPED | OUTPATIENT
Start: 2025-02-03 | End: 2025-03-03

## 2025-02-03 RX ADMIN — SODIUM CHLORIDE, POTASSIUM CHLORIDE, SODIUM LACTATE AND CALCIUM CHLORIDE: 600; 310; 30; 20 INJECTION, SOLUTION INTRAVENOUS at 06:02

## 2025-02-03 RX ADMIN — PROPOFOL 50 MG: 10 INJECTION, EMULSION INTRAVENOUS at 07:02

## 2025-02-03 RX ADMIN — PROPOFOL 100 MG: 10 INJECTION, EMULSION INTRAVENOUS at 07:02

## 2025-02-03 RX ADMIN — LIDOCAINE HYDROCHLORIDE 75 MG: 20 INJECTION INTRAVENOUS at 07:02

## 2025-02-03 RX ADMIN — PROPOFOL 25 MG: 10 INJECTION, EMULSION INTRAVENOUS at 07:02

## 2025-02-03 NOTE — ANESTHESIA POSTPROCEDURE EVALUATION
Anesthesia Post Evaluation    Patient: Ankit Ruff    Procedure(s) Performed: Procedure(s) (LRB):  Cardioversion or Defibrillation (N/A)  ECHOCARDIOGRAM,TRANSESOPHAGEAL (N/A)    Final Anesthesia Type: MAC      Patient location during evaluation: PACU  Patient participation: Yes- Able to Participate  Level of consciousness: awake and alert  Post-procedure vital signs: reviewed and stable  Pain management: adequate  Airway patency: patent    PONV status at discharge: No PONV  Anesthetic complications: no      Cardiovascular status: blood pressure returned to baseline  Respiratory status: unassisted  Hydration status: euvolemic  Follow-up not needed.              Vitals Value Taken Time   /59 02/03/25 0740   Temp 36.1 °C (97 °F) 02/03/25 0718   Pulse 51 02/03/25 0740   Resp 18 02/03/25 0740   SpO2 98 % 02/03/25 0740         Event Time   Out of Recovery 07:48:00         Pain/Andrés Score: Andrés Score: 10 (2/3/2025  7:36 AM)

## 2025-02-03 NOTE — ANESTHESIA PREPROCEDURE EVALUATION
02/03/2025  Ankit Ruff is a 84 y.o., male.      Pre-op Assessment    I have reviewed the Patient Summary Reports.     I have reviewed the Nursing Notes. I have reviewed the NPO Status.   I have reviewed the Medications.     Review of Systems  Anesthesia Hx:  No problems with previous Anesthesia             Denies Family Hx of Anesthesia complications.    Denies Personal Hx of Anesthesia complications.                    Social:  Former Smoker Quit '77      Hematology/Oncology:                   Hematology Comments: Chronic thrombocytopenia  Plts 49                    Cardiovascular:  Exercise tolerance: poor  Pacemaker (AICD 2010) Hypertension  Past MI CAD   CABG/stent Dysrhythmias atrial fibrillation  CHF    hyperlipidemia   ECG has been reviewed. Afib s/p Watchman 6/2023    EF - 35%  Mild AS    Ischemic cardiomyopathy s/p AICD  PFO    KAREN 1.9; gradient 8 '22  Pulm HTN        Coronary Artery Disease:          Hx of Myocardial Infarction     Congestive Heart Failure (CHF)                Hypertension     Atrial Fibrillation     Hepatic/GI:      Denies GERD. Liver Disease,            Liver Disease        Musculoskeletal:  Arthritis        Arthritis     Spine Disorders: cervical and lumbar Disc disease and Degenerative disease           Neurological:    Neuromuscular Disease,           Arthritis                         Neuromuscular Disease   Dermatological:  SCC   Psych:   anxiety                 Physical Exam  General: Well nourished, Cooperative, Alert and Oriented    Airway:  Mallampati: III   Mouth Opening: Normal  TM Distance: Normal  Tongue: Normal  Neck ROM: Extension Decreased, Flexion Decreased    Dental:  Dentures        Anesthesia Plan  Type of Anesthesia, risks & benefits discussed:    Anesthesia Type: MAC  Intra-op Monitoring Plan: Standard ASA Monitors  Induction:  IV  Informed Consent:  Informed consent signed with the Patient and all parties understand the risks and agree with anesthesia plan.  All questions answered.   ASA Score: 4  Day of Surgery Review of History & Physical: H&P Update referred to the surgeon/provider.    Ready For Surgery From Anesthesia Perspective.     .

## 2025-02-03 NOTE — TRANSFER OF CARE
"Anesthesia Transfer of Care Note    Patient: Ankit Ruff    Procedure(s) Performed: Procedure(s) (LRB):  Cardioversion or Defibrillation (N/A)  ECHOCARDIOGRAM,TRANSESOPHAGEAL (N/A)    Patient location: PACU    Anesthesia Type: MAC    Transport from OR: Transported from OR on room air with adequate spontaneous ventilation    Post pain: adequate analgesia    Post assessment: no apparent anesthetic complications and tolerated procedure well    Post vital signs: stable    Level of consciousness: awake and alert    Nausea/Vomiting: no nausea/vomiting    Complications: none    Transfer of care protocol was followed      Last vitals: Visit Vitals  BP (!) 140/67   Pulse (!) 56   Temp 36.3 °C (97.3 °F)   Resp 18   Ht 5' 10" (1.778 m)   Wt 86.2 kg (190 lb)   SpO2 99%   BMI 27.26 kg/m²     "

## 2025-02-03 NOTE — DISCHARGE INSTRUCTIONS
Start taking amiodarone 200 mg twice per day for 28 days, which is 3/3/25, then transition to amiodarone 200 mg daily (on 3/4/25) thereafter  DO NOT taking Multaq  Continue taking Eliquis 5 mg twice per day for 28 days. After 28 days, STOP Eliquis (3/3/25) and continue aspirin 81 mg daily  Follow up is scheduled with me on 3/27/25 at 9 a.m.

## 2025-02-03 NOTE — DISCHARGE SUMMARY
[facility]  Cardiology  Discharge Summary      Patient Name: Ankit Ruff  MRN: [id number]  Admission Date: 2/3/2025  Hospital Length of Stay: 0 days  Discharge Date and Time:  02/03/25 at 0900  Attending Physician: Ken Wynne MD  Discharging Provider: Ken Wynne MD  Primary Care Physician: Quynh Alves MD    Hospital Course:   s/p successful MERCED/DCCV.  If symptomatic improvement, would consider PVI in the setting of reduced EF. Of note, if bradycardia is limited antiarrhythmic drug use, the patient may end up needing an upgrade with an atrial lead. Follow up scheduled 3/27/25 at 9AM.      Medication recommendations:  Continue Eliquis 5 mg BID x 30 days post-CV. After 30 days, the patient was instructed to stop Eliquis and resume aspirin 81 mg daily indefinitely.       HPI: patient presented for MERCED/DCCV for persistent atrial fibrillation. ECG shows     Procedure(s) (LRB):  Cardioversion or Defibrillation (N/A)  ECHOCARDIOGRAM,TRANSESOPHAGEAL (N/A)     Indwelling Lines/Drains at time of discharge:  Lines/Drains/Airways       None                     Significant Diagnostic Studies: Labs: All labs within the past 24 hours have been reviewed    Pending Diagnostic Studies:       Procedure Component Value Units Date/Time    EKG 12-lead [[id number]]     Order Status: Sent Lab Status: No result             There are no hospital problems to display for this patient.      Discharged Condition: stable    Follow Up:    Medications:  Reconciled Home Medications:      Medication List        START taking these medications      * amiodarone 200 MG Tab  Commonly known as: PACERONE  Take 1 tablet (200 mg total) by mouth 2 (two) times daily.     * amiodarone 200 MG Tab  Commonly known as: PACERONE  Take 1 tablet (200 mg total) by mouth once daily.  Start taking on: March 3, 2025           * This list has 2 medication(s) that are the same as other medications prescribed for you. Read the directions  carefully, and ask your doctor or other care provider to review them with you.                CONTINUE taking these medications      apixaban 5 mg Tab  Commonly known as: ELIQUIS  Take 1 tablet (5 mg total) by mouth 2 (two) times daily.     aspirin 81 MG EC tablet  Commonly known as: ECOTRIN  Take 81 mg by mouth every evening.     carvediloL 6.25 MG tablet  Commonly known as: COREG  Take 1 tablet (6.25 mg total) by mouth 2 (two) times daily.     furosemide 20 MG tablet  Commonly known as: LASIX  Take 1-2 tablets (20-40 mg total) by mouth daily as needed (swelling).     magnesium 250 mg Tab  Take 250 mg by mouth once daily.     meloxicam 15 MG tablet  Commonly known as: MOBIC  Take 1 tablet (15 mg total) by mouth daily as needed for Pain.     multivitamin capsule  Take 1 capsule by mouth once daily.     pantoprazole 40 MG tablet  Commonly known as: PROTONIX  Take 1 tablet (40 mg total) by mouth once daily.     potassium citrate 99 mg Cap  Take 99 mg by mouth every evening.     rosuvastatin 10 MG tablet  Commonly known as: CRESTOR  Take 1 tablet (10 mg total) by mouth every evening.     tamsulosin 0.4 mg Cap  Commonly known as: FLOMAX  TAKE 1 CAPSULE EVERY EVENING              Time spent on the discharge of patient: >30 minutes    Ken Wynne MD  Cardiology  [facility]

## 2025-02-03 NOTE — H&P
[facility]  Cardiology  History and Physical     Patient Name: Ankit Ruff  MRN: [id number]  Admission Date: 2/3/2025  Code Status: Full Code   Attending Provider: Ken Wynne MD   Primary Care Physician: Quynh Alves MD  Principal Problem:<principal problem not specified>    Patient information was obtained from patient.     Subjective:     Chief Complaint:  AF     HPI: patient here for MERCED/DCCV. Took OAC this AM. ECG shows AF.    Past Medical History:   Diagnosis Date    Anticoagulant long-term use     Anxiety     Arthritis     At risk for bleeding 06/2023    Atrial fibrillation     CAD (coronary artery disease)     Cataract     OU    CHF (congestive heart failure)     Defibrillator discharge     Hearing loss     Heart failure     Hyperlipidemia     ICD (implantable cardiac defibrillator) in place     Ischemic cardiomyopathy     Myocardial infarction     Sciatica     had seen Dr. 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        Past Surgical History:   Procedure Laterality Date    APPENDECTOMY      CARDIAC SURGERY      cabg    CLOSURE OF LEFT ATRIAL APPENDAGE USING DEVICE Right 06/30/2023    Procedure: Watchman;  Surgeon: Ngoc Ndiaye MD;  Location: [facility];  Service: Cardiology;  Laterality: Right;    Defibulater  2022    Right chest    ECHOCARDIOGRAM,TRANSESOPHAGEAL N/A 06/30/2023    Procedure: Transesophageal echo (MERCED) intra-procedure log documentation;  Surgeon: Tamir Ritter MD;  Location: [facility];  Service: Cardiology;  Laterality: N/A;    FRACTURE SURGERY      HERNIA REPAIR      INJECTION OF ANESTHETIC AGENT AROUND NERVE Right 12/21/2018    Procedure: diagnsotic block to the genicular nerve branches to the right knee;  Surgeon: Sj Gonzales MD;  Location: [facility];  Service: Orthopedics;  Laterality: Right;    INTERNAL NEUROLYSIS USING OPERATING MICROSCOPE Right 02/01/2019    Procedure: cooled radiofrequency ablation to the genicular  nerve branches of the right knee;  Surgeon: Sj Gonzales MD;  Location: [facility];  Service: Orthopedics;  Laterality: Right;    KNEE ARTHROSCOPY      SKIN BIOPSY      TONSILLECTOMY      TRANSESOPHAGEAL ECHOCARDIOGRAPHY N/A 06/27/2023    Procedure: ECHOCARDIOGRAM, TRANSESOPHAGEAL;  Surgeon: Tamir Ritter MD;  Location: [facility];  Service: Cardiology;  Laterality: N/A;    TRANSESOPHAGEAL ECHOCARDIOGRAPHY N/A 08/07/2023    Procedure: ECHOCARDIOGRAM, TRANSESOPHAGEAL;  Surgeon: Romeo Guerrero MD;  Location: [facility];  Service: Cardiology;  Laterality: N/A;    TRANSESOPHAGEAL ECHOCARDIOGRAPHY N/A 1/16/2024    Procedure: ECHOCARDIOGRAM, TRANSESOPHAGEAL;  Surgeon: Romeo Guerrero MD;  Location: [facility];  Service: Cardiology;  Laterality: N/A;    TRIGGER FINGER RELEASE Left 02/03/2022    Procedure: RELEASE, TRIGGER FINGER;  Surgeon: Bay Romo MD;  Location: [facility];  Service: Orthopedics;  Laterality: Left;  Procedure:  Left ring finger trigger release    Position:  Supine    Anesthesia:  Anesthesia must be involved due to the presence of pacemaker defibrillator.  The patient will undergo local anesthesia only for this procedure    Equipment:  Basic handset       Review of patient's allergies indicates:   Allergen Reactions    Multaq [dronedarone] Itching and Swelling    Penicillins Rash    Entresto [sacubitril-valsartan] Other (See Comments)     Hypotension       No current facility-administered medications on file prior to encounter.     Current Outpatient Medications on File Prior to Encounter   Medication Sig    apixaban (ELIQUIS) 5 mg Tab Take 1 tablet (5 mg total) by mouth 2 (two) times daily.    aspirin (ECOTRIN) 81 MG EC tablet Take 81 mg by mouth every evening.    carvediloL (COREG) 6.25 MG tablet Take 1 tablet (6.25 mg total) by mouth 2 (two) times daily.    furosemide (LASIX) 20 MG tablet Take 1-2 tablets (20-40 mg total) by mouth daily as needed (swelling).     magnesium 250 mg Tab Take 250 mg by mouth once daily.    meloxicam (MOBIC) 15 MG tablet Take 1 tablet (15 mg total) by mouth daily as needed for Pain.    multivitamin capsule Take 1 capsule by mouth once daily.    pantoprazole (PROTONIX) 40 MG tablet Take 1 tablet (40 mg total) by mouth once daily.    potassium citrate 99 mg Cap Take 99 mg by mouth every evening.    rosuvastatin (CRESTOR) 10 MG tablet Take 1 tablet (10 mg total) by mouth every evening.    tamsulosin (FLOMAX) 0.4 mg Cap TAKE 1 CAPSULE EVERY EVENING     Family History    None       Tobacco Use    Smoking status: Former     Current packs/day: 0.00     Types: Cigarettes     Quit date: 1977     Years since quittin.5    Smokeless tobacco: Never   Substance and Sexual Activity    Alcohol use: Yes     Comment: socially    Drug use: No    Sexual activity: Not on file     ROS  Objective:     Vital Signs (Most Recent):  Temp: 97.3 °F (36.3 °C) (25)  Pulse: (!) 56 (25)  Resp: 18 (25)  BP: (!) 140/67 (25)  SpO2: 99 % (25) Vital Signs (24h Range):  Temp:  [97.3 °F (36.3 °C)] 97.3 °F (36.3 °C)  Pulse:  [56] 56  Resp:  [18] 18  SpO2:  [99 %] 99 %  BP: (140)/(67) 140/67     Weight: 86.2 kg (190 lb)  Body mass index is 27.26 kg/m².    SpO2: 99 %       No intake or output data in the 24 hours ending 25 0639    Lines/Drains/Airways       Peripheral Intravenous Line  Duration                  Peripheral IV - Single Lumen 25 0632 20 G Left;Posterior Hand <1 day                    Physical Exam  General: NAD. AAO  HENT: EOMI  Neck: supple. No JVD  CV: irregularly irregular. Normal S1/S2. 2+ radial pulses  Resp: CTAB. Bilateral chest wall expansion. No increased work of breathing  Ext: warm. No edema.       Significant Labs: All pertinent lab results from the last 24 hours have been reviewed.    Significant Imaging:  Reviewed  Assessment and Plan:   Atrial fibrillation    The risks, benefits and  alternatives of transesophageal echocardiogram and cardioversion (MERCED/DCCV) were explained to the patient, patient's family and/or surrogate decision maker.   No absolute contraindications of esophageal stricture, tumor, perforation, laceration,or diverticulum and/or active GI bleed.  The risks include (but are not limited to) trauma to oral structures, esophageal trauma/perforation, bleeding, laryngospasm/brochospasm, infection, aspiration, sore throat/hoarseness, & dislodgement of the endotracheal tube/nasogastric tube (where applicable), tachy-/bradyarrhythmias, skin burns/pain, CVA, MI, and death.  Informed consent was obtained. The patient is agreeable to proceed with the procedure and all questions and concerns addressed. Consents signed.    VTE Risk Mitigation (From admission, onward)           Ordered     IP VTE HIGH RISK PATIENT  Once         02/03/25 0613                    Ken Wynne MD  Cardiology   [place] - End[facility]

## 2025-02-04 VITALS
HEIGHT: 70 IN | OXYGEN SATURATION: 98 % | SYSTOLIC BLOOD PRESSURE: 125 MMHG | HEART RATE: 51 BPM | WEIGHT: 190 LBS | BODY MASS INDEX: 27.2 KG/M2 | RESPIRATION RATE: 18 BRPM | DIASTOLIC BLOOD PRESSURE: 59 MMHG | TEMPERATURE: 97 F

## 2025-02-13 ENCOUNTER — LAB VISIT (OUTPATIENT)
Dept: LAB | Facility: HOSPITAL | Age: 85
End: 2025-02-13
Attending: INTERNAL MEDICINE
Payer: MEDICARE

## 2025-02-13 DIAGNOSIS — I10 ESSENTIAL HYPERTENSION: ICD-10-CM

## 2025-02-13 DIAGNOSIS — K74.60 HEPATIC CIRRHOSIS, UNSPECIFIED HEPATIC CIRRHOSIS TYPE, UNSPECIFIED WHETHER ASCITES PRESENT: ICD-10-CM

## 2025-02-13 LAB
ALBUMIN SERPL BCP-MCNC: 3.4 G/DL (ref 3.5–5.2)
ALP SERPL-CCNC: 81 U/L (ref 40–150)
ALT SERPL W/O P-5'-P-CCNC: 14 U/L (ref 10–44)
AST SERPL-CCNC: 29 U/L (ref 10–40)
BILIRUB DIRECT SERPL-MCNC: 0.6 MG/DL (ref 0.1–0.3)
BILIRUB SERPL-MCNC: 1.4 MG/DL (ref 0.1–1)
PROT SERPL-MCNC: 6.4 G/DL (ref 6–8.4)
T4 FREE SERPL-MCNC: 0.89 NG/DL (ref 0.71–1.51)
TSH SERPL DL<=0.005 MIU/L-ACNC: 4.8 UIU/ML (ref 0.4–4)

## 2025-02-13 PROCEDURE — 84443 ASSAY THYROID STIM HORMONE: CPT | Performed by: INTERNAL MEDICINE

## 2025-02-13 PROCEDURE — 80076 HEPATIC FUNCTION PANEL: CPT | Performed by: INTERNAL MEDICINE

## 2025-02-13 PROCEDURE — 36415 COLL VENOUS BLD VENIPUNCTURE: CPT | Mod: PO | Performed by: INTERNAL MEDICINE

## 2025-02-13 PROCEDURE — 84439 ASSAY OF FREE THYROXINE: CPT | Performed by: INTERNAL MEDICINE

## 2025-02-18 ENCOUNTER — HOSPITAL ENCOUNTER (OUTPATIENT)
Dept: CARDIOLOGY | Facility: HOSPITAL | Age: 85
Discharge: HOME | End: 2025-02-18
Attending: INTERNAL MEDICINE
Payer: MEDICARE

## 2025-02-18 ENCOUNTER — CLINICAL SUPPORT (OUTPATIENT)
Dept: CARDIOLOGY | Facility: HOSPITAL | Age: 85
End: 2025-02-18
Payer: MEDICARE

## 2025-02-18 DIAGNOSIS — Z95.810 PRESENCE OF AUTOMATIC (IMPLANTABLE) CARDIAC DEFIBRILLATOR: ICD-10-CM

## 2025-02-18 PROCEDURE — 93295 DEV INTERROG REMOTE 1/2/MLT: CPT | Mod: ,,, | Performed by: INTERNAL MEDICINE

## 2025-02-18 PROCEDURE — 93296 REM INTERROG EVL PM/IDS: CPT | Mod: PO | Performed by: INTERNAL MEDICINE

## 2025-02-19 DIAGNOSIS — I10 ESSENTIAL HYPERTENSION: Primary | ICD-10-CM

## 2025-02-20 ENCOUNTER — TELEPHONE (OUTPATIENT)
Dept: CARDIOLOGY | Facility: HOSPITAL | Age: 85
End: 2025-02-20
Payer: MEDICARE

## 2025-02-20 ENCOUNTER — OFFICE VISIT (OUTPATIENT)
Dept: PRIMARY CARE CLINIC | Facility: CLINIC | Age: 85
End: 2025-02-20
Payer: MEDICARE

## 2025-02-20 VITALS
OXYGEN SATURATION: 99 % | SYSTOLIC BLOOD PRESSURE: 126 MMHG | HEART RATE: 46 BPM | WEIGHT: 188.19 LBS | TEMPERATURE: 98 F | HEIGHT: 70 IN | DIASTOLIC BLOOD PRESSURE: 58 MMHG | BODY MASS INDEX: 26.94 KG/M2

## 2025-02-20 DIAGNOSIS — Z95.1 S/P CABG X 4: ICD-10-CM

## 2025-02-20 DIAGNOSIS — D61.818 PANCYTOPENIA: ICD-10-CM

## 2025-02-20 DIAGNOSIS — I10 ESSENTIAL HYPERTENSION: ICD-10-CM

## 2025-02-20 DIAGNOSIS — K76.6 PORTAL VENOUS HYPERTENSION: ICD-10-CM

## 2025-02-20 DIAGNOSIS — K74.60 HEPATIC CIRRHOSIS, UNSPECIFIED HEPATIC CIRRHOSIS TYPE, UNSPECIFIED WHETHER ASCITES PRESENT: ICD-10-CM

## 2025-02-20 DIAGNOSIS — I48.0 PAF (PAROXYSMAL ATRIAL FIBRILLATION): Primary | ICD-10-CM

## 2025-02-20 DIAGNOSIS — I50.43 ACUTE ON CHRONIC COMBINED SYSTOLIC AND DIASTOLIC HEART FAILURE: ICD-10-CM

## 2025-02-20 DIAGNOSIS — I25.5 ISCHEMIC CARDIOMYOPATHY: ICD-10-CM

## 2025-02-20 DIAGNOSIS — E78.2 MIXED HYPERLIPIDEMIA: ICD-10-CM

## 2025-02-20 NOTE — TELEPHONE ENCOUNTER
Atrial fibrillation noted post DCCV 2/3/25 during device remote check:  MDT single-chamber ICD programmed VVI 40    Difficult to discern p-waves, irregular R-R intervals noted   Post DCCV, longest 46mins on 2/4/25, no EGM    Longest with EGM 36mins 2/8/25          Most recent episode: 2/15/25 10min       Ventricular rates:   Controlled       Anticoagulation status:  Eliquis 5mg BID  Amiodarone 200mg BID  Aspirin 81mg daily  Coreg 6.25mg BID    Reviewed with Dr. Guerrero  2/24 DONA Ponce NP  3/27 Dr. Wynne

## 2025-02-20 NOTE — PATIENT INSTRUCTIONS
Check your pill bottles and if any of them says meloxicam or mobic, you need to get rid of it. I do not want you taking this due to being on eliquis and your reduced heart function.

## 2025-02-20 NOTE — PROGRESS NOTES
INTERNAL MEDICINE PROGRESS/URGENT CARE NOTE    CHIEF COMPLAINT     Chief Complaint   Patient presents with    Follow-up     Patient is here for a 3 month follow up. He wants to discuss the procedures and changes that his cardiologist has made. He also has questions about his insurance.        HPI     Ankit Ruff is a 84 y.o.  male who presents with a PMHx of  anxiety, arthritis, CAD, CHF, HLD, HTN, history of, MI, anemia, and ischemic cardiomyopathy, Afib, hearing loss, ICD in place, who presents today for routine follow up visit.   Wonderfully compliant patient. Follows instructions, always makes his appointments and always super early. Very on top of his meds and tries to do what he's instructed ie limiting salt despite his love of gumbo.     His main complaints and concerns are:   He's updates me about his afib and what cardiology is planning for.   I've stopped the mobic due to his reduced EF and he is one eliquis   With his low plts, I will clarify with cardiology why he's on eliquis (he has a watchmand device for his afib). Per Dr. Altamirano of c ardiology patient is only to be on the eliquis for 4 weeks as of 2/3 due to his recent CDV and that's the guideline regardless of the watchman presence.      At his previous visits. We have discussed:   Not been feeling well. Fatigue, says he's back in afib again and now not sure what they will do since he's had ablation, conversion.   His dizziness is worst when he lays down and turns his head. Says if he goes under his car to change his oil, he has severe dizziness.   Had a neuroma on his right leg. Had a steroid injection, which he says worked well. Says they wanted to try that before considering surgery.   Neck pain. Chronic. Has a history of cervical myelopathy and bone spurs. Saw a neurosurgereon years ago who told him hed need surgery but he waited and says now that the pain is worst the surgeon has passed. So he wants a new referral.   Let swelling. Still eats  "out a lot and sausage at home though hank spoken about salt in his diet and eating out. Here we are.    Regarding his cirrhosis: has established and follows with hepatology, saw them less than a year ago and based on age and no symptoms, they agreed to simple follow up.      Had sent in for a refill of his zanaflex which he uses for sleep. Told him cannot prescribe that for insomnia.      POLYPHARMACY: we have discussed the use of meloxicam with is heart failure. Of note,patient was prescribedthis by cesia PCP and gets such relief that he is very hesitant to discontinue its use despite recurrent discussions re its side effects.   We have discussed using muscle relaxants solely for sleep and instead discussed sleep hygiene.         CAD, history of MI, ischemic CDM: follows with cardiology. Current medications include aldactone, lasix 20mg , crestor.   Reviewed his ECHO with his. Given his EF of 30-40% on recent ECHO done 1/6/24.   counseled him on heart failure management including limitin salt, weighing regularly and how to manage acute weight ghain. Ect. Told him I will not take him off any of his cardia cmedicatio      History of anxiety: GAD7 today is 2. Patient reports feeling "fine"   ELLA 7 2/21 mild and controlled. Says his wife has moved to another state. 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 Hit his left chest and now with some pain.      Afib: no a/c. Rate controlled and asymptomatic.   Stopped taking eliquis which he was on for Afib. He stopped taking it because he had extensive bleeding. At his initial appointment, I Told him id like him to discuss watchman device if he does not plan to take the eliquis for stroke prevention. Now takes ASA.   Has hermann had the watchman device placed.      Wants me to take him off aldactone. Will have him see his cardiologist.      Polypharmacy: on tramadol, mobic, neurontin. Quite confused.  Tried to explain he concerns with tramadol. He says he doesn't take " "it. Says someone gave him zanaflex for sleep. Explained to patient that I will not prescribe this for insomnia. No muslce spasm.      Pancytopenia/Anemia/leukopenia: follows with hematology. Recently had a visit 3 months ago.   Per their note " pancytopenia  -review of blood counts show normal white blood cell count up until January of 2022 and has since had a progressive decline.  Review of hemoglobin shows last normal value was in March of 2019 with a hemoglobin of 14 and has had a slow decline down to a kelle of 10.8.  Platelets have been chronically depressed since 2019 around 80-90k   but also have had a progressive decline and currently now around 50,000.  -MDS panel on bone marrow showed normal study; chromosomal analysis was normal,bone marrow flow normal  -Got a liver US to see evaluate for liver disease which showed hepatomegaly (17cm) and a hypoechoic focus; spleen was also enlarged to 17cm thus I favor HSM as the etiology to his blood counts  -we have not been able to find a intrinsic blood or bone marrow disorder.  Therefore I would favor the blood counts as a bystander effect of the liver and spleen issues"      Home Medications:  Prior to Admission medications    Medication Sig Start Date End Date Taking? Authorizing Provider   amiodarone (PACERONE) 200 MG Tab Take 1 tablet (200 mg total) by mouth 2 (two) times daily. 2/3/25 3/3/25  Ken Wynne MD   amiodarone (PACERONE) 200 MG Tab Take 1 tablet (200 mg total) by mouth once daily. 3/3/25 3/3/26  Ken Wynne MD   apixaban (ELIQUIS) 5 mg Tab Take 1 tablet (5 mg total) by mouth 2 (two) times daily. 2/2/25 3/4/25  Noman Ponce, SHAYNA   aspirin (ECOTRIN) 81 MG EC tablet Take 81 mg by mouth every evening.    Provider, Historical   carvediloL (COREG) 6.25 MG tablet Take 1 tablet (6.25 mg total) by mouth 2 (two) times daily. 9/4/24   Romeo Guerrero MD   furosemide (LASIX) 20 MG tablet Take 1-2 tablets (20-40 mg total) by mouth daily as " needed (swelling). 10/8/24   Kina Dangelo NP   magnesium 250 mg Tab Take 250 mg by mouth once daily.    Provider, Historical   meloxicam (MOBIC) 15 MG tablet Take 1 tablet (15 mg total) by mouth daily as needed for Pain. 10/8/24   Kina Dangelo NP   multivitamin capsule Take 1 capsule by mouth once daily.    Provider, Historical   pantoprazole (PROTONIX) 40 MG tablet Take 1 tablet (40 mg total) by mouth once daily. 1/12/25   Romeo Guerrero MD   potassium citrate 99 mg Cap Take 99 mg by mouth every evening.    Provider, Historical   rosuvastatin (CRESTOR) 10 MG tablet Take 1 tablet (10 mg total) by mouth every evening. 10/8/24   Kina Dangelo NP   tamsulosin (FLOMAX) 0.4 mg Cap TAKE 1 CAPSULE EVERY EVENING 12/26/24   Romeo Guerrero MD       Review of Systems:  Review of Systems        Advance Care Planning     Date: 02/20/2025    Power of   I initiated the process of voluntary advance care planning today and explained the importance of this process to the patient.  I introduced the concept of advance directives to the patient, as well. Then the patient received detailed information about the importance of designating a Health Care Power of  (HCPOA). He was also instructed to communicate with this person about their wishes for future healthcare, should he become sick and lose decision-making capacity. The patient has previously appointed a HCPOA. After our discussion, the patient has decided to complete a HCPOA and has appointed his  famil.y , health care agent:  on file  & health care agent number:  on file . I encouraged him to communicate with this person about their wishes for future healthcare, should he become sick and lose decision-making capacity.      A total of 10 min was spent on advance care planning, goals of care discussion, emotional support, formulating and communicating prognosis and exploring burden/benefit of various approaches of treatment.  "This discussion occurred on a fully voluntary basis with the verbal consent of the patient and/or family.             PHYSICAL EXAM     BP (!) 126/58 (BP Location: Left arm, Patient Position: Sitting)   Pulse (!) 46   Temp 97.7 °F (36.5 °C) (Oral)   Ht 5' 10" (1.778 m)   Wt 85.3 kg (188 lb 2.6 oz)   SpO2 99%   BMI 27.00 kg/m²     GEN - A+OX4, NAD   HEENT - PERRL, EOMI, OP clear  Neck - No thyromegaly or cervical LAD. No thyroid masses felt.  CV - RRR, no m/r   Chest - CTAB, no wheezing or rhonchi  Abd - S/NT/ND/+BS.   Ext - 2+BDP and radial pulses. No C/C/E.  Skin - No rash.    LABS         ASSESSMENT/PLAN     Ankit Ruff is a 84 y.o. male with  1. PAF (paroxysmal atrial fibrillation)  Per cardiology  Currently asymptomatic   3/2013  On eliquis. Rate controlled  Continue per cardiology      2. Portal venous hypertension  Continue with diurectic and  current medications    3. Pancytopenia  Stable  Following with hematology. Has had a thorough evaluation  Asymptomatic     4. Hepatic cirrhosis, unspecified hepatic cirrhosis type, unspecified whether ascites present  Overview:  Noted by ALEX HUGHES  last documented on [id number]      5. Acute on chronic combined systolic and diastolic heart failure  Compensated and doing well  Continue current meds    6. Ischemic cardiomyopathy  Reviewed ECHO  Stable      7. Essential hypertension  Overview:  BP well controlled. Continue current medications  Limit salt intake       8. Mixed hyperlipidemia  Overview:  On statin. ASA  Encouraged proper diet and exercise       9. S/P CABG x 4  Overview:  On ASA and statin.   Asymptomatic.        WORRY SCORE 2    RTC in 3 months, sooner if needed and depending on labs.    Quynh Alves MD  Board Certified Internist/Geriatrician  [facility] ([place])      "

## 2025-02-23 PROBLEM — I38 VALVULAR HEART DISEASE: Status: ACTIVE | Noted: 2025-02-23

## 2025-02-24 ENCOUNTER — OFFICE VISIT (OUTPATIENT)
Dept: CARDIOLOGY | Facility: CLINIC | Age: 85
End: 2025-02-24
Payer: MEDICARE

## 2025-02-24 VITALS
SYSTOLIC BLOOD PRESSURE: 128 MMHG | WEIGHT: 189.13 LBS | BODY MASS INDEX: 27.08 KG/M2 | HEIGHT: 70 IN | HEART RATE: 67 BPM | DIASTOLIC BLOOD PRESSURE: 64 MMHG

## 2025-02-24 DIAGNOSIS — Z95.810 ICD (IMPLANTABLE CARDIOVERTER-DEFIBRILLATOR) IN PLACE: Chronic | ICD-10-CM

## 2025-02-24 DIAGNOSIS — E78.2 MIXED HYPERLIPIDEMIA: ICD-10-CM

## 2025-02-24 DIAGNOSIS — I50.42 CHRONIC COMBINED SYSTOLIC AND DIASTOLIC HEART FAILURE: ICD-10-CM

## 2025-02-24 DIAGNOSIS — Z95.1 S/P CABG X 4: ICD-10-CM

## 2025-02-24 DIAGNOSIS — I48.0 PAF (PAROXYSMAL ATRIAL FIBRILLATION): Primary | ICD-10-CM

## 2025-02-24 DIAGNOSIS — I38 VALVULAR HEART DISEASE: ICD-10-CM

## 2025-02-24 DIAGNOSIS — I25.810 CORONARY ARTERY DISEASE INVOLVING AUTOLOGOUS VEIN CORONARY BYPASS GRAFT WITHOUT ANGINA PECTORIS: ICD-10-CM

## 2025-02-24 DIAGNOSIS — I25.5 ISCHEMIC CARDIOMYOPATHY: Chronic | ICD-10-CM

## 2025-02-24 DIAGNOSIS — I10 ESSENTIAL HYPERTENSION: ICD-10-CM

## 2025-02-24 DIAGNOSIS — Z95.818 PRESENCE OF WATCHMAN LEFT ATRIAL APPENDAGE CLOSURE DEVICE: ICD-10-CM

## 2025-02-24 LAB
OHS QRS DURATION: 120 MS
OHS QTC CALCULATION: 496 MS

## 2025-02-24 PROCEDURE — 93010 ELECTROCARDIOGRAM REPORT: CPT | Mod: S$GLB,,, | Performed by: INTERNAL MEDICINE

## 2025-02-24 PROCEDURE — 1126F AMNT PAIN NOTED NONE PRSNT: CPT | Mod: CPTII,S$GLB,,

## 2025-02-24 PROCEDURE — 93005 ELECTROCARDIOGRAM TRACING: CPT | Mod: PO

## 2025-02-24 PROCEDURE — 1159F MED LIST DOCD IN RCRD: CPT | Mod: CPTII,S$GLB,,

## 2025-02-24 PROCEDURE — 3288F FALL RISK ASSESSMENT DOCD: CPT | Mod: CPTII,S$GLB,,

## 2025-02-24 PROCEDURE — 1101F PT FALLS ASSESS-DOCD LE1/YR: CPT | Mod: CPTII,S$GLB,,

## 2025-02-24 PROCEDURE — 99214 OFFICE O/P EST MOD 30 MIN: CPT | Mod: S$GLB,,,

## 2025-02-24 PROCEDURE — 99999 PR PBB SHADOW E&M-EST. PATIENT-LVL III: CPT | Mod: PBBFAC,,,

## 2025-02-24 RX ORDER — AMIODARONE HYDROCHLORIDE 200 MG/1
200 TABLET ORAL DAILY
Qty: 90 TABLET | Refills: 3 | Status: SHIPPED | OUTPATIENT
Start: 2025-02-24 | End: 2026-02-24

## 2025-02-24 NOTE — PROGRESS NOTES
" Subjective:    Patient ID:  Ankit Ruff is a 84 y.o. male patient here for evaluation No chief complaint on file.    History of Present Illness:     Ankit Ruff is a 84 y.o. male who follows with Dr. Guerrero here today for procedure follow up. Last seen in clinic 1/2025. He reports persistent fatigue and mild activity intolerance but denies CP, SOB/LINDER, palpitations, dizziness.     He recently underwent successful MERCED/DCCV on 2/3/2025. Recurrence of AF noted on 2/4 per AICD device check. Multiple brief episodes of AF, longest 36 minutes. Most recent on 2/15. He reports "feeling different" since the MERCED/DCCV, best described as fatigue and jittery feeling.     EKG today: AF 57. Incomplete LBBB.     Focused Past History includes:  PAF/flutter   Remote flutter ablation 2016   S/p Watchman device 6/2024  Failed dronedarone due to ADHF  Successful MERCED/DCCV 2/3/2025  Recurrence on 2/4  Device check 2/18/25: 19.4% AF burden (inaccurate d/t no atrial lead)  HFrEF d/t ICM; Valvular disease  TTE 1/2025:  LVEF 30-35%. Severe LAE (SUMMER 63). Mild AS (Vmax 2.1, mean gradient 10). Moderate MR and TR. PASP 41.   AICD in situ for primary prevention  Last device check 2/18/25: Stable.   Coronary artery disease, remote PCI/CABG   Mild carotid disease   Hypertension  Hyperlipidemia      Most Recent Echocardiogram Results  Results for orders placed during the hospital encounter of 02/03/25    Transesophageal echo (MERCED)with possible cardioversion    Interpretation Summary    Left Ventricle: There is reduced systolic function.    Left Atrium: Left atrium is dilated. Watchman well positioned. No leak. No device thrombus. No left atrial thrombus.    Successful cardioversion to sinus bradycardia (200J)      Most Recent Nuclear Stress Test Results  No results found for this or any previous visit.      Most Recent Cardiac PET Stress Test Results  No results found for this or any previous visit.      Most Recent Cardiovascular " Angiogram results  Results for orders placed during the hospital encounter of 06/30/23    Intra-Procedure Documentation    Conclusion  Intraoperative MERCED performed for pre evaluation, intraprocedural guidance, and post evaluation during Watchman procedure.  No thrombus was appreciated within left atrial appendage or left atrium.  No pericardial effusion was appreciated preprocedure.  Left atrial appendage was measured in four views and measurements were recorded.  Guidance was given through bicaval and short axis views for transseptal puncture to ensure inferoposterior transseptal puncture site.  Catheter was observed crossing the septum and guided into good position into the left atrial appendage.  Device was observed being deployed in good position in the left atrial appendage.  Tug test was observed and deemed to be without significant motion.  Compression measurements were then obtained in four views and recorded.  Color-flow Doppler was used for leak evaluation in four views and no significant leak was appreciated.  The device was then released.  No pericardial effusion was appreciated postprocedure.      Other Most Recent Cardiology Results  Results for orders placed in visit on 11/19/24    Cardiac device check - Remote alert      REVIEW OF SYSTEMS: As noted in HPI   CARDIOVASCULAR: No recent chest pain, palpitations, arm/neck/jaw pain, or edema.  RESPIRATORY: No recent fever, cough, SOB.  : No blood in the urine  GI: No reflux, nausea, vomiting, or blood in stool.   MUSCULOSKELETAL: No falls.   NEURO: No headaches, syncope, or dizziness.  EYES: No sudden changes in vision.     Past Medical History:   Diagnosis Date    Anticoagulant long-term use     Anxiety     Arthritis     At risk for bleeding 06/2023    Atrial fibrillation     CAD (coronary artery disease)     Cataract     OU    CHF (congestive heart failure)     Defibrillator discharge     Hearing loss     Heart failure     Hyperlipidemia     ICD  (implantable cardiac defibrillator) in place     Ischemic cardiomyopathy     Myocardial infarction     Sciatica     had seen Dr. 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      Past Surgical History:   Procedure Laterality Date    APPENDECTOMY      CARDIAC SURGERY      cabg    CLOSURE OF LEFT ATRIAL APPENDAGE USING DEVICE Right 06/30/2023    Procedure: Watchman;  Surgeon: Ngoc Ndiaye MD;  Location: [facility] CATH;  Service: Cardiology;  Laterality: Right;    Defibulater  2022    Right chest    ECHOCARDIOGRAM,TRANSESOPHAGEAL N/A 06/30/2023    Procedure: Transesophageal echo (MERCED) intra-procedure log documentation;  Surgeon: Tamri Ritter MD;  Location: [facility] CATH;  Service: Cardiology;  Laterality: N/A;    ECHOCARDIOGRAM,TRANSESOPHAGEAL N/A 2/3/2025    Procedure: ECHOCARDIOGRAM,TRANSESOPHAGEAL;  Surgeon: Ken Wynne MD;  Location: [facility];  Service: Cardiology;  Laterality: N/A;    FRACTURE SURGERY      HERNIA REPAIR      INJECTION OF ANESTHETIC AGENT AROUND NERVE Right 12/21/2018    Procedure: diagnsotic block to the genicular nerve branches to the right knee;  Surgeon: Sj Gonzales MD;  Location: [facility] OR;  Service: Orthopedics;  Laterality: Right;    INTERNAL NEUROLYSIS USING OPERATING MICROSCOPE Right 02/01/2019    Procedure: cooled radiofrequency ablation to the genicular nerve branches of the right knee;  Surgeon: Sj Gonzales MD;  Location: [facility] OR;  Service: Orthopedics;  Laterality: Right;    KNEE ARTHROSCOPY      SKIN BIOPSY      TONSILLECTOMY      TRANSESOPHAGEAL ECHOCARDIOGRAPHY N/A 06/27/2023    Procedure: ECHOCARDIOGRAM, TRANSESOPHAGEAL;  Surgeon: Tamir Ritter MD;  Location: [facility];  Service: Cardiology;  Laterality: N/A;    TRANSESOPHAGEAL ECHOCARDIOGRAPHY N/A 08/07/2023    Procedure: ECHOCARDIOGRAM, TRANSESOPHAGEAL;  Surgeon: Romeo Guerrero MD;  Location: [facility];  Service: Cardiology;  Laterality: N/A;    TRANSESOPHAGEAL  ECHOCARDIOGRAPHY N/A 1/16/2024    Procedure: ECHOCARDIOGRAM, TRANSESOPHAGEAL;  Surgeon: Romeo Guerrero MD;  Location: [facility];  Service: Cardiology;  Laterality: N/A;    TREATMENT OF CARDIAC ARRHYTHMIA N/A 2/3/2025    Procedure: Cardioversion or Defibrillation;  Surgeon: Ken George MD;  Location: [facility] ENDO;  Service: Cardiology;  Laterality: N/A;    TRIGGER FINGER RELEASE Left 02/03/2022    Procedure: RELEASE, TRIGGER FINGER;  Surgeon: Bay Romo MD;  Location: [facility] OR;  Service: Orthopedics;  Laterality: Left;  Procedure:  Left ring finger trigger release    Position:  Supine    Anesthesia:  Anesthesia must be involved due to the presence of pacemaker defibrillator.  The patient will undergo local anesthesia only for this procedure    Equipment:  Basic handset     Social History[1]      Objective      Vitals:    02/24/25 0831   BP: 128/64   Pulse: 67       The ASCVD Risk score (Garcia DK, et al., 2019) failed to calculate for the following reasons:    The 2019 ASCVD risk score is only valid for ages 40 to 79    Risk score cannot be calculated because patient has a medical history suggesting prior/existing ASCVD      LAST EKG  Results for orders placed or performed in visit on 02/03/25   EKG 12-lead    Collection Time: 02/03/25  7:26 AM   Result Value Ref Range    QRS Duration 120 ms    OHS QTC Calculation 460 ms    Narrative    Test Reason :    Vent. Rate :  51 BPM     Atrial Rate :  51 BPM     P-R Int : 214 ms          QRS Dur : 120 ms      QT Int : 500 ms       P-R-T Axes :  67  34  78 degrees    QTcB Int : 460 ms    Sinus bradycardia with blocked PAC. Demand ventricular pacing.  Intraventricular conduction delay with LBBB morphology 120 ms  Cannot rule out Inferior infarct ,age undetermined  Abnormal ECG  When compared with ECG of 03-Feb-2025 06:33,  Sinus rhythm has replaced AF  Confirmed by Ken George ([id number]) on 2/3/2025 9:15:00 AM    Referred By: KEN GEORGE           Confirmed  By: Ken Wynne     LIPIDS - LAST 2   Lab Results   Component Value Date    CHOL 128 05/14/2024    CHOL 139 04/30/2024    HDL 55 05/14/2024    HDL 62 04/30/2024    LDLCALC 62.4 (L) 05/14/2024    LDLCALC 64.8 04/30/2024    TRIG 53 05/14/2024    TRIG 61 04/30/2024    CHOLHDL 43.0 05/14/2024    CHOLHDL 44.6 04/30/2024     CARDIAC PROFILE - LAST 2  Lab Results   Component Value Date     (H) 09/18/2023     (H) 07/25/2022     07/26/2023    TROPONINI <0.012 10/07/2024    TROPONINI <0.012 04/30/2024      CBC - LAST 2  Lab Results   Component Value Date    WBC 3.14 (L) 10/08/2024    WBC 3.52 (L) 10/07/2024    HGB 10.0 (L) 10/08/2024    HGB 10.9 (L) 10/07/2024    HCT 29.0 (L) 10/08/2024    HCT 31.9 (L) 10/07/2024    PLT 62 (L) 10/08/2024    PLT 73 (L) 10/07/2024     Lab Results   Component Value Date    LABPT 16.4 (H) 10/08/2024    LABPT 17.2 (H) 05/01/2024    INR 1.3 10/08/2024    INR 1.4 05/01/2024    APTT 35.5 05/01/2024    APTT 31.7 08/09/2023     CHEMISTRY - LAST 2  Lab Results   Component Value Date     10/08/2024     10/07/2024    K 3.7 10/08/2024    K 3.5 10/07/2024    CO2 26 10/08/2024    CO2 26 10/07/2024    BUN 19 10/08/2024    BUN 22 (H) 10/07/2024    CREATININE 0.70 10/08/2024    CREATININE 0.68 10/07/2024    GLU 91 10/08/2024    GLU 98 10/07/2024    CALCIUM 8.0 (L) 10/08/2024    CALCIUM 8.3 (L) 10/07/2024    MG 2.1 10/08/2024    MG 2.2 04/30/2024    ALBUMIN 3.4 (L) 02/13/2025    ALBUMIN 3.3 (L) 10/07/2024    ALT 14 02/13/2025    ALT 22 10/07/2024    AST 29 02/13/2025    AST 34 10/07/2024      ENDOCRINE - LAST 2  Lab Results   Component Value Date    HGBA1C 5.2 04/30/2024    TSH 4.796 (H) 02/13/2025    TSH 2.531 05/14/2024        PHYSICAL EXAM  CONSTITUTIONAL: Well built, well nourished in no apparent distress  NECK: no carotid bruit, no JVD  LUNGS: CTA  CHEST WALL: no tenderness  HEART: regular rate and rhythm, S1, S2 normal, no murmur, click, rub or gallop   ABDOMEN: soft,  non-tender; bowel sounds normal; no masses,  no organomegaly  EXTREMITIES: Extremities normal, no edema, no calf tenderness noted  NEURO: AAO X 3    I HAVE REVIEWED :    The vital signs, most recent cardiac testing, and most recent pertinent non-cardiology provider notes.    Current Outpatient Medications   Medication Instructions    amiodarone (PACERONE) 200 mg, Oral, 2 times daily    [START ON 3/3/2025] amiodarone (PACERONE) 200 mg, Oral, Daily    apixaban (ELIQUIS) 5 mg, Oral, 2 times daily    aspirin (ECOTRIN) 81 mg, Nightly    carvediloL (COREG) 6.25 mg, Oral, 2 times daily    furosemide (LASIX) 20-40 mg, Oral, Daily PRN    magnesium 250 mg, Daily    multivitamin capsule 1 capsule, Daily    pantoprazole (PROTONIX) 40 mg, Oral, Daily    potassium citrate 99 mg, Nightly    rosuvastatin (CRESTOR) 10 mg, Oral, Nightly    tamsulosin (FLOMAX) 0.4 mg, Oral, Nightly        Assessment & Plan   Paroxysmal atrial fibrillation  S/p Watchman   EKG today: AF 57  Continue apixaban 5 mg BID --> last dose 3/5  -Change amiodarone to 200 mg daily  -Resume aspirin 81 mg after last dose of OAC    HFrEF d/t ICM  Valvular heart disease  AICD  Euvolemic on exam  Continue furosemide PRN     Coronary artery disease, remote CABG/PCI  No anginal equivalents    Essential hypertension  Stable     Mixed hyperlipidemia  Stable   Continue rosuvastatin 10 mg nightly          Emphasized the importance of modifying lifestyle related risk factors including tobacco avoidance, limiting alcohol intake, aerobic exercise, weight management and Mediterranean diet.      Follow-up as scheduled.      Noman Ponce NP  [facility]   Office: [phone]       [1]   Social History  Tobacco Use    Smoking status: Former     Current packs/day: 0.00     Types: Cigarettes     Quit date: 1977     Years since quittin.5    Smokeless tobacco: Never   Substance Use Topics    Alcohol use: Yes     Comment: socially    Drug use: No

## 2025-02-26 DIAGNOSIS — I10 ESSENTIAL HYPERTENSION: ICD-10-CM

## 2025-02-26 NOTE — TELEPHONE ENCOUNTER
Informed pt to continue ELlquis until 3/5 then switch to aspirin  Pt needs Eliquis sent to pharmacy--[facility] told pt they do not have prescription

## 2025-02-26 NOTE — TELEPHONE ENCOUNTER
----- Message from Mery sent at 2/26/2025  8:15 AM CST -----  Contact: pt  Type: Needs Medical AdviceWho Called: ptBest Call Back Number:[phone] Information: Requesting a call back regarding  Pt asking for the Noman Ponce to call him only. Pt said he was out of the apixaban (ELIQUIS) 5 mg Tab and did not take it yesterday or today. Pt was not aware a refill was called in on 02/20. Pt calling Pharm about it.  Pt is still asking if he needs to continue the apixaban (ELIQUIS) 5 mg Tab until 03/05 as directed and go back to the baby Asprin until he see Dr Wynne on 03/27.  Please Advise- Thank you

## 2025-03-11 LAB
OHS CV AF BURDEN PERCENT: 19.4
OHS CV DC REMOTE DEVICE TYPE: NORMAL
OHS CV ICD SHOCK: NO
OHS CV RV PACING PERCENT: 3.12 %

## 2025-03-25 NOTE — PROGRESS NOTES
Subjective:    Patient ID:  Ankit Ruff is a 84 y.o. male patient here for evaluation No chief complaint on file.    History of Present Illness:     Ankit Ruff is a 84 y.o. male who follows with Dr. Guerrero and Dr. Wynne here today for follow up. Last seen in clinic 2/2025. He reports intermittent mild BLE swelling (primarily after salty foods). Also reports persistent fatigue. Denies CP, SOB/LINDER, palpitations, dizziness, activity intolerance.     EKG today: AF 53. IVCD. .     Focused Active Problem List includes:  PAF/flutter   Remote flutter ablation 2016   S/p Watchman device 6/2024  Failed dronedarone due to ADHF  Successful MERCED/DCCV 2/3/2025  Recurrence on 2/4  Device check 2/18/25: 19.4% AF burden (inaccurate d/t no atrial lead)  HFrEF d/t ICM; Valvular disease  TTE 1/2025:  LVEF 30-35%. Severe LAE (SUMMER 63). Mild AS (Vmax 2.1, mean gradient 10). Moderate MR, TR. PASP 41.   AICD in situ for primary prevention  Last device check 2/18/25: Stable.   Coronary artery disease, remote PCI/CABG   Mild carotid disease   Hypertension  Hyperlipidemia      Most Recent Echocardiogram Results  Results for orders placed during the hospital encounter of 02/03/25    Transesophageal echo (MERCED)with possible cardioversion    Interpretation Summary    Left Ventricle: There is reduced systolic function.    Left Atrium: Left atrium is dilated. Watchman well positioned. No leak. No device thrombus. No left atrial thrombus.    Successful cardioversion to sinus bradycardia (200J)      Most Recent Nuclear Stress Test Results  No results found for this or any previous visit.      Most Recent Cardiac PET Stress Test Results  No results found for this or any previous visit.      Most Recent Cardiovascular Angiogram results  Results for orders placed during the hospital encounter of 06/30/23    Intra-Procedure Documentation    Conclusion  Intraoperative MERCED performed for pre evaluation, intraprocedural guidance, and post  evaluation during Watchman procedure.  No thrombus was appreciated within left atrial appendage or left atrium.  No pericardial effusion was appreciated preprocedure.  Left atrial appendage was measured in four views and measurements were recorded.  Guidance was given through bicaval and short axis views for transseptal puncture to ensure inferoposterior transseptal puncture site.  Catheter was observed crossing the septum and guided into good position into the left atrial appendage.  Device was observed being deployed in good position in the left atrial appendage.  Tug test was observed and deemed to be without significant motion.  Compression measurements were then obtained in four views and recorded.  Color-flow Doppler was used for leak evaluation in four views and no significant leak was appreciated.  The device was then released.  No pericardial effusion was appreciated postprocedure.      Other Most Recent Cardiology Results  Results for orders placed in visit on 02/18/25    Cardiac device check - Remote alert      REVIEW OF SYSTEMS: As noted in HPI   CARDIOVASCULAR: See HPI  RESPIRATORY: No recent fever, cough, SOB.  : No blood in the urine  GI: No reflux, nausea, vomiting, or blood in stool.   MUSCULOSKELETAL: No falls.   NEURO: No headaches, syncope, or dizziness.  EYES: No sudden changes in vision.     Past Medical History:   Diagnosis Date    Anticoagulant long-term use     Anxiety     Arthritis     At risk for bleeding 06/2023    Atrial fibrillation     CAD (coronary artery disease)     Cataract     OU    CHF (congestive heart failure)     Defibrillator discharge     Hearing loss     Heart failure     Hyperlipidemia     ICD (implantable cardiac defibrillator) in place     Ischemic cardiomyopathy     Myocardial infarction     Sciatica     had seen Dr. 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      Past Surgical History:   Procedure Laterality Date    APPENDECTOMY       CARDIAC SURGERY      cabg    CLOSURE OF LEFT ATRIAL APPENDAGE USING DEVICE Right 06/30/2023    Procedure: Watchman;  Surgeon: Ngoc Ndiaye MD;  Location: [facility];  Service: Cardiology;  Laterality: Right;    Defibulater  2022    Right chest    ECHOCARDIOGRAM,TRANSESOPHAGEAL N/A 06/30/2023    Procedure: Transesophageal echo (MERCED) intra-procedure log documentation;  Surgeon: Tamir Ritter MD;  Location: [facility] CATH;  Service: Cardiology;  Laterality: N/A;    ECHOCARDIOGRAM,TRANSESOPHAGEAL N/A 2/3/2025    Procedure: ECHOCARDIOGRAM,TRANSESOPHAGEAL;  Surgeon: Ken Wynne MD;  Location: [facility];  Service: Cardiology;  Laterality: N/A;    FRACTURE SURGERY      HERNIA REPAIR      INJECTION OF ANESTHETIC AGENT AROUND NERVE Right 12/21/2018    Procedure: diagnsotic block to the genicular nerve branches to the right knee;  Surgeon: Sj Gonzales MD;  Location: [facility] OR;  Service: Orthopedics;  Laterality: Right;    INTERNAL NEUROLYSIS USING OPERATING MICROSCOPE Right 02/01/2019    Procedure: cooled radiofrequency ablation to the genicular nerve branches of the right knee;  Surgeon: Sj Gonzales MD;  Location: [facility] OR;  Service: Orthopedics;  Laterality: Right;    KNEE ARTHROSCOPY      SKIN BIOPSY      TONSILLECTOMY      TRANSESOPHAGEAL ECHOCARDIOGRAPHY N/A 06/27/2023    Procedure: ECHOCARDIOGRAM, TRANSESOPHAGEAL;  Surgeon: Tamir Ritter MD;  Location: [facility];  Service: Cardiology;  Laterality: N/A;    TRANSESOPHAGEAL ECHOCARDIOGRAPHY N/A 08/07/2023    Procedure: ECHOCARDIOGRAM, TRANSESOPHAGEAL;  Surgeon: Romeo Guerrero MD;  Location: [facility];  Service: Cardiology;  Laterality: N/A;    TRANSESOPHAGEAL ECHOCARDIOGRAPHY N/A 1/16/2024    Procedure: ECHOCARDIOGRAM, TRANSESOPHAGEAL;  Surgeon: Romeo Guerrero MD;  Location: [facility];  Service: Cardiology;  Laterality: N/A;    TREATMENT OF CARDIAC ARRHYTHMIA N/A 2/3/2025    Procedure: Cardioversion or Defibrillation;  Surgeon:  Ken Wynne MD;  Location: [facility] ENDO;  Service: Cardiology;  Laterality: N/A;    TRIGGER FINGER RELEASE Left 02/03/2022    Procedure: RELEASE, TRIGGER FINGER;  Surgeon: Bay Romo MD;  Location: [facility] OR;  Service: Orthopedics;  Laterality: Left;  Procedure:  Left ring finger trigger release    Position:  Supine    Anesthesia:  Anesthesia must be involved due to the presence of pacemaker defibrillator.  The patient will undergo local anesthesia only for this procedure    Equipment:  Basic handset     Social History[1]      Objective      Vitals:    03/31/25 0844   BP: (!) 129/58   Pulse: (!) 48       The ASCVD Risk score (Garcia DK, et al., 2019) failed to calculate for the following reasons:    The 2019 ASCVD risk score is only valid for ages 40 to 79    Risk score cannot be calculated because patient has a medical history suggesting prior/existing ASCVD      LAST EKG  Results for orders placed or performed in visit on 02/24/25   IN OFFICE EKG 12-LEAD (to [place])    Collection Time: 02/24/25  8:30 AM   Result Value Ref Range    QRS Duration 120 ms    [facility] QTC Calculation 496 ms    Narrative    Test Reason : I48.0,Z95.818,I25.5,I25.810,Z95.1,Z95.810,I10,E78.2,I50.42,I38,    Vent. Rate :  57 BPM     Atrial Rate :    BPM     P-R Int :    ms          QRS Dur : 120 ms      QT Int : 510 ms       P-R-T Axes :     36 160 degrees    QTcB Int : 496 ms    Unclear atrial rhythm (sinus vinnie with very long 1st degree AVB,  junctional rhythm,  versus AF with underlying complete heart block)  IVCD 120 ms  Nonspecific T wave abnormality  Abnormal ECG  When compared with ECG of 03-Feb-2025 07:26,  Wide QRS rhythm has replaced Sinus rhythm  Confirmed by Ken Wynne ([id number]) on 2/24/2025 10:06:47 AM    Referred By:            Confirmed By: Ken Wynne     LIPIDS - LAST 2   Lab Results   Component Value Date    CHOL 128 05/14/2024    CHOL 139 04/30/2024    HDL 55 05/14/2024    HDL 62 04/30/2024    LDLCALC 62.4 (L)  05/14/2024    LDLCALC 64.8 04/30/2024    TRIG 53 05/14/2024    TRIG 61 04/30/2024    CHOLHDL 43.0 05/14/2024    CHOLHDL 44.6 04/30/2024     CARDIAC PROFILE - LAST 2  Lab Results   Component Value Date     (H) 09/18/2023     (H) 07/25/2022     07/26/2023    TROPONINI <0.012 10/07/2024    TROPONINI <0.012 04/30/2024      CBC - LAST 2  Lab Results   Component Value Date    WBC 3.14 (L) 10/08/2024    WBC 3.52 (L) 10/07/2024    HGB 10.0 (L) 10/08/2024    HGB 10.9 (L) 10/07/2024    HCT 29.0 (L) 10/08/2024    HCT 31.9 (L) 10/07/2024    PLT 62 (L) 10/08/2024    PLT 73 (L) 10/07/2024     Lab Results   Component Value Date    LABPT 16.4 (H) 10/08/2024    LABPT 17.2 (H) 05/01/2024    INR 1.3 10/08/2024    INR 1.4 05/01/2024    APTT 35.5 05/01/2024    APTT 31.7 08/09/2023     CHEMISTRY - LAST 2  Lab Results   Component Value Date     10/08/2024     10/07/2024    K 3.7 10/08/2024    K 3.5 10/07/2024    CO2 26 10/08/2024    CO2 26 10/07/2024    BUN 19 10/08/2024    BUN 22 (H) 10/07/2024    CREATININE 0.70 10/08/2024    CREATININE 0.68 10/07/2024    GLU 91 10/08/2024    GLU 98 10/07/2024    CALCIUM 8.0 (L) 10/08/2024    CALCIUM 8.3 (L) 10/07/2024    MG 2.1 10/08/2024    MG 2.2 04/30/2024    ALBUMIN 3.4 (L) 02/13/2025    ALBUMIN 3.3 (L) 10/07/2024    ALT 14 02/13/2025    ALT 22 10/07/2024    AST 29 02/13/2025    AST 34 10/07/2024      ENDOCRINE - LAST 2  Lab Results   Component Value Date    HGBA1C 5.2 04/30/2024    TSH 4.796 (H) 02/13/2025    TSH 2.531 05/14/2024        PHYSICAL EXAM  CONSTITUTIONAL: Well built, well nourished in no apparent distress  NECK: no carotid bruit, no JVD  LUNGS: CTA  CHEST WALL: no tenderness  HEART: Bradycardic irregularly irregular rhythm, S1, S2 normal, no murmur, click, rub or gallop   ABDOMEN: soft, non-tender; bowel sounds normal; no masses,  no organomegaly  EXTREMITIES: Extremities normal, no edema, no calf tenderness noted  NEURO: AAO X 3    I HAVE REVIEWED :     The vital signs, most recent cardiac testing, and most recent pertinent non-cardiology provider notes.    Current Outpatient Medications   Medication Instructions    amiodarone (PACERONE) 200 mg, Oral, Daily    aspirin (ECOTRIN) 81 mg, Nightly    carvediloL (COREG) 6.25 mg, Oral, 2 times daily    furosemide (LASIX) 20-40 mg, Oral, Daily PRN    magnesium 250 mg, Daily    multivitamin capsule 1 capsule, Daily    pantoprazole (PROTONIX) 40 mg, Oral, Daily    potassium citrate 99 mg, Nightly    rosuvastatin (CRESTOR) 10 mg, Oral, Nightly    tamsulosin (FLOMAX) 0.4 mg, Oral, Nightly        Assessment & Plan   PAF/flutter  Remote CTI ablation   S/p Watchman   EKG today: AF 53  Completed 30-day course of OAC post cardioversion  Continue amiodarone 200 mg daily  Continue carvedilol 6.25 mg BID    HFrEF d/t ICM  Valvular heart disease  AICD  Euvolemic on exam  Continue furosemide PRN     Coronary artery disease, remote PCI/CABG  No anginal equivalents   Continue aspirin 81 mg daily    Bilateral carotid artery stenosis  Mild, stable    Hypertension  Stable    Mixed hyperlipidemia  Stable  Continue statin        Of note, he reports feeling much better when he was in SR after MERCED/DCCV, stating that he was less anxious/jittery and fatigue. He felt when he went back into atrial fibrillation. Additionally, decreasing amiodarone did not help with fatigue. I discussed this with Dr. Wynne as I believe he would be a candidate for PVI ablation.     Plan as below:  -RUE Venogram   -Atrial lead insertion   -PVI Ablation in 6-8 weeks      I emphasized the importance of modifying lifestyle related risk factors including tobacco avoidance, limiting alcohol intake, aerobic exercise, weight management and Mediterranean diet.      Follow up as scheduled.     Corey Darvill, NP [facility]   Office: [phone]       [1]   Social History  Tobacco Use    Smoking status: Former     Current packs/day: 0.00     Types:  Cigarettes     Quit date: 1977     Years since quittin.6    Smokeless tobacco: Never   Substance Use Topics    Alcohol use: Yes     Comment: socially    Drug use: No

## 2025-03-31 ENCOUNTER — PATIENT MESSAGE (OUTPATIENT)
Dept: CARDIOLOGY | Facility: CLINIC | Age: 85
End: 2025-03-31

## 2025-03-31 ENCOUNTER — OFFICE VISIT (OUTPATIENT)
Dept: CARDIOLOGY | Facility: CLINIC | Age: 85
End: 2025-03-31
Payer: MEDICARE

## 2025-03-31 VITALS
HEART RATE: 48 BPM | DIASTOLIC BLOOD PRESSURE: 58 MMHG | BODY MASS INDEX: 28.25 KG/M2 | WEIGHT: 196.88 LBS | SYSTOLIC BLOOD PRESSURE: 129 MMHG

## 2025-03-31 DIAGNOSIS — I65.23 BILATERAL CAROTID ARTERY STENOSIS: ICD-10-CM

## 2025-03-31 DIAGNOSIS — I50.42 CHRONIC COMBINED SYSTOLIC AND DIASTOLIC HEART FAILURE: ICD-10-CM

## 2025-03-31 DIAGNOSIS — Z95.810 ICD (IMPLANTABLE CARDIOVERTER-DEFIBRILLATOR) IN PLACE: Chronic | ICD-10-CM

## 2025-03-31 DIAGNOSIS — I48.0 PAF (PAROXYSMAL ATRIAL FIBRILLATION): Primary | ICD-10-CM

## 2025-03-31 DIAGNOSIS — I25.810 CORONARY ARTERY DISEASE INVOLVING AUTOLOGOUS VEIN CORONARY BYPASS GRAFT WITHOUT ANGINA PECTORIS: ICD-10-CM

## 2025-03-31 DIAGNOSIS — I10 ESSENTIAL HYPERTENSION: ICD-10-CM

## 2025-03-31 DIAGNOSIS — I25.5 ISCHEMIC CARDIOMYOPATHY: Chronic | ICD-10-CM

## 2025-03-31 DIAGNOSIS — E78.2 MIXED HYPERLIPIDEMIA: ICD-10-CM

## 2025-03-31 DIAGNOSIS — Z95.1 S/P CABG X 4: ICD-10-CM

## 2025-03-31 DIAGNOSIS — I38 VALVULAR HEART DISEASE: ICD-10-CM

## 2025-03-31 DIAGNOSIS — Z95.818 PRESENCE OF WATCHMAN LEFT ATRIAL APPENDAGE CLOSURE DEVICE: ICD-10-CM

## 2025-03-31 LAB
OHS QRS DURATION: 134 MS
OHS QTC CALCULATION: 469 MS

## 2025-03-31 PROCEDURE — 1101F PT FALLS ASSESS-DOCD LE1/YR: CPT | Mod: CPTII,S$GLB,,

## 2025-03-31 PROCEDURE — 1159F MED LIST DOCD IN RCRD: CPT | Mod: CPTII,S$GLB,,

## 2025-03-31 PROCEDURE — 93010 ELECTROCARDIOGRAM REPORT: CPT | Mod: S$GLB,,, | Performed by: INTERNAL MEDICINE

## 2025-03-31 PROCEDURE — 99999 PR PBB SHADOW E&M-EST. PATIENT-LVL III: CPT | Mod: PBBFAC,,,

## 2025-03-31 PROCEDURE — 99214 OFFICE O/P EST MOD 30 MIN: CPT | Mod: S$GLB,,,

## 2025-03-31 PROCEDURE — 3288F FALL RISK ASSESSMENT DOCD: CPT | Mod: CPTII,S$GLB,,

## 2025-03-31 PROCEDURE — 1126F AMNT PAIN NOTED NONE PRSNT: CPT | Mod: CPTII,S$GLB,,

## 2025-03-31 PROCEDURE — 93005 ELECTROCARDIOGRAM TRACING: CPT | Mod: PO

## 2025-04-01 ENCOUNTER — TELEPHONE (OUTPATIENT)
Dept: CARDIOLOGY | Facility: HOSPITAL | Age: 85
End: 2025-04-01
Payer: MEDICARE

## 2025-04-01 ENCOUNTER — PATIENT MESSAGE (OUTPATIENT)
Dept: CARDIOLOGY | Facility: CLINIC | Age: 85
End: 2025-04-01
Payer: MEDICARE

## 2025-04-01 DIAGNOSIS — Z01.818 PRE-OP EVALUATION: Primary | ICD-10-CM

## 2025-04-01 RX ORDER — SODIUM CHLORIDE 9 MG/ML
INJECTION, SOLUTION INTRAVENOUS ONCE
OUTPATIENT
Start: 2025-04-01 | End: 2025-04-01

## 2025-04-01 RX ORDER — SODIUM CHLORIDE 0.9 % (FLUSH) 0.9 %
10 SYRINGE (ML) INJECTION
Status: SHIPPED | OUTPATIENT
Start: 2025-04-01

## 2025-04-01 NOTE — TELEPHONE ENCOUNTER
Received orders from Dr. Wynne to increase base rate from 40 > 50. Called pt.  and scheduled device check tomorrow @ 3481

## 2025-04-02 ENCOUNTER — TELEPHONE (OUTPATIENT)
Dept: CARDIOLOGY | Facility: CLINIC | Age: 85
End: 2025-04-02
Payer: MEDICARE

## 2025-04-02 ENCOUNTER — HOSPITAL ENCOUNTER (OUTPATIENT)
Dept: CARDIOLOGY | Facility: HOSPITAL | Age: 85
Discharge: HOME | End: 2025-04-02
Attending: INTERNAL MEDICINE
Payer: MEDICARE

## 2025-04-02 NOTE — TELEPHONE ENCOUNTER
Spoke with patient regarding Device check and appt with Dr Wynne. Pt VU and will come on Friday 4/4 at 9:30am

## 2025-04-04 ENCOUNTER — HOSPITAL ENCOUNTER (OUTPATIENT)
Dept: CARDIOLOGY | Facility: HOSPITAL | Age: 85
Discharge: HOME | End: 2025-04-04
Attending: INTERNAL MEDICINE
Payer: MEDICARE

## 2025-04-04 ENCOUNTER — TELEPHONE (OUTPATIENT)
Dept: CARDIOLOGY | Facility: CLINIC | Age: 85
End: 2025-04-04
Payer: MEDICARE

## 2025-04-04 ENCOUNTER — OFFICE VISIT (OUTPATIENT)
Dept: CARDIOLOGY | Facility: CLINIC | Age: 85
End: 2025-04-04
Payer: MEDICARE

## 2025-04-04 VITALS
SYSTOLIC BLOOD PRESSURE: 107 MMHG | BODY MASS INDEX: 27.65 KG/M2 | WEIGHT: 193.13 LBS | HEIGHT: 70 IN | DIASTOLIC BLOOD PRESSURE: 57 MMHG | HEART RATE: 49 BPM

## 2025-04-04 DIAGNOSIS — Z95.810 ICD (IMPLANTABLE CARDIOVERTER-DEFIBRILLATOR) IN PLACE: Chronic | ICD-10-CM

## 2025-04-04 DIAGNOSIS — I50.42 CHRONIC COMBINED SYSTOLIC AND DIASTOLIC HEART FAILURE: ICD-10-CM

## 2025-04-04 DIAGNOSIS — I25.5 ISCHEMIC CARDIOMYOPATHY: Chronic | ICD-10-CM

## 2025-04-04 DIAGNOSIS — I50.43 ACUTE ON CHRONIC COMBINED SYSTOLIC AND DIASTOLIC HEART FAILURE: ICD-10-CM

## 2025-04-04 DIAGNOSIS — E78.2 MIXED HYPERLIPIDEMIA: ICD-10-CM

## 2025-04-04 DIAGNOSIS — I48.0 PAF (PAROXYSMAL ATRIAL FIBRILLATION): Primary | ICD-10-CM

## 2025-04-04 DIAGNOSIS — I10 ESSENTIAL HYPERTENSION: ICD-10-CM

## 2025-04-04 DIAGNOSIS — Z95.1 S/P CABG X 4: ICD-10-CM

## 2025-04-04 DIAGNOSIS — Z95.818 PRESENCE OF WATCHMAN LEFT ATRIAL APPENDAGE CLOSURE DEVICE: ICD-10-CM

## 2025-04-04 DIAGNOSIS — I65.23 BILATERAL CAROTID ARTERY STENOSIS: ICD-10-CM

## 2025-04-04 DIAGNOSIS — I25.810 CORONARY ARTERY DISEASE INVOLVING AUTOLOGOUS VEIN CORONARY BYPASS GRAFT WITHOUT ANGINA PECTORIS: ICD-10-CM

## 2025-04-04 DIAGNOSIS — I70.0 AORTIC ATHEROSCLEROSIS: ICD-10-CM

## 2025-04-04 PROCEDURE — 99999 PR PBB SHADOW E&M-EST. PATIENT-LVL III: CPT | Mod: PBBFAC,,, | Performed by: INTERNAL MEDICINE

## 2025-04-04 PROCEDURE — 93282 PRGRMG EVAL IMPLANTABLE DFB: CPT | Mod: PO

## 2025-04-04 NOTE — TELEPHONE ENCOUNTER
Spoke with pt's daughter regarding venogram and PPM placement. Ablation canceled per daughter request. Awaiting to here from Dr Wynne on confirmation of PPM placement and will call patient with date and time.

## 2025-04-04 NOTE — PROGRESS NOTES
SUBJECTIVE:    Patient ID:  Ankit Ruff is a 84 y.o. male who presents for follow up regarding atrial fibrillation.      HPI 82 yo male with atrial fibrillation, atrial flutter, CAD, ischemic cardiomyopathy, PCI, CHF, ICD, NSVT.     Background:  Developed atrial arrhythmias in 2013. Notes indicate atrial fibrillation, ECG available for review c/w atrial flutter. Underwent DCCV 3/13, and did well for extended period. More recently developed recurrence. ECG c/w isthmus-dependent atrial flutter.  Echo 7/8/16 EF 25% PASP 50 mm Hg  Single chamber ICD implanted 10/27/10, rt sided. Reports abnormal lt sided venous anatomy (? Persistent lt SVC).  RFA 10/24/16 Isthmus dependent atrial flutter confirmed, RFA performed.     6/2024  Has developed paroxysmal AF. Noted excessive bleeding on anticoagulation. Had low blood pressure on Entresto.  AJ occlusion 6/23 (Dr. Ndiaye)  MERCED 1/16/24  Left Atrium: Left atrium is moderately dilated. There is a closure device within the appendage. The device is a Watchman. There is partial occlusion with a very small residual leak.   Switched from Plavix to aspirin.  Is symptomatic with the atrial fibrillation.     ICD reveals stable function of the lead, RV pacing 2.2% suspected AF burden 24% since 5/14/24 maximum 57 hours, none since 6/10/24     9/26/25:  Last visit started on Multaq. Quit around 2 weeks ago secondary to edema.     04/04/2025  S/p MERCED/CV 02/03/2025 with amiodarone initiation.  He reports symptoms of fatigue and dyspnea on exertion.  After discussions, it sounds like he feels better today compared to several months prior before initiation of amiodarone.  However, he is having significant bradycardia in the 40s at times.  ECG today shows very prolonged IA interval.  His daughter Brandy was called during the visit to discuss.  The patient does report acute onset of dizziness which occurred this Monday night following dinner.  Device interrogation does show atrial  fibrillation during this time.    Device interrogation shows 14 hours of atrial fibrillation on 3/31/25.  Reported 16% atrial fibrillation burden since 9/24.  Longest AF episode 31 hours on 10/01/2024.  Base rate increase to 50 beats per minute.  Currently low RV pacing %.    I personally reviewed the ECG/telemetry strip today. My interpretation is sinus bradycardia with ventricular pacing and very prolonged CO interval.    Past Medical History:   Diagnosis Date    Anticoagulant long-term use     Anxiety     Arthritis     At risk for bleeding 06/2023    Atrial fibrillation     CAD (coronary artery disease)     Cataract     OU    CHF (congestive heart failure)     Defibrillator discharge     Hearing loss     Heart failure     Hyperlipidemia     ICD (implantable cardiac defibrillator) in place     Ischemic cardiomyopathy     Myocardial infarction     Sciatica     had seen Dr. 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        Past Surgical History:   Procedure Laterality Date    APPENDECTOMY      CARDIAC SURGERY      cabg    CLOSURE OF LEFT ATRIAL APPENDAGE USING DEVICE Right 06/30/2023    Procedure: Watchman;  Surgeon: Ngoc Ndiaye MD;  Location: [facility] CATH;  Service: Cardiology;  Laterality: Right;    Defibulater  2022    Right chest    ECHOCARDIOGRAM,TRANSESOPHAGEAL N/A 06/30/2023    Procedure: Transesophageal echo (MERCED) intra-procedure log documentation;  Surgeon: Tamir Ritter MD;  Location: [facility] CATH;  Service: Cardiology;  Laterality: N/A;    ECHOCARDIOGRAM,TRANSESOPHAGEAL N/A 2/3/2025    Procedure: ECHOCARDIOGRAM,TRANSESOPHAGEAL;  Surgeon: Ken Wynne MD;  Location: [facility] ENDO;  Service: Cardiology;  Laterality: N/A;    FRACTURE SURGERY      HERNIA REPAIR      INJECTION OF ANESTHETIC AGENT AROUND NERVE Right 12/21/2018    Procedure: diagnsotic block to the genicular nerve branches to the right knee;  Surgeon: Sj Gonzales MD;  Location: [facility] OR;  Service:  Orthopedics;  Laterality: Right;    INTERNAL NEUROLYSIS USING OPERATING MICROSCOPE Right 2019    Procedure: cooled radiofrequency ablation to the genicular nerve branches of the right knee;  Surgeon: Sj Gonzales MD;  Location: [facility] OR;  Service: Orthopedics;  Laterality: Right;    KNEE ARTHROSCOPY      SKIN BIOPSY      TONSILLECTOMY      TRANSESOPHAGEAL ECHOCARDIOGRAPHY N/A 2023    Procedure: ECHOCARDIOGRAM, TRANSESOPHAGEAL;  Surgeon: Tamir Ritter MD;  Location: [facility];  Service: Cardiology;  Laterality: N/A;    TRANSESOPHAGEAL ECHOCARDIOGRAPHY N/A 2023    Procedure: ECHOCARDIOGRAM, TRANSESOPHAGEAL;  Surgeon: Romeo Guerrero MD;  Location: [facility];  Service: Cardiology;  Laterality: N/A;    TRANSESOPHAGEAL ECHOCARDIOGRAPHY N/A 2024    Procedure: ECHOCARDIOGRAM, TRANSESOPHAGEAL;  Surgeon: Romeo Guerrero MD;  Location: [facility];  Service: Cardiology;  Laterality: N/A;    TREATMENT OF CARDIAC ARRHYTHMIA N/A 2/3/2025    Procedure: Cardioversion or Defibrillation;  Surgeon: Ken Wynne MD;  Location: [facility];  Service: Cardiology;  Laterality: N/A;    TRIGGER FINGER RELEASE Left 2022    Procedure: RELEASE, TRIGGER FINGER;  Surgeon: Bay Romo MD;  Location: [facility] OR;  Service: Orthopedics;  Laterality: Left;  Procedure:  Left ring finger trigger release    Position:  Supine    Anesthesia:  Anesthesia must be involved due to the presence of pacemaker defibrillator.  The patient will undergo local anesthesia only for this procedure    Equipment:  Basic handset       No family history on file.    Social History     Socioeconomic History    Marital status:    Tobacco Use    Smoking status: Former     Current packs/day: 0.00     Types: Cigarettes     Quit date: 1977     Years since quittin.6    Smokeless tobacco: Never   Substance and Sexual Activity    Alcohol use: Yes     Comment: socially    Drug use: No     Social Drivers  "of Health     Financial Resource Strain: Low Risk  (10/7/2024)    Overall Financial Resource Strain (CARDIA)     Difficulty of Paying Living Expenses: Not hard at all   Food Insecurity: No Food Insecurity (10/7/2024)    Hunger Vital Sign     Worried About Running Out of Food in the Last Year: Never true     Ran Out of Food in the Last Year: Never true   Transportation Needs: No Transportation Needs (10/7/2024)    TRANSPORTATION NEEDS     Transportation : No   Physical Activity: Unknown (5/14/2024)    Exercise Vital Sign     Days of Exercise per Week: 7 days   Stress: No Stress Concern Present (10/7/2024)    [employer] - Occupational Stress Questionnaire     Feeling of Stress : Not at all   Housing Stability: Unknown (10/7/2024)    Housing Stability Vital Sign     Unable to Pay for Housing in the Last Year: No     Homeless in the Last Year: No       Review of patient's allergies indicates:   Allergen Reactions    Multaq [dronedarone] Itching and Swelling    Penicillins Rash    Entresto [sacubitril-valsartan] Other (See Comments)     Hypotension       Review of Systems   All other systems reviewed and are negative.           OBJECTIVE:     Vitals:    04/04/25 0941   BP: (!) 107/57   BP Location: Left arm   Patient Position: Sitting   Pulse: (!) 49   Weight: 87.6 kg (193 lb 2 oz)   Height: 5' 10" (1.778 m)       BP Readings from Last 5 Encounters:   04/04/25 (!) 107/57   03/31/25 (!) 129/58   02/24/25 128/64   02/20/25 (!) 126/58   02/03/25 (!) 125/59        Physical Exam  Vitals reviewed.   Constitutional:       General: He is not in acute distress.     Appearance: Normal appearance. He is not ill-appearing.   HENT:      Head: Normocephalic and atraumatic.   Eyes:      Extraocular Movements: Extraocular movements intact.      Conjunctiva/sclera: Conjunctivae normal.   Cardiovascular:      Rate and Rhythm: Regular rhythm. Bradycardia present.   Pulmonary:      Effort: Pulmonary effort is " normal. No respiratory distress.   Musculoskeletal:         General: No swelling or deformity.   Skin:     Findings: No erythema or rash.   Neurological:      Mental Status: He is alert.             Current Outpatient Medications   Medication Instructions    amiodarone (PACERONE) 200 mg, Oral, Daily    aspirin (ECOTRIN) 81 mg, Nightly    carvediloL (COREG) 6.25 mg, Oral, 2 times daily    furosemide (LASIX) 20-40 mg, Oral, Daily PRN    magnesium 250 mg, Daily    multivitamin capsule 1 capsule, Daily    pantoprazole (PROTONIX) 40 mg, Oral, Daily    potassium citrate 99 mg, Nightly    rosuvastatin (CRESTOR) 10 mg, Oral, Nightly    tamsulosin (FLOMAX) 0.4 mg, Oral, Nightly       Lipid Panel:   Lab Results   Component Value Date    CHOL 128 05/14/2024    HDL 55 05/14/2024    LDLCALC 62.4 (L) 05/14/2024    TRIG 53 05/14/2024    CHOLHDL 43.0 05/14/2024         The ASCVD Risk score (Garcia DK, et al., 2019) failed to calculate for the following reasons:    The 2019 ASCVD risk score is only valid for ages 40 to 79    Risk score cannot be calculated because patient has a medical history suggesting prior/existing ASCVD    Most Recent EKG Results  Results for orders placed or performed in visit on 03/31/25   IN OFFICE EKG 12-LEAD (to [place])    Collection Time: 03/31/25  9:02 AM   Result Value Ref Range    QRS Duration 134 ms    [facility] QTC Calculation 469 ms    Narrative    Test Reason : I48.0,    Vent. Rate :  53 BPM     Atrial Rate :    BPM     P-R Int :    ms          QRS Dur : 134 ms      QT Int : 500 ms       P-R-T Axes :     44 164 degrees    QTcB Int : 469 ms    Atrial fibrillation with slow ventricular response  Nonspecific intraventricular block  T wave abnormality, consider lateral ischemia  Abnormal ECG  When compared with ECG of 24-Feb-2025 08:30,  Atrial fibrillation has replaced Wide QRS rhythm  Confirmed by Ken Wynne ([id number]) on 3/31/2025 1:13:37 PM    Referred By:            Confirmed By: Ken Wynne       Most  Recent Echocardiogram Results  Results for orders placed during the hospital encounter of 02/03/25    Transesophageal echo (MERCED)with possible cardioversion    Interpretation Summary    Left Ventricle: There is reduced systolic function.    Left Atrium: Left atrium is dilated. Watchman well positioned. No leak. No device thrombus. No left atrial thrombus.    Successful cardioversion to sinus bradycardia (200J)      Most Recent Nuclear Stress Test Results  No results found for this or any previous visit.      Most Recent Cardiac PET Stress Test Results  No results found for this or any previous visit.      Most Recent Cardiovascular Angiogram results  Results for orders placed during the hospital encounter of 06/30/23    Intra-Procedure Documentation    Conclusion  Intraoperative MERCED performed for pre evaluation, intraprocedural guidance, and post evaluation during Watchman procedure.  No thrombus was appreciated within left atrial appendage or left atrium.  No pericardial effusion was appreciated preprocedure.  Left atrial appendage was measured in four views and measurements were recorded.  Guidance was given through bicaval and short axis views for transseptal puncture to ensure inferoposterior transseptal puncture site.  Catheter was observed crossing the septum and guided into good position into the left atrial appendage.  Device was observed being deployed in good position in the left atrial appendage.  Tug test was observed and deemed to be without significant motion.  Compression measurements were then obtained in four views and recorded.  Color-flow Doppler was used for leak evaluation in four views and no significant leak was appreciated.  The device was then released.  No pericardial effusion was appreciated postprocedure.      Other Most Recent Cardiology Results  Results for orders placed in visit on 02/18/25    Cardiac device check - Remote alert        All pertinent data including labs, imaging, EKGs,  and studies listed above were reviewed.  All EKG tracing in [facility] were personally interpreted by this provider.    ASSESSMENT:   Ankit Ruff is a 84 y.o. male who presents for evaluation of atrial fibrillation and bradycardia.  Currently has a right-sided single-chamber ICD in Situ.  Given bradycardia with reduced EF, we would like to minimize RV apical pacing.  Ideally would upgrade to a biventricular ICD with an atrial lead.  I believe an atrial lead alone we will not be sufficient as his [place] interval is 2 prolonged and will likely lead to significant RV pacing.  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  However, would 1st have to assess the patency of his right subclavian system.  We will perform a bilateral venogram prior to any intervention.  Additionally, he does appear to have symptomatic paroxysmal atrial fibrillation.  We will further assess in the future after device upgrade.  Continue amiodarone for now.    1. PAF (paroxysmal atrial fibrillation)        2. Presence of Watchman left atrial appendage closure device        3. Essential hypertension        4. S/P CABG x 4        5. Mixed hyperlipidemia        6. Coronary artery disease involving autologous vein coronary bypass graft without angina pectoris        7. ICD (implantable cardioverter-defibrillator) in place        8. Ischemic cardiomyopathy        9. Bilateral carotid artery stenosis        10. Aortic atherosclerosis        11. Chronic combined systolic and diastolic heart failure        12. Acute on chronic combined systolic and diastolic heart failure          PLAN FOR TREATMENT OF ABOVE DIAGNOSES:     Plans for bilateral venogram.  Pending findings we will plan for a CRT D upgrade with a left bundle area pacing.    Continue amiodarone 200 mg q.d.   Continue aspirin 81 mg daily   Continue Coreg 6.25 mg b.i.d.   Continue Crestor 10 mg daily  Routine ICD checks.      F/u in 6  taiwo Wynne MD  Cardiac Electrophysiology    This note was partially generated using voice recognition and generative artificial intelligence software. While every effort has been made to ensure its accuracy, transcription errors may exist. Please reach out to me with any questions or if clarification is needed.

## 2025-04-07 DIAGNOSIS — Z95.810 ICD (IMPLANTABLE CARDIOVERTER-DEFIBRILLATOR) IN PLACE: Primary | ICD-10-CM

## 2025-04-07 LAB — OHS CV DC REMOTE DEVICE TYPE: NORMAL

## 2025-04-07 RX ORDER — SODIUM CHLORIDE 0.9 % (FLUSH) 0.9 %
10 SYRINGE (ML) INJECTION
Status: SHIPPED | OUTPATIENT
Start: 2025-04-07

## 2025-04-07 RX ORDER — SODIUM CHLORIDE 9 MG/ML
INJECTION, SOLUTION INTRAVENOUS ONCE
OUTPATIENT
Start: 2025-04-07 | End: 2025-04-07

## 2025-04-17 ENCOUNTER — TELEPHONE (OUTPATIENT)
Dept: CARDIOLOGY | Facility: CLINIC | Age: 85
End: 2025-04-17
Payer: MEDICARE

## 2025-04-17 NOTE — TELEPHONE ENCOUNTER
Spoke with patients daughter regarding procedure and current approval for all codes except the artrial lead placement. VU and reviewed instructions with daughter again.

## 2025-04-21 ENCOUNTER — PATIENT MESSAGE (OUTPATIENT)
Dept: CARDIOLOGY | Facility: CLINIC | Age: 85
End: 2025-04-21
Payer: MEDICARE

## 2025-04-23 ENCOUNTER — TELEPHONE (OUTPATIENT)
Dept: CARDIOLOGY | Facility: CLINIC | Age: 85
End: 2025-04-23
Payer: MEDICARE

## 2025-04-23 NOTE — TELEPHONE ENCOUNTER
Post procedure assessment call made and spoke with patient. Patient states he has his arm in sling with slight discomfort. Dressing CDI. Patient has started Doxycycline and stopped Amiodarone. Reviewed discharge instructions with patient and confirmed follow up visit of 5/2/25 at 0940. Patient verbalized understanding.      Wear sling for 48 hours and then you may take arm out but must sleep with sling for 6 weeks. Do not raise elbow above your shoulder on the same side as your device for 6 weeks.

## 2025-05-02 ENCOUNTER — HOSPITAL ENCOUNTER (OUTPATIENT)
Dept: CARDIOLOGY | Facility: HOSPITAL | Age: 85
Discharge: HOME | End: 2025-05-02
Attending: INTERNAL MEDICINE
Payer: MEDICARE

## 2025-05-02 DIAGNOSIS — I25.5 ISCHEMIC CARDIOMYOPATHY: Chronic | ICD-10-CM

## 2025-05-02 DIAGNOSIS — I25.5 ISCHEMIC CARDIOMYOPATHY: Primary | Chronic | ICD-10-CM

## 2025-05-02 DIAGNOSIS — Z95.810 ICD (IMPLANTABLE CARDIOVERTER-DEFIBRILLATOR) IN PLACE: Chronic | ICD-10-CM

## 2025-05-02 PROCEDURE — 93005 ELECTROCARDIOGRAM TRACING: CPT | Mod: PO

## 2025-05-02 PROCEDURE — 93281 PM DEVICE PROGR EVAL MULTI: CPT | Mod: PO

## 2025-05-08 LAB
OHS QRS DURATION: 128 MS
OHS QTC CALCULATION: 509 MS

## 2025-05-09 LAB
OHS CV AF BURDEN PERCENT: < 1
OHS CV DC REMOTE DEVICE TYPE: NORMAL
OHS CV RV PACING PERCENT: 85.8 %

## 2025-05-12 ENCOUNTER — TELEPHONE (OUTPATIENT)
Dept: CARDIOLOGY | Facility: HOSPITAL | Age: 85
End: 2025-05-12
Payer: MEDICARE

## 2025-05-15 ENCOUNTER — HOSPITAL ENCOUNTER (OUTPATIENT)
Dept: CARDIOLOGY | Facility: HOSPITAL | Age: 85
Discharge: HOME | End: 2025-05-15
Attending: INTERNAL MEDICINE
Payer: MEDICARE

## 2025-05-15 DIAGNOSIS — Z95.810 ICD (IMPLANTABLE CARDIOVERTER-DEFIBRILLATOR) IN PLACE: Chronic | ICD-10-CM

## 2025-05-15 DIAGNOSIS — I25.5 ISCHEMIC CARDIOMYOPATHY: Chronic | ICD-10-CM

## 2025-05-15 PROCEDURE — 93284 PRGRMG EVAL IMPLANTABLE DFB: CPT | Mod: PO

## 2025-05-16 LAB
OHS CV AF BURDEN PERCENT: < 1
OHS CV DC REMOTE DEVICE TYPE: NORMAL

## 2025-05-22 ENCOUNTER — OFFICE VISIT (OUTPATIENT)
Dept: FAMILY MEDICINE | Facility: CLINIC | Age: 85
End: 2025-05-22
Payer: MEDICARE

## 2025-05-22 ENCOUNTER — PATIENT MESSAGE (OUTPATIENT)
Dept: OBSTETRICS AND GYNECOLOGY | Facility: CLINIC | Age: 85
End: 2025-05-22
Payer: MEDICARE

## 2025-05-22 VITALS
WEIGHT: 188.5 LBS | DIASTOLIC BLOOD PRESSURE: 60 MMHG | HEART RATE: 71 BPM | OXYGEN SATURATION: 98 % | BODY MASS INDEX: 27.05 KG/M2 | SYSTOLIC BLOOD PRESSURE: 120 MMHG

## 2025-05-22 DIAGNOSIS — K76.6 PORTAL VENOUS HYPERTENSION: ICD-10-CM

## 2025-05-22 DIAGNOSIS — D61.818 PANCYTOPENIA: ICD-10-CM

## 2025-05-22 DIAGNOSIS — Z76.89 ENCOUNTER TO ESTABLISH CARE WITH NEW DOCTOR: Primary | ICD-10-CM

## 2025-05-22 DIAGNOSIS — K74.60 HEPATIC CIRRHOSIS, UNSPECIFIED HEPATIC CIRRHOSIS TYPE, UNSPECIFIED WHETHER ASCITES PRESENT: ICD-10-CM

## 2025-05-22 DIAGNOSIS — E78.2 MIXED HYPERLIPIDEMIA: ICD-10-CM

## 2025-05-22 DIAGNOSIS — Z95.810 ICD (IMPLANTABLE CARDIOVERTER-DEFIBRILLATOR) IN PLACE: ICD-10-CM

## 2025-05-22 DIAGNOSIS — I10 ESSENTIAL HYPERTENSION: ICD-10-CM

## 2025-05-22 DIAGNOSIS — Z95.1 S/P CABG X 4: ICD-10-CM

## 2025-05-22 DIAGNOSIS — I25.5 ISCHEMIC CARDIOMYOPATHY: ICD-10-CM

## 2025-05-22 DIAGNOSIS — I50.42 CHRONIC COMBINED SYSTOLIC AND DIASTOLIC HEART FAILURE: ICD-10-CM

## 2025-05-22 DIAGNOSIS — Z86.59 HISTORY OF ANXIETY: ICD-10-CM

## 2025-05-22 DIAGNOSIS — I48.0 PAF (PAROXYSMAL ATRIAL FIBRILLATION): ICD-10-CM

## 2025-05-22 PROCEDURE — 99999 PR PBB SHADOW E&M-EST. PATIENT-LVL IV: CPT | Mod: PBBFAC,,, | Performed by: STUDENT IN AN ORGANIZED HEALTH CARE EDUCATION/TRAINING PROGRAM

## 2025-05-22 RX ORDER — MAGNESIUM 250 MG
250 TABLET ORAL ONCE
COMMUNITY

## 2025-05-22 NOTE — PATIENT INSTRUCTIONS
I will reach out to Dr. Alves about your appointment with me today and whether it was intended for you to establish care.    You can stop by the [facility] to get acces back into your [facility].

## 2025-05-22 NOTE — PROGRESS NOTES
Subjective:       Patient ID: Ankit Ruff is a 84 y.o. male.    Chief Complaint: Follow-up (3 month)    HPI    84 year old male presents to [facility] care, new to me and new to this clinic. He previously followed for primary care with Dr. Alves at the [facility], who was great; reviewed last visit from 2/20/25. He is very much looking forward to seeing Dr. Wynne since he is feeling great ever since having his procedure. He met with [employer] and got a good report. [place]. Neck is better now that he is active (Dr. Grayson had recommended PT but he is physically active at home). He follows with Dr. Richardson annually for his liver. I reviewed last hematology-oncology note with no need for follow up and pancytopenia likely multifactorial. He follows with Noman Ponce NP and Dr. Ellis. His mood is okay - he appreciated coming in and talking to people today. He lives near [facility]. Daughter, Brandy, and wife live in [place]. Daughter is a pediatric emergency physician and her  is a heart doctor.    Past Medical History:   Diagnosis Date    Anticoagulant long-term use     Anxiety     Arthritis     At risk for bleeding 06/2023    Atrial fibrillation     CAD (coronary artery disease)     Cataract     OU    CHF (congestive heart failure)     Defibrillator discharge     Hearing loss     Heart failure     Hyperlipidemia     ICD (implantable cardiac defibrillator) in place     Ischemic cardiomyopathy     Myocardial infarction     Sciatica     had seen Dr. 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        Past Surgical History:   Procedure Laterality Date    APPENDECTOMY      CARDIAC SURGERY      cabg    CLOSURE OF LEFT ATRIAL APPENDAGE USING DEVICE Right 06/30/2023    Procedure: Watchman;  Surgeon: Ngoc Ndiaye MD;  Location: [facility] CATH;  Service: Cardiology;  Laterality: Right;    Defibulater  2022    Right chest    ECHOCARDIOGRAM,TRANSESOPHAGEAL N/A 06/30/2023     Procedure: Transesophageal echo (MERCED) intra-procedure log documentation;  Surgeon: Tamir Ritter MD;  Location: [facility] CATH;  Service: Cardiology;  Laterality: N/A;    ECHOCARDIOGRAM,TRANSESOPHAGEAL N/A 02/03/2025    Procedure: ECHOCARDIOGRAM,TRANSESOPHAGEAL;  Surgeon: Ken Wynne MD;  Location: [facility];  Service: Cardiology;  Laterality: N/A;    FRACTURE SURGERY      HERNIA REPAIR      INJECTION OF ANESTHETIC AGENT AROUND NERVE Right 12/21/2018    Procedure: diagnsotic block to the genicular nerve branches to the right knee;  Surgeon: Sj Gonzales MD;  Location: [facility] OR;  Service: Orthopedics;  Laterality: Right;    INSERTION, ELECTRODE LEAD, CARDIAC PACEMAKER, 1 ELECTRODE LEAD  4/22/2025    Procedure: New RV and RA lead insertion;  Surgeon: Ken Wynne MD;  Location: [facility] CATH;  Service: Cardiology;;    INSERTION, ICD, DUAL CHAMBER  4/22/2025    Procedure: BIV ICD upgrade ([employer]);  Surgeon: Ken Wynne MD;  Location: [facility] CATH;  Service: Cardiology;;    INTERNAL NEUROLYSIS USING OPERATING MICROSCOPE Right 02/01/2019    Procedure: cooled radiofrequency ablation to the genicular nerve branches of the right knee;  Surgeon: Sj Gonzales MD;  Location: [facility] OR;  Service: Orthopedics;  Laterality: Right;    KNEE ARTHROSCOPY      PHLEBOGRAPHY  04/15/2025    Procedure: Bilateral Sublcavian venogram;  Surgeon: Ken Wynne MD;  Location: [facility] CATH;  Service: Cardiology;;    SKIN BIOPSY      TONSILLECTOMY      TRANSESOPHAGEAL ECHOCARDIOGRAPHY N/A 06/27/2023    Procedure: ECHOCARDIOGRAM, TRANSESOPHAGEAL;  Surgeon: Tamir Ritter MD;  Location: [facility];  Service: Cardiology;  Laterality: N/A;    TRANSESOPHAGEAL ECHOCARDIOGRAPHY N/A 08/07/2023    Procedure: ECHOCARDIOGRAM, TRANSESOPHAGEAL;  Surgeon: Romeo Guerrero MD;  Location: [facility];  Service: Cardiology;  Laterality: N/A;    TRANSESOPHAGEAL ECHOCARDIOGRAPHY N/A 01/16/2024    Procedure:  ECHOCARDIOGRAM, TRANSESOPHAGEAL;  Surgeon: Romeo Guerrero MD;  Location: [facility];  Service: Cardiology;  Laterality: N/A;    TREATMENT OF CARDIAC ARRHYTHMIA N/A 02/03/2025    Procedure: Cardioversion or Defibrillation;  Surgeon: Ken Wynne MD;  Location: [facility] ENDO;  Service: Cardiology;  Laterality: N/A;    TRIGGER FINGER RELEASE Left 02/03/2022    Procedure: RELEASE, TRIGGER FINGER;  Surgeon: Bay Romo MD;  Location: [facility] OR;  Service: Orthopedics;  Laterality: Left;  Procedure:  Left ring finger trigger release    Position:  Supine    Anesthesia:  Anesthesia must be involved due to the presence of pacemaker defibrillator.  The patient will undergo local anesthesia only for this procedure    Equipment:  Basic handset       Review of patient's allergies indicates:   Allergen Reactions    Multaq [dronedarone] Itching and Swelling    Penicillins Rash    Entresto [sacubitril-valsartan] Other (See Comments)     Hypotension       Social History[1]    Medications Ordered Prior to Encounter[2]    No family history on file.    Review of Systems      Objective:      /60   Pulse 71   Wt 85.5 kg (188 lb 7.9 oz)   SpO2 98%   BMI 27.05 kg/m²   Physical Exam  Constitutional:       General: He is not in acute distress.     Appearance: He is not ill-appearing, toxic-appearing or diaphoretic.   HENT:      Nose: No congestion.   Pulmonary:      Effort: Pulmonary effort is normal.   Neurological:      General: No focal deficit present.      Mental Status: He is alert.      Gait: Gait normal.   Psychiatric:         Mood and Affect: Mood normal.         Behavior: Behavior normal.         Assessment:       1. Encounter to establish care with new doctor    2. PAF (paroxysmal atrial fibrillation)    3. ICD (implantable cardioverter-defibrillator) in place    4. Portal venous hypertension    5. Pancytopenia    6. Hepatic cirrhosis, unspecified hepatic cirrhosis type, unspecified whether ascites present     7. Chronic combined systolic and diastolic heart failure    8. Ischemic cardiomyopathy    9. Essential hypertension    10. Mixed hyperlipidemia    11. S/P CABG x 4    12. History of anxiety        Plan:       Encounter to establish care with new doctor    PAF (paroxysmal atrial fibrillation)  - Feeling better after recent procedure with Dr. Wynne. Follow up as scheduled.    ICD (implantable cardioverter-defibrillator) in place  - Feeling better after recent procedure with Dr. Wynne. Wound looks to be healing well without erythema.    Portal venous hypertension  - Stable. Continue to follow with Dr. Richardson annually.    Pancytopenia  - Stable. Released from Hematology-Oncology. Thought to be secondary to hepatosplenomegaly.    Hepatic cirrhosis, unspecified hepatic cirrhosis type, unspecified whether ascites present  - Stable. Continue to follow with Dr. Richardson annually.    Chronic combined systolic and diastolic heart failure  - Stable. Continue to follow with Noman De Leon.    Ischemic cardiomyopathy  - Stable. Continue to follow with Noman De Leon.    Essential hypertension  - Stable. Continue current regimen.    Mixed hyperlipidemia  - Stable. Continue current regimen.      S/P CABG x 4  - Stable. Continue to follow with Noman De Leon.    History of anxiety  - Stable. Monitor clinically.      Return in three months or sooner if needed.         [1]   Social History  Socioeconomic History    Marital status:    Tobacco Use    Smoking status: Former     Current packs/day: 0.00     Types: Cigarettes     Quit date: 1977     Years since quittin.8    Smokeless tobacco: Never   Substance and Sexual Activity    Alcohol use: Yes     Comment: socially    Drug use: No     Social Drivers of Health     Financial Resource Strain: Low Risk  (10/7/2024)    Overall Financial Resource Strain (CARDIA)     Difficulty of Paying Living Expenses: Not hard at all   Food Insecurity: No Food Insecurity (10/7/2024)     Hunger Vital Sign     Worried About Running Out of Food in the Last Year: Never true     Ran Out of Food in the Last Year: Never true   Transportation Needs: No Transportation Needs (10/7/2024)    TRANSPORTATION NEEDS     Transportation : No   Physical Activity: Unknown (5/14/2024)    Exercise Vital Sign     Days of Exercise per Week: 7 days   Stress: No Stress Concern Present (10/7/2024)    [employer] [place] [employer] - Occupational Stress Questionnaire     Feeling of Stress : Not at all   Housing Stability: Unknown (10/7/2024)    Housing Stability Vital Sign     Unable to Pay for Housing in the Last Year: No     Homeless in the Last Year: No   [2]   Current Outpatient Medications on File Prior to Visit   Medication Sig Dispense Refill    aspirin (ECOTRIN) 81 MG EC tablet Take 81 mg by mouth every evening.      carvediloL (COREG) 6.25 MG tablet Take 1 tablet (6.25 mg total) by mouth 2 (two) times daily. 180 tablet 3    furosemide (LASIX) 20 MG tablet Take 1-2 tablets (20-40 mg total) by mouth daily as needed (swelling).      magnesium 250 mg Tab Take 250 mg by mouth once.      multivitamin capsule Take 1 capsule by mouth once daily.      pantoprazole (PROTONIX) 40 MG tablet Take 1 tablet (40 mg total) by mouth once daily. 90 tablet 3    potassium citrate 99 mg Cap Take 99 mg by mouth every evening.      rosuvastatin (CRESTOR) 10 MG tablet Take 1 tablet (10 mg total) by mouth every evening.      tamsulosin (FLOMAX) 0.4 mg Cap TAKE 1 CAPSULE EVERY EVENING 90 capsule 3     No current facility-administered medications on file prior to visit.

## 2025-05-22 NOTE — Clinical Note
Armando Alves, this patient came in to establish care with me. He was not clear on it, so I said I would reach out to you. He is doing well.

## 2025-05-23 ENCOUNTER — CLINICAL SUPPORT (OUTPATIENT)
Dept: CARDIOLOGY | Facility: HOSPITAL | Age: 85
End: 2025-05-23
Payer: MEDICARE

## 2025-05-23 ENCOUNTER — HOSPITAL ENCOUNTER (OUTPATIENT)
Dept: CARDIOLOGY | Facility: HOSPITAL | Age: 85
Discharge: HOME | End: 2025-05-23
Attending: INTERNAL MEDICINE
Payer: MEDICARE

## 2025-05-23 DIAGNOSIS — Z95.810 PRESENCE OF AUTOMATIC (IMPLANTABLE) CARDIAC DEFIBRILLATOR: ICD-10-CM

## 2025-05-23 LAB
OHS CV AF BURDEN PERCENT: < 1
OHS CV BIV PACING PERCENT: 0 %
OHS CV DC REMOTE DEVICE TYPE: NORMAL
OHS CV RV PACING PERCENT: 0 %

## 2025-05-23 PROCEDURE — 93296 REM INTERROG EVL PM/IDS: CPT | Mod: PO | Performed by: INTERNAL MEDICINE

## 2025-07-16 PROBLEM — I50.43 ACUTE ON CHRONIC COMBINED SYSTOLIC AND DIASTOLIC HEART FAILURE: Status: RESOLVED | Noted: 2024-11-08 | Resolved: 2025-07-16

## 2025-07-16 PROBLEM — R00.1 BRADYCARDIA: Status: ACTIVE | Noted: 2025-07-16

## 2025-07-17 ENCOUNTER — OFFICE VISIT (OUTPATIENT)
Dept: CARDIOLOGY | Facility: CLINIC | Age: 85
End: 2025-07-17
Payer: MEDICARE

## 2025-07-17 ENCOUNTER — HOSPITAL ENCOUNTER (OUTPATIENT)
Dept: CARDIOLOGY | Facility: HOSPITAL | Age: 85
Discharge: HOME | End: 2025-07-17
Attending: INTERNAL MEDICINE
Payer: MEDICARE

## 2025-07-17 VITALS
BODY MASS INDEX: 27.4 KG/M2 | HEIGHT: 70 IN | HEART RATE: 68 BPM | WEIGHT: 191.38 LBS | SYSTOLIC BLOOD PRESSURE: 132 MMHG | DIASTOLIC BLOOD PRESSURE: 66 MMHG

## 2025-07-17 DIAGNOSIS — Z95.818 PRESENCE OF WATCHMAN LEFT ATRIAL APPENDAGE CLOSURE DEVICE: ICD-10-CM

## 2025-07-17 DIAGNOSIS — Z95.810 ICD (IMPLANTABLE CARDIOVERTER-DEFIBRILLATOR) IN PLACE: Chronic | ICD-10-CM

## 2025-07-17 DIAGNOSIS — Z95.1 S/P CABG X 4: ICD-10-CM

## 2025-07-17 DIAGNOSIS — I38 VALVULAR HEART DISEASE: ICD-10-CM

## 2025-07-17 DIAGNOSIS — I65.23 BILATERAL CAROTID ARTERY STENOSIS: ICD-10-CM

## 2025-07-17 DIAGNOSIS — I48.0 PAF (PAROXYSMAL ATRIAL FIBRILLATION): Primary | ICD-10-CM

## 2025-07-17 DIAGNOSIS — I10 ESSENTIAL HYPERTENSION: ICD-10-CM

## 2025-07-17 DIAGNOSIS — I25.810 CORONARY ARTERY DISEASE INVOLVING AUTOLOGOUS VEIN CORONARY BYPASS GRAFT WITHOUT ANGINA PECTORIS: ICD-10-CM

## 2025-07-17 DIAGNOSIS — I25.5 ISCHEMIC CARDIOMYOPATHY: Chronic | ICD-10-CM

## 2025-07-17 DIAGNOSIS — E78.2 MIXED HYPERLIPIDEMIA: ICD-10-CM

## 2025-07-17 DIAGNOSIS — I50.42 CHRONIC COMBINED SYSTOLIC AND DIASTOLIC HEART FAILURE: ICD-10-CM

## 2025-07-17 DIAGNOSIS — R00.1 BRADYCARDIA: ICD-10-CM

## 2025-07-17 LAB — OHS CV CPX PATIENT HEIGHT IN: 70

## 2025-07-17 PROCEDURE — 93284 PRGRMG EVAL IMPLANTABLE DFB: CPT | Mod: PO

## 2025-07-17 PROCEDURE — 99999 PR PBB SHADOW E&M-EST. PATIENT-LVL III: CPT | Mod: PBBFAC,,,

## 2025-07-17 NOTE — PROGRESS NOTES
Subjective:    Patient ID:  Ankit Ruff is a 85 y.o. male patient here for evaluation No chief complaint on file.    History of Present Illness:     Ankit Ruff is a 85 y.o. male who follows with Dr. Guerrero and Dr. Wynne here today for follow up. Last seen in clinic 4/2025.     Underwent successful BiV ICD upgrade with addition of RA lead in 4/2025. Reports significant improvement in symptoms. Able to do things that he has not been able to do in the last 2-3 years.     Device check today: 1 episode of AF, 9 hours in duration, average V rate 69. Otherwise stable check.     Focused Active Problem List includes:  Paroxysmal AF  Atrial flutter, remote RFA (2016)  LAAO via Watchman (6/2024)  Chronic HFrEF d/t ICM (LVEF 30-35%)  AICD in situ - BiV upgrade with addition of RA lead (4/2025)  Valvular disease - mod MR, TR  Coronary artery disease, remote PCI/CABG   Mild carotid disease   Hypertension  Hyperlipidemia      Most Recent Echocardiogram Results  Results for orders placed during the hospital encounter of 02/03/25    Transesophageal echo (MERCED)with possible cardioversion    Interpretation Summary    Left Ventricle: There is reduced systolic function.    Left Atrium: Left atrium is dilated. Watchman well positioned. No leak. No device thrombus. No left atrial thrombus.    Successful cardioversion to sinus bradycardia (200J)      Most Recent Nuclear Stress Test Results  No results found for this or any previous visit.      Most Recent Cardiac PET Stress Test Results  No results found for this or any previous visit.      Most Recent Cardiovascular Angiogram results  Results for orders placed during the hospital encounter of 04/15/25    Cardiac catheterization    Conclusion    Patent right-sided subclavian, axillary, and SVC system    Persistent left SVC with no connection to right SVC    The procedure log was documented by Documenter: Sherly Nagel RN and verified by Ken Wynne MD.    Date:  4/16/2025  Time: 8:58 PM      Other Most Recent Cardiology Results  Results for orders placed in visit on 05/23/25    Cardiac device check - Remote      REVIEW OF SYSTEMS: As noted in HPI   CARDIOVASCULAR: No recent chest pain, palpitations, arm/neck/jaw pain, or edema.  RESPIRATORY: No recent fever, cough, SOB.  : No blood in the urine  GI: No reflux, nausea, vomiting, or blood in stool.   MUSCULOSKELETAL: No falls.   NEURO: No headaches, syncope, or dizziness.  EYES: No sudden changes in vision.     Past Medical History:   Diagnosis Date    Anticoagulant long-term use     Anxiety     Arthritis     At risk for bleeding 06/2023    Atrial fibrillation     CAD (coronary artery disease)     Cataract     OU    CHF (congestive heart failure)     Defibrillator discharge     Hearing loss     Heart failure     Hyperlipidemia     ICD (implantable cardiac defibrillator) in place     Ischemic cardiomyopathy     Myocardial infarction     Sciatica     had seen Dr. 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      Past Surgical History:   Procedure Laterality Date    APPENDECTOMY      CARDIAC SURGERY      cabg    CLOSURE OF LEFT ATRIAL APPENDAGE USING DEVICE Right 06/30/2023    Procedure: Watchman;  Surgeon: Ngoc Ndiaye MD;  Location: [facility] CATH;  Service: Cardiology;  Laterality: Right;    Defibulater  2022    Right chest    ECHOCARDIOGRAM,TRANSESOPHAGEAL N/A 06/30/2023    Procedure: Transesophageal echo (MERCED) intra-procedure log documentation;  Surgeon: Tamir Ritter MD;  Location: [facility] CATH;  Service: Cardiology;  Laterality: N/A;    ECHOCARDIOGRAM,TRANSESOPHAGEAL N/A 02/03/2025    Procedure: ECHOCARDIOGRAM,TRANSESOPHAGEAL;  Surgeon: Ken Wynne MD;  Location: [facility];  Service: Cardiology;  Laterality: N/A;    FRACTURE SURGERY      HERNIA REPAIR      INJECTION OF ANESTHETIC AGENT AROUND NERVE Right 12/21/2018    Procedure: diagnsotic block to the genicular nerve branches to the right  knee;  Surgeon: Sj Gonzales MD;  Location: [facility] OR;  Service: Orthopedics;  Laterality: Right;    INSERTION, ELECTRODE LEAD, CARDIAC PACEMAKER, 1 ELECTRODE LEAD  4/22/2025    Procedure: New RV and RA lead insertion;  Surgeon: Ken Wynne MD;  [facility];  Service: Cardiology;;    INSERTION, ICD, DUAL CHAMBER  4/22/2025    Procedure: BIV ICD upgrade ([employer]);  Surgeon: Ken Wynne MD;  [facility];  Service: Cardiology;;    INTERNAL NEUROLYSIS USING OPERATING MICROSCOPE Right 02/01/2019    Procedure: cooled radiofrequency ablation to the genicular nerve branches of the right knee;  Surgeon: Sj Gonzales MD;  Location: [facility] OR;  Service: Orthopedics;  Laterality: Right;    KNEE ARTHROSCOPY      PHLEBOGRAPHY  04/15/2025    Procedure: Bilateral Sublcavian venogram;  Surgeon: Ken Wynne MD;  Location: [facility] CATH;  Service: Cardiology;;    SKIN BIOPSY      TONSILLECTOMY      TRANSESOPHAGEAL ECHOCARDIOGRAPHY N/A 06/27/2023    Procedure: ECHOCARDIOGRAM, TRANSESOPHAGEAL;  Surgeon: Tamir Ritter MD;  Location: [facility];  Service: Cardiology;  Laterality: N/A;    TRANSESOPHAGEAL ECHOCARDIOGRAPHY N/A 08/07/2023    Procedure: ECHOCARDIOGRAM, TRANSESOPHAGEAL;  Surgeon: Romeo Guerrero MD;  Location: [facility];  Service: Cardiology;  Laterality: N/A;    TRANSESOPHAGEAL ECHOCARDIOGRAPHY N/A 01/16/2024    Procedure: ECHOCARDIOGRAM, TRANSESOPHAGEAL;  Surgeon: Romeo Guerrero MD;  Location: [facility];  Service: Cardiology;  Laterality: N/A;    TREATMENT OF CARDIAC ARRHYTHMIA N/A 02/03/2025    Procedure: Cardioversion or Defibrillation;  Surgeon: Ken Wynne MD;  Location: [facility] ENDO;  Service: Cardiology;  Laterality: N/A;    TRIGGER FINGER RELEASE Left 02/03/2022    Procedure: RELEASE, TRIGGER FINGER;  Surgeon: Bay Romo MD;  Location: [facility] OR;  Service: Orthopedics;  Laterality: Left;  Procedure:  Left ring finger trigger  release    Position:  Supine    Anesthesia:  Anesthesia must be involved due to the presence of pacemaker defibrillator.  The patient will undergo local anesthesia only for this procedure    Equipment:  Basic handset     Social History[1]      Objective      Vitals:    07/17/25 0816   BP: 132/66   Pulse: 68       The ASCVD Risk score (Garcia PATEL, et al., 2019) failed to calculate for the following reasons:    The 2019 ASCVD risk score is only valid for ages 40 to 79    Risk score cannot be calculated because patient has a medical history suggesting prior/existing ASCVD      LAST EKG  Results for orders placed or performed during the hospital encounter of 05/02/25   IN OFFICE EKG 12-LEAD (to [place])    Collection Time: 05/02/25 10:16 AM   Result Value Ref Range    QRS Duration 128 ms    [facility] QTC Calculation 509 ms    Narrative    Test Reason : I25.5,    Vent. Rate :  66 BPM     Atrial Rate :  34 BPM     P-R Int : 294 ms          QRS Dur : 128 ms      QT Int : 486 ms       P-R-T Axes :     73  44 degrees    QTcB Int : 509 ms    AV dual-paced rhythm with prolonged AV conduction  Abnormal ECG  When compared with ECG of 22-Apr-2025 18:05,  Vent. rate has increased by   6 bpm  Confirmed by Carlos George ([id number]) on 5/8/2025 8:10:15 AM    Referred By: CARLOS GEORGE           Confirmed By: Carlos George     LIPIDS - LAST 2   Lab Results   Component Value Date    CHOL 128 05/14/2024    CHOL 139 04/30/2024    HDL 55 05/14/2024    HDL 62 04/30/2024    LDLCALC 62.4 (L) 05/14/2024    LDLCALC 64.8 04/30/2024    TRIG 53 05/14/2024    TRIG 61 04/30/2024    CHOLHDL 43.0 05/14/2024    CHOLHDL 44.6 04/30/2024     CARDIAC PROFILE - LAST 2  Lab Results   Component Value Date     (H) 09/18/2023     (H) 07/25/2022     07/26/2023    TROPONINI <0.012 10/07/2024    TROPONINI <0.012 04/30/2024      CBC - LAST 2  Lab Results   Component Value Date    WBC 3.05 (L) 04/22/2025    WBC 3.14 (L) 10/08/2024    HGB 10.8 (L) 04/22/2025     HGB 10.0 (L) 10/08/2024    HCT 31.0 (L) 04/22/2025    HCT 29.0 (L) 10/08/2024    PLT 49 (L) 04/22/2025    PLT 62 (L) 10/08/2024     Lab Results   Component Value Date    LABPT 16.4 (H) 10/08/2024    LABPT 17.2 (H) 05/01/2024    INR 1.3 10/08/2024    INR 1.4 05/01/2024    APTT 35.5 05/01/2024    APTT 31.7 08/09/2023     CHEMISTRY - LAST 2  Lab Results   Component Value Date     04/22/2025     04/15/2025    K 4.1 04/22/2025    K 4.1 04/15/2025    CO2 26 04/22/2025    CO2 24 04/15/2025    BUN 19 04/22/2025    BUN 22 04/15/2025    CREATININE 0.83 04/22/2025    CREATININE 0.72 04/15/2025     04/22/2025     04/15/2025    CALCIUM 8.1 (L) 04/22/2025    CALCIUM 8.0 (L) 04/15/2025    MG 2.1 10/08/2024    MG 2.2 04/30/2024    ALBUMIN 3.3 (L) 04/22/2025    ALBUMIN 3.4 (L) 02/13/2025    ALT 16 04/22/2025    ALT 14 02/13/2025    AST 29 04/22/2025    AST 29 02/13/2025      ENDOCRINE - LAST 2  Lab Results   Component Value Date    HGBA1C 5.2 04/30/2024    TSH 4.796 (H) 02/13/2025    TSH 2.531 05/14/2024        PHYSICAL EXAM  CONSTITUTIONAL: Well built, well nourished in no apparent distress  NECK: no carotid bruit, no JVD  LUNGS: CTA  CHEST WALL: no tenderness  HEART: regular rate and rhythm, S1, S2 normal, no murmur, click, rub or gallop   ABDOMEN: soft, non-tender; bowel sounds normal; no masses,  no organomegaly  EXTREMITIES: Extremities normal, no edema, no calf tenderness noted  NEURO: AAO X 3    I HAVE REVIEWED :    The vital signs, most recent cardiac testing, and most recent pertinent non-cardiology provider notes.    Current Outpatient Medications   Medication Instructions    aspirin (ECOTRIN) 81 mg, Nightly    carvediloL (COREG) 6.25 mg, Oral, 2 times daily    furosemide (LASIX) 20-40 mg, Oral, Daily PRN    magnesium 250 mg, Once    multivitamin capsule 1 capsule, Daily    pantoprazole (PROTONIX) 40 mg, Oral, Daily    potassium citrate 99 mg, Nightly    rosuvastatin (CRESTOR) 10 mg, Oral,  Nightly    tamsulosin (FLOMAX) 0.4 mg, Oral, Nightly        Assessment   1. PAF (paroxysmal atrial fibrillation) (Primary)      2. Bradycardia      3. Ischemic cardiomyopathy      4. ICD (implantable cardioverter-defibrillator) in place      5. Coronary artery disease involving autologous vein coronary bypass graft without angina pectoris      6. S/P CABG x 4      7. Chronic combined systolic and diastolic heart failure      8. Valvular heart disease      9. Presence of Watchman left atrial appendage closure device      10. Bilateral carotid artery stenosis      11. Essential hypertension      12. Mixed hyperlipidemia         Plan to address above diagnoses:     No change to current plan.     Continue aspirin 81 mg daily  Continue carvedilol 6.25 mg BID  Continue furosemide PRN  Continue rosuvastatin 10 mg daily      I emphasized the importance of modifying lifestyle related risk factors including tobacco avoidance, limiting alcohol intake, aerobic exercise, weight management and Mediterranean diet.      Follow up in about 6 months (around 2026). With Dr. Wynne.      Noman Ponce NP  [facility]   Office: [phone]       [1]   Social History  Tobacco Use    Smoking status: Former     Current packs/day: 0.00     Types: Cigarettes     Quit date: 1977     Years since quittin.9    Smokeless tobacco: Never   Substance Use Topics    Alcohol use: Yes     Comment: socially    Drug use: No

## 2025-07-31 ENCOUNTER — TELEPHONE (OUTPATIENT)
Dept: PRIMARY CARE CLINIC | Facility: CLINIC | Age: 85
End: 2025-07-31
Payer: MEDICARE

## 2025-08-06 ENCOUNTER — NURSE TRIAGE (OUTPATIENT)
Dept: ADMINISTRATIVE | Facility: CLINIC | Age: 85
End: 2025-08-06
Payer: MEDICARE

## 2025-08-06 ENCOUNTER — OFFICE VISIT (OUTPATIENT)
Dept: FAMILY MEDICINE | Facility: CLINIC | Age: 85
End: 2025-08-06
Payer: MEDICARE

## 2025-08-06 VITALS
DIASTOLIC BLOOD PRESSURE: 68 MMHG | WEIGHT: 190.25 LBS | BODY MASS INDEX: 27.24 KG/M2 | HEART RATE: 86 BPM | OXYGEN SATURATION: 97 % | SYSTOLIC BLOOD PRESSURE: 118 MMHG | HEIGHT: 70 IN

## 2025-08-06 DIAGNOSIS — J06.9 UPPER RESPIRATORY INFECTION WITH COUGH AND CONGESTION: Primary | ICD-10-CM

## 2025-08-06 DIAGNOSIS — I10 ESSENTIAL HYPERTENSION: ICD-10-CM

## 2025-08-06 PROCEDURE — 1101F PT FALLS ASSESS-DOCD LE1/YR: CPT | Mod: CPTII,S$GLB,, | Performed by: PHYSICIAN ASSISTANT

## 2025-08-06 PROCEDURE — 1159F MED LIST DOCD IN RCRD: CPT | Mod: CPTII,S$GLB,, | Performed by: PHYSICIAN ASSISTANT

## 2025-08-06 PROCEDURE — 1126F AMNT PAIN NOTED NONE PRSNT: CPT | Mod: CPTII,S$GLB,, | Performed by: PHYSICIAN ASSISTANT

## 2025-08-06 PROCEDURE — 99999 PR PBB SHADOW E&M-EST. PATIENT-LVL III: CPT | Mod: PBBFAC,,, | Performed by: PHYSICIAN ASSISTANT

## 2025-08-06 PROCEDURE — 1160F RVW MEDS BY RX/DR IN RCRD: CPT | Mod: CPTII,S$GLB,, | Performed by: PHYSICIAN ASSISTANT

## 2025-08-06 PROCEDURE — 3288F FALL RISK ASSESSMENT DOCD: CPT | Mod: CPTII,S$GLB,, | Performed by: PHYSICIAN ASSISTANT

## 2025-08-06 PROCEDURE — 99214 OFFICE O/P EST MOD 30 MIN: CPT | Mod: S$GLB,,, | Performed by: PHYSICIAN ASSISTANT

## 2025-08-06 RX ORDER — DOXYCYCLINE 100 MG/1
100 CAPSULE ORAL 2 TIMES DAILY
Qty: 14 CAPSULE | Refills: 0 | Status: SHIPPED | OUTPATIENT
Start: 2025-08-06 | End: 2025-08-13

## 2025-08-06 NOTE — PROGRESS NOTES
"Subjective:      Patient ID: Ankit Ruff is a 85 y.o. male.    Chief Complaint: Cough (Patient has not been feeling well for 10 days. Patient was given an OTC medication to help. Medication worked for a few days and then he started feeling better again.), Headache, Nasal Congestion, Sore Throat, and Chest Congestion    Patient is new to me.    HPI  Patient's active medical issues include afib, HLD, CAD s/p CABG, HTN, CHF, BPPV, pancytopenia, and ICD in place.    Patient reports nasal congestion, cough, and sore throat for 10 days.  Patient felt better and then felt worse today.  He states that he was thinking of going to the ER.  No sick contacts.  Taken antihistamine daily without relief.  Denies fever, shortness of breath, or chest pain.    Review of Systems   Constitutional:  Negative for appetite change, chills and fever.   HENT:  Positive for congestion and sore throat. Negative for ear pain, sinus pressure and sinus pain.    Respiratory:  Positive for cough. Negative for shortness of breath.    Cardiovascular:  Negative for chest pain.   Gastrointestinal:  Negative for abdominal pain, constipation, diarrhea, nausea and vomiting.   Neurological:  Negative for headaches.       Objective:   /68   Pulse 86   Ht 5' 10" (1.778 m)   Wt 86.3 kg (190 lb 4.1 oz)   SpO2 97%   BMI 27.30 kg/m²     Physical Exam  Vitals reviewed.   Constitutional:       Appearance: Normal appearance. He is well-developed.   HENT:      Head: Normocephalic and atraumatic.      Right Ear: External ear normal. Decreased hearing noted.      Left Ear: External ear normal. Decreased hearing noted.      Nose: Congestion present.      Right Sinus: No maxillary sinus tenderness or frontal sinus tenderness.      Left Sinus: No maxillary sinus tenderness or frontal sinus tenderness.      Mouth/Throat:      Lips: Pink.   Eyes:      General: Lids are normal.      Conjunctiva/sclera: Conjunctivae normal.   Cardiovascular:      Rate and " Rhythm: Normal rate and regular rhythm.      Heart sounds: Normal heart sounds. No murmur heard.     No friction rub. No gallop.   Pulmonary:      Effort: Pulmonary effort is normal. No respiratory distress.      Breath sounds: Normal breath sounds. No wheezing, rhonchi or rales.   Musculoskeletal:         General: Normal range of motion.   Lymphadenopathy:      Cervical: No cervical adenopathy.   Skin:     General: Skin is warm and dry.      Findings: No rash.   Neurological:      General: No focal deficit present.      Mental Status: He is alert and oriented to person, place, and time.   Psychiatric:         Mood and Affect: Mood normal.         Behavior: Behavior normal. Behavior is cooperative.         Judgment: Judgment normal.       Assessment:      1. Upper respiratory infection with cough and congestion    2. Essential hypertension       Plan:   1. Upper respiratory infection with cough and congestion (Primary)  Take antibiotic with food.  Eat yogurt and/or take probiotic while on medication.  - doxycycline (MONODOX) 100 MG capsule; Take 1 capsule (100 mg total) by mouth 2 (two) times daily. for 7 days  Dispense: 14 capsule; Refill: 0    2. Essential hypertension  Controlled.    Follow up as scheduled.  Patient agreed with plan and expressed understanding.    Thank you for allowing me to serve you,

## 2025-08-06 NOTE — TELEPHONE ENCOUNTER
Pt stated he had a bad head cold for a week and half. He went to the pharmacy and they gave him an antihistamine and told him to see pcp if he wasn't feeling better to check in with his pcp. Phlegm is clear now at first it looked like mustard and mayonnaise. Head and nasal congestion. Care advice recommends pt see Md within 3 days. Appt given.  Reason for Disposition   [1] Sinus congestion (pressure, fullness) AND [2] present > 10 days    Additional Information   Negative: SEVERE difficulty breathing (e.g., struggling for each breath, speaks in single words)   Negative: Sounds like a life-threatening emergency to the triager   Negative: Fever > 104 F (40 C)   Negative: Patient sounds very sick or weak to the triager   Negative: [1] Fever > 101 F (38.3 C) AND [2] age > 60 years   Negative: [1] Fever > 100 F (37.8 C) AND [2] bedridden (e.g., CVA, chronic illness, recovering from surgery)   Negative: [1] Fever > 100 F (37.8 C) AND [2] diabetes mellitus or weak immune system (e.g., HIV positive, cancer chemo, splenectomy, organ transplant, chronic steroids)   Negative: Fever present > 3 days (72 hours)   Negative: [1] Fever returns after gone for over 24 hours AND [2] symptoms worse or not improved   Negative: [1] Sinus pain (not just congestion) AND [2] fever   Negative: Earache   Negative: [1] SEVERE sore throat AND [2] present > 24 hours    Protocols used: Common Cold-A-

## 2025-08-21 ENCOUNTER — CLINICAL SUPPORT (OUTPATIENT)
Dept: CARDIOLOGY | Facility: HOSPITAL | Age: 85
End: 2025-08-21
Payer: MEDICARE

## 2025-08-21 ENCOUNTER — HOSPITAL ENCOUNTER (OUTPATIENT)
Dept: CARDIOLOGY | Facility: HOSPITAL | Age: 85
Discharge: HOME | End: 2025-08-21
Attending: INTERNAL MEDICINE
Payer: MEDICARE

## 2025-08-21 DIAGNOSIS — Z95.810 PRESENCE OF AUTOMATIC (IMPLANTABLE) CARDIAC DEFIBRILLATOR: ICD-10-CM

## 2025-08-22 ENCOUNTER — TELEPHONE (OUTPATIENT)
Dept: CARDIOLOGY | Facility: HOSPITAL | Age: 85
End: 2025-08-22
Payer: MEDICARE

## 2025-08-26 ENCOUNTER — HOSPITAL ENCOUNTER (OUTPATIENT)
Dept: CARDIOLOGY | Facility: HOSPITAL | Age: 85
Discharge: HOME | End: 2025-08-26
Attending: INTERNAL MEDICINE
Payer: MEDICARE

## 2025-08-26 ENCOUNTER — CLINICAL SUPPORT (OUTPATIENT)
Dept: CARDIOLOGY | Facility: HOSPITAL | Age: 85
End: 2025-08-26
Payer: MEDICARE

## 2025-08-26 DIAGNOSIS — Z95.810 PRESENCE OF AUTOMATIC (IMPLANTABLE) CARDIAC DEFIBRILLATOR: ICD-10-CM

## 2025-08-26 PROCEDURE — 93295 DEV INTERROG REMOTE 1/2/MLT: CPT | Mod: ,,, | Performed by: INTERNAL MEDICINE

## 2025-08-26 PROCEDURE — 93296 REM INTERROG EVL PM/IDS: CPT | Mod: PO | Performed by: INTERNAL MEDICINE

## 2025-08-27 ENCOUNTER — TELEPHONE (OUTPATIENT)
Dept: CARDIOLOGY | Facility: HOSPITAL | Age: 85
End: 2025-08-27
Payer: MEDICARE

## 2025-08-31 LAB
OHS CV AF BURDEN PERCENT: 100
OHS CV AF BURDEN PERCENT: 11.9
OHS CV BIV PACING PERCENT: 0 %
OHS CV BIV PACING PERCENT: 0 %
OHS CV DC REMOTE DEVICE TYPE: NORMAL
OHS CV DC REMOTE DEVICE TYPE: NORMAL
OHS CV ICD SHOCK: NO
OHS CV ICD SHOCK: NO
OHS CV RV PACING PERCENT: 0 %
OHS CV RV PACING PERCENT: 0 %

## 2025-09-02 ENCOUNTER — OFFICE VISIT (OUTPATIENT)
Dept: CARDIOLOGY | Facility: CLINIC | Age: 85
End: 2025-09-02
Payer: MEDICARE

## 2025-09-02 VITALS
BODY MASS INDEX: 27.84 KG/M2 | SYSTOLIC BLOOD PRESSURE: 113 MMHG | WEIGHT: 194.44 LBS | HEART RATE: 81 BPM | HEIGHT: 70 IN | DIASTOLIC BLOOD PRESSURE: 68 MMHG

## 2025-09-02 DIAGNOSIS — I50.42 CHRONIC COMBINED SYSTOLIC AND DIASTOLIC HEART FAILURE: ICD-10-CM

## 2025-09-02 DIAGNOSIS — I25.5 ISCHEMIC CARDIOMYOPATHY: Chronic | ICD-10-CM

## 2025-09-02 DIAGNOSIS — R00.1 BRADYCARDIA: ICD-10-CM

## 2025-09-02 DIAGNOSIS — I10 ESSENTIAL HYPERTENSION: ICD-10-CM

## 2025-09-02 DIAGNOSIS — Z95.810 ICD (IMPLANTABLE CARDIOVERTER-DEFIBRILLATOR) IN PLACE: Chronic | ICD-10-CM

## 2025-09-02 DIAGNOSIS — E78.2 MIXED HYPERLIPIDEMIA: ICD-10-CM

## 2025-09-02 DIAGNOSIS — I48.0 PAF (PAROXYSMAL ATRIAL FIBRILLATION): Primary | ICD-10-CM

## 2025-09-02 DIAGNOSIS — Z95.1 S/P CABG X 4: ICD-10-CM

## 2025-09-02 DIAGNOSIS — I25.810 CORONARY ARTERY DISEASE INVOLVING AUTOLOGOUS VEIN CORONARY BYPASS GRAFT WITHOUT ANGINA PECTORIS: ICD-10-CM

## 2025-09-02 DIAGNOSIS — Z95.818 PRESENCE OF WATCHMAN LEFT ATRIAL APPENDAGE CLOSURE DEVICE: ICD-10-CM

## 2025-09-02 PROCEDURE — 93010 ELECTROCARDIOGRAM REPORT: CPT | Mod: S$GLB,,, | Performed by: INTERNAL MEDICINE

## 2025-09-02 PROCEDURE — 1101F PT FALLS ASSESS-DOCD LE1/YR: CPT | Mod: CPTII,S$GLB,,

## 2025-09-02 PROCEDURE — 1126F AMNT PAIN NOTED NONE PRSNT: CPT | Mod: CPTII,S$GLB,,

## 2025-09-02 PROCEDURE — 99214 OFFICE O/P EST MOD 30 MIN: CPT | Mod: S$GLB,,,

## 2025-09-02 PROCEDURE — 99999 PR PBB SHADOW E&M-EST. PATIENT-LVL II: CPT | Mod: PBBFAC,,,

## 2025-09-02 PROCEDURE — 3288F FALL RISK ASSESSMENT DOCD: CPT | Mod: CPTII,S$GLB,,

## 2025-09-03 LAB
OHS QRS DURATION: 130 MS
OHS QTC CALCULATION: 529 MS

## (undated) DEVICE — NDL HYPO 27G X 1 1/2

## (undated) DEVICE — GAUZE SPONGE 4X4 12PLY

## (undated) DEVICE — SYR DISP LL 5CC

## (undated) DEVICE — NDL SPINAL 25GX3.5 SPINOCAN

## (undated) DEVICE — APPLICATOR CHLORAPREP CLR 10.5

## (undated) DEVICE — TOWEL OR DISP STRL BLUE 4/PK

## (undated) DEVICE — SYR 10CC LUER LOCK

## (undated) DEVICE — TOURNIQUET SB QC DP 18X4IN

## (undated) DEVICE — SEE MEDLINE ITEM 152678

## (undated) DEVICE — SEE MEDLINE ITEM 157131

## (undated) DEVICE — SEE MEDLINE ITEM 157173

## (undated) DEVICE — SEE MEDLINE ITEM 157128

## (undated) DEVICE — TUBING SUC UNIV W/CONN 12FT

## (undated) DEVICE — PAD CAST SPECIALIST STRL 3

## (undated) DEVICE — BOWL STERILE LARGE 32OZ

## (undated) DEVICE — BAND RUBBER STERILE 1/4X3.5IN

## (undated) DEVICE — SEE L#120831

## (undated) DEVICE — APPLICATOR CHLORAPREP ORN 26ML

## (undated) DEVICE — BANDAGE ESMARK LATEX FREE 4INX

## (undated) DEVICE — GLOVE PROTEXIS LTX MICRO  7.5

## (undated) DEVICE — DRAPE PLASTIC U 60X72

## (undated) DEVICE — CORD BIPOLAR 12 FOOT

## (undated) DEVICE — SOL IRR STRL WATER 500ML

## (undated) DEVICE — ALCOHOL 70% ISOP RUBBING 4OZ

## (undated) DEVICE — NDL 27G X 1 1/4

## (undated) DEVICE — MARKER SKIN STND TIP BLUE BARR

## (undated) DEVICE — CUP MEDICINE STERILE 2OZ

## (undated) DEVICE — SEE MEDLINE ITEM 152622

## (undated) DEVICE — BANDAGE ELAS SOFTWRAP ST 3X5YD

## (undated) DEVICE — GLOVE SURG ULTRA TOUCH 8

## (undated) DEVICE — NDL 18GA X1 1/2 REG BEVEL

## (undated) DEVICE — Device

## (undated) DEVICE — DRESSING XEROFORM FOIL PK 1X8

## (undated) DEVICE — GLOVE PROTEXIS LTX MICRO 8

## (undated) DEVICE — PAD GROUNDING DISPER ELECTRODE

## (undated) DEVICE — DRAPE STERI-DRAPE 1000 17X11IN

## (undated) DEVICE — GLOVE SURGICAL LATEX SZ 8

## (undated) DEVICE — LABEL FOR UTILITY MARKER

## (undated) DEVICE — FORCEP STRAIGHT DISP